# Patient Record
Sex: FEMALE | HISPANIC OR LATINO | Employment: UNEMPLOYED | URBAN - METROPOLITAN AREA
[De-identification: names, ages, dates, MRNs, and addresses within clinical notes are randomized per-mention and may not be internally consistent; named-entity substitution may affect disease eponyms.]

---

## 2020-02-12 ENCOUNTER — OFFICE VISIT (OUTPATIENT)
Dept: FAMILY MEDICINE CLINIC | Facility: CLINIC | Age: 60
End: 2020-02-12

## 2020-02-12 ENCOUNTER — LAB (OUTPATIENT)
Dept: FAMILY MEDICINE CLINIC | Facility: CLINIC | Age: 60
End: 2020-02-12

## 2020-02-12 ENCOUNTER — RESULTS ENCOUNTER (OUTPATIENT)
Dept: FAMILY MEDICINE CLINIC | Facility: CLINIC | Age: 60
End: 2020-02-12

## 2020-02-12 VITALS
HEIGHT: 65 IN | SYSTOLIC BLOOD PRESSURE: 148 MMHG | WEIGHT: 200.6 LBS | OXYGEN SATURATION: 98 % | HEART RATE: 94 BPM | DIASTOLIC BLOOD PRESSURE: 83 MMHG | BODY MASS INDEX: 33.42 KG/M2 | RESPIRATION RATE: 16 BRPM | TEMPERATURE: 97.4 F

## 2020-02-12 DIAGNOSIS — I10 HYPERTENSION, UNSPECIFIED TYPE: Primary | ICD-10-CM

## 2020-02-12 DIAGNOSIS — Z23 NEED FOR SHINGLES VACCINE: ICD-10-CM

## 2020-02-12 DIAGNOSIS — Z12.39 BREAST CANCER SCREENING: ICD-10-CM

## 2020-02-12 DIAGNOSIS — M25.50 ARTHRALGIA, UNSPECIFIED JOINT: ICD-10-CM

## 2020-02-12 DIAGNOSIS — G47.00 INSOMNIA, UNSPECIFIED TYPE: ICD-10-CM

## 2020-02-12 DIAGNOSIS — F39 MOOD DISORDER (HCC): ICD-10-CM

## 2020-02-12 DIAGNOSIS — E11.9 TYPE 2 DIABETES MELLITUS WITHOUT COMPLICATION, WITHOUT LONG-TERM CURRENT USE OF INSULIN (HCC): ICD-10-CM

## 2020-02-12 DIAGNOSIS — Z12.11 COLON CANCER SCREENING: ICD-10-CM

## 2020-02-12 DIAGNOSIS — I10 HYPERTENSION, UNSPECIFIED TYPE: ICD-10-CM

## 2020-02-12 LAB
ALBUMIN SERPL-MCNC: 4.5 G/DL (ref 3.5–5.2)
ALBUMIN/GLOB SERPL: 1.4 G/DL
ALP SERPL-CCNC: 114 U/L (ref 39–117)
ALT SERPL W P-5'-P-CCNC: 14 U/L (ref 1–33)
ANION GAP SERPL CALCULATED.3IONS-SCNC: 12.8 MMOL/L (ref 5–15)
AST SERPL-CCNC: 14 U/L (ref 1–32)
BASOPHILS # BLD AUTO: 0.02 10*3/MM3 (ref 0–0.2)
BASOPHILS NFR BLD AUTO: 0.2 % (ref 0–1.5)
BILIRUB SERPL-MCNC: 0.2 MG/DL (ref 0.2–1.2)
BUN BLD-MCNC: 13 MG/DL (ref 6–20)
BUN/CREAT SERPL: 18.6 (ref 7–25)
CALCIUM SPEC-SCNC: 9.5 MG/DL (ref 8.6–10.5)
CHLORIDE SERPL-SCNC: 97 MMOL/L (ref 98–107)
CHOLEST SERPL-MCNC: 236 MG/DL (ref 0–200)
CHROMATIN AB SERPL-ACNC: <10 IU/ML (ref 0–14)
CO2 SERPL-SCNC: 26.2 MMOL/L (ref 22–29)
CREAT BLD-MCNC: 0.7 MG/DL (ref 0.57–1)
CRP SERPL-MCNC: 1.89 MG/DL (ref 0–0.5)
DEPRECATED RDW RBC AUTO: 40.8 FL (ref 37–54)
EOSINOPHIL # BLD AUTO: 0.15 10*3/MM3 (ref 0–0.4)
EOSINOPHIL NFR BLD AUTO: 1.6 % (ref 0.3–6.2)
ERYTHROCYTE [DISTWIDTH] IN BLOOD BY AUTOMATED COUNT: 12.5 % (ref 12.3–15.4)
GFR SERPL CREATININE-BSD FRML MDRD: 105 ML/MIN/1.73
GFR SERPL CREATININE-BSD FRML MDRD: 86 ML/MIN/1.73
GLOBULIN UR ELPH-MCNC: 3.3 GM/DL
GLUCOSE BLD-MCNC: 232 MG/DL (ref 65–99)
HBA1C MFR BLD: 10.7 % (ref 3.5–5.6)
HCT VFR BLD AUTO: 42.9 % (ref 34–46.6)
HDLC SERPL-MCNC: 38 MG/DL (ref 40–60)
HGB BLD-MCNC: 14.6 G/DL (ref 12–15.9)
IMM GRANULOCYTES # BLD AUTO: 0.04 10*3/MM3 (ref 0–0.05)
IMM GRANULOCYTES NFR BLD AUTO: 0.4 % (ref 0–0.5)
LDLC SERPL CALC-MCNC: 158 MG/DL (ref 0–100)
LDLC/HDLC SERPL: 4.16 {RATIO}
LYMPHOCYTES # BLD AUTO: 4.06 10*3/MM3 (ref 0.7–3.1)
LYMPHOCYTES NFR BLD AUTO: 43 % (ref 19.6–45.3)
MCH RBC QN AUTO: 31.2 PG (ref 26.6–33)
MCHC RBC AUTO-ENTMCNC: 34 G/DL (ref 31.5–35.7)
MCV RBC AUTO: 91.7 FL (ref 79–97)
MONOCYTES # BLD AUTO: 0.46 10*3/MM3 (ref 0.1–0.9)
MONOCYTES NFR BLD AUTO: 4.9 % (ref 5–12)
NEUTROPHILS # BLD AUTO: 4.72 10*3/MM3 (ref 1.7–7)
NEUTROPHILS NFR BLD AUTO: 49.9 % (ref 42.7–76)
NRBC BLD AUTO-RTO: 0 /100 WBC (ref 0–0.2)
PLATELET # BLD AUTO: 294 10*3/MM3 (ref 140–450)
PMV BLD AUTO: 10.9 FL (ref 6–12)
POTASSIUM BLD-SCNC: 4.4 MMOL/L (ref 3.5–5.2)
PROT SERPL-MCNC: 7.8 G/DL (ref 6–8.5)
RBC # BLD AUTO: 4.68 10*6/MM3 (ref 3.77–5.28)
SODIUM BLD-SCNC: 136 MMOL/L (ref 136–145)
TRIGL SERPL-MCNC: 199 MG/DL (ref 0–150)
TSH SERPL DL<=0.05 MIU/L-ACNC: 2.35 UIU/ML (ref 0.27–4.2)
VLDLC SERPL-MCNC: 39.8 MG/DL (ref 5–40)
WBC NRBC COR # BLD: 9.45 10*3/MM3 (ref 3.4–10.8)

## 2020-02-12 PROCEDURE — 86431 RHEUMATOID FACTOR QUANT: CPT | Performed by: NURSE PRACTITIONER

## 2020-02-12 PROCEDURE — 86140 C-REACTIVE PROTEIN: CPT | Performed by: NURSE PRACTITIONER

## 2020-02-12 PROCEDURE — 86038 ANTINUCLEAR ANTIBODIES: CPT | Performed by: NURSE PRACTITIONER

## 2020-02-12 PROCEDURE — 80061 LIPID PANEL: CPT | Performed by: NURSE PRACTITIONER

## 2020-02-12 PROCEDURE — 84443 ASSAY THYROID STIM HORMONE: CPT | Performed by: NURSE PRACTITIONER

## 2020-02-12 PROCEDURE — 36415 COLL VENOUS BLD VENIPUNCTURE: CPT

## 2020-02-12 PROCEDURE — 80053 COMPREHEN METABOLIC PANEL: CPT | Performed by: NURSE PRACTITIONER

## 2020-02-12 PROCEDURE — 83036 HEMOGLOBIN GLYCOSYLATED A1C: CPT | Performed by: NURSE PRACTITIONER

## 2020-02-12 PROCEDURE — 99204 OFFICE O/P NEW MOD 45 MIN: CPT | Performed by: NURSE PRACTITIONER

## 2020-02-12 PROCEDURE — 85025 COMPLETE CBC W/AUTO DIFF WBC: CPT | Performed by: NURSE PRACTITIONER

## 2020-02-12 RX ORDER — GLIPIZIDE 10 MG/1
10 TABLET ORAL
Qty: 180 TABLET | Refills: 1 | Status: SHIPPED | OUTPATIENT
Start: 2020-02-12 | End: 2020-05-11 | Stop reason: SDUPTHER

## 2020-02-12 RX ORDER — LISINOPRIL 10 MG/1
10 TABLET ORAL DAILY
COMMUNITY
End: 2020-02-12

## 2020-02-12 RX ORDER — AMITRIPTYLINE HYDROCHLORIDE 75 MG/1
75 TABLET, FILM COATED ORAL NIGHTLY
COMMUNITY
End: 2020-02-12 | Stop reason: SDUPTHER

## 2020-02-12 RX ORDER — GLIPIZIDE 10 MG/1
10 TABLET ORAL
COMMUNITY
End: 2020-02-12 | Stop reason: SDUPTHER

## 2020-02-12 RX ORDER — MELOXICAM 15 MG/1
15 TABLET ORAL DAILY
Qty: 30 TABLET | Refills: 0 | Status: SHIPPED | OUTPATIENT
Start: 2020-02-12 | End: 2020-05-11 | Stop reason: SDUPTHER

## 2020-02-12 RX ORDER — ZOLPIDEM TARTRATE 10 MG/1
10 TABLET ORAL NIGHTLY PRN
COMMUNITY
End: 2020-02-12 | Stop reason: SDUPTHER

## 2020-02-12 RX ORDER — LISINOPRIL 20 MG/1
20 TABLET ORAL DAILY
Qty: 30 TABLET | Refills: 2 | Status: SHIPPED | OUTPATIENT
Start: 2020-02-12 | End: 2020-05-11 | Stop reason: SDUPTHER

## 2020-02-12 RX ORDER — ZOLPIDEM TARTRATE 10 MG/1
10 TABLET ORAL NIGHTLY PRN
Qty: 28 TABLET | Refills: 2 | Status: SHIPPED | OUTPATIENT
Start: 2020-02-12 | End: 2020-05-11 | Stop reason: SDUPTHER

## 2020-02-12 RX ORDER — AMITRIPTYLINE HYDROCHLORIDE 75 MG/1
75 TABLET, FILM COATED ORAL NIGHTLY
Qty: 90 TABLET | Refills: 1 | Status: SHIPPED | OUTPATIENT
Start: 2020-02-12 | End: 2020-05-11 | Stop reason: SDUPTHER

## 2020-02-12 NOTE — PROGRESS NOTES
Subjective   Carli Dubon is a 59 y.o. female.     Pt is here today to establish care and with c/o HTN, insomnia, DM.  Pt is from [place] but moved here in Oct.  She is single with 2 children.  She lives with her daughter and 6 grandchildren.  Saw her previous PCP in August.    HTN- currently takes lisinopril 10mg daily.  At home it runs 130/90.    Insomnia- currently takes elavil 75mg nightly and Ambien 10mg nightly.    DM- currently takes metformin 1000mg BID and glipizide 10mg BID.  Does not check BS at home, but does have a meter. Denies any hypoglycemic episodes. Eats a well balanced diet.  She walks frequently.    Bipolar- was previously on meds but lost medicaid and couldn't go to the psychiatrist.  Has mood swings frequently.  Has occasional anxiety and depression.    Pt reports that she has difficulty walking long distances.  She states that she has trouble gripping items because her hand bother her so much.  Pt reports that she has low back pain and bilateral hip pain.  On average pain is a 8/10.      Labs- due  Pap smear- 08/2019- normal  Mammogram- due  DEXA- post menopausal for 4 years.  Colonoscopy- never had- no family history- wants cologuard    Vaccines:  Flu- refused  PNA- believes she had one 15-20 years ago.  Shingles-due   Tdap- due- had 10 years ago    Dental exam- UTD  Eye exam- due         The following portions of the patient's history were reviewed and updated as appropriate: allergies, current medications, past family history, past medical history, past social history, past surgical history and problem list.    Review of Systems   Constitutional: Negative for appetite change, chills, fatigue and fever.   HENT: Negative for congestion, ear pain, hearing loss, postnasal drip, rhinorrhea, sinus pressure, sore throat, swollen glands and trouble swallowing.    Eyes: Negative for blurred vision, double vision, pain, discharge, itching and visual disturbance.   Respiratory: Negative for cough, chest  "tightness, shortness of breath and wheezing.    Cardiovascular: Negative for chest pain and palpitations.   Gastrointestinal: Negative for abdominal pain, blood in stool, constipation, diarrhea, nausea and vomiting.   Endocrine: Negative for polydipsia, polyphagia and polyuria.   Genitourinary: Negative for dysuria, flank pain, frequency and urgency.   Musculoskeletal: Positive for arthralgias, back pain and myalgias. Negative for joint swelling.   Skin: Negative for rash and skin lesions.   Neurological: Negative for dizziness, weakness, numbness and headache.   Psychiatric/Behavioral: Positive for sleep disturbance and stress. Negative for depressed mood. The patient is not nervous/anxious.        Objective   /83 (BP Location: Left arm, Patient Position: Sitting, Cuff Size: Large Adult)   Pulse 94   Temp 97.4 °F (36.3 °C) (Oral)   Resp 16   Ht 165.1 cm (65\")   Wt 91 kg (200 lb 9.6 oz)   SpO2 98%   BMI 33.38 kg/m²   Physical Exam   Constitutional: She is oriented to person, place, and time. She appears well-developed and well-nourished. No distress.   HENT:   Head: Normocephalic and atraumatic.   Eyes: Pupils are equal, round, and reactive to light. Conjunctivae and EOM are normal. Right eye exhibits no discharge. Left eye exhibits no discharge.   Neck: Normal range of motion. Neck supple.   Cardiovascular: Normal rate and regular rhythm.   Pulmonary/Chest: Effort normal and breath sounds normal. No respiratory distress. She has no wheezes. She has no rales.   Abdominal: Soft. Normal appearance and bowel sounds are normal. She exhibits no distension. There is no tenderness.   Musculoskeletal: Normal range of motion.   Neurological: She is alert and oriented to person, place, and time.   Skin: Skin is warm and dry. She is not diaphoretic.   Psychiatric: She has a normal mood and affect. Her behavior is normal. Thought content normal.   Vitals reviewed.        Assessment/Plan     Diagnoses and all orders " for this visit:    1. Hypertension, unspecified type (Primary)  Comments:  Increase lisinopril to 20 mg daily  Check blood pressure  Follow-up 1 month  Orders:  -     CBC & Differential  -     Lipid Panel; Future  -     TSH; Future  -     lisinopril (PRINIVIL,ZESTRIL) 20 MG tablet; Take 1 tablet by mouth Daily.  Dispense: 30 tablet; Refill: 2  -     CBC Auto Differential    2. Colon cancer screening  -     Cologuard - Stool, Per Rectum; Future    3. Breast cancer screening  -     Mammo Screening Digital Tomosynthesis Bilateral With CAD; Future    4. Arthralgia, unspecified joint  Comments:  Start meloxicam  Check rheumatic panel  Will fill out handicap placard according to results  Orders:  -     Rheumatoid factor; Future  -     C-reactive protein; Future  -     CARLITO; Future  -     meloxicam (MOBIC) 15 MG tablet; Take 1 tablet by mouth Daily.  Dispense: 30 tablet; Refill: 0    5. Need for shingles vaccine  -     Zoster Vac Recomb Adjuvanted (SHINGRIX) 50 MCG/0.5ML reconstituted suspension; Inject 0.5 mL into the appropriate muscle as directed by prescriber 1 (One) Time for 1 dose. Repeat dose in 6 mo  Dispense: 1 each; Refill: 1    6. Type 2 diabetes mellitus without complication, without long-term current use of insulin (CMS/[facility])  Comments:  Continue current meds  Check A1c  Advised to check blood sugars occasionally  Orders:  -     Comprehensive Metabolic Panel; Future  -     Hemoglobin A1c; Future  -     glipizide (GLUCOTROL) 10 MG tablet; Take 1 tablet by mouth 2 (Two) Times a Day Before Meals.  Dispense: 180 tablet; Refill: 1  -     metFORMIN (GLUCOPHAGE) 1000 MG tablet; Take 1 tablet by mouth 2 (Two) Times a Day With Meals.  Dispense: 180 tablet; Refill: 1    7. Mood disorder (CMS/HCC)  Comments:  Referral to psychiatry  Stable at this time    Orders:  -     Ambulatory Referral to Psychiatry    8. Insomnia, unspecified type  Comments:  Continue current medications  Patient was educated and is aware of risks of  Ambien  Orders:  -     zolpidem (AMBIEN) 10 MG tablet; Take 1 tablet by mouth At Night As Needed for Sleep.  Dispense: 28 tablet; Refill: 2  -     amitriptyline (ELAVIL) 75 MG tablet; Take 1 tablet by mouth Every Night.  Dispense: 90 tablet; Refill: 1

## 2020-02-12 NOTE — PATIENT INSTRUCTIONS
Tdap vaccine given today  Increase lisinopril to 20mg daily  Start taking meloxicam daily- no other NSAIDS (ibuprofen, aleve, motrin)  Get labs  Get cologuard  Get mammogram

## 2020-02-14 ENCOUNTER — TELEPHONE (OUTPATIENT)
Dept: FAMILY MEDICINE CLINIC | Facility: CLINIC | Age: 60
End: 2020-02-14

## 2020-02-14 DIAGNOSIS — E11.9 TYPE 2 DIABETES MELLITUS WITHOUT COMPLICATION, WITHOUT LONG-TERM CURRENT USE OF INSULIN (HCC): Primary | ICD-10-CM

## 2020-02-14 DIAGNOSIS — E78.5 HYPERLIPIDEMIA, UNSPECIFIED HYPERLIPIDEMIA TYPE: ICD-10-CM

## 2020-02-14 LAB — ANA SER QL: NEGATIVE

## 2020-02-14 RX ORDER — ATORVASTATIN CALCIUM 10 MG/1
10 TABLET, FILM COATED ORAL DAILY
Qty: 30 TABLET | Refills: 1 | Status: SHIPPED | OUTPATIENT
Start: 2020-02-14 | End: 2020-05-11 | Stop reason: SDUPTHER

## 2020-02-14 NOTE — TELEPHONE ENCOUNTER
Called and discussed patient's lab results with her.  I informed her that her A1c is quite elevated.  Patient reports that she does not want insulin at this time.  I told her we would start Januvia and Jardiance.  I also informed her that her cholesterol was elevated.  I am going to start a statin.  Patient was agreeable.  I have also filled out her temporary handicap placard due to her chronic joint pain.

## 2020-02-18 ENCOUNTER — HOSPITAL ENCOUNTER (OUTPATIENT)
Dept: MAMMOGRAPHY | Facility: HOSPITAL | Age: 60
Discharge: HOME | End: 2020-02-18
Admitting: NURSE PRACTITIONER

## 2020-02-18 DIAGNOSIS — Z12.39 BREAST CANCER SCREENING: ICD-10-CM

## 2020-02-18 PROCEDURE — 77067 SCR MAMMO BI INCL CAD: CPT

## 2020-02-18 PROCEDURE — 77063 BREAST TOMOSYNTHESIS BI: CPT

## 2020-02-24 ENCOUNTER — TELEPHONE (OUTPATIENT)
Dept: FAMILY MEDICINE CLINIC | Facility: CLINIC | Age: 60
End: 2020-02-24

## 2020-02-24 PROBLEM — E11.9 DIABETES (HCC): Status: ACTIVE | Noted: 2020-02-24

## 2020-02-25 NOTE — TELEPHONE ENCOUNTER
I submitted both the jardiance and januvia to [facility] on [address].  See if she has tried to pick them up please.

## 2020-03-03 PROBLEM — E78.2 MIXED HYPERLIPIDEMIA: Status: ACTIVE | Noted: 2020-03-03

## 2020-03-04 ENCOUNTER — HOSPITAL ENCOUNTER (EMERGENCY)
Facility: HOSPITAL | Age: 60
Discharge: HOME | End: 2020-03-04
Admitting: EMERGENCY MEDICINE

## 2020-03-04 VITALS
HEIGHT: 65 IN | OXYGEN SATURATION: 97 % | DIASTOLIC BLOOD PRESSURE: 91 MMHG | TEMPERATURE: 98.8 F | HEART RATE: 120 BPM | SYSTOLIC BLOOD PRESSURE: 155 MMHG | BODY MASS INDEX: 33.54 KG/M2 | RESPIRATION RATE: 17 BRPM | WEIGHT: 201.28 LBS

## 2020-03-04 DIAGNOSIS — J02.0 STREP PHARYNGITIS: Primary | ICD-10-CM

## 2020-03-04 LAB
FLUAV SUBTYP SPEC NAA+PROBE: NOT DETECTED
FLUBV RNA ISLT QL NAA+PROBE: NOT DETECTED
S PYO AG THROAT QL: POSITIVE

## 2020-03-04 PROCEDURE — 87502 INFLUENZA DNA AMP PROBE: CPT

## 2020-03-04 PROCEDURE — 25010000002 PENICILLIN G BENZATHINE PER 1200000 UNITS: Performed by: NURSE PRACTITIONER

## 2020-03-04 PROCEDURE — 87651 STREP A DNA AMP PROBE: CPT | Performed by: NURSE PRACTITIONER

## 2020-03-04 PROCEDURE — 96372 THER/PROPH/DIAG INJ SC/IM: CPT

## 2020-03-04 PROCEDURE — 99283 EMERGENCY DEPT VISIT LOW MDM: CPT

## 2020-03-04 RX ORDER — ACETAMINOPHEN 500 MG
1000 TABLET ORAL ONCE
Status: COMPLETED | OUTPATIENT
Start: 2020-03-04 | End: 2020-03-04

## 2020-03-04 RX ADMIN — ACETAMINOPHEN 1000 MG: 500 TABLET, FILM COATED ORAL at 16:53

## 2020-03-04 RX ADMIN — PENICILLIN G BENZATHINE 1.2 MILLION UNITS: 1200000 INJECTION, SUSPENSION INTRAMUSCULAR at 18:13

## 2020-03-04 NOTE — ED PROVIDER NOTES
Subjective   Patient is a 59-year-old  female with history of hypertension and diabetes who presents today with complaints of sore throat fever chills and body aches.  She states her symptoms started yesterday.  She denies any cough or nasal congestion.  She denies any nausea vomiting or diarrhea.  She denies any ill contacts or recent travel.  She reports pain with swallowing but denies any difficulty breathing or swallowing.          Review of Systems   Constitutional: Positive for chills and fever.   HENT: Positive for sore throat. Negative for congestion, drooling, rhinorrhea, trouble swallowing and voice change.    Respiratory: Negative for cough.    Gastrointestinal: Negative for diarrhea, nausea and vomiting.   Skin: Negative for rash.       Past Medical History:   Diagnosis Date   • Bipolar 1 disorder (CMS/[facility])    • Diabetes mellitus (CMS/[facility])    • Hypertension    • PTSD (post-traumatic stress disorder)        Allergies   Allergen Reactions   • Iodine Swelling   • Adhesive Tape Rash       No past surgical history on file.    Family History   Problem Relation Age of Onset   • Hypertension Mother    • Diabetes Mother        Social History     Socioeconomic History   • Marital status: Unknown     Spouse name: Not on file   • Number of children: Not on file   • Years of education: Not on file   • Highest education level: Not on file   Tobacco Use   • Smoking status: Current Every Day Smoker     Packs/day: 1.00     Types: Cigarettes   • Smokeless tobacco: Never Used   Substance and Sexual Activity   • Alcohol use: Not Currently   • Drug use: Not Currently   • Sexual activity: Not Currently           Objective   Physical Exam   Constitutional: She appears well-developed.   Vital signs and triage nurse note reviewed.  Constitutional: Awake, alert; well-developed and well-nourished. No acute distress is noted.  HEENT: Normocephalic, atraumatic; pupils are PERRL with intact EOM; oropharynx is erythematous and  moist with exudate.  Uvula is midline.  No drooling or pooling of oral secretions.  Neck: Supple, full range of motion without pain; no cervical lymphadenopathy. Normal phonation.  Cardiovascular: Regular rate and rhythm, normal S1-S2.  No murmur noted.  Pulmonary: Respiratory effort regular nonlabored, breath sounds clear to auscultation all fields.  Abdomen: Soft, nontender, nondistended with normoactive bowel sounds; no rebound or guarding.  Musculoskeletal: Independent range of motion of all extremities with no palpable tenderness or edema.  Neuro: Alert oriented x3, speech is clear and appropriate, GCS 15.    Skin: Flesh tone, warm, dry, intact; no erythematous or petechial rash or lesion.        Procedures           ED Course      Labs Reviewed   RAPID STREP A SCREEN - Abnormal; Notable for the following components:       Result Value    Strep A Ag Positive (*)     All other components within normal limits   INFLUENZA ANTIGEN, RAPID - Normal     No radiology results for the last day  Medications   penicillin G benzathine (BICILLIN-LA) injection 1.2 Million Units (has no administration in time range)   acetaminophen (TYLENOL) tablet 1,000 mg (1,000 mg Oral Given 3/4/20 1653)                                          MDM  Number of Diagnoses or Management Options  Strep pharyngitis:   Diagnosis management comments: Patient had strep and flu swab obtained.  She was given Tylenol for fever.  She remained well-appearing throughout her ED stay and in no acute distress with stable vital signs.    Patient was given Bicillin 1,200,000 units IM.    Diagnosis and treatment plan discussed with patient.  Patient agreeable to plan.   I discussed findings with patient who voices understanding of discharge instructions, signs and symptoms requiring return to ED; discharged improved and in stable condition with follow up for re-evaluation.         Amount and/or Complexity of Data Reviewed  Clinical lab tests: reviewed and  ordered    Patient Progress  Patient progress: stable      Final diagnoses:   Strep pharyngitis            Penelope Odell, DILLON  03/04/20 3941

## 2020-03-04 NOTE — DISCHARGE INSTRUCTIONS
Continue Tylenol ibuprofen as needed for pain and fever.  Drink plenty of fluids.  Warm salt water gargles.  Chloraseptic throat spray or Cepacol lozenges.  Change your toothbrush in 2 days.  Follow-up with your primary care physician as needed.  Return for new or worsening symptoms.

## 2020-03-12 ENCOUNTER — OFFICE VISIT (OUTPATIENT)
Dept: FAMILY MEDICINE CLINIC | Facility: CLINIC | Age: 60
End: 2020-03-12

## 2020-03-12 VITALS
BODY MASS INDEX: 33.59 KG/M2 | TEMPERATURE: 97.4 F | RESPIRATION RATE: 16 BRPM | SYSTOLIC BLOOD PRESSURE: 144 MMHG | WEIGHT: 201.6 LBS | HEIGHT: 65 IN | OXYGEN SATURATION: 99 % | DIASTOLIC BLOOD PRESSURE: 86 MMHG | HEART RATE: 99 BPM

## 2020-03-12 DIAGNOSIS — M25.50 ARTHRALGIA, UNSPECIFIED JOINT: ICD-10-CM

## 2020-03-12 DIAGNOSIS — E11.9 TYPE 2 DIABETES MELLITUS WITHOUT COMPLICATION, WITHOUT LONG-TERM CURRENT USE OF INSULIN (HCC): ICD-10-CM

## 2020-03-12 DIAGNOSIS — I10 HYPERTENSION, UNSPECIFIED TYPE: Primary | ICD-10-CM

## 2020-03-12 PROCEDURE — 99213 OFFICE O/P EST LOW 20 MIN: CPT | Performed by: NURSE PRACTITIONER

## 2020-03-12 NOTE — PROGRESS NOTES
"Subjective   Carli Dubon is a 59 y.o. female.     Pt is here today for a 1 mo follow up on HTN, arthralgias, DM.  Overall, she is doing well at this time.    HTN- lisinopril was previously increased to 20mg daily.  She reports that she is feeling good with the medication change.  Has not checked it at home at all.  Denies CP, SOA, dizziness, or HA.    Arthralgias- pt was started on meloxicam daily. She states that she has not seen any difference in her joint pain since starting the medication. The pain is daily, but it is manageable.      DM- currently on glipizide 10mg BID, metformin 1000mg BID, Jardiance 25mg daily, and Januvia 100mg.  She states that she is doing really well on her new medications.  She does not check her blood sugars at home.  She can tell that they are doing better though.  Denies any hypoglycemia.           The following portions of the patient's history were reviewed and updated as appropriate: allergies, current medications, past family history, past medical history, past social history, past surgical history and problem list.    Review of Systems   Constitutional: Negative for chills, fatigue and fever.   HENT: Negative for congestion, sinus pressure and sore throat.    Eyes: Negative for blurred vision and double vision.   Respiratory: Negative for cough, chest tightness and shortness of breath.    Cardiovascular: Negative for chest pain, palpitations and leg swelling.   Gastrointestinal: Negative for abdominal pain, diarrhea, nausea and vomiting.   Musculoskeletal: Positive for arthralgias.   Neurological: Positive for headache (occasional). Negative for dizziness.       Objective   /86 (BP Location: Left arm, Patient Position: Sitting, Cuff Size: Adult) Comment (BP Location): ELEVATED HEART LEVEL  Pulse 99   Temp 97.4 °F (36.3 °C) (Oral)   Resp 16   Ht 165.1 cm (65\")   Wt 91.4 kg (201 lb 9.6 oz)   SpO2 99%   Breastfeeding No   BMI 33.55 kg/m²   Physical Exam   "   Constitutional: She is oriented to person, place, and time. She appears well-developed and well-nourished. No distress.   HENT:   Head: Normocephalic and atraumatic.   Cardiovascular: Normal rate, regular rhythm and normal heart sounds.   Pulmonary/Chest: Effort normal and breath sounds normal. No respiratory distress.   Abdominal: Soft.   Musculoskeletal: She exhibits no edema.   Neurological: She is alert and oriented to person, place, and time.   Psychiatric: She has a normal mood and affect. Her behavior is normal. Judgment and thought content normal.         Assessment/Plan     Diagnoses and all orders for this visit:    1. Hypertension, unspecified type (Primary)  Comments:  stable  cont lisinopril  follow up 6 mo    2. Type 2 diabetes mellitus without complication, without long-term current use of insulin (CMS/[facility])  Comments:  reports she is doing well  cont current meds  recheck A1C in 3 mo    3. Arthralgia, unspecified joint  Comments:  no change  stop meloxicam  tylenol arthritis prn  pain is manageable.

## 2020-03-12 NOTE — PATIENT INSTRUCTIONS
Continue current meds  Stop meloxicam  Take tylenol arthritis as needed  Call for any issues or concerns

## 2020-05-11 DIAGNOSIS — G47.00 INSOMNIA, UNSPECIFIED TYPE: ICD-10-CM

## 2020-05-11 DIAGNOSIS — E11.9 TYPE 2 DIABETES MELLITUS WITHOUT COMPLICATION, WITHOUT LONG-TERM CURRENT USE OF INSULIN (HCC): ICD-10-CM

## 2020-05-11 DIAGNOSIS — I10 HYPERTENSION, UNSPECIFIED TYPE: ICD-10-CM

## 2020-05-11 DIAGNOSIS — E78.5 HYPERLIPIDEMIA, UNSPECIFIED HYPERLIPIDEMIA TYPE: ICD-10-CM

## 2020-05-11 DIAGNOSIS — M25.50 ARTHRALGIA, UNSPECIFIED JOINT: ICD-10-CM

## 2020-05-11 RX ORDER — AMITRIPTYLINE HYDROCHLORIDE 75 MG/1
75 TABLET, FILM COATED ORAL NIGHTLY
Qty: 90 TABLET | Refills: 1 | Status: SHIPPED | OUTPATIENT
Start: 2020-05-11 | End: 2021-03-08 | Stop reason: SDUPTHER

## 2020-05-11 RX ORDER — ATORVASTATIN CALCIUM 10 MG/1
10 TABLET, FILM COATED ORAL DAILY
Qty: 30 TABLET | Refills: 1 | Status: SHIPPED | OUTPATIENT
Start: 2020-05-11 | End: 2020-08-10 | Stop reason: SDUPTHER

## 2020-05-11 RX ORDER — GLIPIZIDE 10 MG/1
10 TABLET ORAL
Qty: 180 TABLET | Refills: 1 | Status: SHIPPED | OUTPATIENT
Start: 2020-05-11 | End: 2021-06-25 | Stop reason: SDUPTHER

## 2020-05-11 RX ORDER — LISINOPRIL 20 MG/1
20 TABLET ORAL DAILY
Qty: 30 TABLET | Refills: 2 | Status: SHIPPED | OUTPATIENT
Start: 2020-05-11 | End: 2020-10-05 | Stop reason: SDUPTHER

## 2020-05-11 RX ORDER — ZOLPIDEM TARTRATE 10 MG/1
10 TABLET ORAL NIGHTLY PRN
Qty: 28 TABLET | Refills: 2 | Status: SHIPPED | OUTPATIENT
Start: 2020-05-11 | End: 2020-08-10 | Stop reason: SDUPTHER

## 2020-05-11 RX ORDER — MELOXICAM 15 MG/1
15 TABLET ORAL DAILY
Qty: 30 TABLET | Refills: 0 | Status: SHIPPED | OUTPATIENT
Start: 2020-05-11 | End: 2020-10-05

## 2020-05-12 ENCOUNTER — OFFICE VISIT (OUTPATIENT)
Dept: FAMILY MEDICINE CLINIC | Facility: CLINIC | Age: 60
End: 2020-05-12

## 2020-05-12 VITALS
OXYGEN SATURATION: 98 % | TEMPERATURE: 97.7 F | DIASTOLIC BLOOD PRESSURE: 83 MMHG | HEIGHT: 65 IN | HEART RATE: 95 BPM | BODY MASS INDEX: 32.32 KG/M2 | WEIGHT: 194 LBS | SYSTOLIC BLOOD PRESSURE: 123 MMHG

## 2020-05-12 DIAGNOSIS — J44.9 CHRONIC OBSTRUCTIVE PULMONARY DISEASE, UNSPECIFIED COPD TYPE (HCC): ICD-10-CM

## 2020-05-12 DIAGNOSIS — E11.9 TYPE 2 DIABETES MELLITUS WITHOUT COMPLICATION, WITHOUT LONG-TERM CURRENT USE OF INSULIN (HCC): Primary | ICD-10-CM

## 2020-05-12 DIAGNOSIS — E78.5 HYPERLIPIDEMIA, UNSPECIFIED HYPERLIPIDEMIA TYPE: ICD-10-CM

## 2020-05-12 DIAGNOSIS — B37.9 YEAST INFECTION: ICD-10-CM

## 2020-05-12 PROCEDURE — 99214 OFFICE O/P EST MOD 30 MIN: CPT | Performed by: NURSE PRACTITIONER

## 2020-05-12 RX ORDER — FLUCONAZOLE 150 MG/1
150 TABLET ORAL ONCE
Qty: 1 TABLET | Refills: 0 | Status: SHIPPED | OUTPATIENT
Start: 2020-05-12 | End: 2020-05-12

## 2020-05-12 NOTE — PROGRESS NOTES
Subjective   Carli Dubon is a 59 y.o. female.     Pt is here today to follow up on DM and hyperlipidemia.    DM- currently on glipizide 10mg BID, metformin 1000mg BID, Jardiance 25mg daily, and Januvia 100mg.  She states that she is well on her medications.  She does not check her blood sugars at home.  She can tell that they are doing better though.  Denies any hypoglycemia. She has not been watching her diet as well since the pandemic started.  She does report that she currently has a yeast infection, which we believe is due to the jardiance. She is having vaginal itching. Denies dysuria    Hyperlipidemia- currently taking lipitor 10mg daily. She is down 7lbs since her last visit 2 months ago.    Asthma/copd- believes that her asthma is acting up.  She reports that she gets short of air some, and she is coughing frequently when she is lying down.  She used to be on spiriva when she was in [place] but has not been taking that since moving.  She is smoking 1ppd.       The following portions of the patient's history were reviewed and updated as appropriate: allergies, current medications, past family history, past medical history, past social history, past surgical history and problem list.    Review of Systems   Constitutional: Negative for chills, fatigue and fever.   HENT: Negative for congestion, sinus pressure and sore throat.    Respiratory: Positive for cough and shortness of breath (occasional). Negative for chest tightness.    Cardiovascular: Negative for chest pain and palpitations.   Gastrointestinal: Negative for abdominal pain, constipation, diarrhea, nausea and vomiting.   Genitourinary: Positive for vaginal pain (itching). Negative for dysuria, frequency and urgency.   Musculoskeletal: Positive for arthralgias.   Neurological: Negative for dizziness and headache.       Objective   /83 (BP Location: Left arm, Patient Position: Sitting, Cuff Size: Large Adult)   Pulse 95   Temp 97.7 °F (36.5 °C)  "(Oral)   Ht 165.1 cm (65\")   Wt 88 kg (194 lb)   SpO2 98%   Breastfeeding No   BMI 32.28 kg/m²   Physical Exam   Constitutional: She is oriented to person, place, and time. She appears well-developed and well-nourished. No distress.   HENT:   Head: Normocephalic and atraumatic.   Eyes: EOM are normal.   Cardiovascular: Normal rate, regular rhythm and normal heart sounds.   No murmur heard.  Pulmonary/Chest: Effort normal and breath sounds normal. No respiratory distress. She has no wheezes.   Abdominal: Soft.   Neurological: She is alert and oriented to person, place, and time.   Psychiatric: She has a normal mood and affect. Her behavior is normal. Judgment and thought content normal.         Assessment/Plan     Diagnoses and all orders for this visit:    1. Type 2 diabetes mellitus without complication, without long-term current use of insulin (CMS/[facility]) (Primary)  Comments:  tolerating meds well  has yeast inf- likely from jardiance- may need to switch if reoccurence  check a1c  Orders:  -     Hemoglobin A1c; Future    2. Hyperlipidemia, unspecified hyperlipidemia type  Comments:  stable  check lipid panel  work on diet and exercise  Orders:  -     Lipid Panel; Future    3. Yeast infection  Comments:  diflucan  if continues will need to stop jardiance  Orders:  -     fluconazole (Diflucan) 150 MG tablet; Take 1 tablet by mouth 1 (One) Time for 1 dose.  Dispense: 1 tablet; Refill: 0    4. Chronic obstructive pulmonary disease, unspecified COPD type (CMS/[facility])  Comments:  restart spiriva  may need PFTS  smoking cessation discussed  Orders:  -     Tiotropium Bromide Monohydrate (Spiriva Respimat) 1.25 MCG/ACT aerosol solution inhaler; Inhale 2 puffs Daily.  Dispense: 1 inhaler; Refill: 11              "

## 2020-05-12 NOTE — PATIENT INSTRUCTIONS
Restart spiriva inhaler- 2 puffs daily  Work on smoking cessation  Continue current meds  Get labs drawn  Work on diet and exercise  Take diflucan pill for yeast infection, call if it continues

## 2020-06-15 ENCOUNTER — HOSPITAL ENCOUNTER (INPATIENT)
Facility: HOSPITAL | Age: 60
LOS: 1 days | Discharge: HOME | End: 2020-06-17
Attending: EMERGENCY MEDICINE | Admitting: INTERNAL MEDICINE

## 2020-06-15 ENCOUNTER — APPOINTMENT (OUTPATIENT)
Dept: CT IMAGING | Facility: HOSPITAL | Age: 60
End: 2020-06-15

## 2020-06-15 DIAGNOSIS — K85.90 ACUTE PANCREATITIS WITHOUT INFECTION OR NECROSIS, UNSPECIFIED PANCREATITIS TYPE: Primary | ICD-10-CM

## 2020-06-15 DIAGNOSIS — R10.9 ABDOMINAL PAIN, UNSPECIFIED ABDOMINAL LOCATION: ICD-10-CM

## 2020-06-15 DIAGNOSIS — K85.30 DRUG-INDUCED ACUTE PANCREATITIS WITHOUT INFECTION OR NECROSIS: ICD-10-CM

## 2020-06-15 PROBLEM — E66.9 OBESITY (BMI 30-39.9): Chronic | Status: ACTIVE | Noted: 2020-06-15

## 2020-06-15 PROBLEM — I10 ESSENTIAL HYPERTENSION: Status: ACTIVE | Noted: 2020-06-15

## 2020-06-15 PROBLEM — F43.10 PTSD (POST-TRAUMATIC STRESS DISORDER): Chronic | Status: ACTIVE | Noted: 2020-06-15

## 2020-06-15 PROBLEM — Z72.0 TOBACCO ABUSE: Chronic | Status: ACTIVE | Noted: 2020-06-15

## 2020-06-15 LAB
ALBUMIN SERPL-MCNC: 4.2 G/DL (ref 3.5–5.2)
ALBUMIN/GLOB SERPL: 1.1 G/DL
ALP SERPL-CCNC: 116 U/L (ref 39–117)
ALT SERPL W P-5'-P-CCNC: 8 U/L (ref 1–33)
ANION GAP SERPL CALCULATED.3IONS-SCNC: 11 MMOL/L (ref 5–15)
AST SERPL-CCNC: 10 U/L (ref 1–32)
BASOPHILS # BLD AUTO: 0.1 10*3/MM3 (ref 0–0.2)
BASOPHILS NFR BLD AUTO: 0.8 % (ref 0–1.5)
BILIRUB SERPL-MCNC: 0.2 MG/DL (ref 0.2–1.2)
BILIRUB UR QL STRIP: NEGATIVE
BUN BLD-MCNC: 11 MG/DL (ref 6–20)
BUN BLD-MCNC: ABNORMAL MG/DL
BUN/CREAT SERPL: ABNORMAL
CALCIUM SPEC-SCNC: 8.8 MG/DL (ref 8.6–10.5)
CHLORIDE SERPL-SCNC: 101 MMOL/L (ref 98–107)
CLARITY UR: CLEAR
CO2 SERPL-SCNC: 25 MMOL/L (ref 22–29)
COLOR UR: YELLOW
CREAT BLD-MCNC: 0.77 MG/DL (ref 0.57–1)
DEPRECATED RDW RBC AUTO: 47.3 FL (ref 37–54)
EOSINOPHIL # BLD AUTO: 0.1 10*3/MM3 (ref 0–0.4)
EOSINOPHIL NFR BLD AUTO: 1.1 % (ref 0.3–6.2)
ERYTHROCYTE [DISTWIDTH] IN BLOOD BY AUTOMATED COUNT: 14.4 % (ref 12.3–15.4)
GFR SERPL CREATININE-BSD FRML MDRD: 77 ML/MIN/1.73
GFR SERPL CREATININE-BSD FRML MDRD: 94 ML/MIN/1.73
GLOBULIN UR ELPH-MCNC: 3.9 GM/DL
GLUCOSE BLD-MCNC: 188 MG/DL (ref 65–99)
GLUCOSE UR STRIP-MCNC: ABNORMAL MG/DL
HCT VFR BLD AUTO: 44.4 % (ref 34–46.6)
HGB BLD-MCNC: 15.4 G/DL (ref 12–15.9)
HGB UR QL STRIP.AUTO: NEGATIVE
HOLD SPECIMEN: NORMAL
KETONES UR QL STRIP: NEGATIVE
LEUKOCYTE ESTERASE UR QL STRIP.AUTO: NEGATIVE
LIPASE SERPL-CCNC: 296 U/L (ref 13–60)
LYMPHOCYTES # BLD AUTO: 3.2 10*3/MM3 (ref 0.7–3.1)
LYMPHOCYTES NFR BLD AUTO: 26.6 % (ref 19.6–45.3)
MAGNESIUM SERPL-MCNC: 2.3 MG/DL (ref 1.6–2.6)
MCH RBC QN AUTO: 32.7 PG (ref 26.6–33)
MCHC RBC AUTO-ENTMCNC: 34.8 G/DL (ref 31.5–35.7)
MCV RBC AUTO: 94 FL (ref 79–97)
MONOCYTES # BLD AUTO: 0.6 10*3/MM3 (ref 0.1–0.9)
MONOCYTES NFR BLD AUTO: 5.2 % (ref 5–12)
NEUTROPHILS # BLD AUTO: 7.9 10*3/MM3 (ref 1.7–7)
NEUTROPHILS NFR BLD AUTO: 66.3 % (ref 42.7–76)
NITRITE UR QL STRIP: NEGATIVE
NRBC BLD AUTO-RTO: 0.1 /100 WBC (ref 0–0.2)
PH UR STRIP.AUTO: <=5 [PH] (ref 5–8)
PHOSPHATE SERPL-MCNC: 3.1 MG/DL (ref 2.5–4.5)
PLATELET # BLD AUTO: 290 10*3/MM3 (ref 140–450)
PMV BLD AUTO: 8.7 FL (ref 6–12)
POTASSIUM BLD-SCNC: 3.9 MMOL/L (ref 3.5–5.2)
PROT SERPL-MCNC: 8.1 G/DL (ref 6–8.5)
PROT UR QL STRIP: NEGATIVE
RBC # BLD AUTO: 4.72 10*6/MM3 (ref 3.77–5.28)
SODIUM BLD-SCNC: 137 MMOL/L (ref 136–145)
SP GR UR STRIP: 1.05 (ref 1–1.03)
UROBILINOGEN UR QL STRIP: ABNORMAL
WBC NRBC COR # BLD: 11.9 10*3/MM3 (ref 3.4–10.8)
WHOLE BLOOD HOLD SPECIMEN: NORMAL

## 2020-06-15 PROCEDURE — 84100 ASSAY OF PHOSPHORUS: CPT | Performed by: PHYSICIAN ASSISTANT

## 2020-06-15 PROCEDURE — G0378 HOSPITAL OBSERVATION PER HR: HCPCS

## 2020-06-15 PROCEDURE — 80053 COMPREHEN METABOLIC PANEL: CPT | Performed by: EMERGENCY MEDICINE

## 2020-06-15 PROCEDURE — 25010000002 ONDANSETRON PER 1 MG: Performed by: EMERGENCY MEDICINE

## 2020-06-15 PROCEDURE — 83690 ASSAY OF LIPASE: CPT | Performed by: EMERGENCY MEDICINE

## 2020-06-15 PROCEDURE — 99219 PR INITIAL OBSERVATION CARE/DAY 50 MINUTES: CPT | Performed by: PHYSICIAN ASSISTANT

## 2020-06-15 PROCEDURE — 25010000002 MORPHINE PER 10 MG: Performed by: EMERGENCY MEDICINE

## 2020-06-15 PROCEDURE — 81003 URINALYSIS AUTO W/O SCOPE: CPT | Performed by: EMERGENCY MEDICINE

## 2020-06-15 PROCEDURE — 94799 UNLISTED PULMONARY SVC/PX: CPT

## 2020-06-15 PROCEDURE — 83735 ASSAY OF MAGNESIUM: CPT | Performed by: PHYSICIAN ASSISTANT

## 2020-06-15 PROCEDURE — 94640 AIRWAY INHALATION TREATMENT: CPT

## 2020-06-15 PROCEDURE — 99284 EMERGENCY DEPT VISIT MOD MDM: CPT

## 2020-06-15 PROCEDURE — 85025 COMPLETE CBC W/AUTO DIFF WBC: CPT | Performed by: EMERGENCY MEDICINE

## 2020-06-15 PROCEDURE — 74176 CT ABD & PELVIS W/O CONTRAST: CPT

## 2020-06-15 RX ORDER — DOCUSATE SODIUM 100 MG/1
100 CAPSULE, LIQUID FILLED ORAL 2 TIMES DAILY PRN
Status: DISCONTINUED | OUTPATIENT
Start: 2020-06-15 | End: 2020-06-17 | Stop reason: HOSPADM

## 2020-06-15 RX ORDER — ACETAMINOPHEN 325 MG/1
650 TABLET ORAL EVERY 4 HOURS PRN
Status: DISCONTINUED | OUTPATIENT
Start: 2020-06-15 | End: 2020-06-17 | Stop reason: HOSPADM

## 2020-06-15 RX ORDER — HYDRALAZINE HYDROCHLORIDE 20 MG/ML
10 INJECTION INTRAMUSCULAR; INTRAVENOUS EVERY 6 HOURS PRN
Status: DISCONTINUED | OUTPATIENT
Start: 2020-06-15 | End: 2020-06-17 | Stop reason: HOSPADM

## 2020-06-15 RX ORDER — ONDANSETRON 2 MG/ML
4 INJECTION INTRAMUSCULAR; INTRAVENOUS EVERY 6 HOURS PRN
Status: DISCONTINUED | OUTPATIENT
Start: 2020-06-15 | End: 2020-06-17 | Stop reason: HOSPADM

## 2020-06-15 RX ORDER — SODIUM CHLORIDE 0.9 % (FLUSH) 0.9 %
10 SYRINGE (ML) INJECTION AS NEEDED
Status: DISCONTINUED | OUTPATIENT
Start: 2020-06-15 | End: 2020-06-17 | Stop reason: HOSPADM

## 2020-06-15 RX ORDER — ACETAMINOPHEN 650 MG/1
650 SUPPOSITORY RECTAL EVERY 4 HOURS PRN
Status: DISCONTINUED | OUTPATIENT
Start: 2020-06-15 | End: 2020-06-17 | Stop reason: HOSPADM

## 2020-06-15 RX ORDER — ALUMINA, MAGNESIA, AND SIMETHICONE 2400; 2400; 240 MG/30ML; MG/30ML; MG/30ML
15 SUSPENSION ORAL EVERY 6 HOURS PRN
Status: DISCONTINUED | OUTPATIENT
Start: 2020-06-15 | End: 2020-06-17 | Stop reason: HOSPADM

## 2020-06-15 RX ORDER — MORPHINE SULFATE 4 MG/ML
4 INJECTION, SOLUTION INTRAMUSCULAR; INTRAVENOUS ONCE
Status: COMPLETED | OUTPATIENT
Start: 2020-06-15 | End: 2020-06-15

## 2020-06-15 RX ORDER — NITROGLYCERIN 0.4 MG/1
0.4 TABLET SUBLINGUAL
Status: DISCONTINUED | OUTPATIENT
Start: 2020-06-15 | End: 2020-06-17 | Stop reason: HOSPADM

## 2020-06-15 RX ORDER — ZOLPIDEM TARTRATE 5 MG/1
10 TABLET ORAL NIGHTLY PRN
Status: DISCONTINUED | OUTPATIENT
Start: 2020-06-15 | End: 2020-06-17 | Stop reason: HOSPADM

## 2020-06-15 RX ORDER — MAGNESIUM SULFATE HEPTAHYDRATE 40 MG/ML
2 INJECTION, SOLUTION INTRAVENOUS AS NEEDED
Status: DISCONTINUED | OUTPATIENT
Start: 2020-06-15 | End: 2020-06-17 | Stop reason: HOSPADM

## 2020-06-15 RX ORDER — DEXTROSE MONOHYDRATE 25 G/50ML
25 INJECTION, SOLUTION INTRAVENOUS
Status: DISCONTINUED | OUTPATIENT
Start: 2020-06-15 | End: 2020-06-17 | Stop reason: HOSPADM

## 2020-06-15 RX ORDER — CHOLECALCIFEROL (VITAMIN D3) 125 MCG
5 CAPSULE ORAL NIGHTLY PRN
Status: DISCONTINUED | OUTPATIENT
Start: 2020-06-15 | End: 2020-06-17 | Stop reason: HOSPADM

## 2020-06-15 RX ORDER — CALCIUM GLUCONATE 20 MG/ML
2 INJECTION, SOLUTION INTRAVENOUS AS NEEDED
Status: DISCONTINUED | OUTPATIENT
Start: 2020-06-15 | End: 2020-06-17 | Stop reason: HOSPADM

## 2020-06-15 RX ORDER — ONDANSETRON 2 MG/ML
4 INJECTION INTRAMUSCULAR; INTRAVENOUS ONCE
Status: COMPLETED | OUTPATIENT
Start: 2020-06-15 | End: 2020-06-15

## 2020-06-15 RX ORDER — ACETAMINOPHEN 160 MG/5ML
650 SOLUTION ORAL EVERY 4 HOURS PRN
Status: DISCONTINUED | OUTPATIENT
Start: 2020-06-15 | End: 2020-06-17 | Stop reason: HOSPADM

## 2020-06-15 RX ORDER — SODIUM CHLORIDE, SODIUM LACTATE, POTASSIUM CHLORIDE, CALCIUM CHLORIDE 600; 310; 30; 20 MG/100ML; MG/100ML; MG/100ML; MG/100ML
125 INJECTION, SOLUTION INTRAVENOUS CONTINUOUS
Status: DISCONTINUED | OUTPATIENT
Start: 2020-06-16 | End: 2020-06-17 | Stop reason: HOSPADM

## 2020-06-15 RX ORDER — POTASSIUM CHLORIDE 20 MEQ/1
40 TABLET, EXTENDED RELEASE ORAL AS NEEDED
Status: DISCONTINUED | OUTPATIENT
Start: 2020-06-15 | End: 2020-06-17 | Stop reason: HOSPADM

## 2020-06-15 RX ORDER — ONDANSETRON 4 MG/1
4 TABLET, FILM COATED ORAL EVERY 6 HOURS PRN
Status: DISCONTINUED | OUTPATIENT
Start: 2020-06-15 | End: 2020-06-17 | Stop reason: HOSPADM

## 2020-06-15 RX ORDER — POTASSIUM CHLORIDE 1.5 G/1.77G
40 POWDER, FOR SOLUTION ORAL AS NEEDED
Status: DISCONTINUED | OUTPATIENT
Start: 2020-06-15 | End: 2020-06-17 | Stop reason: HOSPADM

## 2020-06-15 RX ORDER — NICOTINE POLACRILEX 4 MG
15 LOZENGE BUCCAL
Status: DISCONTINUED | OUTPATIENT
Start: 2020-06-15 | End: 2020-06-17 | Stop reason: HOSPADM

## 2020-06-15 RX ORDER — CALCIUM GLUCONATE 20 MG/ML
1 INJECTION, SOLUTION INTRAVENOUS AS NEEDED
Status: DISCONTINUED | OUTPATIENT
Start: 2020-06-15 | End: 2020-06-17 | Stop reason: HOSPADM

## 2020-06-15 RX ORDER — SODIUM CHLORIDE 0.9 % (FLUSH) 0.9 %
10 SYRINGE (ML) INJECTION EVERY 12 HOURS SCHEDULED
Status: DISCONTINUED | OUTPATIENT
Start: 2020-06-16 | End: 2020-06-17 | Stop reason: HOSPADM

## 2020-06-15 RX ORDER — FENTANYL 12 UG/H
1 PATCH TRANSDERMAL
Status: DISCONTINUED | OUTPATIENT
Start: 2020-06-16 | End: 2020-06-17 | Stop reason: HOSPADM

## 2020-06-15 RX ORDER — MAGNESIUM SULFATE HEPTAHYDRATE 40 MG/ML
4 INJECTION, SOLUTION INTRAVENOUS AS NEEDED
Status: DISCONTINUED | OUTPATIENT
Start: 2020-06-15 | End: 2020-06-17 | Stop reason: HOSPADM

## 2020-06-15 RX ADMIN — IPRATROPIUM BROMIDE 0.5 MG: 0.5 SOLUTION RESPIRATORY (INHALATION) at 23:57

## 2020-06-15 RX ADMIN — ONDANSETRON 4 MG: 2 INJECTION INTRAMUSCULAR; INTRAVENOUS at 20:59

## 2020-06-15 RX ADMIN — MORPHINE SULFATE 4 MG: 4 INJECTION INTRAVENOUS at 20:59

## 2020-06-15 NOTE — ED NOTES
Pt c/o abd pain worse with palpation in RUQ x a week and a half. Nausea no vomiting. Pt states the pain is worse when she eats.     Johnna Armstrong, RN  06/15/20 1931       Johnna Armstrong RN  06/15/20 1932

## 2020-06-15 NOTE — ED PROVIDER NOTES
Subjective   Chief complaint: Abdominal pain    59-year-old female presents with abdominal pain.  Patient states pain has been present for little over a week.  Pain has been intermittent.  Pain is in the epigastric area and goes into the right side of the abdomen.  She has had nausea but no vomiting.  She denies any diarrhea.  She has had no fever.  She does report that the pain is worse with eating.  Pain is described as moderate in intensity.      History provided by:  Patient      Review of Systems   Constitutional: Negative for fatigue and fever.   HENT: Negative for congestion and sore throat.    Eyes: Negative for pain and redness.   Respiratory: Negative for cough and shortness of breath.    Cardiovascular: Negative for chest pain and palpitations.   Gastrointestinal: Positive for abdominal pain and nausea. Negative for diarrhea and vomiting.   Genitourinary: Negative for dysuria and frequency.   Musculoskeletal: Negative for back pain.   Skin: Negative for rash.   Neurological: Negative for dizziness and headaches.   Psychiatric/Behavioral: Negative for behavioral problems and confusion.       Past Medical History:   Diagnosis Date   • Bipolar 1 disorder (CMS/[facility])    • Diabetes mellitus (CMS/[facility])    • Hypertension    • PTSD (post-traumatic stress disorder)        Allergies   Allergen Reactions   • Iodine Swelling   • Adhesive Tape Rash       History reviewed. No pertinent surgical history.    Family History   Problem Relation Age of Onset   • Hypertension Mother    • Diabetes Mother        Social History     Socioeconomic History   • Marital status: Single     Spouse name: Not on file   • Number of children: Not on file   • Years of education: Not on file   • Highest education level: Not on file   Tobacco Use   • Smoking status: Current Every Day Smoker     Packs/day: 1.00     Types: Cigarettes   • Smokeless tobacco: Never Used   Substance and Sexual Activity   • Alcohol use: Not Currently   • Drug use: Not  "Currently   • Sexual activity: Not Currently       /68   Pulse 87   Temp 97.7 °F (36.5 °C) (Oral)   Resp 16   Ht 165.1 cm (65\")   Wt 87.4 kg (192 lb 10.9 oz)   SpO2 100%   BMI 32.06 kg/m²       Objective   Physical Exam   Constitutional: She is oriented to person, place, and time. She appears well-developed and well-nourished.   HENT:   Head: Normocephalic and atraumatic.   Eyes: Pupils are equal, round, and reactive to light. EOM are normal.   Neck: Normal range of motion. Neck supple.   Cardiovascular: Normal rate, regular rhythm and normal heart sounds.   Pulmonary/Chest: Effort normal and breath sounds normal. No respiratory distress.   Abdominal: Soft. Bowel sounds are normal. There is tenderness in the right lower quadrant and epigastric area. There is no rebound, no guarding and negative Correia's sign.   Musculoskeletal: Normal range of motion.   Neurological: She is alert and oriented to person, place, and time.   Skin: Skin is warm and dry.   Nursing note and vitals reviewed.      Procedures           ED Course      Results for orders placed or performed during the hospital encounter of 06/15/20   Comprehensive Metabolic Panel   Result Value Ref Range    Glucose 188 (H) 65 - 99 mg/dL    BUN      Creatinine 0.77 0.57 - 1.00 mg/dL    Sodium 137 136 - 145 mmol/L    Potassium 3.9 3.5 - 5.2 mmol/L    Chloride 101 98 - 107 mmol/L    CO2 25.0 22.0 - 29.0 mmol/L    Calcium 8.8 8.6 - 10.5 mg/dL    Total Protein 8.1 6.0 - 8.5 g/dL    Albumin 4.20 3.50 - 5.20 g/dL    ALT (SGPT) 8 1 - 33 U/L    AST (SGOT) 10 1 - 32 U/L    Alkaline Phosphatase 116 39 - 117 U/L    Total Bilirubin 0.2 0.2 - 1.2 mg/dL    eGFR Non African Amer 77 >60 mL/min/1.73    eGFR  African Amer 94 >60 mL/min/1.73    Globulin 3.9 gm/dL    A/G Ratio 1.1 g/dL    BUN/Creatinine Ratio      Anion Gap 11.0 5.0 - 15.0 mmol/L   Lipase   Result Value Ref Range    Lipase 296 (H) 13 - 60 U/L   Urinalysis With Culture If Indicated - Urine, Clean Catch "   Result Value Ref Range    Color, UA Yellow Yellow, Straw    Appearance, UA Clear Clear    pH, UA <=5.0 5.0 - 8.0    Specific Gravity, UA 1.049 (H) 1.005 - 1.030    Glucose, UA >=1000 mg/dL (3+) (A) Negative    Ketones, UA Negative Negative    Bilirubin, UA Negative Negative    Blood, UA Negative Negative    Protein, UA Negative Negative    Leuk Esterase, UA Negative Negative    Nitrite, UA Negative Negative    Urobilinogen, UA 0.2 E.U./dL 0.2 - 1.0 E.U./dL   CBC Auto Differential   Result Value Ref Range    WBC 11.90 (H) 3.40 - 10.80 10*3/mm3    RBC 4.72 3.77 - 5.28 10*6/mm3    Hemoglobin 15.4 12.0 - 15.9 g/dL    Hematocrit 44.4 34.0 - 46.6 %    MCV 94.0 79.0 - 97.0 fL    MCH 32.7 26.6 - 33.0 pg    MCHC 34.8 31.5 - 35.7 g/dL    RDW 14.4 12.3 - 15.4 %    RDW-SD 47.3 37.0 - 54.0 fl    MPV 8.7 6.0 - 12.0 fL    Platelets 290 140 - 450 10*3/mm3    Neutrophil % 66.3 42.7 - 76.0 %    Lymphocyte % 26.6 19.6 - 45.3 %    Monocyte % 5.2 5.0 - 12.0 %    Eosinophil % 1.1 0.3 - 6.2 %    Basophil % 0.8 0.0 - 1.5 %    Neutrophils, Absolute 7.90 (H) 1.70 - 7.00 10*3/mm3    Lymphocytes, Absolute 3.20 (H) 0.70 - 3.10 10*3/mm3    Monocytes, Absolute 0.60 0.10 - 0.90 10*3/mm3    Eosinophils, Absolute 0.10 0.00 - 0.40 10*3/mm3    Basophils, Absolute 0.10 0.00 - 0.20 10*3/mm3    nRBC 0.1 0.0 - 0.2 /100 WBC   BUN   Result Value Ref Range    BUN 11 6 - 20 mg/dL   Light Blue Top   Result Value Ref Range    Extra Tube hold for add-on    Gold Top - SST   Result Value Ref Range    Extra Tube Hold for add-ons.      Ct Abdomen Pelvis Without Contrast    Result Date: 6/15/2020  1. There is mild fat stranding around the pancreatic head. This is most likely due to pancreatitis. Primary duodenal inflammation is considered less likely. 2. Fatty liver. 3. Uterine fibroids.  Electronically Signed By-Ayiln Looney On:6/15/2020 8:15 PM This report was finalized on [id number] by  Aylin Looney, .                                         [facility]   Patient  had the above evaluation.  Results were discussed with the patient.  IV access was established and the patient was given pain and nausea medicine.  She is feeling somewhat better.  CT scan is showing findings consistent with pancreatitis with fat stranding around the head of the pancreas.  Lipase is elevated at 296.  White blood cell count is 11.9.  I discussed with the nurse practitioner on-call for the hospitalist and the patient will be admitted for further evaluation and management.      Final diagnoses:   Acute pancreatitis without infection or necrosis, unspecified pancreatitis type   Abdominal pain, unspecified abdominal location            Nicolas Santos MD  06/15/20 [id number]

## 2020-06-16 ENCOUNTER — APPOINTMENT (OUTPATIENT)
Dept: ULTRASOUND IMAGING | Facility: HOSPITAL | Age: 60
End: 2020-06-16

## 2020-06-16 PROBLEM — G47.00 INSOMNIA: Chronic | Status: ACTIVE | Noted: 2020-06-16

## 2020-06-16 LAB
ANION GAP SERPL CALCULATED.3IONS-SCNC: 10 MMOL/L (ref 5–15)
BASOPHILS # BLD AUTO: 0.2 10*3/MM3 (ref 0–0.2)
BASOPHILS NFR BLD AUTO: 1.3 % (ref 0–1.5)
BUN BLD-MCNC: 13 MG/DL (ref 6–20)
BUN BLD-MCNC: ABNORMAL MG/DL
BUN/CREAT SERPL: ABNORMAL
CALCIUM SPEC-SCNC: 8.1 MG/DL (ref 8.6–10.5)
CHLORIDE SERPL-SCNC: 102 MMOL/L (ref 98–107)
CHOLEST SERPL-MCNC: 141 MG/DL (ref 0–200)
CO2 SERPL-SCNC: 21 MMOL/L (ref 22–29)
CREAT BLD-MCNC: 0.59 MG/DL (ref 0.57–1)
DEPRECATED RDW RBC AUTO: 46.4 FL (ref 37–54)
EOSINOPHIL # BLD AUTO: 0.2 10*3/MM3 (ref 0–0.4)
EOSINOPHIL NFR BLD AUTO: 1.4 % (ref 0.3–6.2)
ERYTHROCYTE [DISTWIDTH] IN BLOOD BY AUTOMATED COUNT: 14.1 % (ref 12.3–15.4)
GFR SERPL CREATININE-BSD FRML MDRD: 105 ML/MIN/1.73
GFR SERPL CREATININE-BSD FRML MDRD: 127 ML/MIN/1.73
GLUCOSE BLD-MCNC: 135 MG/DL (ref 65–99)
GLUCOSE BLDC GLUCOMTR-MCNC: 128 MG/DL (ref 70–105)
GLUCOSE BLDC GLUCOMTR-MCNC: 140 MG/DL (ref 70–105)
GLUCOSE BLDC GLUCOMTR-MCNC: 144 MG/DL (ref 70–105)
GLUCOSE BLDC GLUCOMTR-MCNC: 159 MG/DL (ref 70–105)
GLUCOSE BLDC GLUCOMTR-MCNC: 164 MG/DL (ref 70–105)
HBA1C MFR BLD: 7.5 % (ref 3.5–5.6)
HCT VFR BLD AUTO: 40.9 % (ref 34–46.6)
HDLC SERPL-MCNC: 32 MG/DL (ref 40–60)
HGB BLD-MCNC: 13.8 G/DL (ref 12–15.9)
LDH SERPL-CCNC: 168 U/L (ref 135–214)
LDLC SERPL CALC-MCNC: 90 MG/DL (ref 0–100)
LDLC/HDLC SERPL: 2.82 {RATIO}
LIPASE SERPL-CCNC: 155 U/L (ref 13–60)
LYMPHOCYTES # BLD AUTO: 3.4 10*3/MM3 (ref 0.7–3.1)
LYMPHOCYTES NFR BLD AUTO: 29 % (ref 19.6–45.3)
MAGNESIUM SERPL-MCNC: 2.2 MG/DL (ref 1.6–2.6)
MCH RBC QN AUTO: 31.3 PG (ref 26.6–33)
MCHC RBC AUTO-ENTMCNC: 33.8 G/DL (ref 31.5–35.7)
MCV RBC AUTO: 92.8 FL (ref 79–97)
MONOCYTES # BLD AUTO: 0.7 10*3/MM3 (ref 0.1–0.9)
MONOCYTES NFR BLD AUTO: 5.6 % (ref 5–12)
NEUTROPHILS # BLD AUTO: 7.4 10*3/MM3 (ref 1.7–7)
NEUTROPHILS NFR BLD AUTO: 62.7 % (ref 42.7–76)
NRBC BLD AUTO-RTO: 0.1 /100 WBC (ref 0–0.2)
NT-PROBNP SERPL-MCNC: 46.8 PG/ML (ref 5–900)
PHOSPHATE SERPL-MCNC: 3 MG/DL (ref 2.5–4.5)
PLATELET # BLD AUTO: 231 10*3/MM3 (ref 140–450)
PMV BLD AUTO: 8.4 FL (ref 6–12)
POTASSIUM BLD-SCNC: 4 MMOL/L (ref 3.5–5.2)
RBC # BLD AUTO: 4.41 10*6/MM3 (ref 3.77–5.28)
SODIUM BLD-SCNC: 133 MMOL/L (ref 136–145)
TRIGL SERPL-MCNC: 94 MG/DL (ref 0–150)
VLDLC SERPL-MCNC: 18.8 MG/DL
WBC NRBC COR # BLD: 11.8 10*3/MM3 (ref 3.4–10.8)

## 2020-06-16 PROCEDURE — 80061 LIPID PANEL: CPT | Performed by: PHYSICIAN ASSISTANT

## 2020-06-16 PROCEDURE — 76705 ECHO EXAM OF ABDOMEN: CPT

## 2020-06-16 PROCEDURE — 83615 LACTATE (LD) (LDH) ENZYME: CPT | Performed by: PHYSICIAN ASSISTANT

## 2020-06-16 PROCEDURE — 63710000001 INSULIN LISPRO (HUMAN) PER 5 UNITS: Performed by: INTERNAL MEDICINE

## 2020-06-16 PROCEDURE — 85025 COMPLETE CBC W/AUTO DIFF WBC: CPT | Performed by: PHYSICIAN ASSISTANT

## 2020-06-16 PROCEDURE — 99232 SBSQ HOSP IP/OBS MODERATE 35: CPT | Performed by: INTERNAL MEDICINE

## 2020-06-16 PROCEDURE — 83036 HEMOGLOBIN GLYCOSYLATED A1C: CPT | Performed by: PHYSICIAN ASSISTANT

## 2020-06-16 PROCEDURE — 83880 ASSAY OF NATRIURETIC PEPTIDE: CPT | Performed by: PHYSICIAN ASSISTANT

## 2020-06-16 PROCEDURE — 80048 BASIC METABOLIC PNL TOTAL CA: CPT | Performed by: PHYSICIAN ASSISTANT

## 2020-06-16 PROCEDURE — 84100 ASSAY OF PHOSPHORUS: CPT | Performed by: PHYSICIAN ASSISTANT

## 2020-06-16 PROCEDURE — 82962 GLUCOSE BLOOD TEST: CPT

## 2020-06-16 PROCEDURE — 83735 ASSAY OF MAGNESIUM: CPT | Performed by: PHYSICIAN ASSISTANT

## 2020-06-16 PROCEDURE — 25010000002 HYDROMORPHONE PER 4 MG: Performed by: INTERNAL MEDICINE

## 2020-06-16 PROCEDURE — 83690 ASSAY OF LIPASE: CPT | Performed by: INTERNAL MEDICINE

## 2020-06-16 RX ORDER — HYDROMORPHONE HCL 110MG/55ML
0.5 PATIENT CONTROLLED ANALGESIA SYRINGE INTRAVENOUS ONCE AS NEEDED
Status: COMPLETED | OUTPATIENT
Start: 2020-06-16 | End: 2020-06-16

## 2020-06-16 RX ORDER — NICOTINE 21 MG/24HR
1 PATCH, TRANSDERMAL 24 HOURS TRANSDERMAL DAILY
Status: DISCONTINUED | OUTPATIENT
Start: 2020-06-16 | End: 2020-06-17 | Stop reason: HOSPADM

## 2020-06-16 RX ADMIN — AMITRIPTYLINE HYDROCHLORIDE 75 MG: 50 TABLET, FILM COATED ORAL at 22:34

## 2020-06-16 RX ADMIN — Medication 10 ML: at 08:30

## 2020-06-16 RX ADMIN — FENTANYL 1 PATCH: 12 PATCH, EXTENDED RELEASE TRANSDERMAL at 00:18

## 2020-06-16 RX ADMIN — AMITRIPTYLINE HYDROCHLORIDE 75 MG: 50 TABLET, FILM COATED ORAL at 00:20

## 2020-06-16 RX ADMIN — HYDROMORPHONE HYDROCHLORIDE 0.5 MG: 2 INJECTION, SOLUTION INTRAMUSCULAR; INTRAVENOUS; SUBCUTANEOUS at 22:35

## 2020-06-16 RX ADMIN — SODIUM CHLORIDE, SODIUM LACTATE, POTASSIUM CHLORIDE, AND CALCIUM CHLORIDE 125 ML/HR: 600; 310; 30; 20 INJECTION, SOLUTION INTRAVENOUS at 08:17

## 2020-06-16 RX ADMIN — SODIUM CHLORIDE, SODIUM LACTATE, POTASSIUM CHLORIDE, AND CALCIUM CHLORIDE 125 ML/HR: 600; 310; 30; 20 INJECTION, SOLUTION INTRAVENOUS at 00:20

## 2020-06-16 RX ADMIN — NICOTINE 1 PATCH: 21 PATCH TRANSDERMAL at 05:05

## 2020-06-16 RX ADMIN — Medication 10 ML: at 22:34

## 2020-06-16 RX ADMIN — INSULIN LISPRO 2 UNITS: 100 INJECTION, SOLUTION INTRAVENOUS; SUBCUTANEOUS at 22:34

## 2020-06-16 RX ADMIN — Medication 10 ML: at 00:20

## 2020-06-16 NOTE — H&P
"      [facility] Medicine Services      Patient Name: Carli Dubon  : 1960  MRN: [id number]  Primary Care Physician: Cindy Huddleston APRN  Date of admission: 6/15/2020    Patient Care Team:  Cindy Huddleston APRN as PCP - General (Nurse Practitioner)          Subjective   History Present Illness     Chief Complaint:   Chief Complaint   Patient presents with   • Abdominal Pain     right upper abd pain for 1 week       Ms. Dubon is a 59 y.o. female with past medical history of diabetes, hypertension, hyperlipidemia, obesity, PTSD and tobacco abuse who presents to [facility] complaining of epigastric pain and nausea.  Patient states that for approximately the past week and a half she is experienced a constant and worsening epigastric pain described as a \"knot\" in the epigastric region and occasionally stabbing rated at 10/10 at its worst and 8/5 at the time of my exam.  Patient reports pain is made worse with eating and has some associated nausea without vomiting.  She denies any chest pain, shortness of air, cough, diarrhea, constipation or fever.  Patient denies alcohol use, is uncertain when she last had her cholesterol checked and has not started any new medications.    In the ED patient's CMP found to be generally unremarkable.  Lipase: 296, WBCs: 11.90 with increased absolute neutrophil and lymphocyte count noted on exam.  UA shows 3+ glucose otherwise unremarkable.  CT of the abdomen shows mild fat stranding around the pancreatic head most likely due to pancreatitis.  Primary duodenal inflammation is considered less likely.  A fatty liver as well as uterine fibroids are also noted.    History of Present Illness    Review of Systems   Constitution: Negative for chills, decreased appetite and fever.   HENT: Negative.    Eyes: Negative.    Cardiovascular: Negative.    Respiratory: Negative.    Endocrine: Negative.    Skin: Negative.    Musculoskeletal: Negative.    "   Gastrointestinal: Positive for abdominal pain and nausea. Negative for constipation, diarrhea, hematemesis, hematochezia and vomiting.   Genitourinary: Negative.    Neurological: Negative.    Psychiatric/Behavioral: Negative.            Personal History     Past Medical History:   Past Medical History:   Diagnosis Date   • Bipolar 1 disorder (CMS/HCC)    • Diabetes mellitus (CMS/HCC)    • Hypertension    • PTSD (post-traumatic stress disorder)        Surgical History:    History reviewed. No pertinent surgical history.        Family History: family history includes Diabetes in her mother; Hypertension in her mother. Otherwise pertinent FHx was reviewed and unremarkable.     Social History:  reports that she has been smoking cigarettes. She has been smoking about 1.00 pack per day. She has never used smokeless tobacco. She reports that she drank alcohol. She reports that she has current or past drug history.      Medications:  Prior to Admission medications    Medication Sig Start Date End Date Taking? Authorizing Provider   amitriptyline (ELAVIL) 75 MG tablet Take 1 tablet by mouth Every Night. 5/11/20   Cindy Huddleston APRN   atorvastatin (Lipitor) 10 MG tablet Take 1 tablet by mouth Daily. 5/11/20   Cindy Huddleston APRN   Empagliflozin (Jardiance) 25 MG tablet Take 25 mg by mouth Daily. 5/11/20   Cindy Huddleston APRN   glipizide (GLUCOTROL) 10 MG tablet Take 1 tablet by mouth 2 (Two) Times a Day Before Meals. 5/11/20   Cindy Huddleston APRN   lisinopril (PRINIVIL,ZESTRIL) 20 MG tablet Take 1 tablet by mouth Daily. 5/11/20   Cindy Huddleston APRN   meloxicam (Mobic) 15 MG tablet Take 1 tablet by mouth Daily. 5/11/20   Cindy Huddleston APRN   metFORMIN (GLUCOPHAGE) 1000 MG tablet Take 1 tablet by mouth 2 (Two) Times a Day With Meals. 5/11/20   Cindy Huddleston APRN   SITagliptin (Januvia) 100 MG tablet Take 1 tablet by mouth Daily. 5/11/20   Cindy Huddleston APRN   Tiotropium Bromide Monohydrate  (Spiriva Respimat) 1.25 MCG/ACT aerosol solution inhaler Inhale 2 puffs Daily. 5/12/20   Cindy Huddleston APRN   zolpidem (Ambien) 10 MG tablet Take 1 tablet by mouth At Night As Needed for Sleep. 5/11/20   Cindy Huddleston APRN       Allergies:    Allergies   Allergen Reactions   • Iodine Swelling   • Adhesive Tape Rash       Objective   Objective     Vital Signs  Temp:  [97.7 °F (36.5 °C)] 97.7 °F (36.5 °C)  Heart Rate:  [87-99] 87  Resp:  [16] 16  BP: (113-145)/(63-89) 145/68  SpO2:  [98 %-100 %] 100 %  on   ;      Body mass index is 32.06 kg/m².    Physical Exam   Constitutional: She is oriented to person, place, and time. She appears well-developed and well-nourished. No distress.   HENT:   Head: Normocephalic and atraumatic.   Right Ear: External ear normal.   Left Ear: External ear normal.   Nose: Nose normal.   Eyes: Conjunctivae and EOM are normal.   Neck: Normal range of motion. No JVD present.   Cardiovascular: Normal rate, regular rhythm and intact distal pulses.   Murmur heard.  Pulmonary/Chest: Effort normal and breath sounds normal.   Abdominal: Soft. Bowel sounds are normal. There is tenderness.   No Lyman Swartz sign present   Musculoskeletal: Normal range of motion.   Neurological: She is alert and oriented to person, place, and time.   Skin: Skin is warm and dry.   Psychiatric: She has a normal mood and affect. Her behavior is normal. Judgment and thought content normal.       Results Review:  I have personally reviewed most recent lab results and radiology images and interpretations and agree with findings, most notably: CMP, CBC, lipase, CT of abdomen.    Results from last 7 days   Lab Units 06/15/20  1939   WBC 10*3/mm3 11.90*   HEMOGLOBIN g/dL 15.4   HEMATOCRIT % 44.4   PLATELETS 10*3/mm3 290     Results from last 7 days   Lab Units 06/15/20  1939   SODIUM mmol/L 137   POTASSIUM mmol/L 3.9   CHLORIDE mmol/L 101   CO2 mmol/L 25.0   BUN  11   CREATININE mg/dL 0.77   GLUCOSE mg/dL 188*      CALCIUM mg/dL 8.8   ALT (SGPT) U/L 8   AST (SGOT) U/L 10     Estimated Creatinine Clearance: 88.1 mL/min (by C-G formula based on SCr of 0.77 mg/dL).  Brief Urine Lab Results  (Last result in the past 365 days)      Color   Clarity   Blood   Leuk Est   Nitrite   Protein   CREAT   Urine HCG        06/15/20 1954 Yellow Clear Negative Negative Negative Negative               Microbiology Results (last 10 days)     ** No results found for the last 240 hours. **          ECG/EMG Results (most recent)     None                    Ct Abdomen Pelvis Without Contrast    Result Date: 6/15/2020  1. There is mild fat stranding around the pancreatic head. This is most likely due to pancreatitis. Primary duodenal inflammation is considered less likely. 2. Fatty liver. 3. Uterine fibroids.  Electronically Signed By-Aylin Looney On:6/15/2020 8:15 PM This report was finalized on [id number] by  Aylin Looney, .        Estimated Creatinine Clearance: 88.1 mL/min (by C-G formula based on SCr of 0.77 mg/dL).    Assessment/Plan   Assessment/Plan       Active Hospital Problems    Diagnosis  POA   • Acute pancreatitis without infection or necrosis [K85.90]  Yes     Priority: High   • Essential hypertension [I10]  Yes     Priority: Medium   • Diabetes (CMS/HCC) [E11.9]  Yes     Priority: Medium   • PTSD (post-traumatic stress disorder) [F43.10]  Yes     Priority: Low   • Tobacco abuse [Z72.0]  Yes     Priority: Low   • Obesity (BMI 30-39.9) [E66.9]  Yes     Priority: Low   • Mixed hyperlipidemia [E78.2]  Yes     Priority: Low      Resolved Hospital Problems   No resolved problems to display.     Acute pancreatitis  -Pt presents with 1/2-week history of epigastric pain with nausea  CT shows mild fat stranding around the pancreatic head most likely due to pancreatitis.  Primary duodenal inflammation is considered less likely.  A fatty liver as well as uterine fibroids are also noted.  Lipase: 296  Ca+: 8.8  Check US RUQ, lipid panel and  LDH  Brinson criteria score: 1 on admission (pending LDH)  Fentanyl patch for pain  LR at 125 ml/hr  NPO      Hypertension  -Well controlled with a blood pressure on admission of 148/89  - Continue lisinopril  - Monitor while admitted    Diabetes  -Moderately controlled with glucose of 188 on admission  - Check A1c  - Hold metformin, Januvia, glipizide and Jardiance  -SSI  -N.p.o. as above  -monitor AC and HS    PTSD  -Continue Elavil    Tobacco abuse  -Patient reports currently smoking 1 pack/day  -Encourage cessation especially in light of patient's comorbid conditions  -Nicotine patch ordered    Obesity (BMI: 31.73)  -Encouraged on lifestyle modifications once acute symptoms resolve    Hyperlipidemia  -Statin held while n.p.o.    Insomnia  -Continue Ambien            VTE Prophylaxis -SCDs  Mechanical Order History:     None      Pharmalogical Order History:     None          CODE STATUS: Full  There are no questions and answers to display.           I discussed the patient's findings and my recommendations with patient.        Electronically signed by Norbert Rose PA-C, 06/15/20, 9:02 PM.  [facility], [place], operated by [facility] Hospitalist Team

## 2020-06-16 NOTE — PROGRESS NOTES
"      [facility] Medicine Services Daily Progress Note          Hospitalist Team  LOS 0 days      Patient Care Team:  Cindy Huddleston APRN as PCP - General (Nurse Practitioner)    Patient Location: 4109/1      Subjective   Subjective     Chief Complaint / Subjective  Chief Complaint   Patient presents with   • Abdominal Pain     right upper abd pain for 1 week         Brief Synopsis of Hospital Course/HPI  with past medical history of diabetes, hypertension, hyperlipidemia, obesity, PTSD and tobacco abuse who presents to [facility] complaining of epigastric pain and nausea.  Patient states that for approximately the past week and a half she is experienced a constant and worsening epigastric pain described as a \"knot\" in the epigastric region and occasionally stabbing rated at 10/10 at its worst and 8/5 at the time of my exam.  Patient reports pain is made worse with eating and has some associated nausea without vomiting.  She denies any chest pain, shortness of air, cough, diarrhea, constipation or fever.  Patient denies alcohol use, is uncertain when she last had her cholesterol checked and has not started any new medications.     In the ED patient's CMP found to be generally unremarkable.  Lipase: 296, WBCs: 11.90 with increased absolute neutrophil and lymphocyte count noted on exam.  UA shows 3+ glucose otherwise unremarkable.  CT of the abdomen shows mild fat stranding around the pancreatic head most likely due to pancreatitis.  Primary duodenal inflammation is considered less likely.  A fatty liver as well as uterine fibroids are also noted.    Date::    6/16/2020:  Patient feeling better since admission.  May attempt to advance diet if tolerated.      Review of Systems   Constitution: Negative.   HENT: Negative.    Cardiovascular: Negative.    Respiratory: Negative.    Skin: Negative.    Musculoskeletal: Negative.    Gastrointestinal: Positive for abdominal pain. Negative for " "constipation, diarrhea, jaundice, nausea and vomiting.   Genitourinary: Negative.    Neurological: Negative.    Psychiatric/Behavioral: Negative.    All other systems reviewed and are negative.        Objective   Objective      Vital Signs  Temp:  [97.7 °F (36.5 °C)-98.2 °F (36.8 °C)] 98 °F (36.7 °C)  Heart Rate:  [66-99] 91  Resp:  [16-17] 16  BP: (113-157)/(63-91) 144/80  Oxygen Therapy  SpO2: 94 %  Pulse Oximetry Type: Intermittent  Device (Oxygen Therapy): room air  Device (Oxygen Therapy): room air  Flowsheet Rows      First Filed Value   Admission Height  165.1 cm (65\") Documented at 06/15/2020 1857   Admission Weight  87.4 kg (192 lb 10.9 oz) Documented at 06/15/2020 1857        Intake & Output (last 3 days)       06/13 0701 - 06/14 0700 06/14 0701 - 06/15 0700 06/15 0701 - 06/16 0700 06/16 0701 - 06/17 0700    P.O.   120     I.V. (mL/kg)   590 (6.7)     Total Intake(mL/kg)   710 (8)     Net   +710             Urine Unmeasured Occurrence    1 x        Lines, Drains & Airways    Active LDAs     Name:   Placement date:   Placement time:   Site:   Days:    Peripheral IV 06/15/20 1939 Right Antecubital   06/15/20    1939    Antecubital   less than 1                  Physical Exam:  General: well-developed and well-nourished, NAD  HEENT: NC/AT, EOMI, PERRLA  Heart: RRR. + murmur   Chest: CTAB, no w/r/r, normal respiratory effort  Abdominal: Soft. Epigastric tenderness. ND. Bowel sounds present  Musculoskeletal: Normal ROM.  No edema. No calf tenderness.  Neurological: AAOx3, no focal deficits  Skin: Skin is warm and dry. No rash  Psychiatric: Normal mood and affect.        Procedures:           Results Review:     I reviewed the patient's new clinical results.    Results from last 7 days   Lab Units 06/16/20  0331 06/15/20  1939   WBC 10*3/mm3 11.80* 11.90*   HEMOGLOBIN g/dL 13.8 15.4   HEMATOCRIT % 40.9 44.4   PLATELETS 10*3/mm3 231 290     Results from last 7 days   Lab Units 06/16/20  1504 06/16/20  0331 " 06/15/20  1939   SODIUM mmol/L  --  133* 137   POTASSIUM mmol/L  --  4.0 3.9   CHLORIDE mmol/L  --  102 101   CO2 mmol/L  --  21.0* 25.0   BUN   --  13 11   CREATININE mg/dL  --  0.59 0.77   GLUCOSE mg/dL  --  135* 188*   ALBUMIN g/dL  --   --  4.20   BILIRUBIN mg/dL  --   --  0.2   ALK PHOS U/L  --   --  116   AST (SGOT) U/L  --   --  10   ALT (SGPT) U/L  --   --  8   LIPASE U/L 155*  --  296*   CALCIUM mg/dL  --  8.1* 8.8     Cr Clearance Estimated Creatinine Clearance: 115.4 mL/min (by C-G formula based on SCr of 0.59 mg/dL).    Coag       HbA1C   Lab Results   Component Value Date    HGBA1C 7.5 (H) 06/16/2020    HGBA1C 10.7 (H) 02/12/2020     Blood Glucose   Results from last 7 days   Lab Units 06/16/20  1048 06/16/20  0640 06/16/20  0007   GLUCOSE mg/dL 128* 140* 144*       Troponin Results from last 7 days   Lab Units 06/16/20  0331   PROBNP pg/mL 46.8     Lipids  Lab Results   Component Value Date    CHOL 141 06/16/2020    TRIG 94 06/16/2020    HDL 32 (L) 06/16/2020    LDL 90 06/16/2020       UA    Results from last 7 days   Lab Units 06/15/20  1954   NITRITE UA  Negative       Microbiology       ABG        EKG  No orders to display       Imaging:  Ct Abdomen Pelvis Without Contrast    Result Date: 6/15/2020  1. There is mild fat stranding around the pancreatic head. This is most likely due to pancreatitis. Primary duodenal inflammation is considered less likely. 2. Fatty liver. 3. Uterine fibroids.  Electronically Signed By-Aylin Looney On:6/15/2020 8:15 PM This report was finalized on [id number] by  Aylin Looney, .    Us Gallbladder    Result Date: 6/16/2020  1.The pancreas is mostly obscured by overlying bowel gas. 2.Diffuse hepatic steatosis. 3.Unremarkable sonographic appearance of the gallbladder. No extrahepatic biliary ductal dilatation.  Electronically Signed By-Swathi Griffith On:6/16/2020 9:43 AM This report was finalized on [id number] by  Swathi Griffith, .            Xrays, labs reviewed  personally by physician.    Medication Review:   I have reviewed the patient's current medication list      Scheduled Meds    amitriptyline 75 mg Oral Nightly   fentaNYL 1 patch Transdermal Q72H   And      Check Fentanyl Patch Placement 1 each Does not apply Q12H   insulin lispro 0-9 Units Subcutaneous Q6H   ipratropium 0.5 mg Nebulization 4x Daily - RT   nicotine 1 patch Transdermal Daily   sodium chloride 10 mL Intravenous Q12H       Meds Infusions    lactated ringers 125 mL/hr Last Rate: 125 mL/hr (06/16/20 0817)       Meds PRN  •  acetaminophen **OR** acetaminophen **OR** acetaminophen  •  aluminum-magnesium hydroxide-simethicone  •  Calcium Gluconate-NaCl **AND** calcium gluconate **AND** Calcium, Ionized  •  dextrose  •  dextrose  •  docusate sodium  •  glucagon (human recombinant)  •  hydrALAZINE  •  HYDROmorphone  •  insulin lispro **AND** insulin lispro  •  ketamine (KETALAR) IVPB **AND** Ketamine Vital Signs & Assessment  •  magnesium sulfate **OR** magnesium sulfate **OR** magnesium sulfate  •  melatonin  •  nitroglycerin  •  ondansetron **OR** ondansetron  •  potassium & sodium phosphates **OR** potassium & sodium phosphates  •  potassium chloride  •  potassium chloride  •  [COMPLETED] Insert peripheral IV **AND** sodium chloride  •  sodium chloride  •  zolpidem      Assessment/Plan   Assessment/Plan     Active Hospital Problems    Diagnosis  POA   • Insomnia [G47.00]  Yes   • Acute pancreatitis without infection or necrosis [K85.90]  Yes   • Essential hypertension [I10]  Yes   • PTSD (post-traumatic stress disorder) [F43.10]  Yes   • Tobacco abuse [Z72.0]  Yes   • Obesity (BMI 30-39.9) [E66.9]  Yes   • Mixed hyperlipidemia [E78.2]  Yes   • Diabetes (CMS/HCC) [E11.9]  Yes      Resolved Hospital Problems   No resolved problems to display.       MEDICAL DECISION MAKING COMPLEXITY BY PROBLEM:     Acute pancreatitis  -- pain well managed on fentanyl patch at this time  -- Lipase improved  -- RUQ US  unremakrable  -- fairly mild, etiology not quite clear, suspect Januvia as it is newly started per patient    Diabetes mellitus - type 2  -- oral medications on hold: metformin, Januvia, glipizide, and Jardiance  -- HgbA1c 7.5  -- AccuChecks and SSI coverage    Essential hypertension  -- chronic; stable  -- continue lisinopril    Hyperlipidemia  -- statin on hold while NPO  -- restart at discharge    Insomnia  -- continue Ambien    PTSD  -- continue Elavil    Tobacco abuse  -- encouraged cessation  -- nicotine patch ordered    Obesity  -- encouraged lifestyle modifications after acute illness    VTE Prophylaxis  Mechanical Order History:      Ordered        06/15/20 2300  Place Sequential Compression Device  Once         06/15/20 2300  Maintain Sequential Compression Device  Continuous                 Pharmalogical Order History:     None          Code Status  Code Status and Medical Interventions:   Ordered at: 06/15/20 8474     Code Status:    CPR     Medical Interventions (Level of Support Prior to Arrest):    Full       This patient has been examined wearing appropriate Personal Protective Equipment 06/16/20      Discharge Planning    To be determined pending clinical course.    Destination      Coordination has not been started for this encounter.      Durable Medical Equipment      Coordination has not been started for this encounter.      Dialysis/Infusion      Coordination has not been started for this encounter.      Home Medical Care      Coordination has not been started for this encounter.      Therapy      Coordination has not been started for this encounter.      Community Resources      Coordination has not been started for this encounter.            Electronically signed by Katie Moore MD, 06/16/20, 16:13.    [facility] ([facility] Hospitalist Team

## 2020-06-16 NOTE — NURSING NOTE
S/W Willian Rose in regards to no available heart monitors at this time. Monitor vital signs every 4 hrs and apply when available. Notified charge nurse, Chiqui of situation.

## 2020-06-16 NOTE — PROGRESS NOTES
Discharge Planning Assessment   [facility]     Patient Name: Carli Dubon  MRN: [id number]  Today's Date: 6/16/2020    Admit Date: 6/15/2020    Discharge Needs Assessment     Row Name 06/16/20 1249       Living Environment    Lives With  child(jadon), adult;grandchild(jadon)    Current Living Arrangements  home/apartment/condo    Primary Care Provided by  self    Provides Primary Care For  no one    Family Caregiver if Needed  child(jadon), adult    Able to Return to Prior Arrangements  yes       Resource/Environmental Concerns    Transportation Concerns  car, none       Transition Planning    Patient/Family Anticipates Transition to  home with family    Patient/Family Anticipated Services at Transition  none    Transportation Anticipated  car, drives self       Discharge Needs Assessment    Readmission Within the Last 30 Days  no previous admission in last 30 days    Concerns to be Addressed  no discharge needs identified    Equipment Currently Used at Home  none    Anticipated Changes Related to Illness  none    Equipment Needed After Discharge  none    Discharge Coordination/Progress  Anticipate routine discharge home with family. Patient denies any needs at this time.         Discharge Plan     Row Name 06/16/20 2820       Plan    Plan  Anticipate routine discharge home with family. Patient denies any needs at this time.     Provided Post Acute Provider List?  N/A    Provided Post Acute Provider Quality & Resource List?  N/A    Patient/Family in Agreement with Plan  yes    Plan Comments  Due to Covid 19 pandemic,  working off site. Verified PCP and pharmacy. Patient denies any trouble obtaining food or medication. Denied any DME usage. Barriers to discharge: patient to have a HIDA scan today.  Possible surgery .          Expected Discharge Date and Time     Expected Discharge Date Expected Discharge Time    Jun 18, 2020         Demographic Summary     Row Name 06/16/20 6614       General Information     Admission Type  inpatient    Arrived From  emergency department    Referral Source  other (see comments)    Reason for Consult  discharge planning    Preferred Language  English     Used During This Interaction  no       Contact Information    Permission Granted to Share Info With              Anna Naegele RN Case Manager  [address]  [address] [phone]  office  [phone]  fax  [email or web address]

## 2020-06-17 ENCOUNTER — READMISSION MANAGEMENT (OUTPATIENT)
Dept: CALL CENTER | Facility: HOSPITAL | Age: 60
End: 2020-06-17

## 2020-06-17 VITALS
HEIGHT: 65 IN | WEIGHT: 197.75 LBS | SYSTOLIC BLOOD PRESSURE: 131 MMHG | OXYGEN SATURATION: 98 % | RESPIRATION RATE: 15 BRPM | HEART RATE: 92 BPM | TEMPERATURE: 97.4 F | DIASTOLIC BLOOD PRESSURE: 78 MMHG | BODY MASS INDEX: 32.95 KG/M2

## 2020-06-17 PROBLEM — K85.90 ACUTE PANCREATITIS WITHOUT INFECTION OR NECROSIS: Status: RESOLVED | Noted: 2020-06-15 | Resolved: 2020-06-17

## 2020-06-17 LAB
ANION GAP SERPL CALCULATED.3IONS-SCNC: 9 MMOL/L (ref 5–15)
BASOPHILS # BLD AUTO: 0 10*3/MM3 (ref 0–0.2)
BASOPHILS NFR BLD AUTO: 0.2 % (ref 0–1.5)
BUN BLD-MCNC: 8 MG/DL (ref 6–20)
BUN BLD-MCNC: ABNORMAL MG/DL
BUN/CREAT SERPL: ABNORMAL
CALCIUM SPEC-SCNC: 8.1 MG/DL (ref 8.6–10.5)
CHLORIDE SERPL-SCNC: 101 MMOL/L (ref 98–107)
CO2 SERPL-SCNC: 25 MMOL/L (ref 22–29)
CREAT BLD-MCNC: 0.48 MG/DL (ref 0.57–1)
DEPRECATED RDW RBC AUTO: 45.1 FL (ref 37–54)
EOSINOPHIL # BLD AUTO: 0.2 10*3/MM3 (ref 0–0.4)
EOSINOPHIL NFR BLD AUTO: 1.7 % (ref 0.3–6.2)
ERYTHROCYTE [DISTWIDTH] IN BLOOD BY AUTOMATED COUNT: 13.8 % (ref 12.3–15.4)
GFR SERPL CREATININE-BSD FRML MDRD: 133 ML/MIN/1.73
GFR SERPL CREATININE-BSD FRML MDRD: >150 ML/MIN/1.73
GLUCOSE BLD-MCNC: 115 MG/DL (ref 65–99)
GLUCOSE BLDC GLUCOMTR-MCNC: 131 MG/DL (ref 70–105)
HCT VFR BLD AUTO: 39 % (ref 34–46.6)
HGB BLD-MCNC: 13.3 G/DL (ref 12–15.9)
LIPASE SERPL-CCNC: 144 U/L (ref 13–60)
LYMPHOCYTES # BLD AUTO: 3.1 10*3/MM3 (ref 0.7–3.1)
LYMPHOCYTES NFR BLD AUTO: 34.2 % (ref 19.6–45.3)
MAGNESIUM SERPL-MCNC: 2.1 MG/DL (ref 1.6–2.6)
MCH RBC QN AUTO: 31.3 PG (ref 26.6–33)
MCHC RBC AUTO-ENTMCNC: 34 G/DL (ref 31.5–35.7)
MCV RBC AUTO: 92.1 FL (ref 79–97)
MONOCYTES # BLD AUTO: 0.5 10*3/MM3 (ref 0.1–0.9)
MONOCYTES NFR BLD AUTO: 5.6 % (ref 5–12)
NEUTROPHILS # BLD AUTO: 5.2 10*3/MM3 (ref 1.7–7)
NEUTROPHILS NFR BLD AUTO: 58.3 % (ref 42.7–76)
NRBC BLD AUTO-RTO: 0.1 /100 WBC (ref 0–0.2)
PHOSPHATE SERPL-MCNC: 3.6 MG/DL (ref 2.5–4.5)
PLATELET # BLD AUTO: 245 10*3/MM3 (ref 140–450)
PMV BLD AUTO: 8.1 FL (ref 6–12)
POTASSIUM BLD-SCNC: 3.7 MMOL/L (ref 3.5–5.2)
RBC # BLD AUTO: 4.23 10*6/MM3 (ref 3.77–5.28)
SODIUM BLD-SCNC: 135 MMOL/L (ref 136–145)
WBC NRBC COR # BLD: 9 10*3/MM3 (ref 3.4–10.8)

## 2020-06-17 PROCEDURE — 83735 ASSAY OF MAGNESIUM: CPT | Performed by: PHYSICIAN ASSISTANT

## 2020-06-17 PROCEDURE — 85025 COMPLETE CBC W/AUTO DIFF WBC: CPT | Performed by: PHYSICIAN ASSISTANT

## 2020-06-17 PROCEDURE — 84100 ASSAY OF PHOSPHORUS: CPT | Performed by: PHYSICIAN ASSISTANT

## 2020-06-17 PROCEDURE — 83690 ASSAY OF LIPASE: CPT | Performed by: INTERNAL MEDICINE

## 2020-06-17 PROCEDURE — 94799 UNLISTED PULMONARY SVC/PX: CPT

## 2020-06-17 PROCEDURE — 80048 BASIC METABOLIC PNL TOTAL CA: CPT | Performed by: PHYSICIAN ASSISTANT

## 2020-06-17 PROCEDURE — 82962 GLUCOSE BLOOD TEST: CPT

## 2020-06-17 PROCEDURE — 99239 HOSP IP/OBS DSCHRG MGMT >30: CPT | Performed by: INTERNAL MEDICINE

## 2020-06-17 RX ORDER — FENTANYL 12 UG/H
1 PATCH TRANSDERMAL
Qty: 1 PATCH | Refills: 0 | Status: SHIPPED | OUTPATIENT
Start: 2020-06-19 | End: 2020-06-20

## 2020-06-17 RX ORDER — FENTANYL 12 UG/H
1 PATCH TRANSDERMAL
Qty: 1 PATCH | Refills: 0 | Status: SHIPPED | OUTPATIENT
Start: 2020-06-17 | End: 2020-06-25 | Stop reason: SDUPTHER

## 2020-06-17 RX ADMIN — IPRATROPIUM BROMIDE 0.5 MG: 0.5 SOLUTION RESPIRATORY (INHALATION) at 14:36

## 2020-06-17 RX ADMIN — Medication 10 ML: at 08:46

## 2020-06-17 RX ADMIN — NICOTINE 1 PATCH: 21 PATCH TRANSDERMAL at 08:46

## 2020-06-17 RX ADMIN — IPRATROPIUM BROMIDE 0.5 MG: 0.5 SOLUTION RESPIRATORY (INHALATION) at 07:07

## 2020-06-17 RX ADMIN — SODIUM CHLORIDE, SODIUM LACTATE, POTASSIUM CHLORIDE, AND CALCIUM CHLORIDE 125 ML/HR: 600; 310; 30; 20 INJECTION, SOLUTION INTRAVENOUS at 05:31

## 2020-06-17 NOTE — DISCHARGE SUMMARY
"      [facility] Medicine Services  DISCHARGE SUMMARY        Prepared For PCP:  Cindy Huddleston APRN    Patient Name: Carli Dubon  : 1960  MRN: [id number]      Date of Admission:   6/15/2020    Date of Discharge:  2020    Length of stay:  LOS: 1 day     Hospital Course     Presenting Problem:   Abdominal pain, unspecified abdominal location [R10.9]  Acute pancreatitis without infection or necrosis, unspecified pancreatitis type [K85.90]  Acute pancreatitis without infection or necrosis, unspecified pancreatitis type [K85.90]      Active Hospital Problems    Diagnosis  POA   • Insomnia [G47.00]  Yes   • Essential hypertension [I10]  Yes   • PTSD (post-traumatic stress disorder) [F43.10]  Yes   • Tobacco abuse [Z72.0]  Yes   • Obesity (BMI 30-39.9) [E66.9]  Yes   • Mixed hyperlipidemia [E78.2]  Yes   • Diabetes (CMS/HCC) [E11.9]  Yes      Resolved Hospital Problems    Diagnosis Date Resolved POA   • Acute pancreatitis without infection or necrosis [K85.90] 2020 Yes           Hospital Course:  Carli Dubon is a 59 y.o. female with past medical history of diabetes, hypertension, hyperlipidemia, obesity, PTSD and tobacco abuse who presents to [facility] complaining of epigastric pain and nausea.  Patient states that for approximately the past week and a half she is experienced a constant and worsening epigastric pain described as a \"knot\" in the epigastric region and occasionally stabbing rated at 10/10 at its worst and 8/5 at the time of my exam.  Patient reports pain is made worse with eating and has some associated nausea without vomiting.  She denies any chest pain, shortness of air, cough, diarrhea, constipation or fever.  Patient denies alcohol use, is uncertain when she last had her cholesterol checked and has not started any new medications.     In the ED patient's CMP found to be generally unremarkable.  Lipase: 296, WBCs: 11.90 with increased absolute " neutrophil and lymphocyte count noted on exam.  UA shows 3+ glucose otherwise unremarkable.  CT of the abdomen shows mild fat stranding around the pancreatic head most likely due to pancreatitis.  Primary duodenal inflammation is considered less likely.  A fatty liver as well as uterine fibroids are also noted.    Patient was admitted and given IV fluids and bowel rest along with analgesia.  Her lipase trended down and she was gradually able to advance her diet prior to discharge.  Given the mild presentation, it was felt that this was a side effect of her recently started Januvia. The medication was discontinued as recommended by the drug manufacturers.    She was discharged home in good condition with instructions to follow-up with her PCP.     Recommendation for Outpatient Providers:     Patient will need alternative regimen of diabetic medications and glucose management due to the removal of Januvia from her medical regimen.      Reasons For Change In Medications and Indications for New Medications:    Januvia discontinued due to pancreatitis.    Day of Discharge     HPI: Patient feels well, no increased pain with advancing diet.  No nausea or vomiting.      Vital Signs:   Temp:  [97.4 °F (36.3 °C)-98.4 °F (36.9 °C)] 97.4 °F (36.3 °C)  Heart Rate:  [82-97] 92  Resp:  [14-18] 15  BP: (107-150)/(73-85) 131/78     Physical Exam:  General: well-developed and well-nourished, NAD  HEENT: NC/AT, EOMI, PERRLA  Heart: RRR. No murmur   Chest: CTAB, no w/r/r, normal respiratory effort  Abdominal: Soft. NT/ND. Bowel sounds present  Musculoskeletal: Normal ROM.  No edema. No calf tenderness.  Neurological: AAOx3, no focal deficits  Skin: Skin is warm and dry. No rash  Psychiatric: Normal mood and affect.     Pertinent  and/or Most Recent Results     Results from last 7 days   Lab Units 06/17/20  0355 06/16/20  0331 06/15/20  1939   WBC 10*3/mm3 9.00 11.80* 11.90*   HEMOGLOBIN g/dL 13.3 13.8 15.4   HEMATOCRIT % 39.0 40.9 44.4    PLATELETS 10*3/mm3 245 231 290   SODIUM mmol/L 135* 133* 137   POTASSIUM mmol/L 3.7 4.0 3.9   CHLORIDE mmol/L 101 102 101   CO2 mmol/L 25.0 21.0* 25.0   BUN  8 13 11   CREATININE mg/dL 0.48* 0.59 0.77   GLUCOSE mg/dL 115* 135* 188*   CALCIUM mg/dL 8.1* 8.1* 8.8     Results from last 7 days   Lab Units 06/15/20  1939   BILIRUBIN mg/dL 0.2   ALK PHOS U/L 116   ALT (SGPT) U/L 8   AST (SGOT) U/L 10     Results from last 7 days   Lab Units 06/16/20  0331   CHOLESTEROL mg/dL 141   TRIGLYCERIDES mg/dL 94   HDL CHOL mg/dL 32*     Results from last 7 days   Lab Units 06/16/20  0331   HEMOGLOBIN A1C % 7.5*   PROBNP pg/mL 46.8       Brief Urine Lab Results  (Last result in the past 365 days)      Color   Clarity   Blood   Leuk Est   Nitrite   Protein   CREAT   Urine HCG        06/15/20 1954 Yellow Clear Negative Negative Negative Negative               Microbiology Results Abnormal     None          Ct Abdomen Pelvis Without Contrast    Result Date: 6/15/2020  Impression: 1. There is mild fat stranding around the pancreatic head. This is most likely due to pancreatitis. Primary duodenal inflammation is considered less likely. 2. Fatty liver. 3. Uterine fibroids.  Electronically Signed By-Aylin Looney On:6/15/2020 8:15 PM This report was finalized on [id number] by  Aylin Looney .    Us Gallbladder    Result Date: 6/16/2020  Impression: 1.The pancreas is mostly obscured by overlying bowel gas. 2.Diffuse hepatic steatosis. 3.Unremarkable sonographic appearance of the gallbladder. No extrahepatic biliary ductal dilatation.  Electronically Signed By-Swathi Griffith On:6/16/2020 9:43 AM This report was finalized on [id number] by  Swathi Griffith, .                             Test Results Pending at Discharge        Procedures Performed           Consults:   Consults     Date and Time Order Name Status Description    6/15/2020 2046 Hospitalist (on-call MD unless specified) Completed             Discharge Details           Discharge Medications      New Medications      Instructions Start Date   fentaNYL 12 MCG/HR  Commonly known as:  DURAGESIC   1 patch, Transdermal, Every 72 Hours   Start Date:  June 19, 2020        Continue These Medications      Instructions Start Date   amitriptyline 75 MG tablet  Commonly known as:  ELAVIL   75 mg, Oral, Nightly      atorvastatin 10 MG tablet  Commonly known as:  Lipitor   10 mg, Oral, Daily      Empagliflozin 25 MG tablet  Commonly known as:  Jardiance   25 mg, Oral, Daily      glipizide 10 MG tablet  Commonly known as:  GLUCOTROL   10 mg, Oral, 2 Times Daily Before Meals      lisinopril 20 MG tablet  Commonly known as:  PRINIVIL,ZESTRIL   20 mg, Oral, Daily      meloxicam 15 MG tablet  Commonly known as:  Mobic   15 mg, Oral, Daily      metFORMIN 1000 MG tablet  Commonly known as:  GLUCOPHAGE   1,000 mg, Oral, 2 Times Daily With Meals      Tiotropium Bromide Monohydrate 1.25 MCG/ACT aerosol solution inhaler  Commonly known as:  Spiriva Respimat   2 puffs, Inhalation, Daily - RT      zolpidem 10 MG tablet  Commonly known as:  Ambien   10 mg, Oral, Nightly PRN         Stop These Medications    SITagliptin 100 MG tablet  Commonly known as:  Januvia            Allergies   Allergen Reactions   • Iodine Swelling   • Adhesive Tape Rash         Discharge Disposition:  Home or Self Care    Diet:  Hospital:  Diet Order   Procedures   • Diet Gastrointestinal; Low Residue         Discharge Activity:   Activity Instructions     Activity as Tolerated              CODE STATUS:    Code Status and Medical Interventions:   Ordered at: 06/15/20 2218     Code Status:    CPR     Medical Interventions (Level of Support Prior to Arrest):    Full         Follow-up Appointments  Future Appointments   Date Time Provider Department Center   7/30/2020 11:00 AM Kitty Carlos PA-C [facility] None       Additional Instructions for the Follow-ups that You Need to Schedule     Call MD With Problems / Concerns   As  directed      Instructions: Call [phone] or email [email or web address] for problems or concerns.    Order Comments:  Instructions: Call [phone] or email [email or web address] for problems or concerns.          Discharge Follow-up with PCP   As directed       Currently Documented PCP:    Cindy Huddleston APRN    PCP Phone Number:    [phone]     Follow Up Details:  1 week                 Condition on Discharge:      Stable      This patient has been examined wearing appropriate Personal Protective Equipment 06/17/20      Electronically signed by Katie Moore MD, 06/17/20, 5:15 PM.      Time: I spent  32  minutes on this discharge activity which included face-to-face encounter with the patient/reviewing the data in the system/coordination of the care with the nursing staff as well as consultants/documentation/entering orders.

## 2020-06-17 NOTE — OUTREACH NOTE
Prep Survey      Responses   [facility] patient discharged from?  [facility]   Is LACE score < 7 ?  Yes   Eligibility  [facility]   Date of Admission  06/15/20   Date of Discharge  06/17/20   Discharge Disposition  Home or Self Care   Discharge diagnosis  Acute pancreatitis    COVID-19 Test Status  Not tested   Does the patient have one of the following disease processes/diagnoses(primary or secondary)?  Other   Does the patient have Home health ordered?  No   Is there a DME ordered?  No   Prep survey completed?  Yes          Andreia Campo RN

## 2020-06-18 ENCOUNTER — TRANSITIONAL CARE MANAGEMENT TELEPHONE ENCOUNTER (OUTPATIENT)
Dept: CALL CENTER | Facility: HOSPITAL | Age: 60
End: 2020-06-18

## 2020-06-18 NOTE — OUTREACH NOTE
Call Center TCM Note      Responses   [facility] patient discharged from?  [facility]   COVID-19 Test Status  Not tested   Does the patient have one of the following disease processes/diagnoses(primary or secondary)?  Other   TCM attempt successful?  Yes   Call start time  1638   Call end time  1641   Discharge diagnosis  Acute pancreatitis    Meds reviewed with patient/caregiver?  Yes   Is the patient having any side effects they believe may be caused by any medication additions or changes?  No   Does the patient have all medications ordered at discharge?  No   What is keeping the patient from filling the prescriptions?  -- [Was not available at pharmacy, but is ordered. ]   Nursing Interventions  Nurse provided patient education, No intervention needed   Is the patient taking all medications as directed (includes completed medication regime)?  No   What is preventing the patient from taking all medications as directed?  Other   Nursing Interventions  Nurse provided patient education, Advised patient to call provider   Medication comments  Patient will discuss with PCP tomorrow at [facility]   Does the patient have a primary care provider?   Yes   Does the patient have an appointment with their PCP within 7 days of discharge?  Yes   Comments regarding PCP  PCP:  Samantha Hatch MD Patient has a followup [facility] 6/19/2020   Has the patient kept scheduled appointments due by today?  N/A   Has home health visited the patient within 72 hours of discharge?  N/A   Psychosocial issues?  No   Did the patient receive a copy of their discharge instructions?  Yes   Nursing interventions  Reviewed instructions with patient   What is the patient's perception of their health status since discharge?  Improving   Is the patient/caregiver able to teach back signs and symptoms related to disease process for when to call PCP?  Yes   Is the patient/caregiver able to teach back signs and symptoms related to disease process for when  to call 911?  Yes   Is the patient/caregiver able to teach back the hierarchy of who to call/visit for symptoms/problems? PCP, Specialist, Home health nurse, Urgent Care, ED, 911  Yes   TCM call completed?  Yes          Paresh Peacock RN    6/18/2020, 16:41

## 2020-06-18 NOTE — PROGRESS NOTES
Case Management Discharge Note      Final Note: Routine discharge home     Provided Post Acute Provider List?: N/A  Provided Post Acute Provider Quality & Resource List?: N/A              Final Discharge Disposition Code: 01 - home or self-care

## 2020-06-18 NOTE — PAYOR COMM NOTE
"DC Notification for Cliff Dubon  1960  Ref #[id number]  DC 2020 routine to home    GABRIEL DAMON LPN [facility]    [phone]    Cliff Dubon (59 y.o. Female)     Date of Birth Social Security Number Address Home Phone MRN    1960  [id number] [place] [address] [phone] [id number]    Sabianist Marital Status          None Single       Admission Date Admission Type Admitting Provider Attending Provider Department, Room/Bed    6/15/20 Emergency Katie Moore MD  [facility], 4109/1    Discharge Date Discharge Disposition Discharge Destination        2020 Home or Self Care Home             Attending Provider:  (none)   Allergies:  Iodine, Adhesive Tape    Isolation:  None   Infection:  None   Code Status:  Prior    Ht:  165.1 cm (65\")   Wt:  89.7 kg (197 lb 12 oz)    Admission Cmt:  None   Principal Problem:  None                Active Insurance as of 6/15/2020     Primary Coverage     Payor Plan Insurance Group Employer/Plan Group    [facility] [id number]      Payor Plan Address Payor Plan Phone Number Payor Plan Fax Number Effective Dates    [address]   2020 - None Entered    [facility] [id number]       Subscriber Name Subscriber Birth Date Member ID       CLIFF DUBON 1960 [id number]                 Emergency Contacts      (Rel.) Home Phone Work Phone Mobile Phone    GRULLON,LISSY (Daughter) -- -- [phone]    ANTONIO CEJA (Grandchild) -- -- --            Discharge Summary    No notes of this type exist for this encounter.         "

## 2020-06-19 ENCOUNTER — LAB (OUTPATIENT)
Dept: FAMILY MEDICINE CLINIC | Facility: CLINIC | Age: 60
End: 2020-06-19

## 2020-06-19 ENCOUNTER — OFFICE VISIT (OUTPATIENT)
Dept: FAMILY MEDICINE CLINIC | Facility: CLINIC | Age: 60
End: 2020-06-19

## 2020-06-19 VITALS
TEMPERATURE: 98.4 F | SYSTOLIC BLOOD PRESSURE: 118 MMHG | WEIGHT: 191 LBS | HEIGHT: 65 IN | OXYGEN SATURATION: 95 % | DIASTOLIC BLOOD PRESSURE: 78 MMHG | BODY MASS INDEX: 31.82 KG/M2 | HEART RATE: 118 BPM

## 2020-06-19 DIAGNOSIS — E78.5 HYPERLIPIDEMIA, UNSPECIFIED HYPERLIPIDEMIA TYPE: ICD-10-CM

## 2020-06-19 DIAGNOSIS — K85.30 DRUG-INDUCED ACUTE PANCREATITIS, UNSPECIFIED COMPLICATION STATUS: Primary | ICD-10-CM

## 2020-06-19 DIAGNOSIS — K85.30 DRUG-INDUCED ACUTE PANCREATITIS, UNSPECIFIED COMPLICATION STATUS: ICD-10-CM

## 2020-06-19 DIAGNOSIS — E11.9 TYPE 2 DIABETES MELLITUS WITHOUT COMPLICATION, WITHOUT LONG-TERM CURRENT USE OF INSULIN (HCC): ICD-10-CM

## 2020-06-19 LAB
AMYLASE SERPL-CCNC: 49 U/L (ref 28–100)
CHOLEST SERPL-MCNC: 152 MG/DL (ref 0–200)
HDLC SERPL-MCNC: 35 MG/DL (ref 40–60)
LDLC SERPL CALC-MCNC: 91 MG/DL (ref 0–100)
LDLC/HDLC SERPL: 2.6 {RATIO}
LIPASE SERPL-CCNC: 107 U/L (ref 13–60)
TRIGL SERPL-MCNC: 130 MG/DL (ref 0–150)
VLDLC SERPL-MCNC: 26 MG/DL (ref 5–40)

## 2020-06-19 PROCEDURE — 80061 LIPID PANEL: CPT | Performed by: NURSE PRACTITIONER

## 2020-06-19 PROCEDURE — 83690 ASSAY OF LIPASE: CPT | Performed by: NURSE PRACTITIONER

## 2020-06-19 PROCEDURE — 82150 ASSAY OF AMYLASE: CPT | Performed by: NURSE PRACTITIONER

## 2020-06-19 PROCEDURE — 99214 OFFICE O/P EST MOD 30 MIN: CPT | Performed by: NURSE PRACTITIONER

## 2020-06-19 PROCEDURE — 36415 COLL VENOUS BLD VENIPUNCTURE: CPT

## 2020-06-19 RX ORDER — TRAMADOL HYDROCHLORIDE 50 MG/1
50 TABLET ORAL EVERY 6 HOURS PRN
Qty: 10 TABLET | Refills: 0 | Status: SHIPPED | OUTPATIENT
Start: 2020-06-19 | End: 2020-06-25

## 2020-06-19 NOTE — PROGRESS NOTES
Transitional Care Follow Up Visit  Subjective     Crali Dubon is a 59 y.o. female who presents for a transitional care management visit.    Within 48 business hours after discharge our office contacted her via telephone to coordinate her care and needs.      I reviewed and discussed the details of that call along with the discharge summary, hospital problems, inpatient lab results, inpatient diagnostic studies, and consultation reports with Carli.     Current outpatient and discharge medications have been reconciled for the patient.  Reviewed by: DILLON Landis      Date of TCM Phone Call 6/17/2020   [facility]   Date of Admission 6/15/2020   Date of Discharge 6/17/2020   Discharge Disposition Home or Self Care     Risk for Readmission (LACE) Score: 7 (6/17/2020  6:00 AM)      Pt is here today to follow up on a [facility] admission from 6/15-6/17.  She presented to the hospital with severe epigastric pain.  Pt was found to have pancreatitis with labs and abdominal scan.  She was NPO and slowly progressed diet.  Pt states that her pain is improving, but she is still experiencing it daily.  She is now eating regular foods, but pain is worse with eating.  She has a fentanyl patch on now, but it was due to be changed yesterday.  She took her prescription to [facility] and they had to order patches for her and they wont be available until Monday.  Pt rates her pain a 5/10.  Pt's Januvia was stopped, as we believe this was the cause.  She does not drink alcohol.  Denies CP, SOA, dizziness, HA.       Course During Hospital Stay:  See H and P- will repeat amylase and lipase today to ensure trending down.  Pt advised to go to ER if symptoms persist or worsen     The following portions of the patient's history were reviewed and updated as appropriate: allergies, current medications, past family history, past medical history, past social history, past surgical history and problem list.    Review of Systems    Constitutional: Negative for chills, fatigue and fever.   Respiratory: Negative for cough and chest tightness.    Cardiovascular: Negative for chest pain and palpitations.   Gastrointestinal: Positive for abdominal pain. Negative for constipation, diarrhea, nausea and vomiting.   Genitourinary: Negative for dysuria, frequency and hematuria.   Neurological: Negative for dizziness and headaches.   Psychiatric/Behavioral: The patient is not nervous/anxious.        Objective   Physical Exam   Constitutional: She is oriented to person, place, and time. She appears well-developed and well-nourished. No distress.   HENT:   Head: Normocephalic and atraumatic.   Right Ear: External ear normal.   Left Ear: External ear normal.   Cardiovascular: Normal rate, regular rhythm and normal heart sounds.   No murmur heard.  Pulmonary/Chest: Effort normal and breath sounds normal. No respiratory distress.   Abdominal: Soft. Bowel sounds are normal. There is tenderness.   Neurological: She is alert and oriented to person, place, and time.   Skin: Skin is warm and dry.   Psychiatric: She has a normal mood and affect. Her behavior is normal. Judgment and thought content normal.       Assessment/Plan   Problems Addressed this Visit        Endocrine    Diabetes (CMS/[facility])    Relevant Orders    Ambulatory Referral to Endocrinology      Other Visit Diagnoses     Drug-induced acute pancreatitis, unspecified complication status    -  Primary    slowly improving  recheck amylase and lipase today  strict er prec  script for Ultram for weekend until fentanyl available- INSPECT reviewed  stopped januvia      Relevant Medications    traMADol (Ultram) 50 MG tablet    Other Relevant Orders    Lipase    Amylase

## 2020-06-19 NOTE — PATIENT INSTRUCTIONS
If pain worsen back diet down to liquid or nothing by mouth. Go to ER if no improvement.  Take ultram as needed for pain.  Monday may start fentanyl patch  Watch diet closely  Referral to endocrinology for diabetes management  Call for any issues or concerns

## 2020-06-25 ENCOUNTER — OFFICE VISIT (OUTPATIENT)
Dept: FAMILY MEDICINE CLINIC | Facility: CLINIC | Age: 60
End: 2020-06-25

## 2020-06-25 ENCOUNTER — LAB (OUTPATIENT)
Dept: FAMILY MEDICINE CLINIC | Facility: CLINIC | Age: 60
End: 2020-06-25

## 2020-06-25 VITALS
WEIGHT: 189 LBS | HEIGHT: 65 IN | SYSTOLIC BLOOD PRESSURE: 115 MMHG | HEART RATE: 93 BPM | DIASTOLIC BLOOD PRESSURE: 76 MMHG | BODY MASS INDEX: 31.49 KG/M2 | TEMPERATURE: 97.3 F | OXYGEN SATURATION: 97 %

## 2020-06-25 DIAGNOSIS — K85.30 DRUG-INDUCED ACUTE PANCREATITIS, UNSPECIFIED COMPLICATION STATUS: Primary | ICD-10-CM

## 2020-06-25 DIAGNOSIS — K85.30 DRUG-INDUCED ACUTE PANCREATITIS, UNSPECIFIED COMPLICATION STATUS: ICD-10-CM

## 2020-06-25 LAB
ALBUMIN SERPL-MCNC: 4.4 G/DL (ref 3.5–5.2)
ALBUMIN/GLOB SERPL: 1.2 G/DL
ALP SERPL-CCNC: 92 U/L (ref 39–117)
ALT SERPL W P-5'-P-CCNC: 13 U/L (ref 1–33)
ANION GAP SERPL CALCULATED.3IONS-SCNC: 12.3 MMOL/L (ref 5–15)
AST SERPL-CCNC: 11 U/L (ref 1–32)
BASOPHILS # BLD AUTO: 0.03 10*3/MM3 (ref 0–0.2)
BASOPHILS NFR BLD AUTO: 0.3 % (ref 0–1.5)
BILIRUB SERPL-MCNC: 0.2 MG/DL (ref 0.2–1.2)
BUN BLD-MCNC: 10 MG/DL (ref 6–20)
BUN/CREAT SERPL: 13 (ref 7–25)
CALCIUM SPEC-SCNC: 9.7 MG/DL (ref 8.6–10.5)
CHLORIDE SERPL-SCNC: 102 MMOL/L (ref 98–107)
CO2 SERPL-SCNC: 24.7 MMOL/L (ref 22–29)
CREAT BLD-MCNC: 0.77 MG/DL (ref 0.57–1)
DEPRECATED RDW RBC AUTO: 42.1 FL (ref 37–54)
EOSINOPHIL # BLD AUTO: 0.13 10*3/MM3 (ref 0–0.4)
EOSINOPHIL NFR BLD AUTO: 1.3 % (ref 0.3–6.2)
ERYTHROCYTE [DISTWIDTH] IN BLOOD BY AUTOMATED COUNT: 12.5 % (ref 12.3–15.4)
GFR SERPL CREATININE-BSD FRML MDRD: 77 ML/MIN/1.73
GFR SERPL CREATININE-BSD FRML MDRD: 94 ML/MIN/1.73
GLOBULIN UR ELPH-MCNC: 3.7 GM/DL
GLUCOSE BLD-MCNC: 201 MG/DL (ref 65–99)
HCT VFR BLD AUTO: 44.7 % (ref 34–46.6)
HGB BLD-MCNC: 15 G/DL (ref 12–15.9)
IMM GRANULOCYTES # BLD AUTO: 0.02 10*3/MM3 (ref 0–0.05)
IMM GRANULOCYTES NFR BLD AUTO: 0.2 % (ref 0–0.5)
LIPASE SERPL-CCNC: 105 U/L (ref 13–60)
LYMPHOCYTES # BLD AUTO: 2.89 10*3/MM3 (ref 0.7–3.1)
LYMPHOCYTES NFR BLD AUTO: 27.9 % (ref 19.6–45.3)
MCH RBC QN AUTO: 30.8 PG (ref 26.6–33)
MCHC RBC AUTO-ENTMCNC: 33.6 G/DL (ref 31.5–35.7)
MCV RBC AUTO: 91.8 FL (ref 79–97)
MONOCYTES # BLD AUTO: 0.66 10*3/MM3 (ref 0.1–0.9)
MONOCYTES NFR BLD AUTO: 6.4 % (ref 5–12)
NEUTROPHILS # BLD AUTO: 6.61 10*3/MM3 (ref 1.7–7)
NEUTROPHILS NFR BLD AUTO: 63.9 % (ref 42.7–76)
NRBC BLD AUTO-RTO: 0 /100 WBC (ref 0–0.2)
PLATELET # BLD AUTO: 330 10*3/MM3 (ref 140–450)
PMV BLD AUTO: 11 FL (ref 6–12)
POTASSIUM BLD-SCNC: 4.2 MMOL/L (ref 3.5–5.2)
PROT SERPL-MCNC: 8.1 G/DL (ref 6–8.5)
RBC # BLD AUTO: 4.87 10*6/MM3 (ref 3.77–5.28)
SODIUM BLD-SCNC: 139 MMOL/L (ref 136–145)
WBC NRBC COR # BLD: 10.34 10*3/MM3 (ref 3.4–10.8)

## 2020-06-25 PROCEDURE — 85025 COMPLETE CBC W/AUTO DIFF WBC: CPT | Performed by: NURSE PRACTITIONER

## 2020-06-25 PROCEDURE — 83690 ASSAY OF LIPASE: CPT | Performed by: NURSE PRACTITIONER

## 2020-06-25 PROCEDURE — 36415 COLL VENOUS BLD VENIPUNCTURE: CPT

## 2020-06-25 PROCEDURE — 80053 COMPREHEN METABOLIC PANEL: CPT | Performed by: NURSE PRACTITIONER

## 2020-06-25 PROCEDURE — 99214 OFFICE O/P EST MOD 30 MIN: CPT | Performed by: NURSE PRACTITIONER

## 2020-06-25 RX ORDER — FENTANYL 12 UG/H
1 PATCH TRANSDERMAL
Qty: 1 PATCH | Refills: 0 | Status: SHIPPED | OUTPATIENT
Start: 2020-06-25 | End: 2020-10-05

## 2020-06-25 NOTE — PROGRESS NOTES
"Subjective   Carli Dubon is a 59 y.o. female.     Pt is here today with c/o pancreatitis.  She was hospitalized about 10 days ago due to elevated pacreatic enzymes.  It was believed that her pancreatitis was due to her januvia, which was stopped.  Pt states that her pain started getting worse on Saturday.  She was taking tramadol over the weekend, which only helped minimally.  She now has a fentanyl patch, which helps more with the pain.  She applied a new patch yesterday.  Pain is the worse at night time.  She is consuming a normal diet- but a small amount at a time.  She reports worsening pain after eating.  Pain is in the epigastric area.  Rates it a 5/10 during the day and 9/10 at night.  She does not drink alcohol.  Reports that the pain was so bad yesterday that she almost went to the ER.       The following portions of the patient's history were reviewed and updated as appropriate: allergies, current medications, past family history, past medical history, past social history, past surgical history and problem list.    Review of Systems   Constitutional: Positive for appetite change and chills. Negative for fatigue and fever.   Respiratory: Negative for chest tightness and shortness of breath.    Cardiovascular: Negative for chest pain and palpitations.   Gastrointestinal: Positive for abdominal pain. Negative for constipation, diarrhea, nausea and vomiting.   Neurological: Negative for dizziness and headache.   Psychiatric/Behavioral: Negative for depressed mood and stress. The patient is not nervous/anxious.        Objective   /76 (BP Location: Left arm, Patient Position: Sitting, Cuff Size: Adult)   Pulse 93   Temp 97.3 °F (36.3 °C) (Temporal)   Ht 165.1 cm (65\")   Wt 85.7 kg (189 lb)   SpO2 97%   BMI 31.45 kg/m²   Physical Exam   Constitutional: She is oriented to person, place, and time. She appears well-developed and well-nourished. No distress.   HENT:   Head: Normocephalic and atraumatic. "   Eyes: EOM are normal.   Cardiovascular: Normal rate, regular rhythm and normal heart sounds.   No murmur heard.  Pulmonary/Chest: Effort normal and breath sounds normal. No respiratory distress. She has no wheezes.   Abdominal: Soft. There is tenderness (epigastric region).   Neurological: She is alert and oriented to person, place, and time.   Skin: Skin is warm and dry.   Psychiatric: She has a normal mood and affect. Her behavior is normal. Judgment and thought content normal.         Assessment/Plan     Diagnoses and all orders for this visit:    1. Drug-induced acute pancreatitis, unspecified complication status (Primary)  Comments:  check labs- CBC, CMP , and lipase  cont fentanyl patch for pain management  urgent referral to GI  strict ER precautions- go if pain worsens  consume clear liquid diet for the next few days- advance to low fat as tolerated  Avoid alcohol use  Check blood sugars at home  Last lipid panel was stable  Orders:  -     Ambulatory Referral to Gastroenterology  -     Lipase; Future  -     CBC & Differential  -     Comprehensive Metabolic Panel; Future  -     fentaNYL (DURAGESIC) 12 MCG/HR; Place 1 patch on the skin as directed by provider Every 72 (Seventy-Two) Hours.  Dispense: 1 patch; Refill: 0

## 2020-06-25 NOTE — PATIENT INSTRUCTIONS
Call and set up appt with endocrinology- [phone]  Referral to [facility] ASAP  Consume a clear liquid diet for 1-2 days and then increase to low fat if pain has improved  Go to ER for worsening pain  Cont fentanyl patch  Obtain labs

## 2020-06-26 ENCOUNTER — TELEPHONE (OUTPATIENT)
Dept: FAMILY MEDICINE CLINIC | Facility: CLINIC | Age: 60
End: 2020-06-26

## 2020-06-26 NOTE — TELEPHONE ENCOUNTER
Let her know that I personally called GI and that is the earliest in office visit they have.  They can get her in sooner for a virtual visit and will be calling to discuss that with her.  If her symptoms worsen she will unfortunately need to go to the ER.  Tell her to make sure she sets up an appointment with endocrinology

## 2020-06-26 NOTE — TELEPHONE ENCOUNTER
Patient called and said ZARA richmond get her in until 7/27. She does not think she can wait that long and wants to know if there is something else you can do

## 2020-06-29 ENCOUNTER — OFFICE (AMBULATORY)
Dept: URBAN - METROPOLITAN AREA CLINIC 64 | Facility: CLINIC | Age: 60
End: 2020-06-29
Payer: COMMERCIAL

## 2020-06-29 VITALS
HEART RATE: 101 BPM | DIASTOLIC BLOOD PRESSURE: 84 MMHG | SYSTOLIC BLOOD PRESSURE: 122 MMHG | HEIGHT: 66 IN | WEIGHT: 189 LBS

## 2020-06-29 DIAGNOSIS — Z12.11 ENCOUNTER FOR SCREENING FOR MALIGNANT NEOPLASM OF COLON: ICD-10-CM

## 2020-06-29 DIAGNOSIS — J44.9 CHRONIC OBSTRUCTIVE PULMONARY DISEASE, UNSPECIFIED: ICD-10-CM

## 2020-06-29 DIAGNOSIS — R10.13 EPIGASTRIC PAIN: ICD-10-CM

## 2020-06-29 DIAGNOSIS — K85.90 ACUTE PANCREATITIS WITHOUT NECROSIS OR INFECTION, UNSPECIFIE: ICD-10-CM

## 2020-06-29 DIAGNOSIS — R63.4 ABNORMAL WEIGHT LOSS: ICD-10-CM

## 2020-06-29 PROCEDURE — 99204 OFFICE O/P NEW MOD 45 MIN: CPT | Performed by: INTERNAL MEDICINE

## 2020-07-02 ENCOUNTER — ON CAMPUS - OUTPATIENT (AMBULATORY)
Dept: URBAN - METROPOLITAN AREA HOSPITAL 2 | Facility: HOSPITAL | Age: 60
End: 2020-07-02
Payer: COMMERCIAL

## 2020-07-02 ENCOUNTER — OFFICE (AMBULATORY)
Dept: URBAN - METROPOLITAN AREA PATHOLOGY 4 | Facility: PATHOLOGY | Age: 60
End: 2020-07-02
Payer: COMMERCIAL

## 2020-07-02 VITALS
SYSTOLIC BLOOD PRESSURE: 119 MMHG | SYSTOLIC BLOOD PRESSURE: 123 MMHG | DIASTOLIC BLOOD PRESSURE: 47 MMHG | DIASTOLIC BLOOD PRESSURE: 70 MMHG | OXYGEN SATURATION: 95 % | TEMPERATURE: 97.3 F | OXYGEN SATURATION: 94 % | OXYGEN SATURATION: 100 % | RESPIRATION RATE: 18 BRPM | DIASTOLIC BLOOD PRESSURE: 80 MMHG | RESPIRATION RATE: 16 BRPM | OXYGEN SATURATION: 96 % | OXYGEN SATURATION: 98 % | SYSTOLIC BLOOD PRESSURE: 113 MMHG | HEART RATE: 94 BPM | HEART RATE: 101 BPM | DIASTOLIC BLOOD PRESSURE: 76 MMHG | SYSTOLIC BLOOD PRESSURE: 111 MMHG | OXYGEN SATURATION: 99 % | SYSTOLIC BLOOD PRESSURE: 94 MMHG | HEART RATE: 92 BPM | HEIGHT: 66 IN | DIASTOLIC BLOOD PRESSURE: 57 MMHG | RESPIRATION RATE: 14 BRPM | WEIGHT: 188.2 LBS | HEART RATE: 97 BPM | RESPIRATION RATE: 10 BRPM | DIASTOLIC BLOOD PRESSURE: 81 MMHG | RESPIRATION RATE: 15 BRPM | SYSTOLIC BLOOD PRESSURE: 105 MMHG | RESPIRATION RATE: 23 BRPM | SYSTOLIC BLOOD PRESSURE: 131 MMHG | HEART RATE: 105 BPM | HEART RATE: 99 BPM | SYSTOLIC BLOOD PRESSURE: 112 MMHG

## 2020-07-02 DIAGNOSIS — K29.50 UNSPECIFIED CHRONIC GASTRITIS WITHOUT BLEEDING: ICD-10-CM

## 2020-07-02 DIAGNOSIS — R63.4 ABNORMAL WEIGHT LOSS: ICD-10-CM

## 2020-07-02 DIAGNOSIS — B96.81 HELICOBACTER PYLORI [H. PYLORI] AS THE CAUSE OF DISEASES CLA: ICD-10-CM

## 2020-07-02 DIAGNOSIS — K25.9 GASTRIC ULCER, UNSPECIFIED AS ACUTE OR CHRONIC, WITHOUT HEMO: ICD-10-CM

## 2020-07-02 DIAGNOSIS — R10.13 EPIGASTRIC PAIN: ICD-10-CM

## 2020-07-02 DIAGNOSIS — Z12.11 ENCOUNTER FOR SCREENING FOR MALIGNANT NEOPLASM OF COLON: ICD-10-CM

## 2020-07-02 DIAGNOSIS — R93.3 ABNORMAL FINDINGS ON DIAGNOSTIC IMAGING OF OTHER PARTS OF DI: ICD-10-CM

## 2020-07-02 PROBLEM — K31.89 OTHER DISEASES OF STOMACH AND DUODENUM: Status: ACTIVE | Noted: 2020-07-02

## 2020-07-02 LAB
GI HISTOLOGY: A. UNSPECIFIED: (no result)
GI HISTOLOGY: B. SELECT: (no result)
GI HISTOLOGY: PDF REPORT: (no result)

## 2020-07-02 PROCEDURE — 43239 EGD BIOPSY SINGLE/MULTIPLE: CPT | Performed by: INTERNAL MEDICINE

## 2020-07-02 PROCEDURE — 88305 TISSUE EXAM BY PATHOLOGIST: CPT | Mod: 26 | Performed by: INTERNAL MEDICINE

## 2020-07-02 PROCEDURE — 45330 DIAGNOSTIC SIGMOIDOSCOPY: CPT | Performed by: INTERNAL MEDICINE

## 2020-07-02 RX ORDER — PANTOPRAZOLE SODIUM 20 MG/1
20 TABLET, DELAYED RELEASE ORAL
Qty: 90 | Refills: 3 | Status: COMPLETED
Start: 2020-07-02 | End: 2021-08-10

## 2020-07-10 ENCOUNTER — TELEPHONE (OUTPATIENT)
Dept: ENDOCRINOLOGY | Facility: CLINIC | Age: 60
End: 2020-07-10

## 2020-07-14 PROBLEM — K29.70 HELICOBACTER PYLORI GASTRITIS: Status: ACTIVE | Noted: 2020-07-14

## 2020-07-14 PROBLEM — B96.81 HELICOBACTER PYLORI GASTRITIS: Status: ACTIVE | Noted: 2020-07-14

## 2020-07-30 ENCOUNTER — OFFICE VISIT (OUTPATIENT)
Dept: PSYCHIATRY | Facility: CLINIC | Age: 60
End: 2020-07-30

## 2020-07-30 DIAGNOSIS — F43.10 PTSD (POST-TRAUMATIC STRESS DISORDER): Primary | Chronic | ICD-10-CM

## 2020-07-30 DIAGNOSIS — F31.32 BIPOLAR AFFECTIVE DISORDER, CURRENTLY DEPRESSED, MODERATE (HCC): ICD-10-CM

## 2020-07-30 DIAGNOSIS — F51.05 INSOMNIA DUE TO MENTAL CONDITION: ICD-10-CM

## 2020-07-30 PROCEDURE — 90792 PSYCH DIAG EVAL W/MED SRVCS: CPT | Performed by: PHYSICIAN ASSISTANT

## 2020-07-30 RX ORDER — ARIPIPRAZOLE 2 MG/1
2 TABLET ORAL NIGHTLY
Qty: 30 TABLET | Refills: 3 | Status: SHIPPED | OUTPATIENT
Start: 2020-07-30 | End: 2020-09-15 | Stop reason: ALTCHOICE

## 2020-07-30 RX ORDER — LAMOTRIGINE 25 MG/1
25 TABLET ORAL 2 TIMES DAILY
Qty: 60 TABLET | Refills: 3 | Status: SHIPPED | OUTPATIENT
Start: 2020-07-30 | End: 2020-08-17 | Stop reason: DRUGHIGH
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
## 2020-07-30 NOTE — PROGRESS NOTES
I have reviewed the notes, assessments, and/or procedures performed byKitty Carlos, I concur with her/his documentation of Carli Dubon.

## 2020-08-10 DIAGNOSIS — E78.5 HYPERLIPIDEMIA, UNSPECIFIED HYPERLIPIDEMIA TYPE: ICD-10-CM

## 2020-08-10 DIAGNOSIS — G47.00 INSOMNIA, UNSPECIFIED TYPE: ICD-10-CM

## 2020-08-10 DIAGNOSIS — E11.9 TYPE 2 DIABETES MELLITUS WITHOUT COMPLICATION, WITHOUT LONG-TERM CURRENT USE OF INSULIN (HCC): ICD-10-CM

## 2020-08-10 RX ORDER — ZOLPIDEM TARTRATE 10 MG/1
10 TABLET ORAL NIGHTLY PRN
Qty: 28 TABLET | Refills: 2 | Status: SHIPPED | OUTPATIENT
Start: 2020-08-10 | End: 2020-10-27 | Stop reason: SDUPTHER

## 2020-08-10 RX ORDER — ATORVASTATIN CALCIUM 10 MG/1
10 TABLET, FILM COATED ORAL DAILY
Qty: 30 TABLET | Refills: 1 | Status: SHIPPED | OUTPATIENT
Start: 2020-08-10 | End: 2020-10-05

## 2020-08-14 ENCOUNTER — TELEPHONE (OUTPATIENT)
Dept: PSYCHIATRY | Facility: CLINIC | Age: 60
End: 2020-08-14

## 2020-08-14 DIAGNOSIS — F31.32 BIPOLAR AFFECTIVE DISORDER, CURRENTLY DEPRESSED, MODERATE (HCC): Primary | ICD-10-CM

## 2020-08-14 NOTE — TELEPHONE ENCOUNTER
Pt called and stated the Lamotrigine 25 mg is not helping with the mood swings. Thinks it need to be increased.

## 2020-08-17 RX ORDER — LAMOTRIGINE 100 MG/1
50 TABLET ORAL 2 TIMES DAILY
Qty: 30 TABLET | Refills: 2 | Status: SHIPPED | OUTPATIENT
Start: 2020-08-17 | End: 2020-10-27 | Stop reason: ALTCHOICE

## 2020-08-17 NOTE — TELEPHONE ENCOUNTER
New prescription sent in for the 100 mg tablets.  Patient is to take half a tablet twice daily for mood stabilizer.  We can increase it to a full tablet if needed in a few weeks

## 2020-08-19 ENCOUNTER — ON CAMPUS - OUTPATIENT (AMBULATORY)
Dept: URBAN - METROPOLITAN AREA HOSPITAL 2 | Facility: HOSPITAL | Age: 60
End: 2020-08-19
Payer: COMMERCIAL

## 2020-08-19 VITALS
WEIGHT: 188 LBS | OXYGEN SATURATION: 98 % | HEIGHT: 66 IN | DIASTOLIC BLOOD PRESSURE: 88 MMHG | HEART RATE: 101 BPM | DIASTOLIC BLOOD PRESSURE: 75 MMHG | TEMPERATURE: 98 F | SYSTOLIC BLOOD PRESSURE: 109 MMHG | SYSTOLIC BLOOD PRESSURE: 163 MMHG | HEART RATE: 80 BPM | RESPIRATION RATE: 18 BRPM | HEART RATE: 103 BPM | OXYGEN SATURATION: 99 % | OXYGEN SATURATION: 100 % | SYSTOLIC BLOOD PRESSURE: 145 MMHG | RESPIRATION RATE: 100 BRPM | DIASTOLIC BLOOD PRESSURE: 90 MMHG | SYSTOLIC BLOOD PRESSURE: 136 MMHG | DIASTOLIC BLOOD PRESSURE: 50 MMHG | HEART RATE: 79 BPM | HEART RATE: 100 BPM | DIASTOLIC BLOOD PRESSURE: 52 MMHG | SYSTOLIC BLOOD PRESSURE: 126 MMHG | SYSTOLIC BLOOD PRESSURE: 146 MMHG | DIASTOLIC BLOOD PRESSURE: 79 MMHG

## 2020-08-19 DIAGNOSIS — K29.70 GASTRITIS, UNSPECIFIED, WITHOUT BLEEDING: ICD-10-CM

## 2020-08-19 DIAGNOSIS — B96.81 HELICOBACTER PYLORI [H. PYLORI] AS THE CAUSE OF DISEASES CLA: ICD-10-CM

## 2020-08-19 DIAGNOSIS — K21.0 GASTRO-ESOPHAGEAL REFLUX DISEASE WITH ESOPHAGITIS: ICD-10-CM

## 2020-08-19 DIAGNOSIS — R10.13 EPIGASTRIC PAIN: ICD-10-CM

## 2020-08-19 PROBLEM — K20.8 OTHER ESOPHAGITIS: Status: ACTIVE | Noted: 2020-08-19

## 2020-08-19 PROCEDURE — 43239 EGD BIOPSY SINGLE/MULTIPLE: CPT | Performed by: INTERNAL MEDICINE

## 2020-08-19 NOTE — SERVICEHPINOTES
FRANNY TREADWELL  is a  59  female   who presents today for a  EGD   for   the indications listed below. The updated Patient Profile was reviewed prior to the procedure, in conjunction with the Physical Exam, including medical conditions, surgical procedures, medications, allergies, family history and social history. See Physical Exam time stamp below for date and time of HPI completion.Pre-operatively, I reviewed the indication(s) for the procedure, the risks of the procedure [including but not limited to: unexpected bleeding possibly requiring hospitalization and/or unplanned repeat procedures, perforation possibly requiring surgical treatment, missed lesions and complications of sedation/MAC (also explained by anesthesia staff)]. I have evaluated the patient for risks associated with the planned anesthesia and the procedure to be performed and find the patient an acceptable candidate for IV sedation.Multiple opportunities were provided for any questions or concerns, and all questions were answered satisfactorily before any anesthesia was administered. We will proceed with the planned procedure.BR

## 2020-09-14 ENCOUNTER — TELEPHONE (OUTPATIENT)
Dept: PSYCHIATRY | Facility: CLINIC | Age: 60
End: 2020-09-14

## 2020-09-14 DIAGNOSIS — F31.32 BIPOLAR AFFECTIVE DISORDER, CURRENTLY DEPRESSED, MODERATE (HCC): Primary | ICD-10-CM

## 2020-09-14 NOTE — TELEPHONE ENCOUNTER
Lamictal DOSE WAS INCREASED TO 50 MG BID- PT REPORTS IT IS NOT HELPING- SHE LEFT A VOICE MESSAGE- I TRIED TO CALL HER BACK TO FIND OUT MORE INFORMATION- I HAD TO LEAVE A MESSAGE.
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
## 2020-09-15 RX ORDER — ZIPRASIDONE HYDROCHLORIDE 20 MG/1
20 CAPSULE ORAL 2 TIMES DAILY WITH MEALS
Qty: 60 CAPSULE | Refills: 2 | Status: SHIPPED | OUTPATIENT
Start: 2020-09-15 | End: 2020-10-27 | Stop reason: DRUGHIGH

## 2020-10-02 NOTE — PROGRESS NOTES
"Estelle Dubon is a 59 y.o. female.     Patient is here for three-month follow-up on hypertension, diabetes, hyperlipidemia  Patient is being seen by psychiatry for PTSD and bipolar disorder.  She reports that her stomach has improved after having pancreatitis.  Pt is asking to be referred to a neurologist because she states her hands started \"twitching\" about a month ago.  She is concerned about parkinsons.  She has been tried on numerous new psych medications. She states she is wanting to hold off on the referral until after she speaks with psych to see if it could be due to the medication.    Pt is also complaining of bilateral hip pain. She is wanting to see an orthopedic specialist.  She has had pain for years.  Feels like her balance is off.  She knows she has arthritis in the hips.     Hypertension- patient is currently on lisinopril 20 mg daily. She is currently out of her medicine, but has not had it yet today, which is why her BP is elevated.  She does not monitor her BP at home.  Denies CP, SOA.  Feels like her balance is off some, but she is having hip pain.  Occasional HA.    Diabetes-patient is currently on metformin 1000 mg twice daily, glipizide 10 mg twice daily, and Jardiance 25 mg daily.  Patient was previously on Januvia but that was stopped after she developed pancreatitis.  She is doing well on her meds.  She does not monitor her BS.  She states that she can tell when her sugar is dropping.      Hyperlipidemia-patient is currently on Crestor. She was previously on lipitor but GI switched her to crestor d/t pancreatitis and liver.     COPD- pt currently smoke about 1 ppd.  States she has been using the Spiriva QID instead of BID.  She does not have a rescue inhaler.       The following portions of the patient's history were reviewed and updated as appropriate: allergies, current medications, past family history, past medical history, past social history, past surgical history and " "problem list.    Review of Systems   Constitutional: Negative for appetite change, chills, fatigue and fever.   HENT: Negative for congestion, ear pain, hearing loss, postnasal drip, rhinorrhea, sinus pressure, sore throat, swollen glands and trouble swallowing.    Eyes: Negative for blurred vision, double vision, pain, discharge, itching and visual disturbance.   Respiratory: Positive for cough (clear phlegm), chest tightness and shortness of breath. Negative for wheezing.    Cardiovascular: Negative for chest pain and palpitations.   Gastrointestinal: Negative for abdominal pain, blood in stool, constipation, diarrhea, nausea and vomiting.   Endocrine: Positive for polyphagia and polyuria. Negative for polydipsia.   Genitourinary: Positive for frequency. Negative for dysuria, flank pain and urgency.   Musculoskeletal: Positive for arthralgias.   Skin: Negative for rash and skin lesions.   Neurological: Positive for headache. Negative for dizziness, weakness and numbness.   Psychiatric/Behavioral: Negative for self-injury, suicidal ideas, depressed mood and stress. The patient is not nervous/anxious.        Objective   /92 (BP Location: Left arm, Patient Position: Sitting, Cuff Size: Large Adult)   Pulse 92   Temp 97.5 °F (36.4 °C) (Temporal)   Ht 165.1 cm (65\")   Wt 86.8 kg (191 lb 6.4 oz)   SpO2 99%   Breastfeeding No   BMI 31.85 kg/m²   Physical Exam  Vitals signs reviewed.   Constitutional:       General: She is not in acute distress.     Appearance: Normal appearance. She is well-developed. She is not diaphoretic.   HENT:      Head: Normocephalic and atraumatic.   Eyes:      General:         Right eye: No discharge.         Left eye: No discharge.      Conjunctiva/sclera: Conjunctivae normal.      Pupils: Pupils are equal, round, and reactive to light.   Neck:      Musculoskeletal: Normal range of motion and neck supple.   Cardiovascular:      Rate and Rhythm: Normal rate and regular rhythm. "   Pulmonary:      Effort: Pulmonary effort is normal. No respiratory distress.      Breath sounds: Normal breath sounds. No wheezing or rales.   Abdominal:      General: Bowel sounds are normal. There is no distension.      Palpations: Abdomen is soft.      Tenderness: There is no abdominal tenderness.   Musculoskeletal: Normal range of motion.   Skin:     General: Skin is warm and dry.   Neurological:      General: No focal deficit present.      Mental Status: She is alert and oriented to person, place, and time.   Psychiatric:         Mood and Affect: Mood normal.         Behavior: Behavior normal.         Thought Content: Thought content normal.         Judgment: Judgment normal.           Assessment/Plan     Diagnoses and all orders for this visit:    1. Type 2 diabetes mellitus without complication, without long-term current use of insulin (CMS/[facility]) (Primary)  Comments:  obtain labs  cont current meds  Orders:  -     Hemoglobin A1c; Future  -     Microalbumin / Creatinine Urine Ratio - Urine, Clean Catch; Future    2. Hypertension, unspecified type  Comments:  elevated today but hasnt had meds  denies side effects  Orders:  -     Lipid Panel; Future  -     Comprehensive Metabolic Panel; Future  -     lisinopril (PRINIVIL,ZESTRIL) 20 MG tablet; Take 1 tablet by mouth Daily.  Dispense: 90 tablet; Refill: 1    3. Hyperlipidemia, unspecified hyperlipidemia type  Comments:  on crestor  will check lipid panel  work on diet and exercise    4. Chronic obstructive pulmonary disease, unspecified COPD type (CMS/[facility])  Comments:  cont spiriva BID  add in albuterol as needed  Orders:  -     albuterol sulfate  (90 Base) MCG/ACT inhaler; Inhale 2 puffs Every 4 (Four) Hours As Needed for Wheezing.  Dispense: 1 g; Refill: 1    5. Need for vaccination  -     FluLaval Quad >6 Months ([id number])    6. Bilateral hip pain  Comments:  requesting referral to ortho surgeon  Orders:  -     Ambulatory Referral to Orthopedic  Surgery

## 2020-10-05 ENCOUNTER — LAB (OUTPATIENT)
Dept: LAB | Facility: HOSPITAL | Age: 60
End: 2020-10-05

## 2020-10-05 ENCOUNTER — OFFICE VISIT (OUTPATIENT)
Dept: FAMILY MEDICINE CLINIC | Facility: CLINIC | Age: 60
End: 2020-10-05

## 2020-10-05 VITALS
HEART RATE: 92 BPM | TEMPERATURE: 97.5 F | DIASTOLIC BLOOD PRESSURE: 92 MMHG | WEIGHT: 191.4 LBS | SYSTOLIC BLOOD PRESSURE: 151 MMHG | OXYGEN SATURATION: 99 % | BODY MASS INDEX: 31.89 KG/M2 | HEIGHT: 65 IN

## 2020-10-05 DIAGNOSIS — Z23 NEED FOR VACCINATION: ICD-10-CM

## 2020-10-05 DIAGNOSIS — E78.5 HYPERLIPIDEMIA, UNSPECIFIED HYPERLIPIDEMIA TYPE: ICD-10-CM

## 2020-10-05 DIAGNOSIS — E11.9 TYPE 2 DIABETES MELLITUS WITHOUT COMPLICATION, WITHOUT LONG-TERM CURRENT USE OF INSULIN (HCC): Primary | ICD-10-CM

## 2020-10-05 DIAGNOSIS — M25.551 BILATERAL HIP PAIN: ICD-10-CM

## 2020-10-05 DIAGNOSIS — J44.9 CHRONIC OBSTRUCTIVE PULMONARY DISEASE, UNSPECIFIED COPD TYPE (HCC): ICD-10-CM

## 2020-10-05 DIAGNOSIS — E11.9 TYPE 2 DIABETES MELLITUS WITHOUT COMPLICATION, WITHOUT LONG-TERM CURRENT USE OF INSULIN (HCC): ICD-10-CM

## 2020-10-05 DIAGNOSIS — I10 HYPERTENSION, UNSPECIFIED TYPE: ICD-10-CM

## 2020-10-05 DIAGNOSIS — M25.552 BILATERAL HIP PAIN: ICD-10-CM

## 2020-10-05 LAB
ALBUMIN SERPL-MCNC: 4.3 G/DL (ref 3.5–5.2)
ALBUMIN UR-MCNC: <1.2 MG/DL
ALBUMIN/GLOB SERPL: 1.3 G/DL
ALP SERPL-CCNC: 120 U/L (ref 39–117)
ALT SERPL W P-5'-P-CCNC: 14 U/L (ref 1–33)
ANION GAP SERPL CALCULATED.3IONS-SCNC: 10.2 MMOL/L (ref 5–15)
AST SERPL-CCNC: 13 U/L (ref 1–32)
BILIRUB SERPL-MCNC: 0.2 MG/DL (ref 0–1.2)
BUN SERPL-MCNC: 20 MG/DL (ref 6–20)
BUN/CREAT SERPL: 24.7 (ref 7–25)
CALCIUM SPEC-SCNC: 9.5 MG/DL (ref 8.6–10.5)
CHLORIDE SERPL-SCNC: 105 MMOL/L (ref 98–107)
CHOLEST SERPL-MCNC: 150 MG/DL (ref 0–200)
CO2 SERPL-SCNC: 25.8 MMOL/L (ref 22–29)
CREAT SERPL-MCNC: 0.81 MG/DL (ref 0.57–1)
CREAT UR-MCNC: 66.1 MG/DL
GFR SERPL CREATININE-BSD FRML MDRD: 73 ML/MIN/1.73
GFR SERPL CREATININE-BSD FRML MDRD: 88 ML/MIN/1.73
GLOBULIN UR ELPH-MCNC: 3.4 GM/DL
GLUCOSE SERPL-MCNC: 122 MG/DL (ref 65–99)
HBA1C MFR BLD: 6.7 % (ref 3.5–5.6)
HDLC SERPL-MCNC: 40 MG/DL (ref 40–60)
LDLC SERPL CALC-MCNC: 86 MG/DL (ref 0–100)
LDLC/HDLC SERPL: 2.14 {RATIO}
MICROALBUMIN/CREAT UR: NORMAL MG/G{CREAT}
POTASSIUM SERPL-SCNC: 4.7 MMOL/L (ref 3.5–5.2)
PROT SERPL-MCNC: 7.7 G/DL (ref 6–8.5)
SODIUM SERPL-SCNC: 141 MMOL/L (ref 136–145)
TRIGL SERPL-MCNC: 122 MG/DL (ref 0–150)
VLDLC SERPL-MCNC: 24.4 MG/DL (ref 5–40)

## 2020-10-05 PROCEDURE — 82570 ASSAY OF URINE CREATININE: CPT

## 2020-10-05 PROCEDURE — 83036 HEMOGLOBIN GLYCOSYLATED A1C: CPT

## 2020-10-05 PROCEDURE — 99214 OFFICE O/P EST MOD 30 MIN: CPT | Performed by: NURSE PRACTITIONER

## 2020-10-05 PROCEDURE — 36415 COLL VENOUS BLD VENIPUNCTURE: CPT

## 2020-10-05 PROCEDURE — 82043 UR ALBUMIN QUANTITATIVE: CPT

## 2020-10-05 PROCEDURE — 90471 IMMUNIZATION ADMIN: CPT | Performed by: NURSE PRACTITIONER

## 2020-10-05 PROCEDURE — 90686 IIV4 VACC NO PRSV 0.5 ML IM: CPT | Performed by: NURSE PRACTITIONER

## 2020-10-05 PROCEDURE — 80053 COMPREHEN METABOLIC PANEL: CPT

## 2020-10-05 PROCEDURE — 80061 LIPID PANEL: CPT

## 2020-10-05 RX ORDER — ROSUVASTATIN CALCIUM 10 MG/1
10 TABLET, COATED ORAL DAILY
COMMUNITY
End: 2020-12-10 | Stop reason: SDUPTHER

## 2020-10-05 RX ORDER — EMPAGLIFLOZIN 25 MG/1
25 TABLET, FILM COATED ORAL DAILY
Qty: 30 TABLET | Refills: 1 | Status: SHIPPED | OUTPATIENT
Start: 2020-10-05 | End: 2020-12-10 | Stop reason: SDUPTHER

## 2020-10-05 RX ORDER — ALBUTEROL SULFATE 90 UG/1
2 AEROSOL, METERED RESPIRATORY (INHALATION) EVERY 4 HOURS PRN
Qty: 1 G | Refills: 1 | Status: SHIPPED | OUTPATIENT
Start: 2020-10-05 | End: 2021-07-06

## 2020-10-05 RX ORDER — LISINOPRIL 20 MG/1
20 TABLET ORAL DAILY
Qty: 90 TABLET | Refills: 1 | Status: SHIPPED | OUTPATIENT
Start: 2020-10-05 | End: 2021-04-16 | Stop reason: SDUPTHER

## 2020-10-05 NOTE — PATIENT INSTRUCTIONS
Continue current meds  Cont to work on diet and exercise  Flu vaccine today  Follow up with psych in regards to hands shaking  Referral to Dr. Hale with ortho  Use albuterol inhaler as needed  Work on smoking cessation

## 2020-10-06 ENCOUNTER — APPOINTMENT (OUTPATIENT)
Dept: CT IMAGING | Facility: HOSPITAL | Age: 60
End: 2020-10-06

## 2020-10-06 ENCOUNTER — HOSPITAL ENCOUNTER (EMERGENCY)
Facility: HOSPITAL | Age: 60
Discharge: HOME | End: 2020-10-07
Attending: EMERGENCY MEDICINE | Admitting: EMERGENCY MEDICINE

## 2020-10-06 DIAGNOSIS — R10.13 EPIGASTRIC PAIN: Primary | ICD-10-CM

## 2020-10-06 LAB
BASOPHILS # BLD AUTO: 0 10*3/MM3 (ref 0–0.2)
BASOPHILS NFR BLD AUTO: 0.4 % (ref 0–1.5)
BILIRUB UR QL STRIP: NEGATIVE
CLARITY UR: CLEAR
COLOR UR: YELLOW
DEPRECATED RDW RBC AUTO: 47.3 FL (ref 37–54)
EOSINOPHIL # BLD AUTO: 0.3 10*3/MM3 (ref 0–0.4)
EOSINOPHIL NFR BLD AUTO: 2.7 % (ref 0.3–6.2)
ERYTHROCYTE [DISTWIDTH] IN BLOOD BY AUTOMATED COUNT: 14.5 % (ref 12.3–15.4)
GLUCOSE UR STRIP-MCNC: ABNORMAL MG/DL
HCT VFR BLD AUTO: 42.6 % (ref 34–46.6)
HGB BLD-MCNC: 14.3 G/DL (ref 12–15.9)
HGB UR QL STRIP.AUTO: NEGATIVE
KETONES UR QL STRIP: NEGATIVE
LEUKOCYTE ESTERASE UR QL STRIP.AUTO: NEGATIVE
LYMPHOCYTES # BLD AUTO: 3.1 10*3/MM3 (ref 0.7–3.1)
LYMPHOCYTES NFR BLD AUTO: 33.3 % (ref 19.6–45.3)
MCH RBC QN AUTO: 31.3 PG (ref 26.6–33)
MCHC RBC AUTO-ENTMCNC: 33.7 G/DL (ref 31.5–35.7)
MCV RBC AUTO: 92.8 FL (ref 79–97)
MONOCYTES # BLD AUTO: 0.7 10*3/MM3 (ref 0.1–0.9)
MONOCYTES NFR BLD AUTO: 7.1 % (ref 5–12)
NEUTROPHILS NFR BLD AUTO: 5.2 10*3/MM3 (ref 1.7–7)
NEUTROPHILS NFR BLD AUTO: 56.5 % (ref 42.7–76)
NITRITE UR QL STRIP: NEGATIVE
NRBC BLD AUTO-RTO: 0.1 /100 WBC (ref 0–0.2)
PH UR STRIP.AUTO: 5.5 [PH] (ref 5–8)
PLATELET # BLD AUTO: 301 10*3/MM3 (ref 140–450)
PMV BLD AUTO: 8.2 FL (ref 6–12)
PROT UR QL STRIP: NEGATIVE
RBC # BLD AUTO: 4.59 10*6/MM3 (ref 3.77–5.28)
SP GR UR STRIP: 1.04 (ref 1–1.03)
UROBILINOGEN UR QL STRIP: ABNORMAL
WBC # BLD AUTO: 9.3 10*3/MM3 (ref 3.4–10.8)

## 2020-10-06 PROCEDURE — 74176 CT ABD & PELVIS W/O CONTRAST: CPT

## 2020-10-06 PROCEDURE — 81003 URINALYSIS AUTO W/O SCOPE: CPT | Performed by: EMERGENCY MEDICINE

## 2020-10-06 PROCEDURE — 80053 COMPREHEN METABOLIC PANEL: CPT | Performed by: EMERGENCY MEDICINE

## 2020-10-06 PROCEDURE — 82150 ASSAY OF AMYLASE: CPT | Performed by: EMERGENCY MEDICINE

## 2020-10-06 PROCEDURE — 99283 EMERGENCY DEPT VISIT LOW MDM: CPT

## 2020-10-06 PROCEDURE — 83690 ASSAY OF LIPASE: CPT | Performed by: EMERGENCY MEDICINE

## 2020-10-06 PROCEDURE — 85025 COMPLETE CBC W/AUTO DIFF WBC: CPT | Performed by: EMERGENCY MEDICINE

## 2020-10-07 VITALS
BODY MASS INDEX: 32.47 KG/M2 | SYSTOLIC BLOOD PRESSURE: 139 MMHG | WEIGHT: 194.89 LBS | HEART RATE: 94 BPM | HEIGHT: 65 IN | TEMPERATURE: 98.4 F | OXYGEN SATURATION: 98 % | RESPIRATION RATE: 19 BRPM | DIASTOLIC BLOOD PRESSURE: 78 MMHG

## 2020-10-07 LAB
ALBUMIN SERPL-MCNC: 4.1 G/DL (ref 3.5–5.2)
ALBUMIN/GLOB SERPL: 1.3 G/DL
ALP SERPL-CCNC: 124 U/L (ref 39–117)
ALT SERPL W P-5'-P-CCNC: 12 U/L (ref 1–33)
AMYLASE SERPL-CCNC: 56 U/L (ref 28–100)
ANION GAP SERPL CALCULATED.3IONS-SCNC: 10 MMOL/L (ref 5–15)
AST SERPL-CCNC: 14 U/L (ref 1–32)
BILIRUB SERPL-MCNC: <0.2 MG/DL (ref 0–1.2)
BUN SERPL-MCNC: 13 MG/DL (ref 6–20)
BUN SERPL-MCNC: ABNORMAL MG/DL
BUN/CREAT SERPL: ABNORMAL
CALCIUM SPEC-SCNC: 8.8 MG/DL (ref 8.6–10.5)
CHLORIDE SERPL-SCNC: 104 MMOL/L (ref 98–107)
CO2 SERPL-SCNC: 24 MMOL/L (ref 22–29)
CREAT SERPL-MCNC: 0.84 MG/DL (ref 0.57–1)
GFR SERPL CREATININE-BSD FRML MDRD: 70 ML/MIN/1.73
GFR SERPL CREATININE-BSD FRML MDRD: 85 ML/MIN/1.73
GLOBULIN UR ELPH-MCNC: 3.2 GM/DL
GLUCOSE SERPL-MCNC: 147 MG/DL (ref 65–99)
LIPASE SERPL-CCNC: 41 U/L (ref 13–60)
POTASSIUM SERPL-SCNC: 3.8 MMOL/L (ref 3.5–5.2)
PROT SERPL-MCNC: 7.3 G/DL (ref 6–8.5)
SODIUM SERPL-SCNC: 138 MMOL/L (ref 136–145)

## 2020-10-07 RX ORDER — OMEPRAZOLE 20 MG/1
20 CAPSULE, DELAYED RELEASE ORAL DAILY
Qty: 30 CAPSULE | Refills: 0 | Status: SHIPPED | OUTPATIENT
Start: 2020-10-07

## 2020-10-07 NOTE — ED PROVIDER NOTES
Subjective   59-year-old female with moderate epigastric pain, worse with food, nonradiating, nonexertional since 3 PM this afternoon.  Pain feels similar to pancreatitis she had in June. Patient denies any ETOH use or hx of GB disease.          Review of Systems   Respiratory: Positive for cough.    Gastrointestinal: Positive for abdominal pain.   All other systems reviewed and are negative.      Past Medical History:   Diagnosis Date   • Bipolar 1 disorder (CMS/HCC)    • Diabetes mellitus (CMS/HCC)    • Hypertension    • PTSD (post-traumatic stress disorder)        Allergies   Allergen Reactions   • Iodine Swelling   • Adhesive Tape Rash       No past surgical history on file.    Family History   Problem Relation Age of Onset   • Hypertension Mother    • Diabetes Mother        Social History     Socioeconomic History   • Marital status: Single     Spouse name: Not on file   • Number of children: Not on file   • Years of education: Not on file   • Highest education level: Not on file   Tobacco Use   • Smoking status: Current Every Day Smoker     Packs/day: 1.00     Types: Cigarettes   • Smokeless tobacco: Never Used   Substance and Sexual Activity   • Alcohol use: Not Currently   • Drug use: Never   • Sexual activity: Not Currently           Objective   Physical Exam  Constitutional:       Appearance: She is well-developed.   HENT:      Head: Normocephalic and atraumatic.      Mouth/Throat:      Mouth: Mucous membranes are moist.      Pharynx: Oropharynx is clear.   Eyes:      Conjunctiva/sclera: Conjunctivae normal.      Pupils: Pupils are equal, round, and reactive to light.   Cardiovascular:      Rate and Rhythm: Normal rate and regular rhythm.   Pulmonary:      Effort: Pulmonary effort is normal.      Breath sounds: Normal breath sounds.   Abdominal:      General: Bowel sounds are normal. There is no distension.      Palpations: Abdomen is soft.      Comments: Epigastric ttp, no rebound or guarding.    Musculoskeletal: Normal range of motion.         General: No swelling or tenderness.   Skin:     General: Skin is warm and dry.      Capillary Refill: Capillary refill takes less than 2 seconds.   Neurological:      General: No focal deficit present.      Mental Status: She is alert and oriented to person, place, and time.   Psychiatric:         Mood and Affect: Mood normal.         Behavior: Behavior normal.         Procedures           ED Course                                           MDM  Number of Diagnoses or Management Options  Epigastric pain:   Diagnosis management comments: Results for orders placed or performed during the hospital encounter of 10/06/20  -Comprehensive Metabolic Panel  Specimen: Blood       Result                      Value             Ref Range           Glucose                     147 (H)           65 - 99 mg/dL       BUN                                                               Creatinine                  0.84              0.57 - 1.00 *       Sodium                      138               136 - 145 mm*       Potassium                   3.8               3.5 - 5.2 mm*       Chloride                    104               98 - 107 mmo*       CO2                         24.0              22.0 - 29.0 *       Calcium                     8.8               8.6 - 10.5 m*       Total Protein               7.3               6.0 - 8.5 g/*       Albumin                     4.10              3.50 - 5.20 *       ALT (SGPT)                  12                1 - 33 U/L          AST (SGOT)                  14                1 - 32 U/L          Alkaline Phosphatase        124 (H)           39 - 117 U/L        Total Bilirubin             <0.2              0.0 - 1.2 mg*       eGFR Non African Amer       70                >60 mL/min/1*       eGFR  African Amer          85                >60 mL/min/1*       Globulin                    3.2               gm/dL               A/G Ratio                   1.3                g/dL                BUN/Creatinine Ratio                                              Anion Gap                   10.0              5.0 - 15.0 m*  -Lipase  Specimen: Blood       Result                      Value             Ref Range           Lipase                      41                13 - 60 U/L    -Amylase  Specimen: Blood       Result                      Value             Ref Range           Amylase                     56                28 - 100 U/L   -Urinalysis With Culture If Indicated - Urine, Clean Catch  Specimen: Urine, Clean Catch       Result                      Value             Ref Range           Color, UA                   Yellow            Yellow, Straw       Appearance, UA              Clear             Clear               pH, UA                      5.5               5.0 - 8.0           Specific [place], UA        1.039 (H)         1.005 - 1.030       Glucose, UA                                   Negative        >=1000 mg/dL (3+) (A)       Ketones, UA                 Negative          Negative            Bilirubin, UA               Negative          Negative            Blood, UA                   Negative          Negative            Protein, UA                 Negative          Negative            Leuk Esterase, UA           Negative          Negative            Nitrite, UA                 Negative          Negative            Urobilinogen, UA            1.0 E.U./dL       0.2 - 1.0 E.*  -CBC Auto Differential  Specimen: Blood       Result                      Value             Ref Range           WBC                         9.30              3.40 - 10.80*       RBC                         4.59              3.77 - 5.28 *       Hemoglobin                  14.3              12.0 - 15.9 *       Hematocrit                  42.6              34.0 - 46.6 %       MCV                         92.8              79.0 - 97.0 *       MCH                         31.3              26.6 - 33.0  *       MCHC                        33.7              31.5 - 35.7 *       RDW                         14.5              12.3 - 15.4 %       RDW-SD                      47.3              37.0 - 54.0 *       MPV                         8.2               6.0 - 12.0 fL       Platelets                   301               140 - 450 10*       Neutrophil %                56.5              42.7 - 76.0 %       Lymphocyte %                33.3              19.6 - 45.3 %       Monocyte %                  7.1               5.0 - 12.0 %        Eosinophil %                2.7               0.3 - 6.2 %         Basophil %                  0.4               0.0 - 1.5 %         Neutrophils, Absolute       5.20              1.70 - 7.00 *       Lymphocytes, Absolute       3.10              0.70 - 3.10 *       Monocytes, Absolute         0.70              0.10 - 0.90 *       Eosinophils, Absolute       0.30              0.00 - 0.40 *       Basophils, Absolute         0.00              0.00 - 0.20 *       nRBC                        0.1               0.0 - 0.2 /1*  -BUN  Specimen: Blood       Result                      Value             Ref Range           BUN                         13                6 - 20 mg/dL   Ct Abdomen Pelvis Without Contrast    Result Date: 10/6/2020  1.  Moderate constipation. 2.  Moderate gastric distention with transition in the region of the duodenal bulb.  This could merely reflect slow gastric emptying, but could also be related to inflammatory changes in the proximal duodenum. 3.  Uterine fibroids. Electronically signed by:  David Almendarez M.D.  10/6/2020 10:15 PM      Patient well, feels better pain almost resolved.  Regarding gastric distention seen on CT, patient admits to eating macaroni and cheese right before she came in.  I did discuss with the patient the potential of gastroparesis versus occult peptic ulcer disease and the importance to follow-up with her family physician.  If symptoms persist a  EGD may be necessary.  Patient will return if she has any significant recurrence of pain.       Amount and/or Complexity of Data Reviewed  Clinical lab tests: reviewed  Tests in the radiology section of CPT®: reviewed  Decide to obtain previous medical records or to obtain history from someone other than the patient: yes        Final diagnoses:   Epigastric pain            Zackery Toro MD  10/07/20 0038

## 2020-10-27 ENCOUNTER — OFFICE VISIT (OUTPATIENT)
Dept: ORTHOPEDIC SURGERY | Facility: CLINIC | Age: 60
End: 2020-10-27

## 2020-10-27 ENCOUNTER — TELEPHONE (OUTPATIENT)
Dept: PSYCHIATRY | Facility: CLINIC | Age: 60
End: 2020-10-27

## 2020-10-27 ENCOUNTER — OFFICE VISIT (OUTPATIENT)
Dept: PSYCHIATRY | Facility: CLINIC | Age: 60
End: 2020-10-27

## 2020-10-27 VITALS — WEIGHT: 200 LBS | HEIGHT: 65 IN | BODY MASS INDEX: 33.32 KG/M2

## 2020-10-27 DIAGNOSIS — M25.551 BILATERAL HIP PAIN: Primary | ICD-10-CM

## 2020-10-27 DIAGNOSIS — F43.10 PTSD (POST-TRAUMATIC STRESS DISORDER): ICD-10-CM

## 2020-10-27 DIAGNOSIS — F31.32 BIPOLAR AFFECTIVE DISORDER, CURRENTLY DEPRESSED, MODERATE (HCC): Primary | ICD-10-CM

## 2020-10-27 DIAGNOSIS — F51.05 INSOMNIA DUE TO MENTAL CONDITION: ICD-10-CM

## 2020-10-27 DIAGNOSIS — G47.00 INSOMNIA, UNSPECIFIED TYPE: ICD-10-CM

## 2020-10-27 DIAGNOSIS — M25.552 BILATERAL HIP PAIN: Primary | ICD-10-CM

## 2020-10-27 PROCEDURE — 99406 BEHAV CHNG SMOKING 3-10 MIN: CPT | Performed by: ORTHOPAEDIC SURGERY

## 2020-10-27 PROCEDURE — 99213 OFFICE O/P EST LOW 20 MIN: CPT | Performed by: PHYSICIAN ASSISTANT

## 2020-10-27 PROCEDURE — 99203 OFFICE O/P NEW LOW 30 MIN: CPT | Performed by: ORTHOPAEDIC SURGERY

## 2020-10-27 RX ORDER — ZIPRASIDONE HYDROCHLORIDE 40 MG/1
40 CAPSULE ORAL 2 TIMES DAILY WITH MEALS
Qty: 60 CAPSULE | Refills: 2 | Status: SHIPPED | OUTPATIENT
Start: 2020-10-27 | End: 2021-01-21 | Stop reason: ALTCHOICE

## 2020-10-27 RX ORDER — ZOLPIDEM TARTRATE 10 MG/1
10 TABLET ORAL NIGHTLY PRN
Qty: 30 TABLET | Refills: 2 | Status: SHIPPED | OUTPATIENT
Start: 2020-10-27 | End: 2021-01-21 | Stop reason: ALTCHOICE

## 2020-10-27 RX ORDER — DIVALPROEX SODIUM 250 MG/1
250 TABLET, EXTENDED RELEASE ORAL NIGHTLY
Qty: 30 TABLET | Refills: 2 | Status: SHIPPED | OUTPATIENT
Start: 2020-10-27 | End: 2021-02-04 | Stop reason: DRUGHIGH

## 2020-10-27 NOTE — PATIENT INSTRUCTIONS
Insomnia  Insomnia is a sleep disorder that makes it difficult to fall asleep or stay asleep. Insomnia can cause fatigue, low energy, difficulty concentrating, mood swings, and poor performance at work or school.  There are three different ways to classify insomnia:  · Difficulty falling asleep.  · Difficulty staying asleep.  · Waking up too early in the morning.  Any type of insomnia can be long-term (chronic) or short-term (acute). Both are common. Short-term insomnia usually lasts for three months or less. Chronic insomnia occurs at least three times a week for longer than three months.  What are the causes?  Insomnia may be caused by another condition, situation, or substance, such as:  · Anxiety.  · Certain medicines.  · Gastroesophageal reflux disease (GERD) or other gastrointestinal conditions.  · Asthma or other breathing conditions.  · Restless legs syndrome, sleep apnea, or other sleep disorders.  · Chronic pain.  · Menopause.  · Stroke.  · Abuse of alcohol, tobacco, or illegal drugs.  · Mental health conditions, such as depression.  · Caffeine.  · Neurological disorders, such as Alzheimer's disease.  · An overactive thyroid (hyperthyroidism).  Sometimes, the cause of insomnia may not be known.  What increases the risk?  Risk factors for insomnia include:  · Gender. Women are affected more often than men.  · Age. Insomnia is more common as you get older.  · Stress.  · Lack of exercise.  · Irregular work schedule or working night shifts.  · Traveling between different time zones.  · Certain medical and mental health conditions.  What are the signs or symptoms?  If you have insomnia, the main symptom is having trouble falling asleep or having trouble staying asleep. This may lead to other symptoms, such as:  · Feeling fatigued or having low energy.  · Feeling nervous about going to sleep.  · Not feeling rested in the morning.  · Having trouble concentrating.  · Feeling irritable, anxious, or depressed.  How  is this diagnosed?  This condition may be diagnosed based on:  · Your symptoms and medical history. Your health care provider may ask about:  ? Your sleep habits.  ? Any medical conditions you have.  ? Your mental health.  · A physical exam.  How is this treated?  Treatment for insomnia depends on the cause. Treatment may focus on treating an underlying condition that is causing insomnia. Treatment may also include:  · Medicines to help you sleep.  · Counseling or therapy.  · Lifestyle adjustments to help you sleep better.  Follow these instructions at home:  Eating and drinking    · Limit or avoid alcohol, caffeinated beverages, and cigarettes, especially close to bedtime. These can disrupt your sleep.  · Do not eat a large meal or eat spicy foods right before bedtime. This can lead to digestive discomfort that can make it hard for you to sleep.  Sleep habits    · Keep a sleep diary to help you and your health care provider figure out what could be causing your insomnia. Write down:  ? When you sleep.  ? When you wake up during the night.  ? How well you sleep.  ? How rested you feel the next day.  ? Any side effects of medicines you are taking.  ? What you eat and drink.  · Make your bedroom a dark, comfortable place where it is easy to fall asleep.  ? Put up shades or blackout curtains to block light from outside.  ? Use a white noise machine to block noise.  ? Keep the temperature cool.  · Limit screen use before bedtime. This includes:  ? Watching TV.  ? Using your smartphone, tablet, or computer.  · Stick to a routine that includes going to bed and waking up at the same times every day and night. This can help you fall asleep faster. Consider making a quiet activity, such as reading, part of your nighttime routine.  · Try to avoid taking naps during the day so that you sleep better at night.  · Get out of bed if you are still awake after 15 minutes of trying to sleep. Keep the lights down, but try reading or  doing a quiet activity. When you feel sleepy, go back to bed.  General instructions  · Take over-the-counter and prescription medicines only as told by your health care provider.  · Exercise regularly, as told by your health care provider. Avoid exercise starting several hours before bedtime.  · Use relaxation techniques to manage stress. Ask your health care provider to suggest some techniques that may work well for you. These may include:  ? Breathing exercises.  ? Routines to release muscle tension.  ? Visualizing peaceful scenes.  · Make sure that you drive carefully. Avoid driving if you feel very sleepy.  · Keep all follow-up visits as told by your health care provider. This is important.  Contact a health care provider if:  · You are tired throughout the day.  · You have trouble in your daily routine due to sleepiness.  · You continue to have sleep problems, or your sleep problems get worse.  Get help right away if:  · You have serious thoughts about hurting yourself or someone else.  If you ever feel like you may hurt yourself or others, or have thoughts about taking your own life, get help right away. You can go to your nearest emergency department or call:  · Your local emergency services (911 in the [place]  · A suicide crisis helpline, such as the National Suicide Prevention Lifeline at 1-210.241.5092. This is open 24 hours a day.  Summary  · Insomnia is a sleep disorder that makes it difficult to fall asleep or stay asleep.  · Insomnia can be long-term (chronic) or short-term (acute).  · Treatment for insomnia depends on the cause. Treatment may focus on treating an underlying condition that is causing insomnia.  · Keep a sleep diary to help you and your health care provider figure out what could be causing your insomnia.  This information is not intended to replace advice given to you by your health care provider. Make sure you discuss any questions you have with your health care provider.  Document  Released: 12/15/2001 Document Revised: 11/30/2018 Document Reviewed: 09/27/2018  [employer] Patient Education © 2020 [employer]

## 2020-10-27 NOTE — PROGRESS NOTES
NEW VISIT    Patient: Carli Dubon  ?  YOB: 1960    MRN: [id number]  ?  Chief Complaint   Patient presents with   • Right Hip - Pain, Establish Care   • Left Hip - Pain, Establish Care      ?  HPI:   Carli Wright presents to the office with low back pain with radiation into both the hips.  The symptoms appear to be related to neurogenic claudication.  She states that she can walk about half a city block and then she starts to have a lot of pain and discomfort.  There is also some numbness and tingling of the lower extremities.  She has difficulty in going up and down the steps.  When she leans forward she has little relief in her symptoms.  She states that there are no risk factors for avascular necrosis.  She is a smoker and I am concerned that there may be some overlap with vascular claudication caused by lower extremity vascular insufficiency as well.      Pain Location: BILATERAL hip  Radiation: radiates up back and down legs  Quality: aching, stabbing  Intensity/Severity: mild-moderate  Duration: 1+ years  Onset quality: gradual   Timing: intermittent  Aggravating Factors: any weight bearing  Alleviating Factors: rest  Previous Episodes: no  Associated Symptoms: pain, numbness/tingling  ADLs Affected: ambulating  Previous Treatment: nsaids    This patient is a new patient.  This problem is new to this examiner.      Allergies:   Allergies   Allergen Reactions   • Iodine Swelling   • Adhesive Tape Rash       Medications:   Home Medications:  Current Outpatient Medications on File Prior to Visit   Medication Sig   • albuterol sulfate  (90 Base) MCG/ACT inhaler Inhale 2 puffs Every 4 (Four) Hours As Needed for Wheezing.   • amitriptyline (ELAVIL) 75 MG tablet Take 1 tablet by mouth Every Night.   • divalproex (Depakote ER) 250 MG 24 hr tablet Take 1 tablet by mouth Every Night.   • Empagliflozin (Jardiance) 25 MG tablet Take 25 mg by mouth Daily.   • glipizide (GLUCOTROL) 10 MG tablet  Take 1 tablet by mouth 2 (Two) Times a Day Before Meals.   • lisinopril (PRINIVIL,ZESTRIL) 20 MG tablet Take 1 tablet by mouth Daily.   • metFORMIN (GLUCOPHAGE) 1000 MG tablet Take 1 tablet by mouth 2 (Two) Times a Day With Meals.   • omeprazole (priLOSEC) 20 MG capsule Take 1 capsule by mouth Daily.   • rosuvastatin (CRESTOR) 10 MG tablet Take 10 mg by mouth Daily.   • Tiotropium Bromide Monohydrate (Spiriva Respimat) 1.25 MCG/ACT aerosol solution inhaler Inhale 2 puffs Daily.   • ziprasidone (Geodon) 40 MG capsule Take 1 capsule by mouth 2 (Two) Times a Day With Meals.   • zolpidem (Ambien) 10 MG tablet Take 1 tablet by mouth At Night As Needed for Sleep.   • [DISCONTINUED] lamoTRIgine (LaMICtal) 100 MG tablet Take 0.5 tablets by mouth 2 (Two) Times a Day.   • [DISCONTINUED] ziprasidone (Geodon) 20 MG capsule Take 1 capsule by mouth 2 (Two) Times a Day With Meals.   • [DISCONTINUED] zolpidem (Ambien) 10 MG tablet Take 1 tablet by mouth At Night As Needed for Sleep.     No current facility-administered medications on file prior to visit.      Current Medications:  Scheduled Meds:  PRN Meds:.    I have reviewed the patient's medical history in detail and updated the computerized patient record.  Review and summarization of old records include:    Past Medical History:   Diagnosis Date   • Bipolar 1 disorder (CMS/HCC)    • Diabetes mellitus (CMS/HCC)    • Hypertension    • PTSD (post-traumatic stress disorder)      No past surgical history on file.  Social History     Occupational History   • Not on file   Tobacco Use   • Smoking status: Current Every Day Smoker     Packs/day: 1.00     Types: Cigarettes   • Smokeless tobacco: Never Used   Substance and Sexual Activity   • Alcohol use: Not Currently   • Drug use: Never   • Sexual activity: Not Currently      Family History   Problem Relation Age of Onset   • Hypertension Mother    • Diabetes Mother          Review of Systems   Constitutional: Negative.    HENT:  "Negative.    Eyes: Negative.    Respiratory: Negative.    Cardiovascular: Negative.    Gastrointestinal: Negative.    Endocrine: Negative.    Genitourinary: Negative.    Musculoskeletal: Positive for arthralgias and gait problem.   Skin: Negative.    Allergic/Immunologic: Negative.    Hematological: Negative.    Psychiatric/Behavioral: Negative.           Wt Readings from Last 3 Encounters:   10/27/20 90.7 kg (200 lb)   10/06/20 88.4 kg (194 lb 14.2 oz)   10/05/20 86.8 kg (191 lb 6.4 oz)     Ht Readings from Last 3 Encounters:   10/27/20 165.1 cm (65\")   10/06/20 165.1 cm (65\")   10/05/20 165.1 cm (65\")     Body mass index is 33.28 kg/m².  Facility age limit for growth percentiles is 20 years.  There were no vitals filed for this visit.      Physical Exam  Constitutional: Patient is oriented to person, place, and time. Appears well-developed and well-nourished.   HENT:   Head: Normocephalic and atraumatic.   Eyes: Conjunctivae and EOM are normal. Pupils are equal, round, and reactive to light.   Cardiovascular: Normal rate, regular rhythm, normal heart sounds and intact distal pulses.   Pulmonary/Chest: Effort normal and breath sounds normal.   Musculoskeletal:   See detailed exam below   Neurological: Alert and oriented to person, place, and time. No sensory deficit. Coordination normal.   Skin: Skin is warm and dry. Capillary refill takes less than 2 seconds. No rash noted. No erythema.   Psychiatric: Patient has a normal mood and affect. Her behavior is normal. Judgment and thought content normal.   Nursing note and vitals reviewed.      Ortho Exam:   Bilateral SI JOINT: Mild tenderness is present dorsally over the SI joint. Figure of 4 sign is positive. There is mild spasm present in the erector spinae muscle. Flexion and extension are associated with discomfort. Deep tendon reflexes are intact and symmetrical on both lower extremities. Pain radiates into the buttock on extension of the hip. No evidence of cauda " equina syndrome. No motor or sensory deficit is noted. There is no bowel or bladder involvement.  Bilateral hip (djd): Neurovascular status is intact. Patient does not have a limp on the affected side. Internal and external rotations are not associated with pain and discomfort. Anterior joint line pain and tenderness is significant. Stinchfield sign is positive. Figure of 4 sign is positive. Patient is unable to perform an active straight leg raise exam. Greater trochanter is tender. Crossover adduction test is positive. Cross body adduction is limited and painful for the patient. Patient has very significant limp and joint line tenderness anteriorly, posteriorly and medially. Dorsalis pedis and posterior tibial artery pulses are palpable. Common peroneal nerve function is well preserved.             Diagnostics:  Bilateral hip xrays ap/lateral views were ordered by Ace Hale MD and performed at [facility] on 10/27/20. These images were independently viewed and interpreted by myself, my impression as follows:    bilateral Hip X-Ray  Indication: Pain and discomfort in both hips.  AP and lateral  Findings: Early degenerative change over the inferior aspect of the hip joint.  Otherwise the femoral head is fairly well-preserved and there are no signs of avascular necrosis or subchondral fracture.  no bony lesion  Soft tissues within normal limits  within normal limits joint spaces  Hardware appropriately positioned not applicable      no prior studies available for comparison.    X-RAY was ordered and reviewed by Ace Hale MD        Assessment:  Diagnoses and all orders for this visit:    1. Bilateral hip pain (Primary)  -     XR Hips Bilateral With or Without Pelvis 2 View          Procedures  ?    Plan    · Issues with regards to neurogenic claudication and low back pain discussed with the patient.  · Discussed with the patient that she should talk to her PCP about possible  KIRIT at the pain clinic that would help manage her symptoms of low back pain and discomfort.  I advised the patient of the risks in continuing to use tobacco, and I provided this patient with smoking cessation educational materials.  We discussed using Nicotine gum and/or patches, Hypnotherapy, and Psychological Counseling.   I also discussed how to quit smoking and the patient has expressed the willingness to quit.      During this visit, I spent > 3-10 minutes counseling the patient regarding smoking cessation.   ·   · Rest, ice, compression, and elevation (RICE) therapy  · Stretching and strengthening exercises  · Tylenol 500-1000mg by mouth every 6 hours as needed for pain   · Follow up in 6 month(s).  · There is no indication for surgical replacement of the hip at this point.  We will follow this patient for her symptoms and radiographic progression before we would make a surgical recommendation for her.  · Calcium and vitamin D for bone health.    Date of encounter: 10/27/2020   Ace Hale MD
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
## 2020-10-27 NOTE — TELEPHONE ENCOUNTER
[facility] called with a drug interaction with Depakote and Lamictal- wants to know if it is ok to fill or does it need to be changed?   [facility] phone number: [phone]

## 2020-10-28 ENCOUNTER — OFFICE VISIT (OUTPATIENT)
Dept: PODIATRY | Facility: CLINIC | Age: 60
End: 2020-10-28

## 2020-10-28 VITALS
SYSTOLIC BLOOD PRESSURE: 147 MMHG | HEART RATE: 94 BPM | HEIGHT: 65 IN | WEIGHT: 200 LBS | BODY MASS INDEX: 33.32 KG/M2 | DIASTOLIC BLOOD PRESSURE: 87 MMHG

## 2020-10-28 DIAGNOSIS — M79.671 BILATERAL FOOT PAIN: Primary | ICD-10-CM

## 2020-10-28 DIAGNOSIS — M79.672 BILATERAL FOOT PAIN: Primary | ICD-10-CM

## 2020-10-28 DIAGNOSIS — M19.071 ARTHRITIS OF BOTH FEET: ICD-10-CM

## 2020-10-28 DIAGNOSIS — M19.072 ARTHRITIS OF BOTH FEET: ICD-10-CM

## 2020-10-28 PROCEDURE — 99203 OFFICE O/P NEW LOW 30 MIN: CPT | Performed by: PODIATRIST

## 2020-10-29 NOTE — PROGRESS NOTES
10/28/2020  Foot and Ankle Surgery - New Patient   Provider: Dr. Florentin Syed DPM  Location: [facility]    Subjective:  Carli Dubon is a 59 y.o. female.     Chief Complaint   Patient presents with   • Left Foot - Pain   • Right Foot - Pain       HPI: Patient is a 59-year-old diabetic female that presents with pain involving both feet.  She states that these issues have been ongoing for several months.  Symptoms are worse with increased activity and typically improved with rest.  She rates her pain a 7 out of 10 most days.  She is unaware of any previous injury.  She isolates the majority of the discomfort to the midfoot region and arch.  She states that she is fairly active and would like to remain this way.  She is concerned that the pain will progress and cause further limitation.  From a diabetic perspective, she denies any previous open wounds or infections.  Her most recent A1c was 6.7%.      Allergies   Allergen Reactions   • Iodine Swelling   • Adhesive Tape Rash       Past Medical History:   Diagnosis Date   • Bipolar 1 disorder (CMS/[facility])    • Diabetes mellitus (CMS/[facility])    • Hypertension    • PTSD (post-traumatic stress disorder)        History reviewed. No pertinent surgical history.    Family History   Problem Relation Age of Onset   • Hypertension Mother    • Diabetes Mother        Social History     Socioeconomic History   • Marital status: Single     Spouse name: Not on file   • Number of children: Not on file   • Years of education: Not on file   • Highest education level: Not on file   Tobacco Use   • Smoking status: Current Every Day Smoker     Packs/day: 1.00     Types: Cigarettes   • Smokeless tobacco: Never Used   Substance and Sexual Activity   • Alcohol use: Not Currently   • Drug use: Never   • Sexual activity: Not Currently        Current Outpatient Medications on File Prior to Visit   Medication Sig Dispense Refill   • albuterol sulfate  (90 Base) MCG/ACT inhaler  Inhale 2 puffs Every 4 (Four) Hours As Needed for Wheezing. 1 g 1   • amitriptyline (ELAVIL) 75 MG tablet Take 1 tablet by mouth Every Night. 90 tablet 1   • divalproex (Depakote ER) 250 MG 24 hr tablet Take 1 tablet by mouth Every Night. 30 tablet 2   • Empagliflozin (Jardiance) 25 MG tablet Take 25 mg by mouth Daily. 30 tablet 1   • glipizide (GLUCOTROL) 10 MG tablet Take 1 tablet by mouth 2 (Two) Times a Day Before Meals. 180 tablet 1   • lisinopril (PRINIVIL,ZESTRIL) 20 MG tablet Take 1 tablet by mouth Daily. 90 tablet 1   • metFORMIN (GLUCOPHAGE) 1000 MG tablet Take 1 tablet by mouth 2 (Two) Times a Day With Meals. 180 tablet 1   • omeprazole (priLOSEC) 20 MG capsule Take 1 capsule by mouth Daily. 30 capsule 0   • rosuvastatin (CRESTOR) 10 MG tablet Take 10 mg by mouth Daily.     • Tiotropium Bromide Monohydrate (Spiriva Respimat) 1.25 MCG/ACT aerosol solution inhaler Inhale 2 puffs Daily. 1 inhaler 11   • ziprasidone (Geodon) 40 MG capsule Take 1 capsule by mouth 2 (Two) Times a Day With Meals. 60 capsule 2   • zolpidem (Ambien) 10 MG tablet Take 1 tablet by mouth At Night As Needed for Sleep. 30 tablet 2     No current facility-administered medications on file prior to visit.        Review of Systems:  General: Denies fever, chills, fatigue, and weakness.  Eyes: Denies vision loss, blurry vision, and excessive redness.  ENT: Denies hearing issues and difficulty swallowing.  Cardiovascular: Denies palpitations, chest pain, or syncopal episodes.  Respiratory: Denies shortness of breath, wheezing, and coughing.  GI: Denies abdominal pain, nausea, and vomiting.   : Denies frequency, hematuria, and urgency.  Musculoskeletal: + Bilateral foot pain  Derm: Denies rash, open wounds, or suspicious lesions.  Neuro: Denies headaches, numbness, loss of coordination, and tremors.  Psych: Denies anxiety and depression.  Endocrine: Denies temperature intolerance and changes in appetite.  Heme: Denies bleeding disorders or  "abnormal bruising.     Objective   /87   Pulse 94   Ht 165.1 cm (65\")   Wt 90.7 kg (200 lb)   BMI 33.28 kg/m²     Foot/Ankle Exam:       General:   Appearance: appears stated age and healthy    Orientation: AAOx3    Affect: appropriate    Gait: unimpaired      VASCULAR      Right Foot Vascularity   Normal vascular exam    Dorsalis pedis:  2+  Posterior tibial:  2+  Skin Temperature: warm    Edema Grading:  None  CFT:  < 3 seconds  Pedal Hair Growth:  Present  Varicosities: none       Left Foot Vascularity   Normal vascular exam    Dorsalis pedis:  2+  Posterior tibial:  2+  Skin Temperature: warm    Edema Grading:  None  CFT:  < 3 seconds  Pedal Hair Growth:  Present  Varicosities: none        NEUROLOGIC     Right Foot Neurologic   Light touch sensation:  Normal  Hot/Cold sensation: normal    Protective Sensation using [place] Monofilament:  10  Achilles reflex:  2+     Left Foot Neurologic   Light touch sensation:  Normal  Hot/cold sensation: normal    Protective Sensation using [place] Monofilament:  10  Achilles reflex:  2+     MUSCULOSKELETAL      Right Foot Musculoskeletal   Ecchymosis:  None  Tenderness: arch and dorsal foot    Arch:  Normal     Left Foot Musculoskeletal   Ecchymosis:  None  Tenderness: arch and dorsal foot    Arch:  Normal     MUSCLE STRENGTH     Right Foot Muscle Strength   Normal strength    Foot dorsiflexion:  5  Foot plantar flexion:  5  Foot inversion:  5  Foot eversion:  5     Left Foot Muscle Strength   Normal strength    Foot dorsiflexion:  5  Foot plantar flexion:  5  Foot inversion:  5  Foot eversion:  5     DERMATOLOGIC     Right Foot Dermatologic   Skin: skin intact       Left Foot Dermatologic   Skin: skin intact       TESTS     Right Foot Tests   Anterior drawer: negative    Varus tilt: negative    Heel raise: pain       Left Foot Tests   Anterior drawer: negative    Varus tilt: negative    Heel raise: pain        Right Foot Additional Comments Mild " rigidity with midfoot range of motion.  Muscle strength is grossly intact and appropriate.  Discomfort with palpation to the midfoot and arch, bilateral.  Moderate equinus contracture with soft tissue rigidity, bilateral.  No open wounds or signs of infection      Assessment/Plan   Diagnoses and all orders for this visit:    1. Bilateral foot pain (Primary)  -     XR Foot 3+ View Bilateral    2. Arthritis of both feet      Patient presents with longstanding issues involving both feet.  Imaging was performed today showing degenerative changes involving the midfoot joints, bilateral.  I did review the imaging, diagnosis, and treatment options at length.  Her symptoms to appear to be secondary to primary arthritis of the midfoot.  We did discuss conservative treatment options and importance of support.  I would like her to obtain a pair of over-the-counter arch supports and wear on a daily basis.  She is to avoid barefoot and unsupported weightbearing.  We also reviewed rice therapy and use of over-the-counter topical analgesic.  I would like her to perform range of motion, stretching, and manual therapy exercises daily.  I would like to see her in 6 weeks for reevaluation.  We did discuss the importance of diabetic foot care and glycemic control.    Orders Placed This Encounter   Procedures   • XR Foot 3+ View Bilateral     Weight Bearing, RM 13     Order Specific Question:   Reason for Exam:     Answer:   bilateral foot pain        Note is dictated utilizing voice recognition software. Unfortunately this leads to occasional typographical errors. I apologize in advance if the situation occurs. If questions occur please do not hesitate to call our office.

## 2020-12-10 ENCOUNTER — OFFICE VISIT (OUTPATIENT)
Dept: PODIATRY | Facility: CLINIC | Age: 60
End: 2020-12-10

## 2020-12-10 VITALS
WEIGHT: 201 LBS | HEIGHT: 65 IN | HEART RATE: 111 BPM | DIASTOLIC BLOOD PRESSURE: 89 MMHG | SYSTOLIC BLOOD PRESSURE: 149 MMHG | BODY MASS INDEX: 33.49 KG/M2

## 2020-12-10 DIAGNOSIS — M19.072 ARTHRITIS OF BOTH FEET: ICD-10-CM

## 2020-12-10 DIAGNOSIS — M79.671 BILATERAL FOOT PAIN: Primary | ICD-10-CM

## 2020-12-10 DIAGNOSIS — E11.9 TYPE 2 DIABETES MELLITUS WITHOUT COMPLICATION, WITHOUT LONG-TERM CURRENT USE OF INSULIN (HCC): ICD-10-CM

## 2020-12-10 DIAGNOSIS — M19.071 ARTHRITIS OF BOTH FEET: ICD-10-CM

## 2020-12-10 DIAGNOSIS — M79.672 BILATERAL FOOT PAIN: Primary | ICD-10-CM

## 2020-12-10 PROCEDURE — 99213 OFFICE O/P EST LOW 20 MIN: CPT | Performed by: PODIATRIST

## 2020-12-10 RX ORDER — ROSUVASTATIN CALCIUM 10 MG/1
10 TABLET, COATED ORAL DAILY
Qty: 30 TABLET | Refills: 1 | Status: SHIPPED | OUTPATIENT
Start: 2020-12-10 | End: 2021-02-09 | Stop reason: SINTOL

## 2020-12-10 RX ORDER — EMPAGLIFLOZIN 25 MG/1
25 TABLET, FILM COATED ORAL DAILY
Qty: 30 TABLET | Refills: 1 | Status: SHIPPED | OUTPATIENT
Start: 2020-12-10 | End: 2021-02-10 | Stop reason: SDUPTHER

## 2020-12-10 NOTE — PROGRESS NOTES
12/10/2020  Foot and Ankle Surgery - Established Patient/Follow-up  Provider: Dr. Florentin Syed, CHERELLE  Location: [facility]    Subjective:  Carli Dubon is a 60 y.o. female.     Chief Complaint   Patient presents with   • Left Foot - Follow-up   • Right Foot - Follow-up   • Annual Exam     DM check       HPI: Patient returns for follow-up on bilateral foot pain.  She has noticed improvement with the arch supports but continues to have discomfort at the end of the day.  Overall, she feels that she is doing relatively well.  She denies any new issues.    Allergies   Allergen Reactions   • Iodine Swelling   • Adhesive Tape Rash       Current Outpatient Medications on File Prior to Visit   Medication Sig Dispense Refill   • albuterol sulfate  (90 Base) MCG/ACT inhaler Inhale 2 puffs Every 4 (Four) Hours As Needed for Wheezing. 1 g 1   • amitriptyline (ELAVIL) 75 MG tablet Take 1 tablet by mouth Every Night. 90 tablet 1   • divalproex (Depakote ER) 250 MG 24 hr tablet Take 1 tablet by mouth Every Night. 30 tablet 2   • glipizide (GLUCOTROL) 10 MG tablet Take 1 tablet by mouth 2 (Two) Times a Day Before Meals. 180 tablet 1   • lisinopril (PRINIVIL,ZESTRIL) 20 MG tablet Take 1 tablet by mouth Daily. 90 tablet 1   • metFORMIN (GLUCOPHAGE) 1000 MG tablet Take 1 tablet by mouth 2 (Two) Times a Day With Meals. 180 tablet 1   • omeprazole (priLOSEC) 20 MG capsule Take 1 capsule by mouth Daily. 30 capsule 0   • Tiotropium Bromide Monohydrate (Spiriva Respimat) 1.25 MCG/ACT aerosol solution inhaler Inhale 2 puffs Daily. 1 inhaler 11   • ziprasidone (Geodon) 40 MG capsule Take 1 capsule by mouth 2 (Two) Times a Day With Meals. 60 capsule 2   • zolpidem (Ambien) 10 MG tablet Take 1 tablet by mouth At Night As Needed for Sleep. 30 tablet 2   • [DISCONTINUED] Empagliflozin (Jardiance) 25 MG tablet Take 25 mg by mouth Daily. 30 tablet 1   • [DISCONTINUED] rosuvastatin (CRESTOR) 10 MG tablet Take 10 mg by mouth  "Daily.       No current facility-administered medications on file prior to visit.        Objective   /89   Pulse 111   Ht 165.1 cm (65\")   Wt 91.2 kg (201 lb)   BMI 33.45 kg/m²     General:   Appearance: appears stated age and healthy    Orientation: AAOx3    Affect: appropriate    Gait: unimpaired       VASCULAR       Right Foot Vascularity   Normal vascular exam    Dorsalis pedis:  2+  Posterior tibial:  2+  Skin Temperature: warm    Edema Grading:  None  CFT:  < 3 seconds  Pedal Hair Growth:  Present  Varicosities: none        Left Foot Vascularity   Normal vascular exam    Dorsalis pedis:  2+  Posterior tibial:  2+  Skin Temperature: warm    Edema Grading:  None  CFT:  < 3 seconds  Pedal Hair Growth:  Present  Varicosities: none        NEUROLOGIC      Right Foot Neurologic   Light touch sensation:  Normal  Hot/Cold sensation: normal    Protective Sensation using [place]-Amina Monofilament:  10  Achilles reflex:  2+      Left Foot Neurologic   Light touch sensation:  Normal  Hot/cold sensation: normal    Protective Sensation using [place]-Amina Monofilament:  10  Achilles reflex:  2+      MUSCULOSKELETAL       Right Foot Musculoskeletal   Ecchymosis:  None  Tenderness: arch and dorsal foot    Arch:  Normal      Left Foot Musculoskeletal   Ecchymosis:  None  Tenderness: arch and dorsal foot    Arch:  Normal      MUSCLE STRENGTH      Right Foot Muscle Strength   Normal strength    Foot dorsiflexion:  5  Foot plantar flexion:  5  Foot inversion:  5  Foot eversion:  5      Left Foot Muscle Strength   Normal strength    Foot dorsiflexion:  5  Foot plantar flexion:  5  Foot inversion:  5  Foot eversion:  5      DERMATOLOGIC      Right Foot Dermatologic   Skin: skin intact        Left Foot Dermatologic   Skin: skin intact        TESTS      Right Foot Tests   Anterior drawer: negative    Varus tilt: negative    Heel raise: pain        Left Foot Tests   Anterior drawer: negative    Varus tilt: negative  "   Heel raise: pain      Assessment/Plan   Diagnoses and all orders for this visit:    1. Bilateral foot pain (Primary)    2. Arthritis of both feet      Patient returns with physical exam relatively unchanged.  She has noticed improvement with the inserts but continues to have discomfort at the end of the day.  We did discuss additional conservative treatments including steroid therapy.  She states that she does not want to consider at this time.  She would like to continue at home exercises and wearing the inserts.  I have recommended that she call with any additional issues or concerns.  She has opted to see me on an as-needed basis at this time.    No orders of the defined types were placed in this encounter.         Note is dictated utilizing voice recognition software. Unfortunately this leads to occasional typographical errors. I apologize in advance if the situation occurs. If questions occur please do not hesitate to call our office.

## 2021-01-19 ENCOUNTER — TELEPHONE (OUTPATIENT)
Dept: PSYCHIATRY | Facility: CLINIC | Age: 61
End: 2021-01-19

## 2021-01-19 DIAGNOSIS — F31.32 BIPOLAR AFFECTIVE DISORDER, CURRENTLY DEPRESSED, MODERATE (HCC): Primary | ICD-10-CM

## 2021-01-19 DIAGNOSIS — F51.05 INSOMNIA DUE TO MENTAL CONDITION: ICD-10-CM

## 2021-01-19 NOTE — TELEPHONE ENCOUNTER
Ambien is not working  for sleep. Can take it  At bedtime and still be awake at 4 am. Please advise  Geodon states  That after she takes it she has problems swallowing . This started 2 weeks ago. Please advsie

## 2021-01-21 RX ORDER — OLANZAPINE 5 MG/1
5 TABLET ORAL NIGHTLY
Qty: 30 TABLET | Refills: 2 | Status: SHIPPED | OUTPATIENT
Start: 2021-01-21 | End: 2021-02-04 | Stop reason: ALTCHOICE

## 2021-01-21 RX ORDER — TEMAZEPAM 15 MG/1
CAPSULE ORAL
Qty: 60 CAPSULE | Refills: 0 | Status: SHIPPED | OUTPATIENT
Start: 2021-01-21 | End: 2021-01-26 | Stop reason: SDUPTHER

## 2021-01-21 NOTE — TELEPHONE ENCOUNTER
Tell her to stop taking the Geodon and I have sent an Rx for Zyprexa (Olanzapine instead) to take AT BEDTIME, plus change the Ambien to Restoril 15mg capsules, take one or two caps at bedtime for sleep

## 2021-01-26 ENCOUNTER — TELEPHONE (OUTPATIENT)
Dept: PSYCHIATRY | Facility: CLINIC | Age: 61
End: 2021-01-26

## 2021-01-26 DIAGNOSIS — F51.05 INSOMNIA DUE TO MENTAL CONDITION: ICD-10-CM

## 2021-01-26 RX ORDER — TEMAZEPAM 15 MG/1
CAPSULE ORAL
Qty: 30 CAPSULE | Refills: 0 | Status: SHIPPED | OUTPATIENT
Start: 2021-01-26 | End: 2021-03-04 | Stop reason: ALTCHOICE

## 2021-01-26 NOTE — TELEPHONE ENCOUNTER
Then we will start with 1 capsule at bedtime #30 of the 15 mg Restoril.  Tell the patient if it does not seem to help, we can increase it to the 30 mg

## 2021-01-26 NOTE — TELEPHONE ENCOUNTER
TEMAZEPAM IS NOT COVERED IN THE QTY WRITTEN FOR. CAN WE CHANGE IT TO A QTY OF  30? INSURANCE WILL ONLY PAY FOR 30 AT A TIME. PRIOR AUTHORIZATION WAS DENIED.

## 2021-01-26 NOTE — TELEPHONE ENCOUNTER
Does the patient take one or two to go to sleep?  If she takes two, then I will change it to the 30mg capsule #30

## 2021-01-28 ENCOUNTER — OFFICE VISIT (OUTPATIENT)
Dept: FAMILY MEDICINE CLINIC | Facility: CLINIC | Age: 61
End: 2021-01-28

## 2021-01-28 ENCOUNTER — LAB (OUTPATIENT)
Dept: FAMILY MEDICINE CLINIC | Facility: CLINIC | Age: 61
End: 2021-01-28

## 2021-01-28 VITALS
BODY MASS INDEX: 34.45 KG/M2 | SYSTOLIC BLOOD PRESSURE: 133 MMHG | OXYGEN SATURATION: 99 % | DIASTOLIC BLOOD PRESSURE: 86 MMHG | WEIGHT: 207 LBS | TEMPERATURE: 96.5 F | HEART RATE: 103 BPM

## 2021-01-28 DIAGNOSIS — Z23 NEED FOR VACCINATION: ICD-10-CM

## 2021-01-28 DIAGNOSIS — Z00.00 PREVENTATIVE HEALTH CARE: ICD-10-CM

## 2021-01-28 DIAGNOSIS — Z78.0 POSTMENOPAUSAL STATE: ICD-10-CM

## 2021-01-28 DIAGNOSIS — E11.9 TYPE 2 DIABETES MELLITUS WITHOUT COMPLICATION, WITHOUT LONG-TERM CURRENT USE OF INSULIN (HCC): ICD-10-CM

## 2021-01-28 DIAGNOSIS — E78.5 HYPERLIPIDEMIA, UNSPECIFIED HYPERLIPIDEMIA TYPE: ICD-10-CM

## 2021-01-28 DIAGNOSIS — I10 HYPERTENSION, UNSPECIFIED TYPE: ICD-10-CM

## 2021-01-28 DIAGNOSIS — J44.9 CHRONIC OBSTRUCTIVE PULMONARY DISEASE, UNSPECIFIED COPD TYPE (HCC): ICD-10-CM

## 2021-01-28 DIAGNOSIS — Z86.018 HISTORY OF UTERINE FIBROID: ICD-10-CM

## 2021-01-28 DIAGNOSIS — Z12.31 BREAST CANCER SCREENING BY MAMMOGRAM: ICD-10-CM

## 2021-01-28 DIAGNOSIS — Z00.00 PREVENTATIVE HEALTH CARE: Primary | ICD-10-CM

## 2021-01-28 LAB
ALBUMIN SERPL-MCNC: 4.3 G/DL (ref 3.5–5.2)
ALBUMIN/GLOB SERPL: 1.2 G/DL
ALP SERPL-CCNC: 101 U/L (ref 39–117)
ALT SERPL W P-5'-P-CCNC: 19 U/L (ref 1–33)
ANION GAP SERPL CALCULATED.3IONS-SCNC: 9.7 MMOL/L (ref 5–15)
AST SERPL-CCNC: 15 U/L (ref 1–32)
BASOPHILS # BLD AUTO: 0.05 10*3/MM3 (ref 0–0.2)
BASOPHILS NFR BLD AUTO: 0.5 % (ref 0–1.5)
BILIRUB SERPL-MCNC: 0.2 MG/DL (ref 0–1.2)
BUN SERPL-MCNC: 22 MG/DL (ref 6–20)
BUN/CREAT SERPL: 26.2 (ref 7–25)
CALCIUM SPEC-SCNC: 9.3 MG/DL (ref 8.6–10.5)
CHLORIDE SERPL-SCNC: 104 MMOL/L (ref 98–107)
CHOLEST SERPL-MCNC: 139 MG/DL (ref 0–200)
CO2 SERPL-SCNC: 27.3 MMOL/L (ref 22–29)
CREAT SERPL-MCNC: 0.84 MG/DL (ref 0.57–1)
DEPRECATED RDW RBC AUTO: 43.9 FL (ref 37–54)
EOSINOPHIL # BLD AUTO: 0.29 10*3/MM3 (ref 0–0.4)
EOSINOPHIL NFR BLD AUTO: 2.7 % (ref 0.3–6.2)
ERYTHROCYTE [DISTWIDTH] IN BLOOD BY AUTOMATED COUNT: 13.1 % (ref 12.3–15.4)
GFR SERPL CREATININE-BSD FRML MDRD: 70 ML/MIN/1.73
GFR SERPL CREATININE-BSD FRML MDRD: 84 ML/MIN/1.73
GLOBULIN UR ELPH-MCNC: 3.6 GM/DL
GLUCOSE SERPL-MCNC: 144 MG/DL (ref 65–99)
HBA1C MFR BLD: 7.3 % (ref 3.5–5.6)
HCT VFR BLD AUTO: 46.6 % (ref 34–46.6)
HDLC SERPL-MCNC: 39 MG/DL (ref 40–60)
HGB BLD-MCNC: 15.8 G/DL (ref 12–15.9)
IMM GRANULOCYTES # BLD AUTO: 0.05 10*3/MM3 (ref 0–0.05)
IMM GRANULOCYTES NFR BLD AUTO: 0.5 % (ref 0–0.5)
LDLC SERPL CALC-MCNC: 69 MG/DL (ref 0–100)
LDLC/HDLC SERPL: 1.63 {RATIO}
LYMPHOCYTES # BLD AUTO: 3.95 10*3/MM3 (ref 0.7–3.1)
LYMPHOCYTES NFR BLD AUTO: 36.9 % (ref 19.6–45.3)
MCH RBC QN AUTO: 31.7 PG (ref 26.6–33)
MCHC RBC AUTO-ENTMCNC: 33.9 G/DL (ref 31.5–35.7)
MCV RBC AUTO: 93.4 FL (ref 79–97)
MONOCYTES # BLD AUTO: 0.67 10*3/MM3 (ref 0.1–0.9)
MONOCYTES NFR BLD AUTO: 6.3 % (ref 5–12)
NEUTROPHILS NFR BLD AUTO: 5.7 10*3/MM3 (ref 1.7–7)
NEUTROPHILS NFR BLD AUTO: 53.1 % (ref 42.7–76)
NRBC BLD AUTO-RTO: 0 /100 WBC (ref 0–0.2)
PLATELET # BLD AUTO: 297 10*3/MM3 (ref 140–450)
PMV BLD AUTO: 10.9 FL (ref 6–12)
POTASSIUM SERPL-SCNC: 4.2 MMOL/L (ref 3.5–5.2)
PROT SERPL-MCNC: 7.9 G/DL (ref 6–8.5)
RBC # BLD AUTO: 4.99 10*6/MM3 (ref 3.77–5.28)
SODIUM SERPL-SCNC: 141 MMOL/L (ref 136–145)
TRIGL SERPL-MCNC: 183 MG/DL (ref 0–150)
VLDLC SERPL-MCNC: 31 MG/DL (ref 5–40)
WBC # BLD AUTO: 10.71 10*3/MM3 (ref 3.4–10.8)

## 2021-01-28 PROCEDURE — 99396 PREV VISIT EST AGE 40-64: CPT | Performed by: NURSE PRACTITIONER

## 2021-01-28 PROCEDURE — 80061 LIPID PANEL: CPT | Performed by: NURSE PRACTITIONER

## 2021-01-28 PROCEDURE — 85025 COMPLETE CBC W/AUTO DIFF WBC: CPT | Performed by: NURSE PRACTITIONER

## 2021-01-28 PROCEDURE — 90715 TDAP VACCINE 7 YRS/> IM: CPT | Performed by: NURSE PRACTITIONER

## 2021-01-28 PROCEDURE — 83036 HEMOGLOBIN GLYCOSYLATED A1C: CPT | Performed by: NURSE PRACTITIONER

## 2021-01-28 PROCEDURE — 36415 COLL VENOUS BLD VENIPUNCTURE: CPT

## 2021-01-28 PROCEDURE — 90471 IMMUNIZATION ADMIN: CPT | Performed by: NURSE PRACTITIONER

## 2021-01-28 PROCEDURE — 80053 COMPREHEN METABOLIC PANEL: CPT | Performed by: NURSE PRACTITIONER

## 2021-01-28 RX ORDER — ZOSTER VACCINE RECOMBINANT, ADJUVANTED 50 MCG/0.5
0.5 KIT INTRAMUSCULAR ONCE
Qty: 1 EACH | Refills: 1 | Status: SHIPPED | OUTPATIENT
Start: 2021-01-28 | End: 2021-01-28

## 2021-01-28 RX ORDER — FLUTICASONE PROPIONATE 50 MCG
2 SPRAY, SUSPENSION (ML) NASAL DAILY
Qty: 9.9 ML | Refills: 2 | Status: SHIPPED | OUTPATIENT
Start: 2021-01-28

## 2021-01-28 NOTE — PROGRESS NOTES
Subjective   Carli Dubon is a 60 y.o. female.     Patient is here today for her annual CPE.  She tries to stay active 3 days a week.   Diet as been ok.     Hypertension-patient is currently on lisinopril 20 mg daily. Doing well on medication.  Denies CP, SOA, dizziness, HA.     Diabetes-patient is currently on Jardiance 25 mg daily, glipizide 10 mg twice daily, Metformin 1000 mg twice daily. She does not monitor her blood sugars regularly.  Tries to stay active and eat a well balanced diet.  Denies any hypoglycemic episodes.     Hyperlipidemia-patient is currently on Crestor 10 mg daily. Doing well with the medication/     COPD-patient is currently on Spiriva and albuterol as needed. She states that at night she coughs frequently.  She still smokes 1 ppd. She uses the albuterol nightly.      Bipolar disorder-patient is seeing psychiatry for this.  She is currently on Zyprexa, Depakote. Mood is stable with the medication.     Insomnia-patient is seeing psychiatry for this.  She was recently switched to Restoril 15 mg at night.  She is also on Elavil 75 mg nightly.    Labs- due  Pap smear- 08/2019- normal  Mammogram- due end of feb  DEXA- post menopausal for 4 years.  Colonoscopy- 7/2/20     Vaccines:  Flu- UTD  PNA- believes she had one 15-20 years ago.  Shingles-due- needs to get at the pharmacy  Tdap- due     Dental exam- due  Eye exam- due         The following portions of the patient's history were reviewed and updated as appropriate: allergies, current medications, past family history, past medical history, past social history, past surgical history and problem list.    Review of Systems   Constitutional: Negative for chills, fatigue and fever.   HENT: Negative for congestion, ear pain and sore throat.    Eyes: Negative for blurred vision and double vision.   Respiratory: Positive for cough (at night). Negative for chest tightness and shortness of breath.    Cardiovascular: Negative for chest pain and  palpitations.   Gastrointestinal: Negative for abdominal pain, constipation, diarrhea, nausea and vomiting.   Genitourinary: Negative for dysuria and frequency.   Musculoskeletal: Negative for arthralgias.   Neurological: Positive for headache. Negative for dizziness.   Psychiatric/Behavioral: Positive for sleep disturbance. Negative for self-injury, suicidal ideas, depressed mood and stress. The patient is not nervous/anxious.        Objective   /86   Pulse 103   Temp 96.5 °F (35.8 °C) (Tympanic)   Wt 93.9 kg (207 lb)   SpO2 99%   BMI 34.45 kg/m²   Physical Exam  Vitals signs reviewed.   Constitutional:       General: She is not in acute distress.     Appearance: Normal appearance. She is well-developed. She is not diaphoretic.   HENT:      Head: Normocephalic and atraumatic.      Right Ear: Tympanic membrane and ear canal normal.      Left Ear: Tympanic membrane and ear canal normal.   Eyes:      General:         Right eye: No discharge.         Left eye: No discharge.      Conjunctiva/sclera: Conjunctivae normal.      Pupils: Pupils are equal, round, and reactive to light.   Neck:      Musculoskeletal: Normal range of motion and neck supple.   Cardiovascular:      Rate and Rhythm: Normal rate and regular rhythm.   Pulmonary:      Effort: Pulmonary effort is normal. No respiratory distress.      Breath sounds: Normal breath sounds. No wheezing or rales.   Abdominal:      General: Bowel sounds are normal. There is no distension.      Palpations: Abdomen is soft.      Tenderness: There is no abdominal tenderness.   Musculoskeletal: Normal range of motion.   Skin:     General: Skin is warm and dry.   Neurological:      General: No focal deficit present.      Mental Status: She is alert and oriented to person, place, and time.   Psychiatric:         Mood and Affect: Mood normal.         Behavior: Behavior normal.         Thought Content: Thought content normal.         Judgment: Judgment normal.        Diabetic foot exam:   Left: Filament test present   Pulses Dorsalis Pedis:  present          Right: Filament test some altered sensation with filament test   Pulses Dorsalis Pedis:  present         Assessment/Plan     Diagnoses and all orders for this visit:    1. Preventative health care (Primary)  Comments:  work on diet and exercise  check labs  mammo 2/18  DEXA  Tdap today  shingrix ordered  Orders:  -     CBC & Differential  -     Comprehensive Metabolic Panel; Future  -     Lipid Panel; Future    2. Type 2 diabetes mellitus without complication, without long-term current use of insulin (CMS/[facility])  Comments:  cont meds  check A1C  Orders:  -     Hemoglobin A1c; Future  -     Ambulatory Referral to Ophthalmology    3. Breast cancer screening by mammogram  -     Mammo Screening Digital Tomosynthesis Bilateral With CAD; Future    4. Hypertension, unspecified type  Comments:  stable  cont meds  work on diet an exercise    5. Hyperlipidemia, unspecified hyperlipidemia type  Comments:  stable  check labs  cont crestor    6. Chronic obstructive pulmonary disease, unspecified COPD type (CMS/[facility])  Comments:  stable  cont spiriva  smoking cessation discussed    7. Postmenopausal state  -     DEXA Bone Density Axial; Future    8. Need for vaccination  -     Zoster Vac Recomb Adjuvanted (Shingrix) 50 MCG/0.5ML reconstituted suspension; Inject 0.5 mL into the appropriate muscle as directed by prescriber 1 (One) Time for 1 dose. Repeat in 2-6 months  Dispense: 1 each; Refill: 1  -     Tdap Vaccine Greater Than or Equal To 6yo IM    9. History of uterine fibroid  Comments:  states she has had Gyn surgery in past  wants to see Gyn  Orders:  -     Ambulatory Referral to Obstetrics / Gynecology    Other orders  -     fluticasone (Flonase) 50 MCG/ACT nasal spray; 2 sprays into the nostril(s) as directed by provider Daily.  Dispense: 9.9 mL; Refill: 2

## 2021-01-28 NOTE — PATIENT INSTRUCTIONS
Continue current meds  Work on diet and exercise  Stop smoking  Start using flonase nasal spray daily to see if it helps with drainage  Get mammo after 2/18  Get DEXA scan  Complete blood work  Get shingles vaccine at pharmacy  Get diabetic eye exam  Referral to OB/Gyn of [facility]

## 2021-02-02 ENCOUNTER — TELEPHONE (OUTPATIENT)
Dept: PSYCHIATRY | Facility: CLINIC | Age: 61
End: 2021-02-02

## 2021-02-03 NOTE — TELEPHONE ENCOUNTER
Seroquel is one of the bipolar medications, in the same family as Zyprexa,  which she is taking now for bipolar.  She cannot take both.  If she wants to d/c the Geodon, we can switch her to 400mg of Seroquel.  Also, please ask her if the Depakote is helping her mood swings and irritability yet or does she think it needs to be increased to 500mg

## 2021-02-04 RX ORDER — DIVALPROEX SODIUM 500 MG/1
500 TABLET, EXTENDED RELEASE ORAL NIGHTLY
Qty: 30 TABLET | Refills: 2 | Status: SHIPPED | OUTPATIENT
Start: 2021-02-04 | End: 2021-02-09

## 2021-02-04 RX ORDER — QUETIAPINE FUMARATE 400 MG/1
400 TABLET, FILM COATED ORAL NIGHTLY
Qty: 30 TABLET | Refills: 2 | Status: SHIPPED | OUTPATIENT
Start: 2021-02-04 | End: 2021-02-09

## 2021-02-04 NOTE — TELEPHONE ENCOUNTER
It was supposed to say d/c the zyprexa.  I had d/c the Geodon and switched her to Zyprexa (Olanzapine).  I will send the Seroquel but she needs to stop the Olanzapine.  I am sending the 400mg tabs of seroquel, so she should only take one tablet, not two since her daughter's pills were 200mg.  I am increasing the Depakote to 500mg nightly

## 2021-02-04 NOTE — TELEPHONE ENCOUNTER
She is not on Geodon any more it was stopped. She thinks the Depakote is helping some but can be increased and she is very  Interested in taking Seroquel. Please advise

## 2021-02-09 ENCOUNTER — OFFICE VISIT (OUTPATIENT)
Dept: PSYCHIATRY | Facility: CLINIC | Age: 61
End: 2021-02-09

## 2021-02-09 DIAGNOSIS — E78.5 HYPERLIPIDEMIA, UNSPECIFIED HYPERLIPIDEMIA TYPE: ICD-10-CM

## 2021-02-09 DIAGNOSIS — G47.00 INSOMNIA, UNSPECIFIED TYPE: ICD-10-CM

## 2021-02-09 DIAGNOSIS — F31.32 BIPOLAR AFFECTIVE DISORDER, CURRENTLY DEPRESSED, MODERATE (HCC): Chronic | ICD-10-CM

## 2021-02-09 DIAGNOSIS — F43.10 PTSD (POST-TRAUMATIC STRESS DISORDER): Primary | Chronic | ICD-10-CM

## 2021-02-09 PROCEDURE — 99214 OFFICE O/P EST MOD 30 MIN: CPT | Performed by: PHYSICIAN ASSISTANT

## 2021-02-09 RX ORDER — DIVALPROEX SODIUM 500 MG/1
1000 TABLET, EXTENDED RELEASE ORAL NIGHTLY
Qty: 60 TABLET | Refills: 3 | Status: SHIPPED | OUTPATIENT
Start: 2021-02-09 | End: 2021-06-14 | Stop reason: SDUPTHER

## 2021-02-09 NOTE — PATIENT INSTRUCTIONS
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
## 2021-02-10 ENCOUNTER — TELEPHONE (OUTPATIENT)
Dept: FAMILY MEDICINE CLINIC | Facility: CLINIC | Age: 61
End: 2021-02-10

## 2021-02-10 DIAGNOSIS — E11.9 TYPE 2 DIABETES MELLITUS WITHOUT COMPLICATION, WITHOUT LONG-TERM CURRENT USE OF INSULIN (HCC): ICD-10-CM

## 2021-02-10 RX ORDER — EMPAGLIFLOZIN 25 MG/1
25 TABLET, FILM COATED ORAL DAILY
Qty: 30 TABLET | Refills: 1 | Status: SHIPPED | OUTPATIENT
Start: 2021-02-10 | End: 2021-04-26 | Stop reason: SDUPTHER

## 2021-02-10 RX ORDER — ATORVASTATIN CALCIUM 10 MG/1
TABLET, FILM COATED ORAL
Qty: 30 TABLET | Refills: 0 | OUTPATIENT
Start: 2021-02-10

## 2021-02-10 NOTE — TELEPHONE ENCOUNTER
Caller: Sean Dubona    Relationship: Self    Best call back number: [phone]    Medication needed:   Requested Prescriptions     Pending Prescriptions Disp Refills   • Empagliflozin (Jardiance) 25 MG tablet 30 tablet 1     Sig: Take 25 mg by mouth Daily.       When do you need the refill by: asap    What details did the patient provide when requesting the medication: patient has 3 pills left     Does the patient have less than a 3 day supply:  [x] Yes  [] No    What is the patient's preferred pharmacy: [place] [facility] [phone] [place], [place] [phone]

## 2021-02-19 ENCOUNTER — HOSPITAL ENCOUNTER (OUTPATIENT)
Dept: MAMMOGRAPHY | Facility: HOSPITAL | Age: 61
Discharge: HOME | End: 2021-02-19

## 2021-02-19 ENCOUNTER — HOSPITAL ENCOUNTER (OUTPATIENT)
Dept: BONE DENSITY | Facility: HOSPITAL | Age: 61
Discharge: HOME | End: 2021-02-19

## 2021-02-19 DIAGNOSIS — Z78.0 POSTMENOPAUSAL STATE: ICD-10-CM

## 2021-02-19 DIAGNOSIS — Z12.31 BREAST CANCER SCREENING BY MAMMOGRAM: ICD-10-CM

## 2021-02-19 PROCEDURE — 77080 DXA BONE DENSITY AXIAL: CPT

## 2021-02-19 PROCEDURE — 77063 BREAST TOMOSYNTHESIS BI: CPT

## 2021-02-19 PROCEDURE — 77067 SCR MAMMO BI INCL CAD: CPT

## 2021-02-24 ENCOUNTER — TELEPHONE (OUTPATIENT)
Dept: PSYCHIATRY | Facility: CLINIC | Age: 61
End: 2021-02-24

## 2021-03-01 NOTE — TELEPHONE ENCOUNTER
She is already on 75mg of Elavil at night for sleep, tell her to take two at bedtime (150mg nightly) at 9 or 10pm.  She can also use the Restoril with it if needed.

## 2021-03-02 ENCOUNTER — TELEPHONE (OUTPATIENT)
Dept: PSYCHIATRY | Facility: CLINIC | Age: 61
End: 2021-03-02

## 2021-03-02 DIAGNOSIS — F51.05 INSOMNIA DUE TO MENTAL CONDITION: Primary | ICD-10-CM

## 2021-03-04 RX ORDER — ZOLPIDEM TARTRATE 10 MG/1
10 TABLET ORAL NIGHTLY PRN
Qty: 30 TABLET | Refills: 2 | Status: SHIPPED | OUTPATIENT
Start: 2021-03-04 | End: 2021-05-11 | Stop reason: SDUPTHER

## 2021-03-04 NOTE — TELEPHONE ENCOUNTER
New rx sent for Ambien 10mg nightly prn.  Make sure she knows that she is to d/c the Restoril now

## 2021-03-05 ENCOUNTER — TELEPHONE (OUTPATIENT)
Dept: PSYCHIATRY | Facility: CLINIC | Age: 61
End: 2021-03-05

## 2021-03-05 DIAGNOSIS — G47.00 INSOMNIA, UNSPECIFIED TYPE: ICD-10-CM

## 2021-03-08 RX ORDER — AMITRIPTYLINE HYDROCHLORIDE 150 MG/1
150 TABLET, FILM COATED ORAL NIGHTLY
Qty: 30 TABLET | Refills: 2 | Status: SHIPPED | OUTPATIENT
Start: 2021-03-08 | End: 2021-05-11 | Stop reason: SDUPTHER

## 2021-03-08 NOTE — TELEPHONE ENCOUNTER
NO.  She has been taking two of the 75mg tabs, which is 150mg, so I have changed the dosage to the 150mg tabs and sent a new Rx so make sure she knows to ONLY take ONE of these tabs at bedtime, NOT TWO,  since it is the higher dosage.

## 2021-03-12 ENCOUNTER — TELEPHONE (OUTPATIENT)
Dept: FAMILY MEDICINE CLINIC | Facility: CLINIC | Age: 61
End: 2021-03-12

## 2021-03-12 NOTE — TELEPHONE ENCOUNTER
THIS MESSAGE IS FOR:   [facility]      PATIENT CALLED STATING: THE JARDIAN MEDICATION WAS NOT FILLED AT THE PHARMACY AND PATIENT WAS TOLD BY THE PHARMACY THAT INFORMATION WAS SENT TO OFFICE FOR APPROVAL.     POSSIBLE PA NEEDED FOR INS.     PATIENT IS REQUESTING: MORE INFORMATION ON WHY MEDICATION WAS NOT FILLED.    PATIENT NEEDS THIS BY: ASAP    PHARMACY TO USE: [facility] [address] [place] [address] - [phone] [place] [phone] FX  [phone]    PLEASE ADVISE/CALL PATIENT IF YOU HAVE ANY QUESTIONS- PHONE NUMBER:     Carli Dubon (Self) [phone] (H)     Empagliflozin (Jardiance) 25 MG tablet

## 2021-03-16 ENCOUNTER — TELEPHONE (OUTPATIENT)
Dept: FAMILY MEDICINE CLINIC | Facility: CLINIC | Age: 61
End: 2021-03-16

## 2021-03-16 NOTE — TELEPHONE ENCOUNTER
Caller: Carli Dubon    Relationship: Self    Best call back number: [phone]    What medication are you requesting: AMOXICILLIN     What are your current symptoms: COUGH, CHEST COLD       If a prescription is needed, what is your preferred pharmacy and phone number: [facility] [address] [place] [address] [address] [facility] [phone] [place] [phone]

## 2021-03-17 ENCOUNTER — OFFICE VISIT (OUTPATIENT)
Dept: FAMILY MEDICINE CLINIC | Facility: CLINIC | Age: 61
End: 2021-03-17

## 2021-03-17 VITALS
TEMPERATURE: 96.9 F | WEIGHT: 211 LBS | BODY MASS INDEX: 35.11 KG/M2 | SYSTOLIC BLOOD PRESSURE: 153 MMHG | DIASTOLIC BLOOD PRESSURE: 93 MMHG | HEART RATE: 94 BPM | OXYGEN SATURATION: 99 %

## 2021-03-17 DIAGNOSIS — J06.9 UPPER RESPIRATORY TRACT INFECTION, UNSPECIFIED TYPE: Primary | ICD-10-CM

## 2021-03-17 PROCEDURE — 99213 OFFICE O/P EST LOW 20 MIN: CPT | Performed by: NURSE PRACTITIONER

## 2021-03-17 RX ORDER — NYSTATIN AND TRIAMCINOLONE ACETONIDE 100000; 1 [USP'U]/G; MG/G
OINTMENT TOPICAL
COMMUNITY
Start: 2021-02-01

## 2021-03-17 RX ORDER — ZIPRASIDONE HYDROCHLORIDE 40 MG/1
CAPSULE ORAL
COMMUNITY
Start: 2021-01-27 | End: 2021-05-11

## 2021-03-17 RX ORDER — AMOXICILLIN AND CLAVULANATE POTASSIUM 875; 125 MG/1; MG/1
1 TABLET, FILM COATED ORAL 2 TIMES DAILY
Qty: 20 TABLET | Refills: 0 | Status: SHIPPED | OUTPATIENT
Start: 2021-03-17 | End: 2021-03-27

## 2021-03-17 RX ORDER — DIVALPROEX SODIUM 500 MG/1
TABLET, DELAYED RELEASE ORAL
COMMUNITY
Start: 2021-02-09 | End: 2021-05-11

## 2021-03-17 NOTE — PATIENT INSTRUCTIONS
Complete antibiotic  Continue mucinex  Push water intake  Take tylenol or ibuprofen for fever or discomfort  Get plenty of rest  Call if no improvement or worsening symptoms

## 2021-03-17 NOTE — PROGRESS NOTES
Subjective   Carli Dubon is a 60 y.o. female.     Pt is here today with c/o chest congestion and a cough.  She states that her symptoms started over 10 days ago.  Cough and congestion started at the same time.  Cough worsens at night and in the morning  It is productive  Sputum is clear to yellow.  Denies fever, chills, sore throat.  Has some nasal congestion.  Has some wheezing.  She has tried mucinex and aedla selzer         The following portions of the patient's history were reviewed and updated as appropriate: allergies, current medications, past family history, past medical history, past social history, past surgical history and problem list.    Review of Systems   Constitutional: Negative for chills, fatigue and fever.   HENT: Positive for congestion. Negative for sore throat.         Ear fullness   Respiratory: Positive for cough and wheezing. Negative for chest tightness and shortness of breath.    Cardiovascular: Negative for chest pain and palpitations.   Gastrointestinal: Negative for constipation, diarrhea, nausea and vomiting.   Neurological: Negative for dizziness and headache.       Objective   /93 (BP Location: Right arm, Patient Position: Sitting, Cuff Size: Adult)   Pulse 94   Temp 96.9 °F (36.1 °C) (Tympanic)   Wt 95.7 kg (211 lb)   SpO2 99%   BMI 35.11 kg/m²   Physical Exam  Constitutional:       Appearance: Normal appearance. She is obese.   HENT:      Head: Normocephalic and atraumatic.      Right Ear: Tympanic membrane and ear canal normal.      Left Ear: Tympanic membrane and ear canal normal.   Cardiovascular:      Rate and Rhythm: Normal rate and regular rhythm.      Heart sounds: No murmur heard.     Pulmonary:      Effort: Pulmonary effort is normal. No respiratory distress.      Breath sounds: Normal breath sounds.   Skin:     General: Skin is warm and dry.   Neurological:      General: No focal deficit present.      Mental Status: She is alert and oriented to person, place,  and time.   Psychiatric:         Mood and Affect: Mood normal.         Behavior: Behavior normal.         Thought Content: Thought content normal.         Judgment: Judgment normal.           Assessment/Plan     Diagnoses and all orders for this visit:    1. Upper respiratory tract infection, unspecified type (Primary)  Comments:  possible sinusitis with some chest congestion  augmentin  cont mucinex  push water intake  callif no improvement  Orders:  -     amoxicillin-clavulanate (Augmentin) 875-125 MG per tablet; Take 1 tablet by mouth 2 (Two) Times a Day for 10 days.  Dispense: 20 tablet; Refill: 0

## 2021-04-16 DIAGNOSIS — I10 HYPERTENSION, UNSPECIFIED TYPE: ICD-10-CM

## 2021-04-16 RX ORDER — LISINOPRIL 20 MG/1
20 TABLET ORAL DAILY
Qty: 90 TABLET | Refills: 1 | Status: SHIPPED | OUTPATIENT
Start: 2021-04-16 | End: 2021-09-13

## 2021-04-16 NOTE — TELEPHONE ENCOUNTER
Caller: DubonCarli    Relationship: Self    Best call back number: [phone]    Medication needed:   Requested Prescriptions     Pending Prescriptions Disp Refills   • lisinopril (PRINIVIL,ZESTRIL) 20 MG tablet 90 tablet 1     Sig: Take 1 tablet by mouth Daily.       When do you need the refill by: ASAP    Does the patient have less than a 3 day supply:  [x] Yes  [] No    What is the patient's preferred pharmacy: [facility] [address] [place], [place] [phone] [place] [phone] FX

## 2021-04-26 DIAGNOSIS — E11.9 TYPE 2 DIABETES MELLITUS WITHOUT COMPLICATION, WITHOUT LONG-TERM CURRENT USE OF INSULIN (HCC): ICD-10-CM

## 2021-04-26 NOTE — TELEPHONE ENCOUNTER
Caller: Sean Dubona    Relationship: Self    Best call back number: [phone]    Medication needed:   Requested Prescriptions     Pending Prescriptions Disp Refills   • Empagliflozin (Jardiance) 25 MG tablet 30 tablet 1     Sig: Take 25 mg by mouth Daily.       When do you need the refill by: ASAP    What additional details did the patient provide when requesting the medication: PATIENT ONLY HAS 2 LEFT    Does the patient have less than a 3 day supply:  [x] Yes  [] No    What is the patient's preferred pharmacy: [facility] [phone] [place] [phone]

## 2021-04-27 RX ORDER — EMPAGLIFLOZIN 25 MG/1
25 TABLET, FILM COATED ORAL DAILY
Qty: 30 TABLET | Refills: 1 | Status: SHIPPED | OUTPATIENT
Start: 2021-04-27 | End: 2021-06-25 | Stop reason: SDUPTHER

## 2021-05-11 ENCOUNTER — OFFICE VISIT (OUTPATIENT)
Dept: PSYCHIATRY | Facility: CLINIC | Age: 61
End: 2021-05-11

## 2021-05-11 DIAGNOSIS — G47.00 INSOMNIA, UNSPECIFIED TYPE: ICD-10-CM

## 2021-05-11 DIAGNOSIS — F51.05 INSOMNIA DUE TO MENTAL CONDITION: ICD-10-CM

## 2021-05-11 DIAGNOSIS — F31.32 BIPOLAR AFFECTIVE DISORDER, CURRENTLY DEPRESSED, MODERATE (HCC): Primary | Chronic | ICD-10-CM

## 2021-05-11 DIAGNOSIS — F43.10 PTSD (POST-TRAUMATIC STRESS DISORDER): Chronic | ICD-10-CM

## 2021-05-11 PROCEDURE — 99214 OFFICE O/P EST MOD 30 MIN: CPT | Performed by: PHYSICIAN ASSISTANT

## 2021-05-11 RX ORDER — ZOLPIDEM TARTRATE 10 MG/1
10 TABLET ORAL NIGHTLY PRN
Qty: 30 TABLET | Refills: 2 | Status: SHIPPED | OUTPATIENT
Start: 2021-05-11 | End: 2021-09-14 | Stop reason: SDUPTHER

## 2021-05-11 RX ORDER — AMITRIPTYLINE HYDROCHLORIDE 150 MG/1
150 TABLET, FILM COATED ORAL NIGHTLY
Qty: 30 TABLET | Refills: 5 | Status: SHIPPED | OUTPATIENT
Start: 2021-05-11 | End: 2021-12-18

## 2021-05-25 ENCOUNTER — LAB (OUTPATIENT)
Dept: LAB | Facility: HOSPITAL | Age: 61
End: 2021-05-25

## 2021-05-25 DIAGNOSIS — F31.32 BIPOLAR AFFECTIVE DISORDER, CURRENTLY DEPRESSED, MODERATE (HCC): Chronic | ICD-10-CM

## 2021-05-25 LAB
ALBUMIN SERPL-MCNC: 4.2 G/DL (ref 3.5–5.2)
ALBUMIN/GLOB SERPL: 1.3 G/DL
ALP SERPL-CCNC: 86 U/L (ref 39–117)
ALT SERPL W P-5'-P-CCNC: 18 U/L (ref 1–33)
ANION GAP SERPL CALCULATED.3IONS-SCNC: 9.9 MMOL/L (ref 5–15)
AST SERPL-CCNC: 17 U/L (ref 1–32)
BILIRUB SERPL-MCNC: 0.2 MG/DL (ref 0–1.2)
BUN SERPL-MCNC: 14 MG/DL (ref 6–20)
BUN/CREAT SERPL: 16.5 (ref 7–25)
CALCIUM SPEC-SCNC: 9 MG/DL (ref 8.6–10.5)
CHLORIDE SERPL-SCNC: 101 MMOL/L (ref 98–107)
CO2 SERPL-SCNC: 25.1 MMOL/L (ref 22–29)
CREAT SERPL-MCNC: 0.85 MG/DL (ref 0.57–1)
GFR SERPL CREATININE-BSD FRML MDRD: 69 ML/MIN/1.73
GFR SERPL CREATININE-BSD FRML MDRD: 83 ML/MIN/1.73
GLOBULIN UR ELPH-MCNC: 3.2 GM/DL
GLUCOSE SERPL-MCNC: 211 MG/DL (ref 65–99)
POTASSIUM SERPL-SCNC: 4.5 MMOL/L (ref 3.5–5.2)
PROT SERPL-MCNC: 7.4 G/DL (ref 6–8.5)
SODIUM SERPL-SCNC: 136 MMOL/L (ref 136–145)
VALPROATE SERPL-MCNC: 57 MCG/ML (ref 50–125)

## 2021-05-25 PROCEDURE — 80164 ASSAY DIPROPYLACETIC ACD TOT: CPT

## 2021-05-25 PROCEDURE — 36415 COLL VENOUS BLD VENIPUNCTURE: CPT

## 2021-05-25 PROCEDURE — 80053 COMPREHEN METABOLIC PANEL: CPT

## 2021-06-14 RX ORDER — DIVALPROEX SODIUM 500 MG/1
1000 TABLET, EXTENDED RELEASE ORAL NIGHTLY
Qty: 60 TABLET | Refills: 3 | Status: SHIPPED | OUTPATIENT
Start: 2021-06-14 | End: 2021-11-03

## 2021-06-25 DIAGNOSIS — J44.9 CHRONIC OBSTRUCTIVE PULMONARY DISEASE, UNSPECIFIED COPD TYPE (HCC): ICD-10-CM

## 2021-06-25 DIAGNOSIS — E11.9 TYPE 2 DIABETES MELLITUS WITHOUT COMPLICATION, WITHOUT LONG-TERM CURRENT USE OF INSULIN (HCC): ICD-10-CM

## 2021-06-25 RX ORDER — GLIPIZIDE 10 MG/1
10 TABLET ORAL
Qty: 180 TABLET | Refills: 1 | Status: SHIPPED | OUTPATIENT
Start: 2021-06-25 | End: 2021-08-03 | Stop reason: SDUPTHER

## 2021-06-25 RX ORDER — TIOTROPIUM BROMIDE INHALATION SPRAY 1.56 UG/1
2 SPRAY, METERED RESPIRATORY (INHALATION)
Qty: 4 G | Refills: 0 | Status: SHIPPED | OUTPATIENT
Start: 2021-06-25 | End: 2022-07-18

## 2021-06-25 RX ORDER — BENZONATATE 100 MG/1
100 CAPSULE ORAL 3 TIMES DAILY PRN
Qty: 30 CAPSULE | Refills: 0 | Status: SHIPPED | OUTPATIENT
Start: 2021-06-25 | End: 2021-07-28 | Stop reason: SDUPTHER

## 2021-06-25 NOTE — TELEPHONE ENCOUNTER
Caller: CLIFF DIETRICH    Relationship: SELF    Best call back number: [phone]    Medication needed:   Requested Prescriptions     Pending Prescriptions Disp Refills   • metFORMIN (GLUCOPHAGE) 1000 MG tablet 180 tablet 1     Sig: Take 1 tablet by mouth 2 (Two) Times a Day With Meals.   • glipizide (GLUCOTROL) 10 MG tablet 180 tablet 1     Sig: Take 1 tablet by mouth 2 (Two) Times a Day Before Meals.   • empagliflozin (Jardiance) 25 MG tablet tablet 30 tablet 1     Sig: Take 1 tablet by mouth Daily.   • Tiotropium Bromide Monohydrate (Spiriva Respimat) 1.25 MCG/ACT aerosol solution inhaler       Sig: Inhale 2 puffs Daily.       When do you need the refill by: ASAP    What additional details did the patient provide when requesting the medication: PATIENT REQUESTING REFILL ON RX , AND CRESTOR, AND COUGH MEDICINE FOR ONGOING COUGH PLEASE ADVISE, THANK YOU!    Does the patient have less than a 3 day supply:  [] Yes  [x] No    What is the patient's preferred pharmacy: [address] - [facility] [place] [phone] [place] [phone]

## 2021-07-04 DIAGNOSIS — J44.9 CHRONIC OBSTRUCTIVE PULMONARY DISEASE, UNSPECIFIED COPD TYPE (HCC): ICD-10-CM

## 2021-07-06 RX ORDER — ALBUTEROL SULFATE 90 UG/1
AEROSOL, METERED RESPIRATORY (INHALATION)
Qty: 18 G | Refills: 0 | Status: SHIPPED | OUTPATIENT
Start: 2021-07-06 | End: 2021-07-28 | Stop reason: SDUPTHER

## 2021-07-28 ENCOUNTER — OFFICE VISIT (OUTPATIENT)
Dept: FAMILY MEDICINE CLINIC | Facility: CLINIC | Age: 61
End: 2021-07-28

## 2021-07-28 ENCOUNTER — LAB (OUTPATIENT)
Dept: FAMILY MEDICINE CLINIC | Facility: CLINIC | Age: 61
End: 2021-07-28

## 2021-07-28 VITALS
BODY MASS INDEX: 35.28 KG/M2 | DIASTOLIC BLOOD PRESSURE: 84 MMHG | TEMPERATURE: 97.8 F | SYSTOLIC BLOOD PRESSURE: 134 MMHG | HEART RATE: 108 BPM | OXYGEN SATURATION: 95 % | WEIGHT: 212 LBS

## 2021-07-28 DIAGNOSIS — E11.9 TYPE 2 DIABETES MELLITUS WITHOUT COMPLICATION, WITHOUT LONG-TERM CURRENT USE OF INSULIN (HCC): ICD-10-CM

## 2021-07-28 DIAGNOSIS — J44.9 CHRONIC OBSTRUCTIVE PULMONARY DISEASE, UNSPECIFIED COPD TYPE (HCC): ICD-10-CM

## 2021-07-28 DIAGNOSIS — I10 HYPERTENSION, UNSPECIFIED TYPE: ICD-10-CM

## 2021-07-28 DIAGNOSIS — I10 HYPERTENSION, UNSPECIFIED TYPE: Primary | ICD-10-CM

## 2021-07-28 DIAGNOSIS — M79.642 BILATERAL HAND PAIN: ICD-10-CM

## 2021-07-28 DIAGNOSIS — E78.5 HYPERLIPIDEMIA, UNSPECIFIED HYPERLIPIDEMIA TYPE: ICD-10-CM

## 2021-07-28 DIAGNOSIS — M79.641 BILATERAL HAND PAIN: ICD-10-CM

## 2021-07-28 DIAGNOSIS — G47.00 INSOMNIA, UNSPECIFIED TYPE: ICD-10-CM

## 2021-07-28 LAB
ALBUMIN SERPL-MCNC: 4.5 G/DL (ref 3.5–5.2)
ALBUMIN/GLOB SERPL: 1.2 G/DL
ALP SERPL-CCNC: 93 U/L (ref 39–117)
ALT SERPL W P-5'-P-CCNC: 36 U/L (ref 1–33)
ANION GAP SERPL CALCULATED.3IONS-SCNC: 11.9 MMOL/L (ref 5–15)
AST SERPL-CCNC: 23 U/L (ref 1–32)
BILIRUB SERPL-MCNC: 0.2 MG/DL (ref 0–1.2)
BUN SERPL-MCNC: 13 MG/DL (ref 6–20)
BUN/CREAT SERPL: 15.3 (ref 7–25)
CALCIUM SPEC-SCNC: 9.7 MG/DL (ref 8.6–10.5)
CHLORIDE SERPL-SCNC: 100 MMOL/L (ref 98–107)
CHOLEST SERPL-MCNC: 228 MG/DL (ref 0–200)
CO2 SERPL-SCNC: 27.1 MMOL/L (ref 22–29)
CREAT SERPL-MCNC: 0.85 MG/DL (ref 0.57–1)
GFR SERPL CREATININE-BSD FRML MDRD: 69 ML/MIN/1.73
GFR SERPL CREATININE-BSD FRML MDRD: 83 ML/MIN/1.73
GLOBULIN UR ELPH-MCNC: 3.9 GM/DL
GLUCOSE SERPL-MCNC: 130 MG/DL (ref 65–99)
HBA1C MFR BLD: 7.4 % (ref 3.5–5.6)
HDLC SERPL-MCNC: 43 MG/DL (ref 40–60)
LDLC SERPL CALC-MCNC: 150 MG/DL (ref 0–100)
LDLC/HDLC SERPL: 3.42 {RATIO}
POTASSIUM SERPL-SCNC: 4.8 MMOL/L (ref 3.5–5.2)
PROT SERPL-MCNC: 8.4 G/DL (ref 6–8.5)
SODIUM SERPL-SCNC: 139 MMOL/L (ref 136–145)
TRIGL SERPL-MCNC: 190 MG/DL (ref 0–150)
VLDLC SERPL-MCNC: 35 MG/DL (ref 5–40)

## 2021-07-28 PROCEDURE — 36415 COLL VENOUS BLD VENIPUNCTURE: CPT

## 2021-07-28 PROCEDURE — 80061 LIPID PANEL: CPT | Performed by: NURSE PRACTITIONER

## 2021-07-28 PROCEDURE — 99214 OFFICE O/P EST MOD 30 MIN: CPT | Performed by: NURSE PRACTITIONER

## 2021-07-28 PROCEDURE — 80053 COMPREHEN METABOLIC PANEL: CPT | Performed by: NURSE PRACTITIONER

## 2021-07-28 PROCEDURE — 83036 HEMOGLOBIN GLYCOSYLATED A1C: CPT | Performed by: NURSE PRACTITIONER

## 2021-07-28 RX ORDER — ALBUTEROL SULFATE 90 UG/1
2 AEROSOL, METERED RESPIRATORY (INHALATION) EVERY 4 HOURS PRN
Qty: 18 G | Refills: 2 | Status: SHIPPED | OUTPATIENT
Start: 2021-07-28 | End: 2022-09-08

## 2021-07-28 RX ORDER — ATORVASTATIN CALCIUM 20 MG/1
20 TABLET, FILM COATED ORAL DAILY
Qty: 30 TABLET | Refills: 2 | Status: SHIPPED | OUTPATIENT
Start: 2021-07-28 | End: 2022-01-11 | Stop reason: SDUPTHER

## 2021-07-28 RX ORDER — BENZONATATE 100 MG/1
100 CAPSULE ORAL 3 TIMES DAILY PRN
Qty: 30 CAPSULE | Refills: 0 | Status: SHIPPED | OUTPATIENT
Start: 2021-07-28 | End: 2022-01-11

## 2021-07-28 RX ORDER — BUDESONIDE AND FORMOTEROL FUMARATE DIHYDRATE 160; 4.5 UG/1; UG/1
2 AEROSOL RESPIRATORY (INHALATION)
Qty: 6 G | Refills: 12 | Status: SHIPPED | OUTPATIENT
Start: 2021-07-28 | End: 2022-01-25 | Stop reason: SDUPTHER

## 2021-07-28 NOTE — PATIENT INSTRUCTIONS
Continue current meds  Get labs checked  Work on smoking cessation  Referral to hand surgery and pulmonary  Call for issues or concerns  Start symbicort twice daily- rinse mouth after use

## 2021-07-28 NOTE — PROGRESS NOTES
Subjective   Calri Dubon is a 60 y.o. female.     Pt is here today for a 6 mo follow up on hypertension, diabetes, hyperlipidemia, COPD, bipolar disorder, and insomnia.  She had pancreatitis in the last year and follows with GI.  She states that she continues to cough frequently.  She is wanting to see a pulmonologist  She is also wanting to see a hand specialist for difficulty gripping and swelling of the hands.  She states that they go numb at times.     Hypertension-patient is currently on lisinopril 20 mg daily. Doing well on medication.  Denies CP, SOA, dizziness, HA.      Diabetes-patient is currently on Jardiance 25 mg daily, glipizide 10 mg twice daily, Metformin 1000 mg twice daily. She does not monitor her blood sugars regularly.  Tries to stay active and eat a well balanced diet.       Hyperlipidemia- she was previously prescribed crestor but states it was stopped due to a psych med. She was told to restart atorvastatin. She has been off crestor about 1 mo     COPD-patient is currently on Spiriva and albuterol as needed. She states that at night she coughs frequently.  She still smokes 1 ppd. She uses the albuterol nightly.       Bipolar disorder-patient is seeing psychiatry for this.  She is currently on Depakote. Mood is stable with the medication.      Insomnia-patient is seeing psychiatry for this.  She is on ambien 10mg prn.  She is also on Elavil 75 mg nightly.     Labs- due  Pap smear- 08/2019- normal  Mammogram- 2/2021  DEXA- post menopausal for 4 years.  Colonoscopy- 7/2/20     Vaccines:  Flu- UTD  PNA- believes she had one 15-20 years ago.  Shingles-due- needs to get at the pharmacy  Tdap- UTD     Dental exam- due  Eye exam- due       The following portions of the patient's history were reviewed and updated as appropriate: allergies, current medications, past family history, past medical history, past social history, past surgical history and problem list.    Review of Systems    Constitutional: Negative for chills, fatigue and fever.   HENT: Negative for congestion, ear pain, sinus pressure and sore throat.    Respiratory: Positive for cough and shortness of breath. Negative for chest tightness.    Cardiovascular: Negative for chest pain and palpitations.   Gastrointestinal: Positive for nausea. Negative for abdominal pain and vomiting.   Musculoskeletal: Positive for arthralgias.   Neurological: Negative for dizziness and headache.   Psychiatric/Behavioral: Negative for self-injury, suicidal ideas, depressed mood and stress. The patient is not nervous/anxious.        Objective   /84 (BP Location: Left arm, Patient Position: Sitting, Cuff Size: Large Adult)   Pulse 108   Temp 97.8 °F (36.6 °C) (Tympanic)   Wt 96.2 kg (212 lb)   SpO2 95%   BMI 35.28 kg/m²   Physical Exam  Constitutional:       Appearance: Normal appearance. She is not ill-appearing.   HENT:      Head: Normocephalic and atraumatic.   Cardiovascular:      Rate and Rhythm: Normal rate and regular rhythm.      Heart sounds: No murmur heard.     Pulmonary:      Effort: Pulmonary effort is normal. No respiratory distress.      Breath sounds: Normal breath sounds.   Musculoskeletal:         General: Normal range of motion.   Skin:     General: Skin is warm and dry.   Neurological:      General: No focal deficit present.      Mental Status: She is alert and oriented to person, place, and time.   Psychiatric:         Mood and Affect: Mood normal.         Behavior: Behavior normal.         Thought Content: Thought content normal.         Judgment: Judgment normal.           Assessment/Plan     Diagnoses and all orders for this visit:    1. Hypertension, unspecified type (Primary)  Comments:  stable  cont current med  work on diet and exercise  Orders:  -     Comprehensive Metabolic Panel; Future    2. Type 2 diabetes mellitus without complication, without long-term current use of insulin (CMS/[facility])  Comments:  stable  cont  meds  check A1C  Orders:  -     Hemoglobin A1c; Future    3. Hyperlipidemia, unspecified hyperlipidemia type  Comments:  work on diet and exercise  restart atorvastatin  check lipid panel  Orders:  -     Comprehensive Metabolic Panel; Future  -     Lipid Panel; Future  -     atorvastatin (LIPITOR) 20 MG tablet; Take 1 tablet by mouth Daily.  Dispense: 30 tablet; Refill: 2    4. Insomnia, unspecified type  Comments:  stable  sees psych    5. Chronic obstructive pulmonary disease, unspecified COPD type (CMS/HCC)  Comments:  requesting pulmonary referral  discussed smoking cessation  cont spiriva and albuterol  start symbicort  Orders:  -     Ambulatory Referral to Pulmonology  -     albuterol sulfate  (90 Base) MCG/ACT inhaler; Inhale 2 puffs Every 4 (Four) Hours As Needed for Wheezing.  Dispense: 18 g; Refill: 2  -     budesonide-formoterol (Symbicort) 160-4.5 MCG/ACT inhaler; Inhale 2 puffs 2 (Two) Times a Day.  Dispense: 6 g; Refill: 12    6. Bilateral hand pain  Comments:  unknown etiology  possible carpal tunnel  requesting hand surgeon referral  Orders:  -     Ambulatory Referral to Hand Surgery    7. Chronic obstructive pulmonary disease, unspecified COPD type (CMS/HCC)  Comments:  cont spiriva BID  add in albuterol as needed  Orders:  -     Ambulatory Referral to Pulmonology  -     albuterol sulfate  (90 Base) MCG/ACT inhaler; Inhale 2 puffs Every 4 (Four) Hours As Needed for Wheezing.  Dispense: 18 g; Refill: 2  -     budesonide-formoterol (Symbicort) 160-4.5 MCG/ACT inhaler; Inhale 2 puffs 2 (Two) Times a Day.  Dispense: 6 g; Refill: 12    Other orders  -     benzonatate (Tessalon Perles) 100 MG capsule; Take 1 capsule by mouth 3 (Three) Times a Day As Needed for Cough.  Dispense: 30 capsule; Refill: 0

## 2021-08-03 DIAGNOSIS — E11.9 TYPE 2 DIABETES MELLITUS WITHOUT COMPLICATION, WITHOUT LONG-TERM CURRENT USE OF INSULIN (HCC): ICD-10-CM

## 2021-08-03 RX ORDER — GLIPIZIDE 10 MG/1
10 TABLET ORAL
Qty: 180 TABLET | Refills: 1 | Status: SHIPPED | OUTPATIENT
Start: 2021-08-03 | End: 2022-01-11 | Stop reason: SDUPTHER

## 2021-08-03 NOTE — TELEPHONE ENCOUNTER
Caller: Carli Dubon    Relationship: Self    Best call back number:[phone]    Medication needed:   Requested Prescriptions     Pending Prescriptions Disp Refills   • glipizide (GLUCOTROL) 10 MG tablet 180 tablet 1     Sig: Take 1 tablet by mouth 2 (Two) Times a Day Before Meals.       When do you need the refill by: ASAP      Does the patient have less than a 3 day supply:  [x] Yes  [] No    What is the patient's preferred pharmacy: [facility] [address] [place], [place] [id number] [place] [phone]

## 2021-08-10 ENCOUNTER — OFFICE VISIT (OUTPATIENT)
Dept: PODIATRY | Facility: CLINIC | Age: 61
End: 2021-08-10

## 2021-08-10 ENCOUNTER — OFFICE (AMBULATORY)
Dept: URBAN - METROPOLITAN AREA CLINIC 64 | Facility: CLINIC | Age: 61
End: 2021-08-10
Payer: COMMERCIAL

## 2021-08-10 VITALS
HEIGHT: 63 IN | SYSTOLIC BLOOD PRESSURE: 150 MMHG | HEART RATE: 98 BPM | RESPIRATION RATE: 20 BRPM | WEIGHT: 212.6 LBS | BODY MASS INDEX: 37.67 KG/M2 | DIASTOLIC BLOOD PRESSURE: 88 MMHG

## 2021-08-10 VITALS
HEART RATE: 110 BPM | HEIGHT: 66 IN | DIASTOLIC BLOOD PRESSURE: 87 MMHG | WEIGHT: 213 LBS | SYSTOLIC BLOOD PRESSURE: 131 MMHG

## 2021-08-10 DIAGNOSIS — M19.072 ARTHRITIS OF BOTH FEET: ICD-10-CM

## 2021-08-10 DIAGNOSIS — B96.81 HELICOBACTER PYLORI [H. PYLORI] AS THE CAUSE OF DISEASES CLA: ICD-10-CM

## 2021-08-10 DIAGNOSIS — R10.13 EPIGASTRIC PAIN: ICD-10-CM

## 2021-08-10 DIAGNOSIS — M79.671 BILATERAL FOOT PAIN: Primary | ICD-10-CM

## 2021-08-10 DIAGNOSIS — K21.00 GASTRO-ESOPHAGEAL REFLUX DISEASE WITH ESOPHAGITIS, WITHOUT B: ICD-10-CM

## 2021-08-10 DIAGNOSIS — E11.65 TYPE 2 DIABETES MELLITUS WITH HYPERGLYCEMIA, WITHOUT LONG-TERM CURRENT USE OF INSULIN (HCC): ICD-10-CM

## 2021-08-10 DIAGNOSIS — R14.0 ABDOMINAL DISTENSION (GASEOUS): ICD-10-CM

## 2021-08-10 DIAGNOSIS — A04.8 OTHER SPECIFIED BACTERIAL INTESTINAL INFECTIONS: ICD-10-CM

## 2021-08-10 DIAGNOSIS — K92.1 MELENA: ICD-10-CM

## 2021-08-10 DIAGNOSIS — M19.071 ARTHRITIS OF BOTH FEET: ICD-10-CM

## 2021-08-10 DIAGNOSIS — M79.672 BILATERAL FOOT PAIN: Primary | ICD-10-CM

## 2021-08-10 PROBLEM — K21.0 GASTRO-ESOPHAGEAL REFLUX DISEASE WITH ESOPHAGITIS: Status: ACTIVE | Noted: 2020-08-19

## 2021-08-10 PROCEDURE — 99214 OFFICE O/P EST MOD 30 MIN: CPT | Performed by: INTERNAL MEDICINE

## 2021-08-10 PROCEDURE — 99213 OFFICE O/P EST LOW 20 MIN: CPT | Performed by: PODIATRIST

## 2021-08-10 RX ORDER — PANTOPRAZOLE SODIUM 40 MG/1
40 TABLET, DELAYED RELEASE ORAL
Qty: 30 | Refills: 2 | Status: ACTIVE
Start: 2021-08-10

## 2021-08-11 ENCOUNTER — OFFICE (AMBULATORY)
Dept: URBAN - METROPOLITAN AREA PATHOLOGY 4 | Facility: PATHOLOGY | Age: 61
End: 2021-08-11
Payer: COMMERCIAL

## 2021-08-11 ENCOUNTER — ON CAMPUS - OUTPATIENT (AMBULATORY)
Dept: URBAN - METROPOLITAN AREA HOSPITAL 2 | Facility: HOSPITAL | Age: 61
End: 2021-08-11
Payer: COMMERCIAL

## 2021-08-11 VITALS
OXYGEN SATURATION: 95 % | WEIGHT: 214 LBS | OXYGEN SATURATION: 96 % | SYSTOLIC BLOOD PRESSURE: 167 MMHG | OXYGEN SATURATION: 100 % | TEMPERATURE: 97.1 F | SYSTOLIC BLOOD PRESSURE: 145 MMHG | DIASTOLIC BLOOD PRESSURE: 87 MMHG | HEART RATE: 106 BPM | DIASTOLIC BLOOD PRESSURE: 79 MMHG | DIASTOLIC BLOOD PRESSURE: 78 MMHG | HEART RATE: 109 BPM | RESPIRATION RATE: 16 BRPM | HEART RATE: 100 BPM | SYSTOLIC BLOOD PRESSURE: 131 MMHG | DIASTOLIC BLOOD PRESSURE: 84 MMHG | SYSTOLIC BLOOD PRESSURE: 169 MMHG | HEART RATE: 116 BPM | SYSTOLIC BLOOD PRESSURE: 128 MMHG | HEART RATE: 120 BPM | RESPIRATION RATE: 18 BRPM | HEIGHT: 66 IN | DIASTOLIC BLOOD PRESSURE: 70 MMHG | SYSTOLIC BLOOD PRESSURE: 130 MMHG

## 2021-08-11 DIAGNOSIS — K21.00 GASTRO-ESOPHAGEAL REFLUX DISEASE WITH ESOPHAGITIS, WITHOUT B: ICD-10-CM

## 2021-08-11 DIAGNOSIS — R10.13 EPIGASTRIC PAIN: ICD-10-CM

## 2021-08-11 DIAGNOSIS — K29.50 UNSPECIFIED CHRONIC GASTRITIS WITHOUT BLEEDING: ICD-10-CM

## 2021-08-11 DIAGNOSIS — K44.9 DIAPHRAGMATIC HERNIA WITHOUT OBSTRUCTION OR GANGRENE: ICD-10-CM

## 2021-08-11 DIAGNOSIS — K92.1 MELENA: ICD-10-CM

## 2021-08-11 PROBLEM — K20.90 ESOPHAGITIS, UNSPECIFIED WITHOUT BLEEDING: Status: ACTIVE | Noted: 2021-08-11

## 2021-08-11 PROBLEM — K20.80 OTHER ESOPHAGITIS WITHOUT BLEEDING: Status: ACTIVE | Noted: 2021-08-11

## 2021-08-11 PROBLEM — K31.89 OTHER DISEASES OF STOMACH AND DUODENUM: Status: ACTIVE | Noted: 2021-08-11

## 2021-08-11 LAB
GI HISTOLOGY: A. SELECT: (no result)
GI HISTOLOGY: B. UNSPECIFIED: (no result)
GI HISTOLOGY: PDF REPORT: (no result)

## 2021-08-11 PROCEDURE — 88305 TISSUE EXAM BY PATHOLOGIST: CPT | Performed by: INTERNAL MEDICINE

## 2021-08-11 PROCEDURE — 88342 IMHCHEM/IMCYTCHM 1ST ANTB: CPT | Performed by: INTERNAL MEDICINE

## 2021-08-11 PROCEDURE — 43239 EGD BIOPSY SINGLE/MULTIPLE: CPT | Performed by: INTERNAL MEDICINE

## 2021-08-11 RX ADMIN — IPRATROPIUM BROMIDE: 0.5 SOLUTION RESPIRATORY (INHALATION) at 10:08

## 2021-08-11 NOTE — PROGRESS NOTES
08/10/2021  Foot and Ankle Surgery - Established Patient/Follow-up  Provider: Dr. Florentin Syed DPM  Location: [facility]    Subjective:  Carli Dubon is a 60 y.o. female.     Chief Complaint   Patient presents with   • Left Foot - Pain, Follow-up   • Right Foot - Follow-up, Pain   • Diabetes     diabetic foot check pcp leanne barton last seen 07/2021        HPI: Patient returns for continued pain involving both feet.  Patient states that she did not obtain the inserts which were previously discussed.  She has continued to wear flip-flops and flats which does cause increased discomfort at times.  She continues to have a pain to the midfoot regions.  She denies any other issues.  Her most recent A1c was 7.4%.  She has not had any open wounds or infections.    Allergies   Allergen Reactions   • Iodine Swelling   • Adhesive Tape Rash       Current Outpatient Medications on File Prior to Visit   Medication Sig Dispense Refill   • albuterol sulfate  (90 Base) MCG/ACT inhaler Inhale 2 puffs Every 4 (Four) Hours As Needed for Wheezing. 18 g 2   • amitriptyline (ELAVIL) 150 MG tablet Take 1 tablet by mouth Every Night. 30 tablet 5   • atorvastatin (LIPITOR) 20 MG tablet Take 1 tablet by mouth Daily. 30 tablet 2   • benzonatate (Tessalon Perles) 100 MG capsule Take 1 capsule by mouth 3 (Three) Times a Day As Needed for Cough. 30 capsule 0   • budesonide-formoterol (Symbicort) 160-4.5 MCG/ACT inhaler Inhale 2 puffs 2 (Two) Times a Day. 6 g 12   • divalproex (Depakote ER) 500 MG 24 hr tablet Take 2 tablets by mouth Every Night. 60 tablet 3   • empagliflozin (Jardiance) 25 MG tablet tablet Take 1 tablet by mouth Daily. 30 tablet 1   • fluticasone (Flonase) 50 MCG/ACT nasal spray 2 sprays into the nostril(s) as directed by provider Daily. 9.9 mL 2   • glipizide (GLUCOTROL) 10 MG tablet Take 1 tablet by mouth 2 (Two) Times a Day Before Meals. 180 tablet 1   • lisinopril (PRINIVIL,ZESTRIL) 20 MG tablet Take 1  "tablet by mouth Daily. 90 tablet 1   • metFORMIN (GLUCOPHAGE) 1000 MG tablet Take 1 tablet by mouth 2 (Two) Times a Day With Meals. 180 tablet 1   • nystatin-triamcinolone (MYCOLOG) 726704-9.1 UNIT/GM-% ointment      • omeprazole (priLOSEC) 20 MG capsule Take 1 capsule by mouth Daily. 30 capsule 0   • Tiotropium Bromide Monohydrate (Spiriva Respimat) 1.25 MCG/ACT aerosol solution inhaler Inhale 2 puffs Daily. 4 g 0   • zolpidem (AMBIEN) 10 MG tablet Take 1 tablet by mouth At Night As Needed for Sleep. 30 tablet 2     No current facility-administered medications on file prior to visit.       Objective   /88 (BP Location: Right arm, Patient Position: Sitting, Cuff Size: Adult)   Pulse 98   Resp 20   Ht 160 cm (63\")   Wt 96.4 kg (212 lb 9.6 oz)   BMI 37.66 kg/m²     General:   Appearance: appears stated age and healthy    Orientation: AAOx3    Affect: appropriate    Gait: unimpaired       VASCULAR       Right Foot Vascularity   Normal vascular exam    Dorsalis pedis:  2+  Posterior tibial:  2+  Skin Temperature: warm    Edema Grading:  None  CFT:  < 3 seconds  Pedal Hair Growth:  Present  Varicosities: none        Left Foot Vascularity   Normal vascular exam    Dorsalis pedis:  2+  Posterior tibial:  2+  Skin Temperature: warm    Edema Grading:  None  CFT:  < 3 seconds  Pedal Hair Growth:  Present  Varicosities: none        NEUROLOGIC      Right Foot Neurologic   Light touch sensation:  Normal  Hot/Cold sensation: normal    Protective Sensation using York-Amina Monofilament:  10  Achilles reflex:  2+      Left Foot Neurologic   Light touch sensation:  Normal  Hot/cold sensation: normal    Protective Sensation using York-Amina Monofilament:  10  Achilles reflex:  2+      MUSCULOSKELETAL       Right Foot Musculoskeletal   Ecchymosis:  None  Tenderness: arch and dorsal foot    Arch:  Normal      Left Foot Musculoskeletal   Ecchymosis:  None  Tenderness: arch and dorsal " foot    Arch:  Normal      MUSCLE STRENGTH      Right Foot Muscle Strength   Normal strength    Foot dorsiflexion:  5  Foot plantar flexion:  5  Foot inversion:  5  Foot eversion:  5      Left Foot Muscle Strength   Normal strength    Foot dorsiflexion:  5  Foot plantar flexion:  5  Foot inversion:  5  Foot eversion:  5      DERMATOLOGIC      Right Foot Dermatologic   Skin: skin intact        Left Foot Dermatologic   Skin: skin intact        TESTS      Right Foot Tests   Anterior drawer: negative    Varus tilt: negative    Heel raise: pain        Left Foot Tests   Anterior drawer: negative    Varus tilt: negative    Heel raise: pain      Assessment/Plan   Diagnoses and all orders for this visit:    1. Bilateral foot pain (Primary)    2. Arthritis of both feet    3. Type 2 diabetes mellitus with hyperglycemia, without long-term current use of insulin (CMS/[facility])      Patient's physical exam is unchanged and stable.  She continues to have discomfort to the midfoot regions.  I explained that this is due to soft tissue rigidity and moderate degenerative changes involving the midfoot joints.  I do feel that proper support is very important to prevent further issues with her feet.  We did review appropriate shoes and inserts.  Patient states that she understands and agrees.  I have asked that she proceed with range of motion and manual therapy exercises daily.  We did discuss rice therapy and proper use of OTC anti-inflammatories both oral and topical.  I do feel that if she remains supported that she will notice improvement.  I continue to feel that she is at mild risk of pedal complications.  We did review the importance of daily foot checks and overall glycemic control.  Patient is to return in 6 months for routine foot check.  Greater than 20 minutes was spent before, during, and after evaluation for patient care    No orders of the defined types were placed in this encounter.         Note is dictated utilizing voice  recognition software. Unfortunately this leads to occasional typographical errors. I apologize in advance if the situation occurs. If questions occur please do not hesitate to call our office.

## 2021-08-15 ENCOUNTER — HOSPITAL ENCOUNTER (EMERGENCY)
Facility: HOSPITAL | Age: 61
Discharge: LEFT BEFORE DISCHARGE | End: 2021-08-15
Attending: EMERGENCY MEDICINE | Admitting: INTERNAL MEDICINE

## 2021-08-15 ENCOUNTER — APPOINTMENT (OUTPATIENT)
Dept: GENERAL RADIOLOGY | Facility: HOSPITAL | Age: 61
End: 2021-08-15

## 2021-08-15 ENCOUNTER — APPOINTMENT (OUTPATIENT)
Dept: CT IMAGING | Facility: HOSPITAL | Age: 61
End: 2021-08-15

## 2021-08-15 VITALS
SYSTOLIC BLOOD PRESSURE: 152 MMHG | OXYGEN SATURATION: 98 % | TEMPERATURE: 98.5 F | RESPIRATION RATE: 18 BRPM | WEIGHT: 212.3 LBS | BODY MASS INDEX: 35.37 KG/M2 | HEART RATE: 121 BPM | DIASTOLIC BLOOD PRESSURE: 93 MMHG | HEIGHT: 65 IN

## 2021-08-15 DIAGNOSIS — U07.1 COVID-19: Primary | ICD-10-CM

## 2021-08-15 DIAGNOSIS — N39.0 URINARY TRACT INFECTION WITHOUT HEMATURIA, SITE UNSPECIFIED: ICD-10-CM

## 2021-08-15 LAB
ALBUMIN SERPL-MCNC: 4 G/DL (ref 3.5–5.2)
ALBUMIN/GLOB SERPL: 1.2 G/DL
ALP SERPL-CCNC: 84 U/L (ref 39–117)
ALT SERPL W P-5'-P-CCNC: 25 U/L (ref 1–33)
ANION GAP SERPL CALCULATED.3IONS-SCNC: 15 MMOL/L (ref 5–15)
AST SERPL-CCNC: 22 U/L (ref 1–32)
B PARAPERT DNA SPEC QL NAA+PROBE: NOT DETECTED
B PERT DNA SPEC QL NAA+PROBE: NOT DETECTED
BACTERIA UR QL AUTO: ABNORMAL /HPF
BASOPHILS # BLD AUTO: 0 10*3/MM3 (ref 0–0.2)
BASOPHILS NFR BLD AUTO: 0.4 % (ref 0–1.5)
BILIRUB SERPL-MCNC: 0.2 MG/DL (ref 0–1.2)
BILIRUB UR QL STRIP: NEGATIVE
BUN SERPL-MCNC: 8 MG/DL (ref 6–20)
BUN/CREAT SERPL: 12.9 (ref 7–25)
C PNEUM DNA NPH QL NAA+NON-PROBE: NOT DETECTED
CALCIUM SPEC-SCNC: 8.1 MG/DL (ref 8.6–10.5)
CHLORIDE SERPL-SCNC: 96 MMOL/L (ref 98–107)
CLARITY UR: ABNORMAL
CO2 SERPL-SCNC: 23 MMOL/L (ref 22–29)
COLOR UR: YELLOW
CREAT SERPL-MCNC: 0.62 MG/DL (ref 0.57–1)
D-LACTATE SERPL-SCNC: 0.7 MMOL/L (ref 0.5–2)
DEPRECATED RDW RBC AUTO: 48.1 FL (ref 37–54)
EOSINOPHIL # BLD AUTO: 0 10*3/MM3 (ref 0–0.4)
EOSINOPHIL NFR BLD AUTO: 0.1 % (ref 0.3–6.2)
ERYTHROCYTE [DISTWIDTH] IN BLOOD BY AUTOMATED COUNT: 14.6 % (ref 12.3–15.4)
FLUAV SUBTYP SPEC NAA+PROBE: NOT DETECTED
FLUBV RNA ISLT QL NAA+PROBE: NOT DETECTED
GFR SERPL CREATININE-BSD FRML MDRD: 120 ML/MIN/1.73
GFR SERPL CREATININE-BSD FRML MDRD: 99 ML/MIN/1.73
GLOBULIN UR ELPH-MCNC: 3.4 GM/DL
GLUCOSE SERPL-MCNC: 111 MG/DL (ref 65–99)
GLUCOSE UR STRIP-MCNC: ABNORMAL MG/DL
HADV DNA SPEC NAA+PROBE: NOT DETECTED
HCOV 229E RNA SPEC QL NAA+PROBE: NOT DETECTED
HCOV HKU1 RNA SPEC QL NAA+PROBE: NOT DETECTED
HCOV NL63 RNA SPEC QL NAA+PROBE: NOT DETECTED
HCOV OC43 RNA SPEC QL NAA+PROBE: NOT DETECTED
HCT VFR BLD AUTO: 45.4 % (ref 34–46.6)
HGB BLD-MCNC: 15.2 G/DL (ref 12–15.9)
HGB UR QL STRIP.AUTO: ABNORMAL
HMPV RNA NPH QL NAA+NON-PROBE: NOT DETECTED
HPIV1 RNA SPEC QL NAA+PROBE: NOT DETECTED
HPIV2 RNA SPEC QL NAA+PROBE: NOT DETECTED
HPIV3 RNA NPH QL NAA+PROBE: NOT DETECTED
HPIV4 P GENE NPH QL NAA+PROBE: NOT DETECTED
HYALINE CASTS UR QL AUTO: ABNORMAL /LPF
KETONES UR QL STRIP: ABNORMAL
LEUKOCYTE ESTERASE UR QL STRIP.AUTO: ABNORMAL
LYMPHOCYTES # BLD AUTO: 1.3 10*3/MM3 (ref 0.7–3.1)
LYMPHOCYTES NFR BLD AUTO: 11.8 % (ref 19.6–45.3)
M PNEUMO IGG SER IA-ACNC: NOT DETECTED
MAGNESIUM SERPL-MCNC: 1.7 MG/DL (ref 1.6–2.6)
MCH RBC QN AUTO: 31.3 PG (ref 26.6–33)
MCHC RBC AUTO-ENTMCNC: 33.5 G/DL (ref 31.5–35.7)
MCV RBC AUTO: 93.5 FL (ref 79–97)
MONOCYTES # BLD AUTO: 0.6 10*3/MM3 (ref 0.1–0.9)
MONOCYTES NFR BLD AUTO: 5.4 % (ref 5–12)
NEUTROPHILS NFR BLD AUTO: 82.3 % (ref 42.7–76)
NEUTROPHILS NFR BLD AUTO: 9.3 10*3/MM3 (ref 1.7–7)
NITRITE UR QL STRIP: NEGATIVE
NRBC BLD AUTO-RTO: 0 /100 WBC (ref 0–0.2)
PH UR STRIP.AUTO: 5.5 [PH] (ref 5–8)
PLATELET # BLD AUTO: 213 10*3/MM3 (ref 140–450)
PMV BLD AUTO: 7.9 FL (ref 6–12)
POTASSIUM SERPL-SCNC: 4 MMOL/L (ref 3.5–5.2)
PROT SERPL-MCNC: 7.4 G/DL (ref 6–8.5)
PROT UR QL STRIP: NEGATIVE
RBC # BLD AUTO: 4.85 10*6/MM3 (ref 3.77–5.28)
RBC # UR: ABNORMAL /HPF
REF LAB TEST METHOD: ABNORMAL
RHINOVIRUS RNA SPEC NAA+PROBE: NOT DETECTED
RSV RNA NPH QL NAA+NON-PROBE: NOT DETECTED
SARS-COV-2 RNA NPH QL NAA+NON-PROBE: DETECTED
SODIUM SERPL-SCNC: 134 MMOL/L (ref 136–145)
SP GR UR STRIP: 1.03 (ref 1–1.03)
SQUAMOUS #/AREA URNS HPF: ABNORMAL /HPF
TROPONIN T SERPL-MCNC: <0.01 NG/ML (ref 0–0.03)
UROBILINOGEN UR QL STRIP: ABNORMAL
WBC # BLD AUTO: 11.3 10*3/MM3 (ref 3.4–10.8)
WBC UR QL AUTO: ABNORMAL /HPF

## 2021-08-15 PROCEDURE — 81001 URINALYSIS AUTO W/SCOPE: CPT | Performed by: EMERGENCY MEDICINE

## 2021-08-15 PROCEDURE — 71045 X-RAY EXAM CHEST 1 VIEW: CPT

## 2021-08-15 PROCEDURE — 80053 COMPREHEN METABOLIC PANEL: CPT | Performed by: EMERGENCY MEDICINE

## 2021-08-15 PROCEDURE — 99284 EMERGENCY DEPT VISIT MOD MDM: CPT

## 2021-08-15 PROCEDURE — 93005 ELECTROCARDIOGRAM TRACING: CPT | Performed by: EMERGENCY MEDICINE

## 2021-08-15 PROCEDURE — 70450 CT HEAD/BRAIN W/O DYE: CPT

## 2021-08-15 PROCEDURE — 25010000002 ONDANSETRON PER 1 MG: Performed by: EMERGENCY MEDICINE

## 2021-08-15 PROCEDURE — 84484 ASSAY OF TROPONIN QUANT: CPT | Performed by: EMERGENCY MEDICINE

## 2021-08-15 PROCEDURE — 96374 THER/PROPH/DIAG INJ IV PUSH: CPT

## 2021-08-15 PROCEDURE — 25010000002 CEFTRIAXONE PER 250 MG: Performed by: EMERGENCY MEDICINE

## 2021-08-15 PROCEDURE — G0378 HOSPITAL OBSERVATION PER HR: HCPCS

## 2021-08-15 PROCEDURE — 85025 COMPLETE CBC W/AUTO DIFF WBC: CPT | Performed by: EMERGENCY MEDICINE

## 2021-08-15 PROCEDURE — 96375 TX/PRO/DX INJ NEW DRUG ADDON: CPT

## 2021-08-15 PROCEDURE — 25010000002 KETOROLAC TROMETHAMINE PER 15 MG: Performed by: EMERGENCY MEDICINE

## 2021-08-15 PROCEDURE — 83605 ASSAY OF LACTIC ACID: CPT

## 2021-08-15 PROCEDURE — 83735 ASSAY OF MAGNESIUM: CPT | Performed by: EMERGENCY MEDICINE

## 2021-08-15 PROCEDURE — 0202U NFCT DS 22 TRGT SARS-COV-2: CPT | Performed by: EMERGENCY MEDICINE

## 2021-08-15 RX ORDER — CEFDINIR 300 MG/1
300 CAPSULE ORAL 2 TIMES DAILY
Qty: 14 CAPSULE | Refills: 0 | Status: SHIPPED | OUTPATIENT
Start: 2021-08-15 | End: 2022-01-11

## 2021-08-15 RX ORDER — BENZONATATE 100 MG/1
100 CAPSULE ORAL 3 TIMES DAILY PRN
Status: DISCONTINUED | OUTPATIENT
Start: 2021-08-15 | End: 2021-08-15

## 2021-08-15 RX ORDER — KETOROLAC TROMETHAMINE 15 MG/ML
15 INJECTION, SOLUTION INTRAMUSCULAR; INTRAVENOUS ONCE
Status: COMPLETED | OUTPATIENT
Start: 2021-08-15 | End: 2021-08-15

## 2021-08-15 RX ORDER — ACETAMINOPHEN 500 MG
1000 TABLET ORAL ONCE
Status: COMPLETED | OUTPATIENT
Start: 2021-08-15 | End: 2021-08-15

## 2021-08-15 RX ORDER — SODIUM CHLORIDE 0.9 % (FLUSH) 0.9 %
10 SYRINGE (ML) INJECTION AS NEEDED
Status: DISCONTINUED | OUTPATIENT
Start: 2021-08-15 | End: 2021-08-16 | Stop reason: HOSPADM

## 2021-08-15 RX ORDER — ONDANSETRON 2 MG/ML
4 INJECTION INTRAMUSCULAR; INTRAVENOUS ONCE
Status: COMPLETED | OUTPATIENT
Start: 2021-08-15 | End: 2021-08-15

## 2021-08-15 RX ORDER — ONDANSETRON 4 MG/1
4 TABLET, ORALLY DISINTEGRATING ORAL EVERY 6 HOURS PRN
Qty: 20 TABLET | Refills: 0 | Status: SHIPPED | OUTPATIENT
Start: 2021-08-15

## 2021-08-15 RX ORDER — ALBUTEROL SULFATE 90 UG/1
2 AEROSOL, METERED RESPIRATORY (INHALATION) EVERY 6 HOURS PRN
Status: DISCONTINUED | OUTPATIENT
Start: 2021-08-15 | End: 2021-08-15

## 2021-08-15 RX ORDER — IBUPROFEN 400 MG/1
800 TABLET ORAL ONCE
Status: DISCONTINUED | OUTPATIENT
Start: 2021-08-15 | End: 2021-08-15

## 2021-08-15 RX ORDER — DEXAMETHASONE SODIUM PHOSPHATE 10 MG/ML
6 INJECTION, SOLUTION INTRAMUSCULAR; INTRAVENOUS EVERY 24 HOURS
Status: DISCONTINUED | OUTPATIENT
Start: 2021-08-15 | End: 2021-08-15

## 2021-08-15 RX ORDER — DEXTROMETHORPHAN HYDROBROMIDE AND PROMETHAZINE HYDROCHLORIDE 15; 6.25 MG/5ML; MG/5ML
2.5 SYRUP ORAL 4 TIMES DAILY PRN
Qty: 118 ML | Refills: 0 | Status: SHIPPED | OUTPATIENT
Start: 2021-08-15

## 2021-08-15 RX ORDER — ZINC SULFATE 50(220)MG
220 CAPSULE ORAL DAILY
Status: DISCONTINUED | OUTPATIENT
Start: 2021-08-16 | End: 2021-08-15

## 2021-08-15 RX ADMIN — ONDANSETRON 4 MG: 2 INJECTION INTRAMUSCULAR; INTRAVENOUS at 17:24

## 2021-08-15 RX ADMIN — SODIUM CHLORIDE 1000 ML: 9 INJECTION, SOLUTION INTRAVENOUS at 17:24

## 2021-08-15 RX ADMIN — SODIUM CHLORIDE 1000 ML: 9 INJECTION, SOLUTION INTRAVENOUS at 20:49

## 2021-08-15 RX ADMIN — CEFTRIAXONE SODIUM 1 G: 10 INJECTION, POWDER, FOR SOLUTION INTRAVENOUS at 21:16

## 2021-08-15 RX ADMIN — ACETAMINOPHEN 1000 MG: 500 TABLET, FILM COATED ORAL at 17:12

## 2021-08-15 RX ADMIN — KETOROLAC TROMETHAMINE 15 MG: 15 INJECTION, SOLUTION INTRAMUSCULAR; INTRAVENOUS at 21:16

## 2021-08-15 NOTE — ED PROVIDER NOTES
Subjective   Chief complaint: Patient is a pleasant 60-year-old female.  She states since yesterday she has had frontal headache that radiates diffusely.  She does not get headaches like this often but she has had them before.  She is concerned about infection.  She has had fever.  She has had some nausea.  And she is had dry mouth.  She has diabetes.  She has not been checking her sugar.  She denies chest pain shortness of breath.  She denies cough.  She has not been Covid vaccinated.  She denies abdominal pain vomiting or diarrhea.  She has had some congestion.  With her headache she is has a sensation of ringing in her ears.    Context: As above    Duration: 1 day    Timing: Persistent    Severity: Pain is moderate    Associated Symptoms: Negative except as noted above.  Appropriate PPE was used.        PCP:            Review of Systems   Constitutional: Positive for fever.   HENT: Positive for congestion and tinnitus.         Dry mouth   Respiratory: Negative for cough and shortness of breath.    Gastrointestinal: Positive for nausea.   All other systems reviewed and are negative.      Past Medical History:   Diagnosis Date   • Bipolar 1 disorder (CMS/[facility])    • Diabetes mellitus (CMS/[facility])    • Hypertension    • PTSD (post-traumatic stress disorder)        Allergies   Allergen Reactions   • Iodine Swelling   • Adhesive Tape Rash       History reviewed. No pertinent surgical history.    Family History   Problem Relation Age of Onset   • Hypertension Mother    • Diabetes Mother        Social History     Socioeconomic History   • Marital status: Single     Spouse name: Not on file   • Number of children: Not on file   • Years of education: Not on file   • Highest education level: Not on file   Tobacco Use   • Smoking status: Current Every Day Smoker     Packs/day: 1.00     Years: 30.00     Pack years: 30.00     Types: Cigarettes   • Smokeless tobacco: Never Used   Vaping Use   • Vaping Use: Never used   Substance  and Sexual Activity   • Alcohol use: Not Currently   • Drug use: Never   • Sexual activity: Not Currently           Objective   Physical Exam  Vitals and nursing note reviewed.   Constitutional:       Appearance: Normal appearance.   HENT:      Head: Normocephalic and atraumatic.      Right Ear: Tympanic membrane normal.      Left Ear: Tympanic membrane normal.   Eyes:      Extraocular Movements: Extraocular movements intact.      Pupils: Pupils are equal, round, and reactive to light.   Cardiovascular:      Rate and Rhythm: Normal rate and regular rhythm.      Pulses: Normal pulses.      Heart sounds: Normal heart sounds.   Pulmonary:      Effort: Pulmonary effort is normal.      Breath sounds: Normal breath sounds.   Abdominal:      Tenderness: There is no abdominal tenderness.   Musculoskeletal:         General: Normal range of motion.      Cervical back: Neck supple.   Skin:     General: Skin is warm and dry.      Capillary Refill: Capillary refill takes less than 2 seconds.   Neurological:      General: No focal deficit present.      Mental Status: She is alert and oriented to person, place, and time.      Cranial Nerves: No cranial nerve deficit.      Sensory: No sensory deficit.      Motor: No weakness.      Coordination: Coordination normal.   Psychiatric:         Mood and Affect: Mood normal.         Behavior: Behavior normal.         Thought Content: Thought content normal.         Judgment: Judgment normal.         Procedures           ED Course            Results for orders placed or performed during the hospital encounter of 08/15/21   Respiratory Panel PCR w/COVID-19(SARS-CoV-2) [facility]UY[facility] In-House, NP Swab in UTM/VTM, 3-4 HR TAT - Swab, Nasopharynx    Specimen: Nasopharynx; Swab   Result Value Ref Range    ADENOVIRUS, PCR Not Detected Not Detected    Coronavirus 229E Not Detected Not Detected    Coronavirus HKU1 Not Detected Not Detected    Coronavirus NL63 Not Detected Not Detected     Coronavirus OC43 Not Detected Not Detected    COVID19 Detected (C) Not Detected - Ref. Range    Human Metapneumovirus Not Detected Not Detected    Human Rhinovirus/Enterovirus Not Detected Not Detected    Influenza A PCR Not Detected Not Detected    Influenza B PCR Not Detected Not Detected    Parainfluenza Virus 1 Not Detected Not Detected    Parainfluenza Virus 2 Not Detected Not Detected    Parainfluenza Virus 3 Not Detected Not Detected    Parainfluenza Virus 4 Not Detected Not Detected    RSV, PCR Not Detected Not Detected    Bordetella pertussis pcr Not Detected Not Detected    Bordetella parapertussis PCR Not Detected Not Detected    Chlamydophila pneumoniae PCR Not Detected Not Detected    Mycoplasma pneumo by PCR Not Detected Not Detected   Comprehensive Metabolic Panel    Specimen: Blood   Result Value Ref Range    Glucose 111 (H) 65 - 99 mg/dL    BUN 8 6 - 20 mg/dL    Creatinine 0.62 0.57 - 1.00 mg/dL    Sodium 134 (L) 136 - 145 mmol/L    Potassium 4.0 3.5 - 5.2 mmol/L    Chloride 96 (L) 98 - 107 mmol/L    CO2 23.0 22.0 - 29.0 mmol/L    Calcium 8.1 (L) 8.6 - 10.5 mg/dL    Total Protein 7.4 6.0 - 8.5 g/dL    Albumin 4.00 3.50 - 5.20 g/dL    ALT (SGPT) 25 1 - 33 U/L    AST (SGOT) 22 1 - 32 U/L    Alkaline Phosphatase 84 39 - 117 U/L    Total Bilirubin 0.2 0.0 - 1.2 mg/dL    eGFR Non African Amer 99 >60 mL/min/1.73    eGFR  African Amer 120 >60 mL/min/1.73    Globulin 3.4 gm/dL    A/G Ratio 1.2 g/dL    BUN/Creatinine Ratio 12.9 7.0 - 25.0    Anion Gap 15.0 5.0 - 15.0 mmol/L   Urinalysis With Microscopic If Indicated (No Culture) - Urine, Clean Catch    Specimen: Urine, Clean Catch   Result Value Ref Range    Color, UA Yellow Yellow, Straw    Appearance, UA Cloudy (A) Clear    pH, UA 5.5 5.0 - 8.0    Specific Gravity, UA 1.026 1.005 - 1.030    Glucose, UA >=1000 mg/dL (3+) (A) Negative    Ketones, UA 40 mg/dL (2+) (A) Negative    Bilirubin, UA Negative Negative    Blood, UA Trace (A) Negative    Protein,  UA Negative Negative    Leuk Esterase, UA Small (1+) (A) Negative    Nitrite, UA Negative Negative    Urobilinogen, UA 0.2 E.U./dL 0.2 - 1.0 E.U./dL   CBC Auto Differential    Specimen: Blood   Result Value Ref Range    WBC 11.30 (H) 3.40 - 10.80 10*3/mm3    RBC 4.85 3.77 - 5.28 10*6/mm3    Hemoglobin 15.2 12.0 - 15.9 g/dL    Hematocrit 45.4 34.0 - 46.6 %    MCV 93.5 79.0 - 97.0 fL    MCH 31.3 26.6 - 33.0 pg    MCHC 33.5 31.5 - 35.7 g/dL    RDW 14.6 12.3 - 15.4 %    RDW-SD 48.1 37.0 - 54.0 fl    MPV 7.9 6.0 - 12.0 fL    Platelets 213 140 - 450 10*3/mm3    Neutrophil % 82.3 (H) 42.7 - 76.0 %    Lymphocyte % 11.8 (L) 19.6 - 45.3 %    Monocyte % 5.4 5.0 - 12.0 %    Eosinophil % 0.1 (L) 0.3 - 6.2 %    Basophil % 0.4 0.0 - 1.5 %    Neutrophils, Absolute 9.30 (H) 1.70 - 7.00 10*3/mm3    Lymphocytes, Absolute 1.30 0.70 - 3.10 10*3/mm3    Monocytes, Absolute 0.60 0.10 - 0.90 10*3/mm3    Eosinophils, Absolute 0.00 0.00 - 0.40 10*3/mm3    Basophils, Absolute 0.00 0.00 - 0.20 10*3/mm3    nRBC 0.0 0.0 - 0.2 /100 WBC   Troponin    Specimen: Blood   Result Value Ref Range    Troponin T <0.010 0.000 - 0.030 ng/mL   Magnesium    Specimen: Blood   Result Value Ref Range    Magnesium 1.7 1.6 - 2.6 mg/dL   Urinalysis, Microscopic Only - Urine, Clean Catch    Specimen: Urine, Clean Catch   Result Value Ref Range    RBC, UA 13-20 (A) None Seen /HPF    WBC, UA 21-30 (A) None Seen /HPF    Bacteria, UA Trace (A) None Seen /HPF    Squamous Epithelial Cells, UA 0-2 None Seen, 0-2 /HPF    Hyaline Casts, UA 0-2 None Seen /LPF    Methodology Automated Microscopy    POC Lactate    Specimen: Blood   Result Value Ref Range    Lactate 0.7 0.5 - 2.0 mmol/L   ECG 12 Lead   Result Value Ref Range    QT Interval 345 ms            CT Head Without Contrast    Result Date: 8/15/2021  1. No acute abnormality is identified in the brain. There is mild cerebral atrophy. 2. Chronic ethmoid sinusitis.  Electronically Signed By-Aylin Looney MD On:8/15/2021 6:48  PM This report was finalized on [id number] by  Aylin Looney MD.    XR Chest 1 View    Result Date: 8/15/2021  1.  No evidence of active disease.   Electronically Signed By-Aylin Looney MD On:8/15/2021 5:49 PM This report was finalized on [id number] by  Aylin Looney MD.    EKG shows sinus tach rate of 110 poor wave progression.                           MDM  Number of Diagnoses or Management Options  COVID-19  Urinary tract infection without hematuria, site unspecified  Diagnosis management comments: Patient remains persistently tachycardic.  Even when she was afebrile she was heart rate around 120.  She has UTI.  She is diabetic with COVID-19.  Put her on antibiotics and gave IV fluids.  No improvement in the heart rate.  Will admit for observation overnight.  Patient is okay with this verbalized understanding.  Lactic initial is normal.    Shortly after admitting the patient, she did decline admission to the hospital.  I discussed my reasoning again for admitting her to the hospital.  She verbalizes understanding.  She declines admission at this point in time.  She understands that she could die.  She understands and worried about sepsis and infection overwhelming her body.  However she does declined admission.  She was offered the monoclonal antibody therapy.  The risks and benefits were discussed.  She declines the monoclonal antibody therapy.  It was discussed that she could return under 10 days and still receive it.  She understands she can return at any point to be reevaluated but at this point time she will leave AGAINST MEDICAL ADVICE.  I will still send medications in for her urinary tract infection and symptomatic care for Covid.  Patient is fully oriented.  She can make her decisions.           Amount and/or Complexity of Data Reviewed  Clinical lab tests: reviewed  Tests in the radiology section of CPT®: reviewed  Tests in the medicine section of CPT®: reviewed  Discussion of test results with  the performing providers: yes  Discuss the patient with other providers: yes  Independent visualization of images, tracings, or specimens: yes    Risk of Complications, Morbidity, and/or Mortality  Presenting problems: high    Patient Progress  Patient progress: stable      Final diagnoses:   None     covid 19  uti  Persistent tachycardia  ED Disposition  ED Disposition     None          No follow-up provider specified.       Medication List      No changes were made to your prescriptions during this visit.          Kolby Perez,   08/15/21 2139       Kolby Perez,   08/15/21 2155       Kolby Perez,   08/15/21 2159

## 2021-08-16 NOTE — ED NOTES
Pt states she is scared and does not want to be admitted. Dr. Perez did talk to her and explained why he felt it was best to be admitted. Pt still refused admittance. Pt understands she is going AMA and the risks associated with that.      Denise Richards RN  08/15/21 1070

## 2021-08-16 NOTE — DISCHARGE INSTRUCTIONS
You have declined the monoclonal antibody therapy.  You can return and have this therapy less than 10 days of your symptom onset of COVID-19.  Please return to the emergency department at any point for worsening symptoms signs or concerns.    You have elected to leave AGAINST MEDICAL ADVICE please return to the emergency department immediately for any worsening symptoms signs or concerns.  You are always welcome to come for reevaluation if you change your mind

## 2021-08-17 LAB — QT INTERVAL: 345 MS

## 2021-08-19 ENCOUNTER — TELEPHONE (OUTPATIENT)
Dept: FAMILY MEDICINE CLINIC | Facility: CLINIC | Age: 61
End: 2021-08-19

## 2021-08-19 NOTE — TELEPHONE ENCOUNTER
PATIENT NEEDS THE MEDICATION FOR THE NEBULIZER MACHINE.      CALL BACK #: [phone]     PHARMACY: [place] [address] [address] [facility] [phone] [facility] [phone]

## 2021-08-19 NOTE — TELEPHONE ENCOUNTER
I dont see where she has had nebulizing medication. What medication is she talking about. She is not to be using a nebulizer around other people with her covid. She is ok to do it when alone

## 2021-08-20 ENCOUNTER — TELEPHONE (OUTPATIENT)
Dept: FAMILY MEDICINE CLINIC | Facility: CLINIC | Age: 61
End: 2021-08-20

## 2021-08-20 RX ORDER — ALBUTEROL SULFATE 0.63 MG/3ML
1 SOLUTION RESPIRATORY (INHALATION) EVERY 6 HOURS PRN
Qty: 30 EACH | Refills: 2 | Status: SHIPPED | OUTPATIENT
Start: 2021-08-20

## 2021-08-20 NOTE — TELEPHONE ENCOUNTER
"PATIENT CALLED EARLIER REGARDING NEBULIZER SOLUTION FOR HER NEBULIZER SHE WAS PRESCRIBED BY A PROVIDER 5 YEARS AGO WHEN SHE LIVED IN [place]. PATIENT HASNT NEEDED THE SOLUTION UNTIL RECENTLY AND NOW IS OUT AND REQUESTING A REFILL.     PATIENT WAS TOLD THAT BECAUSE ANA ROSA WAS NOT THE PRESCRIBING PROVIDER, THAT SHE MAY NEED TO MAKE AN APPT TO BE ABLE TO GET THIS \"NEW\" PRESCRIPTION. PATIENT HAS COVID AND UNABLE TO SCHEDULE A IN PERSON VISIT.     PLEASE ADVISE.     Caller: Carli Dubon    Relationship: Self    Best call back number: [phone]       If a prescription is needed, what is your preferred pharmacy and phone number: [facility] [address] [facility] - [phone] [facility] [phone] FX       "

## 2021-08-20 NOTE — TELEPHONE ENCOUNTER
I will send in albuterol nebulizer, but she really should be trying to use the inhaler instead of the nebulizer bc the nebulizer can increase the spread of the virus.

## 2021-09-12 DIAGNOSIS — E11.9 TYPE 2 DIABETES MELLITUS WITHOUT COMPLICATION, WITHOUT LONG-TERM CURRENT USE OF INSULIN (HCC): ICD-10-CM

## 2021-09-12 DIAGNOSIS — I10 HYPERTENSION, UNSPECIFIED TYPE: ICD-10-CM

## 2021-09-13 RX ORDER — EMPAGLIFLOZIN 25 MG/1
TABLET, FILM COATED ORAL
Qty: 30 TABLET | Refills: 0 | Status: SHIPPED | OUTPATIENT
Start: 2021-09-13 | End: 2021-09-17 | Stop reason: SDUPTHER

## 2021-09-13 RX ORDER — LISINOPRIL 20 MG/1
TABLET ORAL
Qty: 90 TABLET | Refills: 0 | Status: SHIPPED | OUTPATIENT
Start: 2021-09-13 | End: 2021-09-17 | Stop reason: SDUPTHER

## 2021-09-14 ENCOUNTER — OFFICE VISIT (OUTPATIENT)
Dept: PSYCHIATRY | Facility: CLINIC | Age: 61
End: 2021-09-14

## 2021-09-14 DIAGNOSIS — F43.10 PTSD (POST-TRAUMATIC STRESS DISORDER): Primary | Chronic | ICD-10-CM

## 2021-09-14 DIAGNOSIS — F51.05 INSOMNIA DUE TO MENTAL CONDITION: ICD-10-CM

## 2021-09-14 DIAGNOSIS — F31.32 BIPOLAR AFFECTIVE DISORDER, CURRENTLY DEPRESSED, MODERATE (HCC): Chronic | ICD-10-CM

## 2021-09-14 PROCEDURE — 99214 OFFICE O/P EST MOD 30 MIN: CPT | Performed by: PSYCHIATRY & NEUROLOGY

## 2021-09-14 RX ORDER — ZOLPIDEM TARTRATE 10 MG/1
10 TABLET ORAL NIGHTLY PRN
Qty: 30 TABLET | Refills: 2 | Status: SHIPPED | OUTPATIENT
Start: 2021-09-14 | End: 2022-01-13 | Stop reason: SDUPTHER
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
## 2021-09-17 DIAGNOSIS — I10 HYPERTENSION, UNSPECIFIED TYPE: ICD-10-CM

## 2021-09-17 DIAGNOSIS — E11.9 TYPE 2 DIABETES MELLITUS WITHOUT COMPLICATION, WITHOUT LONG-TERM CURRENT USE OF INSULIN (HCC): ICD-10-CM

## 2021-09-17 RX ORDER — LISINOPRIL 20 MG/1
20 TABLET ORAL DAILY
Qty: 90 TABLET | Refills: 0 | Status: SHIPPED | OUTPATIENT
Start: 2021-09-17 | End: 2022-01-11

## 2021-09-17 NOTE — TELEPHONE ENCOUNTER
Caller: Carli Dubon    Relationship: Self    Best call back number: [phone]    Medication needed:   Requested Prescriptions     Pending Prescriptions Disp Refills   • empagliflozin (Jardiance) 25 MG tablet tablet 30 tablet 0     Sig: Take 1 tablet by mouth Daily.   • lisinopril (PRINIVIL,ZESTRIL) 20 MG tablet 90 tablet 0     Sig: Take 1 tablet by mouth Daily.       When do you need the refill by: 09/17/2021    What additional details did the patient provide when requesting the medication: THE PATIENT IS CURRENTLY OUT OF THE ABOVE MEDICATIONS.    Does the patient have less than a 3 day supply:  [x] Yes  [] No    What is the patient's preferred pharmacy: [facility] [address] [place] [address] [phone] [facility] [phone]

## 2021-09-29 DIAGNOSIS — F51.05 INSOMNIA DUE TO MENTAL CONDITION: ICD-10-CM

## 2021-09-29 RX ORDER — ZOLPIDEM TARTRATE 10 MG/1
10 TABLET ORAL NIGHTLY PRN
Qty: 30 TABLET | Refills: 0 | OUTPATIENT
Start: 2021-09-29

## 2021-10-04 ENCOUNTER — TELEPHONE (OUTPATIENT)
Dept: FAMILY MEDICINE CLINIC | Facility: CLINIC | Age: 61
End: 2021-10-04

## 2021-10-04 ENCOUNTER — TELEPHONE (OUTPATIENT)
Dept: PSYCHIATRY | Facility: CLINIC | Age: 61
End: 2021-10-04

## 2021-10-04 DIAGNOSIS — F43.10 PTSD (POST-TRAUMATIC STRESS DISORDER): Primary | ICD-10-CM

## 2021-10-04 NOTE — TELEPHONE ENCOUNTER
I recommend she make an appt. I also recommend drinking plenty of water and monitoring BP, HR, and BS.

## 2021-10-04 NOTE — TELEPHONE ENCOUNTER
Caller: Carli Dubon    Relationship: Self    Best call back number: [phone]    What medications are you currently taking:   Current Outpatient Medications on File Prior to Visit   Medication Sig Dispense Refill   • albuterol (ACCUNEB) 0.63 MG/3ML nebulizer solution Take 3 mL by nebulization Every 6 (Six) Hours As Needed for Wheezing. 30 each 2   • albuterol sulfate  (90 Base) MCG/ACT inhaler Inhale 2 puffs Every 4 (Four) Hours As Needed for Wheezing. 18 g 2   • amitriptyline (ELAVIL) 150 MG tablet Take 1 tablet by mouth Every Night. 30 tablet 5   • atorvastatin (LIPITOR) 20 MG tablet Take 1 tablet by mouth Daily. 30 tablet 2   • benzonatate (Tessalon Perles) 100 MG capsule Take 1 capsule by mouth 3 (Three) Times a Day As Needed for Cough. 30 capsule 0   • budesonide-formoterol (Symbicort) 160-4.5 MCG/ACT inhaler Inhale 2 puffs 2 (Two) Times a Day. 6 g 12   • cefdinir (OMNICEF) 300 MG capsule Take 1 capsule by mouth 2 (Two) Times a Day. 14 capsule 0   • divalproex (Depakote ER) 500 MG 24 hr tablet Take 2 tablets by mouth Every Night. 60 tablet 3   • empagliflozin (Jardiance) 25 MG tablet tablet Take 1 tablet by mouth Daily. 90 tablet 0   • fluticasone (Flonase) 50 MCG/ACT nasal spray 2 sprays into the nostril(s) as directed by provider Daily. 9.9 mL 2   • glipizide (GLUCOTROL) 10 MG tablet Take 1 tablet by mouth 2 (Two) Times a Day Before Meals. 180 tablet 1   • lisinopril (PRINIVIL,ZESTRIL) 20 MG tablet Take 1 tablet by mouth Daily. 90 tablet 0   • metFORMIN (GLUCOPHAGE) 1000 MG tablet Take 1 tablet by mouth 2 (Two) Times a Day With Meals. 180 tablet 1   • nystatin-triamcinolone (MYCOLOG) 410892-6.1 UNIT/GM-% ointment      • omeprazole (priLOSEC) 20 MG capsule Take 1 capsule by mouth Daily. 30 capsule 0   • ondansetron ODT (ZOFRAN-ODT) 4 MG disintegrating tablet Place 1 tablet on the tongue Every 6 (Six) Hours As Needed for Nausea. 20 tablet 0   • promethazine-dextromethorphan (PROMETHAZINE-DM)  6.25-15 MG/5ML syrup Take 2.5 mL by mouth 4 (Four) Times a Day As Needed for Cough. 118 mL 0   • Tiotropium Bromide Monohydrate (Spiriva Respimat) 1.25 MCG/ACT aerosol solution inhaler Inhale 2 puffs Daily. 4 g 0   • zolpidem (AMBIEN) 10 MG tablet Take 1 tablet by mouth At Night As Needed for Sleep. 30 tablet 2     No current facility-administered medications on file prior to visit.          When did you start taking these medications: A COUPLE OF YEARS     Which medication are you concerned about: LISINOPRIL     Who prescribed you this medication: DILLON MARTINEZ     What are your concerns: PT HAS BEGUN TO HAVE DIZZY SPELLS, HEADACHES, FATIGUE AND COUGH. SHE STATED THAT SHE THINKS THAT THE MEDICATION LISINOPRIL HAS SOMETHING TO DO WITH THE SYMPTOMS     How long have you had these concerns: 2 MONTHS

## 2021-10-04 NOTE — TELEPHONE ENCOUNTER
Patient's mom and dad passed and she has been having increased anxiety and cannot sleep either.  She wants to know if you can give her something to help with anxiety.  Please advise.

## 2021-10-06 RX ORDER — CLONAZEPAM 0.5 MG/1
0.5 TABLET ORAL 2 TIMES DAILY
Qty: 60 TABLET | Refills: 0 | Status: SHIPPED | OUTPATIENT
Start: 2021-10-06 | End: 2022-01-13 | Stop reason: SDUPTHER

## 2021-10-06 NOTE — TELEPHONE ENCOUNTER
I sent an ONE rx for Klonopin 0.5mg twice daily AS NEEDED for anxiety but instruct her not to take it as the same time as the Ambien in the evening.

## 2021-11-03 RX ORDER — DIVALPROEX SODIUM 500 MG/1
TABLET, EXTENDED RELEASE ORAL
Qty: 180 TABLET | Refills: 1 | Status: SHIPPED | OUTPATIENT
Start: 2021-11-03 | End: 2022-02-20

## 2021-12-02 NOTE — PLAN OF CARE
Admitted to unit acute pancreatitis, ruq pain. Fentanyl patch present on left upper arm with tegaderm covering. Room air. Ambulates ad amanda in room safely. BRP. No skin issues noted. Will continue to monitor.  
Fentanyl patch for pain. Took x1 dose dilaudid for increased pain after eating dinner. Voiced that she had very little pain immediately after eating then as time went on pain became worse. Will continue to monitor.  
Pain persist.Patient states she is feeling better but it still hurts. Will advance to full liquid diet this morning. Patient had a one time dose of dilaudid overnight.   
Ultrasound of gallbladder today.  
independent

## 2021-12-17 DIAGNOSIS — G47.00 INSOMNIA, UNSPECIFIED TYPE: ICD-10-CM

## 2021-12-18 RX ORDER — AMITRIPTYLINE HYDROCHLORIDE 150 MG/1
TABLET, FILM COATED ORAL
Qty: 90 TABLET | Refills: 1 | Status: SHIPPED | OUTPATIENT
Start: 2021-12-18 | End: 2022-06-08

## 2022-01-11 ENCOUNTER — LAB (OUTPATIENT)
Dept: FAMILY MEDICINE CLINIC | Facility: CLINIC | Age: 62
End: 2022-01-11

## 2022-01-11 ENCOUNTER — OFFICE VISIT (OUTPATIENT)
Dept: FAMILY MEDICINE CLINIC | Facility: CLINIC | Age: 62
End: 2022-01-11

## 2022-01-11 VITALS
TEMPERATURE: 97.8 F | OXYGEN SATURATION: 96 % | DIASTOLIC BLOOD PRESSURE: 86 MMHG | BODY MASS INDEX: 35.78 KG/M2 | SYSTOLIC BLOOD PRESSURE: 158 MMHG | HEART RATE: 107 BPM | WEIGHT: 215 LBS

## 2022-01-11 DIAGNOSIS — E11.9 TYPE 2 DIABETES MELLITUS WITHOUT COMPLICATION, WITHOUT LONG-TERM CURRENT USE OF INSULIN: ICD-10-CM

## 2022-01-11 DIAGNOSIS — E78.5 HYPERLIPIDEMIA, UNSPECIFIED HYPERLIPIDEMIA TYPE: ICD-10-CM

## 2022-01-11 DIAGNOSIS — J44.9 CHRONIC OBSTRUCTIVE PULMONARY DISEASE, UNSPECIFIED COPD TYPE: ICD-10-CM

## 2022-01-11 DIAGNOSIS — Z12.2 SCREENING FOR LUNG CANCER: ICD-10-CM

## 2022-01-11 DIAGNOSIS — I10 HYPERTENSION, UNSPECIFIED TYPE: ICD-10-CM

## 2022-01-11 DIAGNOSIS — I10 HYPERTENSION, UNSPECIFIED TYPE: Primary | ICD-10-CM

## 2022-01-11 DIAGNOSIS — G47.00 INSOMNIA, UNSPECIFIED TYPE: ICD-10-CM

## 2022-01-11 DIAGNOSIS — Z12.31 BREAST CANCER SCREENING BY MAMMOGRAM: ICD-10-CM

## 2022-01-11 LAB
ALBUMIN SERPL-MCNC: 4 G/DL (ref 3.5–5.2)
ALBUMIN UR-MCNC: 2.4 MG/DL
ALBUMIN/GLOB SERPL: 1.3 G/DL
ALP SERPL-CCNC: 96 U/L (ref 39–117)
ALT SERPL W P-5'-P-CCNC: 19 U/L (ref 1–33)
ANION GAP SERPL CALCULATED.3IONS-SCNC: 11.1 MMOL/L (ref 5–15)
AST SERPL-CCNC: 16 U/L (ref 1–32)
BILIRUB SERPL-MCNC: <0.2 MG/DL (ref 0–1.2)
BUN SERPL-MCNC: 16 MG/DL (ref 6–20)
BUN/CREAT SERPL: 21.1 (ref 7–25)
CALCIUM SPEC-SCNC: 9 MG/DL (ref 8.6–10.5)
CHLORIDE SERPL-SCNC: 101 MMOL/L (ref 98–107)
CHOLEST SERPL-MCNC: 204 MG/DL (ref 0–200)
CO2 SERPL-SCNC: 25.9 MMOL/L (ref 22–29)
CREAT SERPL-MCNC: 0.76 MG/DL (ref 0.57–1)
CREAT UR-MCNC: 53.5 MG/DL
GFR SERPL CREATININE-BSD FRML MDRD: 78 ML/MIN/1.73
GFR SERPL CREATININE-BSD FRML MDRD: 94 ML/MIN/1.73
GLOBULIN UR ELPH-MCNC: 3 GM/DL
GLUCOSE SERPL-MCNC: 169 MG/DL (ref 65–99)
HBA1C MFR BLD: 7.9 % (ref 3.5–5.6)
HDLC SERPL-MCNC: 40 MG/DL (ref 40–60)
LDLC SERPL CALC-MCNC: 127 MG/DL (ref 0–100)
LDLC/HDLC SERPL: 3.07 {RATIO}
MICROALBUMIN/CREAT UR: 44.9 MG/G
POTASSIUM SERPL-SCNC: 4.8 MMOL/L (ref 3.5–5.2)
PROT SERPL-MCNC: 7 G/DL (ref 6–8.5)
SODIUM SERPL-SCNC: 138 MMOL/L (ref 136–145)
TRIGL SERPL-MCNC: 206 MG/DL (ref 0–150)
VLDLC SERPL-MCNC: 37 MG/DL (ref 5–40)

## 2022-01-11 PROCEDURE — 82043 UR ALBUMIN QUANTITATIVE: CPT | Performed by: NURSE PRACTITIONER

## 2022-01-11 PROCEDURE — 36415 COLL VENOUS BLD VENIPUNCTURE: CPT

## 2022-01-11 PROCEDURE — 83036 HEMOGLOBIN GLYCOSYLATED A1C: CPT | Performed by: NURSE PRACTITIONER

## 2022-01-11 PROCEDURE — 80053 COMPREHEN METABOLIC PANEL: CPT | Performed by: NURSE PRACTITIONER

## 2022-01-11 PROCEDURE — 82570 ASSAY OF URINE CREATININE: CPT | Performed by: NURSE PRACTITIONER

## 2022-01-11 PROCEDURE — 99214 OFFICE O/P EST MOD 30 MIN: CPT | Performed by: NURSE PRACTITIONER

## 2022-01-11 PROCEDURE — 80061 LIPID PANEL: CPT | Performed by: NURSE PRACTITIONER

## 2022-01-11 RX ORDER — LOSARTAN POTASSIUM 50 MG/1
50 TABLET ORAL DAILY
Qty: 30 TABLET | Refills: 0 | Status: SHIPPED | OUTPATIENT
Start: 2022-01-11 | End: 2022-02-14

## 2022-01-11 RX ORDER — GLIPIZIDE 10 MG/1
10 TABLET ORAL
Qty: 180 TABLET | Refills: 1 | Status: SHIPPED | OUTPATIENT
Start: 2022-01-11 | End: 2022-08-24 | Stop reason: SDUPTHER

## 2022-01-11 RX ORDER — ATORVASTATIN CALCIUM 20 MG/1
20 TABLET, FILM COATED ORAL DAILY
Qty: 90 TABLET | Refills: 2 | Status: SHIPPED | OUTPATIENT
Start: 2022-01-11 | End: 2022-01-12

## 2022-01-11 NOTE — PATIENT INSTRUCTIONS
Start flonase nasal spray daily  Stop lisinopril  Start losartan for BP  Check labs  Work on diet and exercise  Follow up with pulmonary  Work on quitting smoking  Get CT scan and mammogram

## 2022-01-11 NOTE — PROGRESS NOTES
Subjective     Carli Dubon is a 61 y.o. female.     Pt is here today for a 6 mo follow up on hypertension, diabetes, hyperlipidemia, COPD, bipolar disorder, and insomnia.  She had pancreatitis in the last year and follows with GI.     Hypertension-patient is currently on lisinopril 20 mg daily. Doing well on medication. She has been out of the lisinopril and stopped it due to coughing.  Denies CP, dizziness, HA. She does have some SOA.      Diabetes-patient is currently on Jardiance 25 mg daily, glipizide 10 mg twice daily, Metformin 1000 mg twice daily. She does not monitor her blood sugars regularly.  Doesn't always stay active or eats a well balanced diet. has been out of the jardiance for 2 weeks.      Hyperlipidemia- she is currently on lipitor 20mg daily. She is out of the medication     COPD- Pt was referred to pulmonary and has appt March 3rd. She has sp,e SOA. patient is currently on Spiriva and albuterol as needed. She states that at night she coughs frequently.  She still smokes 1 ppd. She uses the albuterol nightly.       Bipolar disorder-patient is seeing psychiatry for this.  She is currently on Depakote. Mood is stable with the medication. She is also on klonopin as needed     Insomnia-patient is seeing psychiatry for this.  She is on ambien 10mg prn.  She is also on Elavil 75 mg nightly.     Labs- due  Pap smear- 08/2019- normal  Mammogram- 2/2021  DEXA- post menopausal for 4 years.  Colonoscopy- 7/2/20     Vaccines:  Flu- refused  PNA- believes she had one 15-20 years ago.  Shingles-due- needs to get at the pharmacy  Tdap- UTD  covid- UTD     Dental exam- due  Eye exam- due       The following portions of the patient's history were reviewed and updated as appropriate: allergies, current medications, past family history, past medical history, past social history, past surgical history and problem list.    Review of Systems   Constitutional: Negative for chills, fatigue and fever.   HENT: Negative  for congestion, sinus pressure and sore throat.    Eyes: Negative for blurred vision and double vision.   Respiratory: Positive for cough and shortness of breath. Negative for chest tightness.    Cardiovascular: Negative for chest pain and palpitations.   Gastrointestinal: Negative for abdominal pain, constipation, diarrhea, nausea and vomiting.   Genitourinary: Negative for dysuria, frequency and urgency.   Musculoskeletal: Negative for arthralgias.   Neurological: Negative for dizziness and headache.   Psychiatric/Behavioral: Negative for self-injury, suicidal ideas, depressed mood and stress. The patient is not nervous/anxious.        Objective     /86   Pulse 107   Temp 97.8 °F (36.6 °C) (Tympanic)   Wt 97.5 kg (215 lb)   SpO2 96%   BMI 35.78 kg/m²     Current Outpatient Medications on File Prior to Visit   Medication Sig Dispense Refill   • albuterol (ACCUNEB) 0.63 MG/3ML nebulizer solution Take 3 mL by nebulization Every 6 (Six) Hours As Needed for Wheezing. 30 each 2   • albuterol sulfate  (90 Base) MCG/ACT inhaler Inhale 2 puffs Every 4 (Four) Hours As Needed for Wheezing. 18 g 2   • amitriptyline (ELAVIL) 150 MG tablet TAKE 1 TABLET BY MOUTH ONCE DAILY AT NIGHT 90 tablet 1   • budesonide-formoterol (Symbicort) 160-4.5 MCG/ACT inhaler Inhale 2 puffs 2 (Two) Times a Day. 6 g 12   • clonazePAM (KlonoPIN) 0.5 MG tablet Take 1 tablet by mouth 2 (Two) Times a Day. 60 tablet 0   • divalproex (DEPAKOTE ER) 500 MG 24 hr tablet TAKE 2 TABLETS BY MOUTH ONCE DAILY AT NIGHT 180 tablet 1   • fluticasone (Flonase) 50 MCG/ACT nasal spray 2 sprays into the nostril(s) as directed by provider Daily. 9.9 mL 2   • nystatin-triamcinolone (MYCOLOG) 862112-6.1 UNIT/GM-% ointment      • omeprazole (priLOSEC) 20 MG capsule Take 1 capsule by mouth Daily. 30 capsule 0   • ondansetron ODT (ZOFRAN-ODT) 4 MG disintegrating tablet Place 1 tablet on the tongue Every 6 (Six) Hours As Needed for Nausea. 20 tablet 0   •  promethazine-dextromethorphan (PROMETHAZINE-DM) 6.25-15 MG/5ML syrup Take 2.5 mL by mouth 4 (Four) Times a Day As Needed for Cough. 118 mL 0   • Tiotropium Bromide Monohydrate (Spiriva Respimat) 1.25 MCG/ACT aerosol solution inhaler Inhale 2 puffs Daily. 4 g 0   • zolpidem (AMBIEN) 10 MG tablet Take 1 tablet by mouth At Night As Needed for Sleep. 30 tablet 2   • [DISCONTINUED] atorvastatin (LIPITOR) 20 MG tablet Take 1 tablet by mouth Daily. 30 tablet 2   • [DISCONTINUED] benzonatate (Tessalon Perles) 100 MG capsule Take 1 capsule by mouth 3 (Three) Times a Day As Needed for Cough. 30 capsule 0   • [DISCONTINUED] cefdinir (OMNICEF) 300 MG capsule Take 1 capsule by mouth 2 (Two) Times a Day. 14 capsule 0   • [DISCONTINUED] empagliflozin (Jardiance) 25 MG tablet tablet Take 1 tablet by mouth Daily. 90 tablet 0   • [DISCONTINUED] glipizide (GLUCOTROL) 10 MG tablet Take 1 tablet by mouth 2 (Two) Times a Day Before Meals. 180 tablet 1   • [DISCONTINUED] lisinopril (PRINIVIL,ZESTRIL) 20 MG tablet Take 1 tablet by mouth Daily. 90 tablet 0   • [DISCONTINUED] metFORMIN (GLUCOPHAGE) 1000 MG tablet Take 1 tablet by mouth 2 (Two) Times a Day With Meals. 180 tablet 1     No current facility-administered medications on file prior to visit.        Physical Exam  Vitals reviewed.   Constitutional:       General: She is not in acute distress.     Appearance: Normal appearance. She is well-developed. She is obese. She is not diaphoretic.   HENT:      Head: Normocephalic and atraumatic.   Eyes:      General:         Right eye: No discharge.         Left eye: No discharge.      Extraocular Movements: Extraocular movements intact.      Conjunctiva/sclera: Conjunctivae normal.   Cardiovascular:      Rate and Rhythm: Normal rate and regular rhythm.      Heart sounds: No murmur heard.      Pulmonary:      Effort: Pulmonary effort is normal. No respiratory distress.      Breath sounds: Normal breath sounds. No wheezing or rales.    Abdominal:      General: Bowel sounds are normal.      Palpations: Abdomen is soft.   Musculoskeletal:         General: Normal range of motion.      Cervical back: Normal range of motion.   Skin:     General: Skin is warm and dry.   Neurological:      General: No focal deficit present.      Mental Status: She is alert and oriented to person, place, and time.   Psychiatric:         Mood and Affect: Mood normal.         Behavior: Behavior normal.         Thought Content: Thought content normal.         Judgment: Judgment normal.           Assessment/Plan     Diagnoses and all orders for this visit:    1. Hypertension, unspecified type (Primary)  Comments:  deteriorated  off lisinopril due to coughing  start losartan  f/u1 mo  Orders:  -     Comprehensive metabolic panel; Future  -     losartan (Cozaar) 50 MG tablet; Take 1 tablet by mouth Daily.  Dispense: 30 tablet; Refill: 0    2. Hyperlipidemia, unspecified hyperlipidemia type  Comments:  cont lipitor  check labs  work on diet and exercise  Orders:  -     Lipid panel; Future  -     atorvastatin (LIPITOR) 20 MG tablet; Take 1 tablet by mouth Daily.  Dispense: 90 tablet; Refill: 2    3. Insomnia, unspecified type  Comments:  stable  cont meds  sees psych    4. Chronic obstructive pulmonary disease, unspecified COPD type (HCC)  Comments:  waiting to see pulm  cont meds  smoking cessation  start flonase for cough    5. Type 2 diabetes mellitus without complication, without long-term current use of insulin (HCC)  Comments:  check labs  work on diet and exercise  cont meds    Orders:  -     Hemoglobin A1c; Future  -     Microalbumin / Creatinine Urine Ratio - Urine, Clean Catch; Future  -     empagliflozin (Jardiance) 25 MG tablet tablet; Take 1 tablet by mouth Daily.  Dispense: 90 tablet; Refill: 0  -     glipizide (GLUCOTROL) 10 MG tablet; Take 1 tablet by mouth 2 (Two) Times a Day Before Meals.  Dispense: 180 tablet; Refill: 1  -     metFORMIN (GLUCOPHAGE) 1000 MG  tablet; Take 1 tablet by mouth 2 (Two) Times a Day With Meals.  Dispense: 180 tablet; Refill: 1    6. Breast cancer screening by mammogram  -     Mammo Screening Digital Tomosynthesis Bilateral With CAD; Future    7. Screening for lung cancer  Comments:  get low dose CT scan  Orders:  -     CT Chest Low Dose Wo; Future

## 2022-01-12 RX ORDER — ATORVASTATIN CALCIUM 40 MG/1
40 TABLET, FILM COATED ORAL DAILY
Qty: 90 TABLET | Refills: 1 | Status: SHIPPED | OUTPATIENT
Start: 2022-01-12 | End: 2022-08-24 | Stop reason: SDUPTHER

## 2022-01-13 ENCOUNTER — OFFICE VISIT (OUTPATIENT)
Dept: PSYCHIATRY | Facility: CLINIC | Age: 62
End: 2022-01-13

## 2022-01-13 ENCOUNTER — OFFICE (AMBULATORY)
Dept: URBAN - METROPOLITAN AREA CLINIC 64 | Facility: CLINIC | Age: 62
End: 2022-01-13
Payer: COMMERCIAL

## 2022-01-13 VITALS
HEIGHT: 66 IN | WEIGHT: 215 LBS | DIASTOLIC BLOOD PRESSURE: 106 MMHG | SYSTOLIC BLOOD PRESSURE: 163 MMHG | HEART RATE: 100 BPM

## 2022-01-13 DIAGNOSIS — F51.05 INSOMNIA DUE TO MENTAL CONDITION: ICD-10-CM

## 2022-01-13 DIAGNOSIS — F43.10 PTSD (POST-TRAUMATIC STRESS DISORDER): Chronic | ICD-10-CM

## 2022-01-13 DIAGNOSIS — F51.04 PSYCHOPHYSIOLOGICAL INSOMNIA: Chronic | ICD-10-CM

## 2022-01-13 DIAGNOSIS — F31.32 BIPOLAR AFFECTIVE DISORDER, CURRENTLY DEPRESSED, MODERATE: Primary | Chronic | ICD-10-CM

## 2022-01-13 DIAGNOSIS — K85.90 ACUTE PANCREATITIS WITHOUT NECROSIS OR INFECTION, UNSPECIFIE: ICD-10-CM

## 2022-01-13 PROCEDURE — 99214 OFFICE O/P EST MOD 30 MIN: CPT | Performed by: INTERNAL MEDICINE

## 2022-01-13 PROCEDURE — 99214 OFFICE O/P EST MOD 30 MIN: CPT | Performed by: PSYCHIATRY & NEUROLOGY

## 2022-01-13 RX ORDER — ZOLPIDEM TARTRATE 10 MG/1
10 TABLET ORAL NIGHTLY PRN
Qty: 30 TABLET | Refills: 2 | Status: SHIPPED | OUTPATIENT
Start: 2022-01-13 | End: 2022-01-13 | Stop reason: SDUPTHER

## 2022-01-13 RX ORDER — ZOLPIDEM TARTRATE 10 MG/1
10 TABLET ORAL NIGHTLY PRN
Qty: 30 TABLET | Refills: 2 | Status: SHIPPED | OUTPATIENT
Start: 2022-01-13 | End: 2022-04-29 | Stop reason: SDUPTHER

## 2022-01-13 RX ORDER — CLONAZEPAM 0.5 MG/1
0.5 TABLET ORAL 2 TIMES DAILY
Qty: 60 TABLET | Refills: 2 | Status: SHIPPED | OUTPATIENT
Start: 2022-01-13 | End: 2022-01-13 | Stop reason: SDUPTHER

## 2022-01-13 RX ORDER — FAMOTIDINE 40 MG/1
40 TABLET, FILM COATED ORAL
Qty: 30 | Refills: 3 | Status: ACTIVE
Start: 2022-01-13

## 2022-01-13 RX ORDER — POLYETHYLENE GLYCOL 3350, SODIUM SULFATE, SODIUM CHLORIDE, POTASSIUM CHLORIDE, ASCORBIC ACID, SODIUM ASCORBATE 140-9-5.2G
KIT ORAL
Qty: 1 | Refills: 0 | Status: COMPLETED
Start: 2022-01-13 | End: 2022-02-09

## 2022-01-13 RX ORDER — CLONAZEPAM 0.5 MG/1
0.5 TABLET ORAL 2 TIMES DAILY
Qty: 60 TABLET | Refills: 2 | Status: SHIPPED | OUTPATIENT
Start: 2022-01-13 | End: 2022-07-25 | Stop reason: SDUPTHER
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
## 2022-01-18 ENCOUNTER — HOSPITAL ENCOUNTER (OUTPATIENT)
Dept: CT IMAGING | Facility: HOSPITAL | Age: 62
Discharge: HOME | End: 2022-01-18
Admitting: NURSE PRACTITIONER

## 2022-01-18 DIAGNOSIS — Z12.2 SCREENING FOR LUNG CANCER: ICD-10-CM

## 2022-01-18 PROCEDURE — 71271 CT THORAX LUNG CANCER SCR C-: CPT

## 2022-01-19 ENCOUNTER — OFFICE VISIT (OUTPATIENT)
Dept: ORTHOPEDIC SURGERY | Facility: CLINIC | Age: 62
End: 2022-01-19

## 2022-01-19 VITALS — BODY MASS INDEX: 35.32 KG/M2 | WEIGHT: 212 LBS | HEIGHT: 65 IN

## 2022-01-19 DIAGNOSIS — M25.562 CHRONIC PAIN OF BOTH KNEES: ICD-10-CM

## 2022-01-19 DIAGNOSIS — G89.29 CHRONIC PAIN OF BOTH KNEES: ICD-10-CM

## 2022-01-19 DIAGNOSIS — G89.29 CHRONIC PAIN OF RIGHT KNEE: Primary | ICD-10-CM

## 2022-01-19 DIAGNOSIS — M25.551 BILATERAL HIP PAIN: ICD-10-CM

## 2022-01-19 DIAGNOSIS — M25.561 CHRONIC PAIN OF BOTH KNEES: ICD-10-CM

## 2022-01-19 DIAGNOSIS — M25.552 BILATERAL HIP PAIN: ICD-10-CM

## 2022-01-19 DIAGNOSIS — M25.561 CHRONIC PAIN OF RIGHT KNEE: Primary | ICD-10-CM

## 2022-01-19 PROCEDURE — 99213 OFFICE O/P EST LOW 20 MIN: CPT | Performed by: ORTHOPAEDIC SURGERY

## 2022-01-19 RX ORDER — FAMOTIDINE 40 MG/1
TABLET, FILM COATED ORAL
COMMUNITY
Start: 2022-01-13

## 2022-01-19 NOTE — PROGRESS NOTES
"Chief Complaint  Follow-up of the Left Hip, Follow-up of the Right Hip, and Initial Evaluation of the Right Knee (X several months, no known injury)    Subjective    History of Present Illness      Carli Dubon is a 61 y.o. female who presents to [facility] for right knee pain and bilateral SI joint and low back pain.  History of Present Illness patient has been complaining of pain in her right knee for several months.  She states that she cannot kneel down.  She denies any history of trauma to the knee.  Housework is becoming progressively more difficult for the patient to accomplish because of the knee pain and discomfort.  She also states that there is low back pain that radiates into the hip the buttock and the thigh.  The right is more symptomatic than the left.  She has a distinct sense of radiculopathy with some numbness and tingling as well.  Pain Location:  RIGHT knee and RIGHT hip  Radiation: none  Quality: dull, aching  Intensity/Severity: moderate  Duration: 6 months  Progression of symptoms: yes, progressive worsening  Onset quality: gradual   Timing: intermittent  Aggravating Factors: kneeling, squatting  Alleviating Factors: NSAIDs  Previous Episodes: yes  Associated Symptoms: pain, swelling  ADLs Affected: ambulating, recreational activities/sports  Previous Treatment: NSAIDs       Objective   Vital Signs:   Ht 165.1 cm (65\")   Wt 96.2 kg (212 lb)   BMI 35.28 kg/m²     Physical Exam  Physical Exam  Vitals signs and nursing note reviewed.   Constitutional:       Appearance: Normal appearance.   Pulmonary:      Effort: Pulmonary effort is normal.   Skin:     General: Skin is warm and dry.      Capillary Refill: Capillary refill takes less than 2 seconds.   Neurological:      General: No focal deficit present.      Mental Status: He is alert and oriented to person, place, and time. Mental status is at baseline.   Psychiatric:         Mood and Affect: Mood normal.         " Behavior: Behavior normal.         Thought Content: Thought content normal.         Judgment: Judgment normal.     Ortho Exam   Right knee (varus). Patient has crepitus throughout range of motion. Positive patellar grind test. Mild effusion. Lachman is negative. Pivot shift is negative. Anterior and posterior drawer signs are negative. Significant joint line tenderness is noted on the medial aspect of the knee. Patient has a varus orientation of the knee. There is fullness and tenderness in the Popliteal fossa. Mild distention of a Popliteal cyst is noted in this location. Range of motion in flexion is from 0-100 degrees. Neurovascular status is intact.  Dorsalis pedis and posterior tibial artery pulses are palpable. Common peroneal nerve function is well preserved. Patient's gait is cautious and antalgic. Skin and soft tissues are mildly swollen, consistent with synovitis and effusion. The patient has a significant limp with the first few steps after starting the gait cycle. Getting out of a chair takes a lot of effort due to pain on knee flexion.   Bilateral SI JOINT: Mild tenderness is present dorsally over the SI joint. Figure of 4 sign is positive. There is mild spasm present in the erector spinae muscle. Flexion and extension are associated with discomfort. Deep tendon reflexes are intact and symmetrical on both lower extremities. Pain radiates into the buttock on extension of the hip. No evidence of cauda equina syndrome. No motor or sensory deficit is noted. There is no bowel or bladder involvement.          Result Review :   The following data was reviewed by: Ace Hale MD on 01/19/2022:  Radiologic studies - see below for interpretation  RIGHT knee xrays  weightbearing/standing ap/lateral views were performed at [facility] on 01/19/22. Images were independently viewed and interpreted by myself, my impression as follows:    right Knee X-Ray  Indication: Pain and discomfort on the medial  aspect of the knee.  AP, Lateral views  Findings: Moderately advanced osteoarthritis of the knee with narrowing of the medial joint space.  no bony lesion  Soft tissues within normal limits  decreased joint spaces  Hardware appropriately positioned not applicable      no prior studies available for comparison.    This patient's x-ray report was graded according to the Kellgren and Mario classification.  This took into account the joint space narrowing, osteophyte formation, sclerosis of the distal femur/proximal tibia along with deformity of those bones.  The findings were indicative of K L grade 2.    X-RAY was ordered and reviewed by Ace Hale MD        Procedures           Assessment   Assessment and Plan    Diagnoses and all orders for this visit:    1. Chronic pain of right knee (Primary)  -     XR Knee 3 View Right    2. Bilateral hip pain  -     Ambulatory Referral to Physical Therapy Evaluate and treat (bilat hips and knees)    3. Chronic pain of both knees  -     Ambulatory Referral to Physical Therapy Evaluate and treat (bilat hips and knees)          Follow Up   · Compression/brace to the knee to prevent her from buckling and giving her.  · Intra-articular steroid injection and viscosupplementation injections discussed with patient.  · Calcium and vitamin D for bone health.  · Glucosamine, chondroitin and turmeric for cartilage health.  · Note for physical therapy given to the patient to work on the lumbar spine as well as on the knees.  · Nonoperative care at this point discussed with the patient.  · Tablet meloxicam 7.5 mg tab 1 p.o. nightly for pain swelling and discomfort.  · Rest, ice, compression, and elevation (RICE) therapy  · Stretching and strengthening exercises of the quads and hamstrings.  · OTC Tylenol 500-1000mg by mouth every 6 hours as needed for pain   · Follow up in 6 month(s)  • Patient was given instructions and counseling regarding her condition or for health maintenance  advice. Please see specific information pulled into the AVS if appropriate.     Ace Hale MD   Date of Encounter: 1/19/2022        EMR Dragon/Transcription disclaimer:  Much of this encounter note is an electronic transcription/translation of spoken language to printed text. The electronic translation of spoken language may permit erroneous, or at times, nonsensical words or phrases to be inadvertently transcribed; Although I have reviewed the note for such errors, some may still exist.

## 2022-01-25 DIAGNOSIS — J44.9 CHRONIC OBSTRUCTIVE PULMONARY DISEASE, UNSPECIFIED COPD TYPE: ICD-10-CM

## 2022-01-25 RX ORDER — BUDESONIDE AND FORMOTEROL FUMARATE DIHYDRATE 160; 4.5 UG/1; UG/1
2 AEROSOL RESPIRATORY (INHALATION)
Qty: 6 G | Refills: 12 | Status: SHIPPED | OUTPATIENT
Start: 2022-01-25 | End: 2022-09-27

## 2022-01-25 NOTE — TELEPHONE ENCOUNTER
Caller: Dubon Carli    Relationship: Self    Best call back number:     Requested Prescriptions:   Requested Prescriptions     Pending Prescriptions Disp Refills   • budesonide-formoterol (Symbicort) 160-4.5 MCG/ACT inhaler 6 g 12     Sig: Inhale 2 puffs 2 (Two) Times a Day.        Pharmacy where request should be sent:  [facility]  [address]  [phone]    Additional details provided by patient: PATIENT SAYS THAT THIS NEEDS A PRIOR AUTHORIZATION BEFORE IT IS FILLED.      Does the patient have less than a 3 day supply:  [] Yes  [x] No    Shahnaz Hale Rep   01/25/22 15:13 EST

## 2022-02-08 ENCOUNTER — OFFICE VISIT (OUTPATIENT)
Dept: PODIATRY | Facility: CLINIC | Age: 62
End: 2022-02-08

## 2022-02-08 VITALS
WEIGHT: 212 LBS | HEART RATE: 112 BPM | SYSTOLIC BLOOD PRESSURE: 156 MMHG | BODY MASS INDEX: 35.32 KG/M2 | DIASTOLIC BLOOD PRESSURE: 94 MMHG | HEIGHT: 65 IN

## 2022-02-08 DIAGNOSIS — M79.672 BILATERAL FOOT PAIN: Primary | ICD-10-CM

## 2022-02-08 DIAGNOSIS — M19.071 ARTHRITIS OF BOTH FEET: ICD-10-CM

## 2022-02-08 DIAGNOSIS — M19.072 ARTHRITIS OF BOTH FEET: ICD-10-CM

## 2022-02-08 DIAGNOSIS — M79.671 BILATERAL FOOT PAIN: Primary | ICD-10-CM

## 2022-02-08 DIAGNOSIS — E11.65 TYPE 2 DIABETES MELLITUS WITH HYPERGLYCEMIA, WITHOUT LONG-TERM CURRENT USE OF INSULIN: ICD-10-CM

## 2022-02-08 PROCEDURE — 99213 OFFICE O/P EST LOW 20 MIN: CPT | Performed by: PODIATRIST

## 2022-02-09 ENCOUNTER — OFFICE (AMBULATORY)
Dept: URBAN - METROPOLITAN AREA PATHOLOGY 4 | Facility: PATHOLOGY | Age: 62
End: 2022-02-09
Payer: COMMERCIAL

## 2022-02-09 ENCOUNTER — ON CAMPUS - OUTPATIENT (AMBULATORY)
Dept: URBAN - METROPOLITAN AREA HOSPITAL 2 | Facility: HOSPITAL | Age: 62
End: 2022-02-09
Payer: COMMERCIAL

## 2022-02-09 VITALS
DIASTOLIC BLOOD PRESSURE: 91 MMHG | DIASTOLIC BLOOD PRESSURE: 82 MMHG | HEART RATE: 95 BPM | DIASTOLIC BLOOD PRESSURE: 86 MMHG | TEMPERATURE: 96.4 F | SYSTOLIC BLOOD PRESSURE: 105 MMHG | OXYGEN SATURATION: 99 % | HEART RATE: 103 BPM | RESPIRATION RATE: 18 BRPM | DIASTOLIC BLOOD PRESSURE: 84 MMHG | WEIGHT: 207 LBS | HEART RATE: 111 BPM | OXYGEN SATURATION: 97 % | HEART RATE: 96 BPM | DIASTOLIC BLOOD PRESSURE: 46 MMHG | OXYGEN SATURATION: 94 % | SYSTOLIC BLOOD PRESSURE: 130 MMHG | DIASTOLIC BLOOD PRESSURE: 72 MMHG | SYSTOLIC BLOOD PRESSURE: 121 MMHG | OXYGEN SATURATION: 95 % | DIASTOLIC BLOOD PRESSURE: 70 MMHG | DIASTOLIC BLOOD PRESSURE: 79 MMHG | HEART RATE: 97 BPM | SYSTOLIC BLOOD PRESSURE: 127 MMHG | HEART RATE: 98 BPM | SYSTOLIC BLOOD PRESSURE: 110 MMHG | HEIGHT: 66 IN | RESPIRATION RATE: 16 BRPM | DIASTOLIC BLOOD PRESSURE: 50 MMHG | OXYGEN SATURATION: 100 % | SYSTOLIC BLOOD PRESSURE: 133 MMHG | SYSTOLIC BLOOD PRESSURE: 134 MMHG | SYSTOLIC BLOOD PRESSURE: 141 MMHG | HEART RATE: 99 BPM

## 2022-02-09 DIAGNOSIS — D12.3 BENIGN NEOPLASM OF TRANSVERSE COLON: ICD-10-CM

## 2022-02-09 DIAGNOSIS — K62.1 RECTAL POLYP: ICD-10-CM

## 2022-02-09 DIAGNOSIS — D12.8 BENIGN NEOPLASM OF RECTUM: ICD-10-CM

## 2022-02-09 DIAGNOSIS — Z12.11 ENCOUNTER FOR SCREENING FOR MALIGNANT NEOPLASM OF COLON: ICD-10-CM

## 2022-02-09 PROBLEM — K63.5 POLYP OF COLON: Status: ACTIVE | Noted: 2022-02-09

## 2022-02-09 LAB
GI HISTOLOGY: A. UNSPECIFIED: (no result)
GI HISTOLOGY: B. UNSPECIFIED: (no result)
GI HISTOLOGY: PDF REPORT: (no result)

## 2022-02-09 PROCEDURE — 88305 TISSUE EXAM BY PATHOLOGIST: CPT | Mod: 26 | Performed by: INTERNAL MEDICINE

## 2022-02-09 PROCEDURE — 45385 COLONOSCOPY W/LESION REMOVAL: CPT | Mod: 33 | Performed by: INTERNAL MEDICINE

## 2022-02-09 PROCEDURE — 45390 COLONOSCOPY W/RESECTION: CPT | Mod: 33,59 | Performed by: INTERNAL MEDICINE

## 2022-02-09 NOTE — PROGRESS NOTES
02/08/2022  Foot and Ankle Surgery - Established Patient/Follow-up  Provider: Dr. Florentin Syed DPM  Location: [facility]    Subjective:  Carli Dubon is a 61 y.o. female.     Chief Complaint   Patient presents with   • Left Foot - Pain     Patient noticed August 2021 feet were swelling bad     • Right Foot - Pain   • Follow-up     Last pcp appt with ALEX Huddleston APRN 1/11/2022       HPI: Patient returns for routine foot check.  She states that she has been doing relatively well.  She recently went on vacation noticed increased swelling involving the feet.  She states that the swelling and discomfort lasted for approximately 2 weeks and gradually dissipated.  Today, she has no pain or limitation.  She has not had any new issues involving the feet including no open wounds or signs of infection.  She feels that her overall health and glycemic control is relatively unchanged.    Allergies   Allergen Reactions   • Iodine Swelling   • Adhesive Tape Rash       Current Outpatient Medications on File Prior to Visit   Medication Sig Dispense Refill   • albuterol (ACCUNEB) 0.63 MG/3ML nebulizer solution Take 3 mL by nebulization Every 6 (Six) Hours As Needed for Wheezing. 30 each 2   • albuterol sulfate  (90 Base) MCG/ACT inhaler Inhale 2 puffs Every 4 (Four) Hours As Needed for Wheezing. 18 g 2   • amitriptyline (ELAVIL) 150 MG tablet TAKE 1 TABLET BY MOUTH ONCE DAILY AT NIGHT 90 tablet 1   • atorvastatin (Lipitor) 40 MG tablet Take 1 tablet by mouth Daily. 90 tablet 1   • budesonide-formoterol (Symbicort) 160-4.5 MCG/ACT inhaler Inhale 2 puffs 2 (Two) Times a Day. 6 g 12   • clonazePAM (KlonoPIN) 0.5 MG tablet Take 1 tablet by mouth 2 (Two) Times a Day. 60 tablet 2   • divalproex (DEPAKOTE ER) 500 MG 24 hr tablet TAKE 2 TABLETS BY MOUTH ONCE DAILY AT NIGHT 180 tablet 1   • empagliflozin (Jardiance) 25 MG tablet tablet Take 1 tablet by mouth Daily. 90 tablet 0   • famotidine (PEPCID) 40 MG tablet      •  "fluticasone (Flonase) 50 MCG/ACT nasal spray 2 sprays into the nostril(s) as directed by provider Daily. 9.9 mL 2   • glipizide (GLUCOTROL) 10 MG tablet Take 1 tablet by mouth 2 (Two) Times a Day Before Meals. 180 tablet 1   • losartan (Cozaar) 50 MG tablet Take 1 tablet by mouth Daily. 30 tablet 0   • metFORMIN (GLUCOPHAGE) 1000 MG tablet Take 1 tablet by mouth 2 (Two) Times a Day With Meals. 180 tablet 1   • nystatin-triamcinolone (MYCOLOG) 556415-1.1 UNIT/GM-% ointment      • omeprazole (priLOSEC) 20 MG capsule Take 1 capsule by mouth Daily. 30 capsule 0   • ondansetron ODT (ZOFRAN-ODT) 4 MG disintegrating tablet Place 1 tablet on the tongue Every 6 (Six) Hours As Needed for Nausea. 20 tablet 0   • promethazine-dextromethorphan (PROMETHAZINE-DM) 6.25-15 MG/5ML syrup Take 2.5 mL by mouth 4 (Four) Times a Day As Needed for Cough. 118 mL 0   • Tiotropium Bromide Monohydrate (Spiriva Respimat) 1.25 MCG/ACT aerosol solution inhaler Inhale 2 puffs Daily. 4 g 0   • zolpidem (AMBIEN) 10 MG tablet Take 1 tablet by mouth At Night As Needed for Sleep. 30 tablet 2     No current facility-administered medications on file prior to visit.       Objective   /94   Pulse 112   Ht 165.1 cm (65\")   Wt 96.2 kg (212 lb)   BMI 35.28 kg/m²       General:   Appearance: appears stated age and healthy    Orientation: AAOx3    Affect: appropriate    Gait: unimpaired       VASCULAR       Right Foot Vascularity   Normal vascular exam    Dorsalis pedis:  2+  Posterior tibial:  2+  Skin Temperature: warm    Edema Grading:  None  CFT:  < 3 seconds  Pedal Hair Growth:  Present  Varicosities: none        Left Foot Vascularity   Normal vascular exam    Dorsalis pedis:  2+  Posterior tibial:  2+  Skin Temperature: warm    Edema Grading:  None  CFT:  < 3 seconds  Pedal Hair Growth:  Present  Varicosities: none        NEUROLOGIC      Right Foot Neurologic   Light touch sensation:  Normal  Hot/Cold sensation: normal    Protective Sensation " using Luana-Amina Monofilament:  10  Achilles reflex:  2+      Left Foot Neurologic   Light touch sensation:  Normal  Hot/cold sensation: normal    Protective Sensation using Luana-Amina Monofilament:  10  Achilles reflex:  2+      MUSCULOSKELETAL       Right Foot Musculoskeletal   Ecchymosis:  None  Tenderness: arch and dorsal foot    Arch:  Normal      Left Foot Musculoskeletal   Ecchymosis:  None  Tenderness: arch and dorsal foot    Arch:  Normal      MUSCLE STRENGTH      Right Foot Muscle Strength   Normal strength    Foot dorsiflexion:  5  Foot plantar flexion:  5  Foot inversion:  5  Foot eversion:  5      Left Foot Muscle Strength   Normal strength    Foot dorsiflexion:  5  Foot plantar flexion:  5  Foot inversion:  5  Foot eversion:  5      DERMATOLOGIC      Right Foot Dermatologic   Skin: skin intact        Left Foot Dermatologic   Skin: skin intact        TESTS      Right Foot Tests   Anterior drawer: negative    Varus tilt: negative    Heel raise: pain        Left Foot Tests   Anterior drawer: negative    Varus tilt: negative    Heel raise: pain      Assessment/Plan   Diagnoses and all orders for this visit:    1. Bilateral foot pain (Primary)    2. Arthritis of both feet    3. Type 2 diabetes mellitus with hyperglycemia, without long-term current use of insulin (HCC)      Patient returns for evaluation of her feet.  She complains of intermittent discomfort and swelling involving the midfoot regions which is relatively unchanged from previous exam.  She did have a recent inflammatory flare which has improved.  Clinically, patient's symptoms are relatively unchanged and stable.  She is able to perform normal daily activities without limitation.  I have asked that she continue to use over-the-counter arch supports and supportive shoes to help offset any discomfort or inflammatory flares.  We also reviewed the importance of daily diabetic foot checks and glycemic control.  After chart review, her  most recent A1c was 7.9%.  We did discuss follow-up in 6 months for reevaluation but patient would like to see me on an as-needed basis.  I have asked that she call with any new issues or concerns.  Greater than 20 minutes was spent before, during, and after evaluation for patient care.    No orders of the defined types were placed in this encounter.         Note is dictated utilizing voice recognition software. Unfortunately this leads to occasional typographical errors. I apologize in advance if the situation occurs. If questions occur please do not hesitate to call our office.

## 2022-02-13 NOTE — PROGRESS NOTES
Subjective     Carli Dubon is a 61 y.o. female.     Patient is here today for 1 month follow-up on hypertension.  She had developed a cough on lisinopril so it was stopped and patient was started on losartan.  Patient reports that the cough has resolved since stopping the lisinopril.  She feels great on the medication.  Denies CP, dizziness, HA.  Has occasional SOA but has appt with pulmonary.        The following portions of the patient's history were reviewed and updated as appropriate: allergies, current medications, past family history, past medical history, past social history, past surgical history and problem list.    Review of Systems   Constitutional: Negative for appetite change, chills, fatigue and fever.   HENT: Negative for congestion.    Respiratory: Positive for shortness of breath. Negative for cough, chest tightness and wheezing.    Cardiovascular: Negative for chest pain and palpitations.   Gastrointestinal: Negative for abdominal pain, nausea and vomiting.   Neurological: Negative for dizziness, weakness, numbness and headache.       Objective     /81   Pulse 106   Temp 98 °F (36.7 °C) (Tympanic)   Wt 96.6 kg (213 lb)   SpO2 94%   BMI 35.45 kg/m²     Current Outpatient Medications on File Prior to Visit   Medication Sig Dispense Refill   • albuterol (ACCUNEB) 0.63 MG/3ML nebulizer solution Take 3 mL by nebulization Every 6 (Six) Hours As Needed for Wheezing. 30 each 2   • albuterol sulfate  (90 Base) MCG/ACT inhaler Inhale 2 puffs Every 4 (Four) Hours As Needed for Wheezing. 18 g 2   • amitriptyline (ELAVIL) 150 MG tablet TAKE 1 TABLET BY MOUTH ONCE DAILY AT NIGHT 90 tablet 1   • atorvastatin (Lipitor) 40 MG tablet Take 1 tablet by mouth Daily. 90 tablet 1   • budesonide-formoterol (Symbicort) 160-4.5 MCG/ACT inhaler Inhale 2 puffs 2 (Two) Times a Day. 6 g 12   • clonazePAM (KlonoPIN) 0.5 MG tablet Take 1 tablet by mouth 2 (Two) Times a Day. 60 tablet 2   • divalproex (DEPAKOTE  ER) 500 MG 24 hr tablet TAKE 2 TABLETS BY MOUTH ONCE DAILY AT NIGHT 180 tablet 1   • empagliflozin (Jardiance) 25 MG tablet tablet Take 1 tablet by mouth Daily. 90 tablet 0   • famotidine (PEPCID) 40 MG tablet      • fluticasone (Flonase) 50 MCG/ACT nasal spray 2 sprays into the nostril(s) as directed by provider Daily. 9.9 mL 2   • glipizide (GLUCOTROL) 10 MG tablet Take 1 tablet by mouth 2 (Two) Times a Day Before Meals. 180 tablet 1   • metFORMIN (GLUCOPHAGE) 1000 MG tablet Take 1 tablet by mouth 2 (Two) Times a Day With Meals. 180 tablet 1   • nystatin-triamcinolone (MYCOLOG) 238888-5.1 UNIT/GM-% ointment      • omeprazole (priLOSEC) 20 MG capsule Take 1 capsule by mouth Daily. 30 capsule 0   • ondansetron ODT (ZOFRAN-ODT) 4 MG disintegrating tablet Place 1 tablet on the tongue Every 6 (Six) Hours As Needed for Nausea. 20 tablet 0   • promethazine-dextromethorphan (PROMETHAZINE-DM) 6.25-15 MG/5ML syrup Take 2.5 mL by mouth 4 (Four) Times a Day As Needed for Cough. 118 mL 0   • Tiotropium Bromide Monohydrate (Spiriva Respimat) 1.25 MCG/ACT aerosol solution inhaler Inhale 2 puffs Daily. 4 g 0   • zolpidem (AMBIEN) 10 MG tablet Take 1 tablet by mouth At Night As Needed for Sleep. 30 tablet 2   • [DISCONTINUED] losartan (Cozaar) 50 MG tablet Take 1 tablet by mouth Daily. 30 tablet 0     No current facility-administered medications on file prior to visit.        Physical Exam  Vitals reviewed.   Constitutional:       General: She is not in acute distress.     Appearance: Normal appearance. She is well-developed. She is not diaphoretic.   HENT:      Head: Normocephalic and atraumatic.   Eyes:      General:         Right eye: No discharge.         Left eye: No discharge.      Extraocular Movements: Extraocular movements intact.      Conjunctiva/sclera: Conjunctivae normal.   Cardiovascular:      Rate and Rhythm: Normal rate and regular rhythm.   Pulmonary:      Effort: Pulmonary effort is normal. No respiratory  distress.      Breath sounds: Normal breath sounds. No wheezing or rales.   Abdominal:      General: Bowel sounds are normal.      Palpations: Abdomen is soft.   Musculoskeletal:         General: Normal range of motion.      Cervical back: Normal range of motion.   Skin:     General: Skin is warm and dry.   Neurological:      General: No focal deficit present.      Mental Status: She is alert and oriented to person, place, and time.   Psychiatric:         Mood and Affect: Mood normal.         Behavior: Behavior normal.         Thought Content: Thought content normal.         Judgment: Judgment normal.           Assessment/Plan     Diagnoses and all orders for this visit:    1. Hypertension, unspecified type (Primary)  Comments:  stable  will increase losart to 100mg to get closer to 120/80  pt to send home readings in 1 wk  f/u 3 mo  Orders:  -     losartan (Cozaar) 100 MG tablet; Take 1 tablet by mouth Daily.  Dispense: 90 tablet; Refill: 1

## 2022-02-14 ENCOUNTER — OFFICE VISIT (OUTPATIENT)
Dept: FAMILY MEDICINE CLINIC | Facility: CLINIC | Age: 62
End: 2022-02-14

## 2022-02-14 ENCOUNTER — TELEPHONE (OUTPATIENT)
Dept: FAMILY MEDICINE CLINIC | Facility: CLINIC | Age: 62
End: 2022-02-14

## 2022-02-14 VITALS
SYSTOLIC BLOOD PRESSURE: 140 MMHG | WEIGHT: 213 LBS | HEART RATE: 106 BPM | BODY MASS INDEX: 35.45 KG/M2 | OXYGEN SATURATION: 94 % | DIASTOLIC BLOOD PRESSURE: 81 MMHG | TEMPERATURE: 98 F

## 2022-02-14 DIAGNOSIS — M79.641 PAIN IN BOTH HANDS: Primary | ICD-10-CM

## 2022-02-14 DIAGNOSIS — M79.642 PAIN IN BOTH HANDS: Primary | ICD-10-CM

## 2022-02-14 DIAGNOSIS — I10 HYPERTENSION, UNSPECIFIED TYPE: Primary | ICD-10-CM

## 2022-02-14 PROCEDURE — 99213 OFFICE O/P EST LOW 20 MIN: CPT | Performed by: NURSE PRACTITIONER

## 2022-02-14 RX ORDER — LOSARTAN POTASSIUM 100 MG/1
100 TABLET ORAL DAILY
Qty: 90 TABLET | Refills: 1 | Status: SHIPPED | OUTPATIENT
Start: 2022-02-14 | End: 2022-06-16 | Stop reason: SDUPTHER

## 2022-02-14 NOTE — TELEPHONE ENCOUNTER
I literally just saw patient and she mentioned nothing of this. Can you please verify that it was her? She said she was doing great

## 2022-02-14 NOTE — TELEPHONE ENCOUNTER
Caller: Carli Dubon    Relationship: Self    Best call back number:[phone]    What is the medical concern/diagnosis: PAIN AND SWELLING IN HANDS, SOMETIMES AT NIGHT CANNOT CLOSE THEM, WILL HAVE TROUBLE MAKING FINE MOTOR MOVEMENTS SUCH AS OPENING A BOTTLE OF WATER, ON GOING SEVERAL MONTHS     What specialty or service is being requested: SPECIALIST THAT CAN HELP WITH HER HANDS     What is the provider, practice or medical service name: [facility]ILLON [facility]ARTINE[facility] WOULD RECOMMEND     What is the office location:     What is the office phone number:     Any additional details:

## 2022-02-16 ENCOUNTER — TREATMENT (OUTPATIENT)
Dept: PHYSICAL THERAPY | Facility: CLINIC | Age: 62
End: 2022-02-16

## 2022-02-16 DIAGNOSIS — M25.561 CHRONIC PAIN OF BOTH KNEES: ICD-10-CM

## 2022-02-16 DIAGNOSIS — M25.552 BILATERAL HIP PAIN: Primary | ICD-10-CM

## 2022-02-16 DIAGNOSIS — G89.29 CHRONIC PAIN OF BOTH KNEES: ICD-10-CM

## 2022-02-16 DIAGNOSIS — M25.551 BILATERAL HIP PAIN: Primary | ICD-10-CM

## 2022-02-16 DIAGNOSIS — M25.562 CHRONIC PAIN OF BOTH KNEES: ICD-10-CM

## 2022-02-16 PROCEDURE — 97162 PT EVAL MOD COMPLEX 30 MIN: CPT | Performed by: PHYSICAL THERAPIST

## 2022-02-20 RX ORDER — DIVALPROEX SODIUM 500 MG/1
TABLET, EXTENDED RELEASE ORAL
Qty: 180 TABLET | Refills: 1 | Status: SHIPPED | OUTPATIENT
Start: 2022-02-20 | End: 2022-07-25

## 2022-02-22 ENCOUNTER — HOSPITAL ENCOUNTER (OUTPATIENT)
Dept: MAMMOGRAPHY | Facility: HOSPITAL | Age: 62
Discharge: HOME | End: 2022-02-22
Admitting: NURSE PRACTITIONER

## 2022-02-22 DIAGNOSIS — R92.8 ABNORMAL MAMMOGRAM: Primary | ICD-10-CM

## 2022-02-22 DIAGNOSIS — Z12.31 BREAST CANCER SCREENING BY MAMMOGRAM: ICD-10-CM

## 2022-02-22 PROCEDURE — 77067 SCR MAMMO BI INCL CAD: CPT

## 2022-02-22 PROCEDURE — 77063 BREAST TOMOSYNTHESIS BI: CPT

## 2022-02-25 ENCOUNTER — HOSPITAL ENCOUNTER (OUTPATIENT)
Dept: MAMMOGRAPHY | Facility: HOSPITAL | Age: 62
Discharge: HOME | End: 2022-02-25
Admitting: NURSE PRACTITIONER

## 2022-02-25 DIAGNOSIS — R92.8 ABNORMAL MAMMOGRAM: ICD-10-CM

## 2022-02-25 PROCEDURE — G0279 TOMOSYNTHESIS, MAMMO: HCPCS

## 2022-02-25 PROCEDURE — 77065 DX MAMMO INCL CAD UNI: CPT

## 2022-02-28 ENCOUNTER — TREATMENT (OUTPATIENT)
Dept: PHYSICAL THERAPY | Facility: CLINIC | Age: 62
End: 2022-02-28

## 2022-02-28 DIAGNOSIS — G89.29 CHRONIC PAIN OF BOTH KNEES: ICD-10-CM

## 2022-02-28 DIAGNOSIS — M25.551 BILATERAL HIP PAIN: Primary | ICD-10-CM

## 2022-02-28 DIAGNOSIS — M25.561 CHRONIC PAIN OF BOTH KNEES: ICD-10-CM

## 2022-02-28 DIAGNOSIS — M25.562 CHRONIC PAIN OF BOTH KNEES: ICD-10-CM

## 2022-02-28 DIAGNOSIS — M25.552 BILATERAL HIP PAIN: Primary | ICD-10-CM

## 2022-02-28 PROCEDURE — 97112 NEUROMUSCULAR REEDUCATION: CPT | Performed by: PHYSICAL THERAPIST

## 2022-02-28 PROCEDURE — 97110 THERAPEUTIC EXERCISES: CPT | Performed by: PHYSICAL THERAPIST

## 2022-02-28 NOTE — PROGRESS NOTES
"Physical Therapy Daily Progress Note    Patient:  Carli Dubon  :  1960  Referring practitioner:  Ace Hale MD  Date of Initial Visit:  Type: THERAPY  Noted: 2022  Today's Date:  2022      Visit Diagnoses:    ICD-10-CM ICD-9-CM   1. Bilateral hip pain  M25.551 719.45    M25.552    2. Chronic pain of both knees  M25.561 719.46    M25.562 338.29    G89.29        VISIT#:  2 in POC.    Subjective   Carli Dubon reports she felt better after her initial evaluation last visit.  She did not complete her HEP per busyness with living with her daughter and all of her young grandchildren.  Her R knee is \"hurting a little\" at the start of her session today.      Objective   See Exercise, Manual, and Modality Logs for complete treatment.       ASSESSMENT  Pt tolerated new and progression of exercises / activities without problems.  Cues as noted.  Fatigued with exercises; required rest breaks throughout.  She had a difficult time differentiating between pain and muscle fatigue/burn with exercising the muscle.  Requires frequent re-directing throughout session to remind pt of hold durations and to correct amount of sets/reps.  No complications today.      PLAN  Continue current POC and progress as tolerated per PT evaluation.        Timed:  Therapeutic Exercise:    26     mins  [id number];     Neuromuscular Shelbi:    14    mins  [id number];          Timed Treatment:   40   mins   Total Treatment:     40   mins      Edwin Jefferson, PT  [place] License # [id number]  Physical Therapist  "

## 2022-03-03 ENCOUNTER — OFFICE VISIT (OUTPATIENT)
Dept: PULMONOLOGY | Facility: HOSPITAL | Age: 62
End: 2022-03-03

## 2022-03-03 VITALS
OXYGEN SATURATION: 96 % | RESPIRATION RATE: 12 BRPM | SYSTOLIC BLOOD PRESSURE: 151 MMHG | WEIGHT: 213 LBS | DIASTOLIC BLOOD PRESSURE: 92 MMHG | BODY MASS INDEX: 35.49 KG/M2 | HEIGHT: 65 IN | HEART RATE: 101 BPM

## 2022-03-03 DIAGNOSIS — G47.33 OBSTRUCTIVE SLEEP APNEA: ICD-10-CM

## 2022-03-03 DIAGNOSIS — U07.1 COVID-19: ICD-10-CM

## 2022-03-03 DIAGNOSIS — J43.1 PANLOBULAR EMPHYSEMA: Primary | ICD-10-CM

## 2022-03-03 DIAGNOSIS — I10 ESSENTIAL HYPERTENSION: ICD-10-CM

## 2022-03-03 DIAGNOSIS — Z01.812 PRE-PROCEDURE LAB EXAM: Primary | ICD-10-CM

## 2022-03-03 PROBLEM — F17.200 TOBACCO DEPENDENCE SYNDROME: Status: ACTIVE | Noted: 2018-07-09

## 2022-03-03 PROBLEM — E11.65 HYPERGLYCEMIA DUE TO TYPE 2 DIABETES MELLITUS: Status: ACTIVE | Noted: 2018-07-09

## 2022-03-03 PROCEDURE — G0463 HOSPITAL OUTPT CLINIC VISIT: HCPCS

## 2022-03-03 RX ORDER — NICOTINE 21 MG/24HR
1 PATCH, TRANSDERMAL 24 HOURS TRANSDERMAL EVERY 24 HOURS
Qty: 30 PATCH | Refills: 11 | Status: SHIPPED | OUTPATIENT
Start: 2022-03-03

## 2022-03-03 NOTE — PROGRESS NOTES
SLEEP/PULMONARY  CLINIC NOTE      PATIENT IDENTIFICATION:  Name: Carli Dubon  Age: 61 y.o.  Sex: female  :  1960  MRN: [id number]    DATE OF CONSULTATION:  3/3/2022                     CHIEF COMPLAINT: Shortness of breath    History of Present Illness:   Carli Dubon is a 61 y.o. female lady with history of heavy smoking and developing worsening shortness of breath dyspnea exertion coughing dry no hemoptysis no fever no chills she has a CAT scan was done did not show any lung nodule  Patient is still smoking  Pt with still multiple wakening up at night with sleepiness fatigue and snoring, witnessed apnea, Hard  to get up in the morning. Daytime fatigue sleepiness loss of energy, Hyannis score of (12 )         Review of Systems:   Constitutional:  As above   Eyes: negative   ENT/oropharynx: negative   Cardiovascular: negative   Respiratory:  As above   Gastrointestinal: negative   Genitourinary: negative   Neurological: negative   Musculoskeletal: negative   Integument/breast: negative   Endocrine: negative   Allergic/Immunologic: negative     Past Medical History:  Past Medical History:   Diagnosis Date   • Bipolar 1 disorder (HCC)    • Chronic obstructive lung disease (HCC) 2015   • COVID-19 8/15/2021   • Diabetes mellitus (HCC)    • Hypertension    • Mixed hyperlipidemia 3/3/2020   • PTSD (post-traumatic stress disorder)    • Tobacco abuse 6/15/2020       Past Surgical History:  History reviewed. No pertinent surgical history.     Family History:  Family History   Problem Relation Age of Onset   • Hypertension Mother    • Diabetes Mother         Social History:   Social History     Tobacco Use   • Smoking status: Current Every Day Smoker     Packs/day: 1.00     Years: 30.00     Pack years: 30.00     Types: Cigarettes   • Smokeless tobacco: Never Used   Substance Use Topics   • Alcohol use: Not Currently        Allergies:  Allergies   Allergen Reactions   • Iodine Swelling   • Adhesive Tape Rash        Home Meds:  (Not in a hospital admission)      Objective:    Vitals Ranges:   Heart Rate:  [101] 101  Resp:  [12] 12  BP: (151)/(92) 151/92  Body mass index is 35.45 kg/m².     Exam:  General Appearance:  WDWN    HEENT:   without obvious abnormality,  Conjunctiva/corneas clear,  Normal external ear canals, no drainage    Clear orsalmucosa,  Mallampati score 3    Neck:  Supple, symmetrical, trachea midline. No JVD.  Lungs:   Bilateral basal rhonchi bilaterally, respirations unlabored symmetrical wall movement.    Chest wall:  No tenderness or deformity.    Heart:  Regular rate and rhythm, S1 and S2 normal.  Extremities: Trace edema no clubbing or Cyanosis        Data Review:  All labs (24hrs): No results found for this or any previous visit (from the past 24 hour(s)).     Imaging:  Mammo Diagnostic Digital Tomosynthesis Left With CAD  Narrative: MAMMO DIAGNOSTIC DIGITAL TOMOSYNTHESIS LEFT W CAD-     Date of Exam: 2/25/2022 12:25 PM     Indication: 61-year-old woman called back from screening mammogram for  left breast calcifications.      Comparison: 02/22/2022, 02/19/2021, 02/18/2020, 07/19/2018.     Technique: Left. diagnostic mammogram was performed utilizing  tomosynthesis.       These mammographic images were interpreted with the assistance of a  computer aided detection system.      FINDINGS:  There are scattered areas of fibroglandular density.     Group of punctate and amorphous calcifications in the outer, slightly  upper left breast, posterior depth.     Impression: Recommend stereotactic biopsy of left breast calcifications.     I discussed results with the patient following the exam. She will be  scheduled for biopsy, and our staff will notify the ordering clinician's  office.     BI-RADS ASSESSMENT: BI-RADS 4. Suspicious abnormality.        The patient's information is entered into a computerized reminder system  with a targeted due date for the next mammogram.     Note:  It has been reported that  there is approximately a 15% false  negative in mammography.  Therefore, management of a palpable  abnormality should not be deferred because of a negative mammogram.                             Electronically Signed By-Swathi Griffith MD On:2/25/2022 1:00 PM  This report was finalized on [id number] by  Swathi Griffith MD.       ASSESSMENT:  Diagnoses and all orders for this visit:    Panlobular emphysema (HCC)  -     Pulmonary Function Test; Future    COVID-19  -     Pulmonary Function Test; Future    Essential hypertension    Obstructive sleep apnea  -     Polysomnography 4 or More Parameters; Future    Other orders  -     nicotine (Nicotine Step 1) 21 MG/24HR patch; Place 1 patch on the skin as directed by provider Daily.        PLAN:   This is patient with symptoms of obstructive sleep apnea, NPSG study ASAP / split night study, Avoid supine avoid sedative meds in pm, weight loss, Avoid driving. Long discussion with patient about the physiology of NEVIN, and long term and short term   benefit of treating NEVIN     Education patient  About tips for  smoking cessation and risk factors and benefit, was to a discussion with the patient.     Bronchodilator inhaled corticosteroid    Education how to use inhalers    Encouraged to use incentive spirometer    Continue to exercise slowly as tolerated    Monitor for any change in the color of the sputum    Avoid any exposure to fumes, gas or any irritant        Follow-up 3 months    Ryan Bingham MD. D, ABSM.  3/3/2022  15:08 EST

## 2022-03-04 ENCOUNTER — TREATMENT (OUTPATIENT)
Dept: PHYSICAL THERAPY | Facility: CLINIC | Age: 62
End: 2022-03-04

## 2022-03-04 DIAGNOSIS — M25.551 BILATERAL HIP PAIN: Primary | ICD-10-CM

## 2022-03-04 DIAGNOSIS — M25.552 BILATERAL HIP PAIN: Primary | ICD-10-CM

## 2022-03-04 PROCEDURE — 97110 THERAPEUTIC EXERCISES: CPT | Performed by: PHYSICAL THERAPIST

## 2022-03-04 PROCEDURE — 97112 NEUROMUSCULAR REEDUCATION: CPT | Performed by: PHYSICAL THERAPIST

## 2022-03-04 NOTE — PROGRESS NOTES
Physical Therapy Daily Progress Note      Patient: Carli Dubon   : 1960  Diagnosis/ICD-10 Code:  Bilateral hip pain [M25.551, M25.552]  Referring practitioner: Ace Hale MD  Date of Initial Visit: 3/4/2022  Today's Date: 3/4/2022  Patient seen for 3 sessions.  POC 2x/wk x 12 weeks, exp. 22  Insurance 2/10, exp. 22    PMH: DM, arthritis, asthma    VISIT#: 3      Subjective:  Pain low back/upper buttocks 9/10.   She did fine after previous session. She states she is doing HEP.      Objective     See Exercise, Manual, and Modality Logs for complete treatment.  Added NuStep.       Patient Education:      Assessment/Plan:  Max cueing for performance of there ex correctly, with good pace and to avoid holding breath. Significant weakness B quadriceps/hamstrings.  Patient rating pain extremely high although, able to transition movement without appearance of discomfort.     Progress per Plan of Care:  Monitor response, continue as tolerated.             Timed:         Manual Therapy:         mins  01384;     Therapeutic Exercise:   30      mins  23326;     Neuromuscular Shelbi:  10      mins  21327;    Therapeutic Activity:          mins  63766;     Gait Training:           mins  12738;     Ultrasound:          mins  99345;    Ionto                                   mins   68801  Self Care                            mins   20411  Aquatic                               mins 76338    Un-Timed:  Electrical Stimulation:         mins  16305 ( );  Traction          mins 76769      Timed Treatment:      mins   Total Treatment:        mins    Roxann Tejada PTA  Physical Therapist Assistant   [place] license:  #[id number]

## 2022-03-08 ENCOUNTER — HOSPITAL ENCOUNTER (OUTPATIENT)
Dept: MAMMOGRAPHY | Facility: HOSPITAL | Age: 62
Discharge: HOME | End: 2022-03-08

## 2022-03-08 ENCOUNTER — TREATMENT (OUTPATIENT)
Dept: PHYSICAL THERAPY | Facility: CLINIC | Age: 62
End: 2022-03-08

## 2022-03-08 DIAGNOSIS — R92.1 BREAST CALCIFICATION, LEFT: ICD-10-CM

## 2022-03-08 DIAGNOSIS — R92.8 ABNORMAL MAMMOGRAM OF LEFT BREAST: ICD-10-CM

## 2022-03-08 DIAGNOSIS — M25.561 CHRONIC PAIN OF BOTH KNEES: ICD-10-CM

## 2022-03-08 DIAGNOSIS — G89.29 CHRONIC PAIN OF BOTH KNEES: ICD-10-CM

## 2022-03-08 DIAGNOSIS — M25.551 BILATERAL HIP PAIN: Primary | ICD-10-CM

## 2022-03-08 DIAGNOSIS — M25.552 BILATERAL HIP PAIN: Primary | ICD-10-CM

## 2022-03-08 DIAGNOSIS — M25.562 CHRONIC PAIN OF BOTH KNEES: ICD-10-CM

## 2022-03-08 PROCEDURE — 97112 NEUROMUSCULAR REEDUCATION: CPT | Performed by: PHYSICAL THERAPIST

## 2022-03-08 PROCEDURE — 76098 X-RAY EXAM SURGICAL SPECIMEN: CPT

## 2022-03-08 PROCEDURE — 0 LIDOCAINE 1 % SOLUTION: Performed by: NURSE PRACTITIONER

## 2022-03-08 PROCEDURE — A4648 IMPLANTABLE TISSUE MARKER: HCPCS

## 2022-03-08 PROCEDURE — 88305 TISSUE EXAM BY PATHOLOGIST: CPT | Performed by: NURSE PRACTITIONER

## 2022-03-08 PROCEDURE — 97110 THERAPEUTIC EXERCISES: CPT | Performed by: PHYSICAL THERAPIST

## 2022-03-08 RX ORDER — LIDOCAINE HYDROCHLORIDE 10 MG/ML
3 INJECTION, SOLUTION INFILTRATION; PERINEURAL ONCE
Status: COMPLETED | OUTPATIENT
Start: 2022-03-08 | End: 2022-03-08

## 2022-03-08 RX ORDER — LIDOCAINE HYDROCHLORIDE AND EPINEPHRINE BITARTRATE 20; .01 MG/ML; MG/ML
10 INJECTION, SOLUTION SUBCUTANEOUS ONCE
Status: COMPLETED | OUTPATIENT
Start: 2022-03-08 | End: 2022-03-08

## 2022-03-08 RX ADMIN — Medication 3 ML: at 13:18

## 2022-03-08 RX ADMIN — LIDOCAINE HYDROCHLORIDE AND EPINEPHRINE 10 ML: 20; 10 INJECTION, SOLUTION INFILTRATION; PERINEURAL at 13:18

## 2022-03-08 NOTE — PROGRESS NOTES
"Physical Therapy Daily Progress Note      Patient: Carli Dubon   : 1960  Diagnosis/ICD-10 Code:  Bilateral hip pain [M25.551, M25.552]  Referring practitioner: Ace Hale MD  Date of Initial Visit: 3/8/2022  Today's Date: 3/8/2022  Patient seen for 4 sessions.  POC 2x/wk x 12 weeks, exp. 22  Insurance 3/10, exp. 22    PMH: DM, arthritis, asthma    VISIT#: 4      Subjective:  Pain continues with low back/ buttocks to hips 7/10.  No pain B knees. She did fine after previous session.       Objective     See Exercise, Manual, and Modality Logs for complete treatment.  Progression as noted.      Patient Education:  Hep progressed and issued.  See chart.       Exercises  Supine Posterior Pelvic Tilt - 2 x daily - 7 x weekly - 2 sets - 10 reps - 5 sec. hold  Supine Hip Adduction Isometric with Ball - 2 x daily - 7 x weekly - 2 sets - 10 reps - 5\" hold  Clamshell - 2 x daily - 7 x weekly - 1 sets - 10 reps      Assessment/Plan: Pt. Requires cueing to decrease pace of exercise, avoid valsalva and for proper technique, especially with posterior pelvic tilts.   She appears to be responding well to PT without significant increase in pain.       Progress per Plan of Care:  Monitor response, continue as tolerated.             Timed:         Manual Therapy:         mins  04215;     Therapeutic Exercise:   30      mins  21775;     Neuromuscular Shelbi:  10      mins  69730;    Therapeutic Activity:          mins  55411;     Gait Training:           mins  44282;     Ultrasound:          mins  21215;    Ionto                                   mins   36382  Self Care                            mins   31119  Aquatic                               mins 71636    Un-Timed:  Electrical Stimulation:         mins  32674 ( );  Traction          mins 24046      Timed Treatment:   40   mins   Total Treatment:     40   mins    Roxann Tejada PTA  Physical Therapist Assistant   [place] license:  #[id number]  "

## 2022-03-09 ENCOUNTER — TELEPHONE (OUTPATIENT)
Dept: MAMMOGRAPHY | Facility: HOSPITAL | Age: 62
End: 2022-03-09

## 2022-03-09 LAB
LAB AP CASE REPORT: NORMAL
LAB AP DIAGNOSIS COMMENT: NORMAL
PATH REPORT.FINAL DX SPEC: NORMAL
PATH REPORT.GROSS SPEC: NORMAL

## 2022-03-09 NOTE — TELEPHONE ENCOUNTER
Follow up call after left breast stereo bx on 3/8/22.  Carli was just a little sore but doing well. No issues with the tegaderm .

## 2022-03-11 ENCOUNTER — TREATMENT (OUTPATIENT)
Dept: PHYSICAL THERAPY | Facility: CLINIC | Age: 62
End: 2022-03-11

## 2022-03-11 DIAGNOSIS — M25.552 BILATERAL HIP PAIN: Primary | ICD-10-CM

## 2022-03-11 DIAGNOSIS — G89.29 CHRONIC PAIN OF BOTH KNEES: ICD-10-CM

## 2022-03-11 DIAGNOSIS — M25.561 CHRONIC PAIN OF BOTH KNEES: ICD-10-CM

## 2022-03-11 DIAGNOSIS — M25.551 BILATERAL HIP PAIN: Primary | ICD-10-CM

## 2022-03-11 DIAGNOSIS — M25.562 CHRONIC PAIN OF BOTH KNEES: ICD-10-CM

## 2022-03-11 PROCEDURE — 97110 THERAPEUTIC EXERCISES: CPT | Performed by: PHYSICAL THERAPIST

## 2022-03-11 PROCEDURE — 97112 NEUROMUSCULAR REEDUCATION: CPT | Performed by: PHYSICAL THERAPIST

## 2022-03-11 PROCEDURE — 97530 THERAPEUTIC ACTIVITIES: CPT | Performed by: PHYSICAL THERAPIST

## 2022-03-11 NOTE — PROGRESS NOTES
Physical Therapy Daily Progress Note    Patient: Carli Dubon  : 1960  Referring practitioner: Ace Hale MD  Today's Date: 3/11/2022    VISIT#: 5    Subjective   Pt reports: doing a little better. R knee popped when she was coming out of her house this afternoon, it hurt for a while afterwards. Doing HEP.       Objective     See Exercise, Manual, and Modality Logs for complete treatment.     Patient Education:    Assessment & Plan     Assessment    Assessment details: Pt tolerated today's rx session well. Still requires vcs for technique and hold time. Subjective improvement is reported.           Progress per Plan of Care            Timed:         Manual Therapy:         mins  71916;     Therapeutic Exercise:   20      mins  02920;     Neuromuscular Shelbi:  10      mins  71231;    Therapeutic Activity:    10      mins  07159;     Gait Training:           mins  64837;     Ultrasound:          mins  47335;    Ionto                                   mins   82852  Self Care                            mins   05785  Canal repositioning           mins    50394    Un-Timed:  Electrical Stimulation:         mins  39416 ( );  Traction          mins 64114  Low Eval          Mins  25422  Mod Eval          Mins  62426  High Eval                            Mins  41452  Re-Eval                               mins  15540    Timed Treatment:  40    mins   Total Treatment:    40    mins    Ayo Gilmore, PT, CLT  Physical Therapist  [place] License:  # [id number]

## 2022-03-15 DIAGNOSIS — E11.9 TYPE 2 DIABETES MELLITUS WITHOUT COMPLICATION, WITHOUT LONG-TERM CURRENT USE OF INSULIN: ICD-10-CM

## 2022-03-15 RX ORDER — EMPAGLIFLOZIN 25 MG/1
TABLET, FILM COATED ORAL
Qty: 30 TABLET | Refills: 0 | Status: SHIPPED | OUTPATIENT
Start: 2022-03-15 | End: 2022-04-22 | Stop reason: SDUPTHER

## 2022-03-22 ENCOUNTER — TELEPHONE (OUTPATIENT)
Dept: FAMILY MEDICINE CLINIC | Facility: CLINIC | Age: 62
End: 2022-03-22

## 2022-03-22 NOTE — TELEPHONE ENCOUNTER
I dont believe the patient has ever actually been seen in our office for this. She has just called asking for referral. I dont have documentation I can send them. I am not sure that this is emergent. She can either schedule an appt or call them to see if they get cancellization.

## 2022-03-23 ENCOUNTER — HOSPITAL ENCOUNTER (OUTPATIENT)
Dept: SLEEP MEDICINE | Facility: HOSPITAL | Age: 62
Discharge: HOME | End: 2022-03-23
Admitting: INTERNAL MEDICINE

## 2022-03-23 VITALS — WEIGHT: 213 LBS | HEIGHT: 65 IN | BODY MASS INDEX: 35.49 KG/M2

## 2022-03-23 DIAGNOSIS — G47.33 OBSTRUCTIVE SLEEP APNEA: ICD-10-CM

## 2022-03-23 PROCEDURE — 95810 POLYSOM 6/> YRS 4/> PARAM: CPT

## 2022-03-28 ENCOUNTER — TELEPHONE (OUTPATIENT)
Dept: FAMILY MEDICINE CLINIC | Facility: CLINIC | Age: 62
End: 2022-03-28

## 2022-03-28 NOTE — TELEPHONE ENCOUNTER
Caller: Carli Dubon    Relationship: Self    Best call back number: [phone]      What was the call regarding: BOTH HANDS ARE HURTING- CANNOT BEND HER HANDS.  REQUESTING REFERRAL    Do you require a callback: YES

## 2022-03-29 ENCOUNTER — TELEPHONE (OUTPATIENT)
Dept: FAMILY MEDICINE CLINIC | Facility: CLINIC | Age: 62
End: 2022-03-29

## 2022-04-06 ENCOUNTER — TREATMENT (OUTPATIENT)
Dept: PHYSICAL THERAPY | Facility: CLINIC | Age: 62
End: 2022-04-06

## 2022-04-06 DIAGNOSIS — M25.562 CHRONIC PAIN OF BOTH KNEES: ICD-10-CM

## 2022-04-06 DIAGNOSIS — M25.551 BILATERAL HIP PAIN: Primary | ICD-10-CM

## 2022-04-06 DIAGNOSIS — G89.29 CHRONIC PAIN OF BOTH KNEES: ICD-10-CM

## 2022-04-06 DIAGNOSIS — M25.552 BILATERAL HIP PAIN: Primary | ICD-10-CM

## 2022-04-06 DIAGNOSIS — M25.561 CHRONIC PAIN OF BOTH KNEES: ICD-10-CM

## 2022-04-06 PROCEDURE — 97530 THERAPEUTIC ACTIVITIES: CPT | Performed by: PHYSICAL THERAPIST

## 2022-04-06 PROCEDURE — 97110 THERAPEUTIC EXERCISES: CPT | Performed by: PHYSICAL THERAPIST

## 2022-04-06 PROCEDURE — 97112 NEUROMUSCULAR REEDUCATION: CPT | Performed by: PHYSICAL THERAPIST

## 2022-04-06 NOTE — PROGRESS NOTES
Physical Therapy  Progress Note ( 30 day pn)    Patient: Carli Dubon  : 1960  Referring practitioner: Ace Hale MD  Today's Date: 2022    VISIT#: 6  5/10 visits  Exp: 22    Subjective   Pt reports: she hasn't been to therapy because we didn't have any late appts for several weeks. Reports she is doing just a little better. Has not been able to do her HEP due to taking care of her grand children. Her daughter has been sick. Rates her pain 7/10 presently.       Objective          Active Range of Motion   Left Hip   Flexion: 90 degrees with pain    Right Hip   Flexion: 90 degrees with pain  Left Knee   Flexion: 120 degrees   Extension: 0 degrees     Right Knee   Flexion: 126 degrees   Extension: 0 degrees     Additional Active Range of Motion Details  IR/ ER with mod limitation and painful, R worse than L   Has pain in R and L hip        See Exercise, Manual, and Modality Logs for complete treatment.     Patient Education:    Assessment & Plan     Assessment    Assessment details: Pt is referred to therapy due to pain in both hips and knees.  She has been seen for 6 visits.  Pt presents with impaired ROM/ strength/ dec tolerance to standing/ walking/ negotiating stairs. She has made improvement with inc B knee ROM.    Impairments affect performing normal / daily activities, house work, shopping and taking walks. Pt will benefit from continued skilled physical therapy to address impairments, reduce pain and maximize function.     STGs met so far    Goals  Plan Goals: STGs: in 4 weeks    1- Pt will tolerate  initial HEP and progression of her exercise program ( MET )  2- Pt will be I with initial HEP  3- Pt will report at least 35 % improvement and pain reduction  4-  Pt will have improved ROM B hips/ and knee to be able to get in/ out of the car and bath tub with less pain and difficulty  5- Pt will be able to walk for 15-20 min without inc pain     LTGs: by DC     1- Pt will report at  least 60% improvement and pain reduction  2- Pt will be I with final HEP and self management of her condition  3- LEFS score will be 40 % or less  indicating improved function, pain and symptom reduction  4- Pt will voice readiness for DC with I program   5- Pt will be able to walk through grocery store with min pain   6- Pt will be able to negotiate stairs with min pain      Plan  Therapy options: will be seen for skilled therapy services  Planned modality interventions: high voltage pulsed current (pain management) and ultrasound  Planned therapy interventions: functional ROM exercises, home exercise program, manual therapy, neuromuscular re-education, strengthening, therapeutic activities, postural training and balance/weight-bearing training  Frequency: 2x week  Duration in weeks: 12  Treatment plan discussed with: patient  Plan details: Current POC is still indicated          Progress per Plan of Care            Timed:         Manual Therapy:         mins  16788;     Therapeutic Exercise:  20       mins  44684;     Neuromuscular Shelbi: 10       mins  28886;    Therapeutic Activity:    15      mins  10699;     Gait Training:           mins  94047;     Ultrasound:          mins  09202;    Ionto                                   mins   85506  Self Care                            mins   25639  Canal repositioning           mins    52236    Un-Timed:  Electrical Stimulation:         mins  85750 ( );  Traction          mins 60902  Low Eval          Mins  43700  Mod Eval          Mins  79421  High Eval                            Mins  53817  Re-Eval                               mins  57149    Timed Treatment:   45   mins   Total Treatment:     45   mins    Ayo Gilmore PT, CLT  Physical Therapist  [place] License:  # [id number]

## 2022-04-08 ENCOUNTER — TREATMENT (OUTPATIENT)
Dept: PHYSICAL THERAPY | Facility: CLINIC | Age: 62
End: 2022-04-08

## 2022-04-08 ENCOUNTER — LAB (OUTPATIENT)
Dept: LAB | Facility: HOSPITAL | Age: 62
End: 2022-04-08

## 2022-04-08 DIAGNOSIS — M25.552 BILATERAL HIP PAIN: Primary | ICD-10-CM

## 2022-04-08 DIAGNOSIS — Z01.812 PRE-PROCEDURE LAB EXAM: ICD-10-CM

## 2022-04-08 DIAGNOSIS — M25.562 CHRONIC PAIN OF BOTH KNEES: ICD-10-CM

## 2022-04-08 DIAGNOSIS — M25.561 CHRONIC PAIN OF BOTH KNEES: ICD-10-CM

## 2022-04-08 DIAGNOSIS — M25.551 BILATERAL HIP PAIN: Primary | ICD-10-CM

## 2022-04-08 DIAGNOSIS — G89.29 CHRONIC PAIN OF BOTH KNEES: ICD-10-CM

## 2022-04-08 LAB — SARS-COV-2 ORF1AB RESP QL NAA+PROBE: NOT DETECTED

## 2022-04-08 PROCEDURE — 97112 NEUROMUSCULAR REEDUCATION: CPT | Performed by: PHYSICAL THERAPIST

## 2022-04-08 PROCEDURE — C9803 HOPD COVID-19 SPEC COLLECT: HCPCS

## 2022-04-08 PROCEDURE — 97014 ELECTRIC STIMULATION THERAPY: CPT | Performed by: PHYSICAL THERAPIST

## 2022-04-08 PROCEDURE — 97110 THERAPEUTIC EXERCISES: CPT | Performed by: PHYSICAL THERAPIST

## 2022-04-08 PROCEDURE — 97530 THERAPEUTIC ACTIVITIES: CPT | Performed by: PHYSICAL THERAPIST

## 2022-04-08 PROCEDURE — U0004 COV-19 TEST NON-CDC HGH THRU: HCPCS

## 2022-04-08 NOTE — PROGRESS NOTES
Physical Therapy Daily Progress Note    Patient: Carli Dubon  : 1960  Referring practitioner: Ace Hale MD  Today's Date: 2022    VISIT#: 7  6/10 visits   Exp: 21    Subjective   Pt reports: was really sore after last visit. Doing ok this afternoon, a little sore due to the weather. Rates pain 5/10 presently.       Objective     See Exercise, Manual, and Modality Logs for complete treatment. Trial of ES today for pain management.     Patient Education:    Assessment & Plan     Assessment    Assessment details: Good donita to today's rx session and progression of her ex program. Dec pain reported after ES and at conclusion of today's session.           Progress per Plan of Care            Timed:         Manual Therapy:         mins  98570;     Therapeutic Exercise:   20      mins  24231;     Neuromuscular Shelbi:  10      mins  15521;    Therapeutic Activity:    10      mins  21925;     Gait Training:           mins  48682;     Ultrasound:          mins  14628;    Ionto                                   mins   40632  Self Care                            mins   63784  Canal repositioning           mins    00366    Un-Timed:  Electrical Stimulation:   15      mins  61376 ( );  Traction          mins 83309  Low Eval          Mins  07697  Mod Eval          Mins  17001  High Eval                            Mins  18293  Re-Eval                               mins  61784    Timed Treatment:   40   mins   Total Treatment:    55    mins    Ayo Gilmore PT, CLT  Physical Therapist  [place] License:  # [id number]

## 2022-04-11 ENCOUNTER — HOSPITAL ENCOUNTER (OUTPATIENT)
Dept: RESPIRATORY THERAPY | Facility: HOSPITAL | Age: 62
Discharge: HOME | End: 2022-04-11
Admitting: INTERNAL MEDICINE

## 2022-04-11 DIAGNOSIS — J43.1 PANLOBULAR EMPHYSEMA: ICD-10-CM

## 2022-04-11 DIAGNOSIS — U07.1 COVID-19: ICD-10-CM

## 2022-04-11 PROCEDURE — 94060 EVALUATION OF WHEEZING: CPT

## 2022-04-11 PROCEDURE — 94729 DIFFUSING CAPACITY: CPT

## 2022-04-11 PROCEDURE — 94726 PLETHYSMOGRAPHY LUNG VOLUMES: CPT

## 2022-04-11 RX ORDER — ALBUTEROL SULFATE 90 UG/1
2 AEROSOL, METERED RESPIRATORY (INHALATION) ONCE
Status: COMPLETED | OUTPATIENT
Start: 2022-04-11 | End: 2022-04-11

## 2022-04-11 RX ADMIN — ALBUTEROL SULFATE 2 PUFF: 108 INHALANT RESPIRATORY (INHALATION) at 14:00

## 2022-04-12 ENCOUNTER — TREATMENT (OUTPATIENT)
Dept: PHYSICAL THERAPY | Facility: CLINIC | Age: 62
End: 2022-04-12

## 2022-04-12 DIAGNOSIS — M25.551 BILATERAL HIP PAIN: Primary | ICD-10-CM

## 2022-04-12 DIAGNOSIS — M25.552 BILATERAL HIP PAIN: Primary | ICD-10-CM

## 2022-04-12 DIAGNOSIS — M25.561 CHRONIC PAIN OF BOTH KNEES: ICD-10-CM

## 2022-04-12 DIAGNOSIS — M25.562 CHRONIC PAIN OF BOTH KNEES: ICD-10-CM

## 2022-04-12 DIAGNOSIS — G89.29 CHRONIC PAIN OF BOTH KNEES: ICD-10-CM

## 2022-04-12 PROCEDURE — 97110 THERAPEUTIC EXERCISES: CPT | Performed by: PHYSICAL THERAPIST

## 2022-04-12 PROCEDURE — 97530 THERAPEUTIC ACTIVITIES: CPT | Performed by: PHYSICAL THERAPIST

## 2022-04-12 PROCEDURE — 97112 NEUROMUSCULAR REEDUCATION: CPT | Performed by: PHYSICAL THERAPIST

## 2022-04-12 PROCEDURE — 97014 ELECTRIC STIMULATION THERAPY: CPT | Performed by: PHYSICAL THERAPIST

## 2022-04-12 NOTE — PROGRESS NOTES
Physical Therapy Daily Progress Note    Patient: Carli Dubon  : 1960  Referring practitioner: Ace Hlae MD  Today's Date: 2022    VISIT#: 8  7/10 visits   Exp: 21    Subjective   Pt reports: doing ok today, the ES helped last time it felt better for a while. Rates pain 5/10 presently.      Objective     See Exercise, Manual, and Modality Logs for complete treatment.     Patient Education:    Assessment & Plan     Assessment    Assessment details: Good donita to today's rx session . Good initial respond to ES. Responding well to therapy so far. Subjective improvement is reported.           Progress per Plan of Care            Timed:         Manual Therapy:         mins  86833;     Therapeutic Exercise:    20     mins  53640;     Neuromuscular Shelbi:  10      mins  73978;    Therapeutic Activity:    10      mins  09549;     Gait Training:           mins  22458;     Ultrasound:          mins  00548;    Ionto                                   mins   06253  Self Care                            mins   48521  Canal repositioning           mins    49097    Un-Timed:  Electrical Stimulation:    15     mins  23711 ( );  Traction          mins 50235  Low Eval          Mins  28169  Mod Eval          Mins  72160  High Eval                            Mins  92841  Re-Eval                               mins  78246    Timed Treatment:  40    mins   Total Treatment:    55    mins    Ayo Gilmore PT, CLT  Physical Therapist  [place] License:  # [id number]

## 2022-04-14 ENCOUNTER — TREATMENT (OUTPATIENT)
Dept: PHYSICAL THERAPY | Facility: CLINIC | Age: 62
End: 2022-04-14

## 2022-04-14 DIAGNOSIS — M25.552 BILATERAL HIP PAIN: Primary | ICD-10-CM

## 2022-04-14 DIAGNOSIS — M25.551 BILATERAL HIP PAIN: Primary | ICD-10-CM

## 2022-04-14 PROCEDURE — 97014 ELECTRIC STIMULATION THERAPY: CPT | Performed by: PHYSICAL THERAPIST

## 2022-04-14 PROCEDURE — 97110 THERAPEUTIC EXERCISES: CPT | Performed by: PHYSICAL THERAPIST

## 2022-04-14 PROCEDURE — 97112 NEUROMUSCULAR REEDUCATION: CPT | Performed by: PHYSICAL THERAPIST

## 2022-04-14 PROCEDURE — 97530 THERAPEUTIC ACTIVITIES: CPT | Performed by: PHYSICAL THERAPIST

## 2022-04-14 NOTE — PROGRESS NOTES
Physical Therapy Progress Note/ DC summary    Patient: Carli Dubon  : 1960  Referring practitioner: Ace Hale MD  Today's Date: 2022    VISIT#: 9  8/10 visits   Exp: 21    Subjective   Pt reports: pt is going to [place] for a while and wants to make today her last visit. Therapy has helped some but not much. Maybe about 10% improvement so far.       Objective          Active Range of Motion   Left Hip   Flexion: 90 degrees with pain    Right Hip   Flexion: 90 degrees with pain  Left Knee   Flexion: 120 degrees   Extension: 0 degrees     Right Knee   Flexion: 126 degrees   Extension: 0 degrees     Additional Active Range of Motion Details  IR/ ER with mod limitation and painful, R worse than L   Has pain in R and L hip        See Exercise, Manual, and Modality Logs for complete treatment.     Patient Education: Encouraged to continue with HEP    Assessment & Plan     Assessment    Assessment details: Pt is referred to therapy due to pain in both hips and knees.  She has been seen for 8 visits.  Pt presents with impaired ROM/ strength/ dec tolerance to standing/ walking/ negotiating stairs. She has made improvement with inc B knee ROM.    Impairments affect performing normal / daily activities, house work, shopping and taking walks. She has made some improvement but her hip ROM and pain level and functional limitations have not improved much.  She is going out of town for a couple of months therefore she will be DC for now to continue with her HEP.    2/5 STGs and 1/6 LTGs met  LEFS score: 29/80 ( 64%)  Improved was at 67% at eval    Goals  Plan Goals: Plan Goals: STGs: in 4 weeks    1- Pt will tolerate  initial HEP and progression of her exercise program ( MET )  2- Pt will be I with initial HEP ( MET )  3- Pt will report at least 35 % improvement and pain reduction  4-  Pt will have improved ROM B hips/ and knee to be able to get in/ out of the car and bath tub with less pain and  difficulty  5- Pt will be able to walk for 15-20 min without inc pain     LTGs: by DC     1- Pt will report at least 60% improvement and pain reduction  2- Pt will be I with final HEP and self management of her condition ( MET 4/14)  3- LEFS score will be 40 % or less  indicating improved function, pain and symptom reduction  4- Pt will voice readiness for DC with I program   5- Pt will be able to walk through grocery store with min pain   6- Pt will be able to negotiate stairs with min pain         Plan  Plan details: Will be DC to continue with HEP since she is going out of town and will be gone for a couple of months.                       Timed:         Manual Therapy:         mins  75370;     Therapeutic Exercise:   20      mins  57625;     Neuromuscular Shelbi: 10       mins  95332;    Therapeutic Activity:    10      mins  51677;     Gait Training:           mins  23609;     Ultrasound:          mins  56385;    Ionto                                   mins   61074  Self Care                            mins   92728  Canal repositioning           mins    74831    Un-Timed:  Electrical Stimulation:    15     mins  25618 ( );  Traction          mins 02767  Low Eval          Mins  20618  Mod Eval          Mins  66349  High Eval                            Mins  74531  Re-Eval                               mins  55386    Timed Treatment: 40     mins   Total Treatment:    55    mins    Ayo Gilmore, PT, CLT  Physical Therapist  [place] License:  # [id number]

## 2022-04-22 DIAGNOSIS — E11.9 TYPE 2 DIABETES MELLITUS WITHOUT COMPLICATION, WITHOUT LONG-TERM CURRENT USE OF INSULIN: ICD-10-CM

## 2022-04-22 NOTE — TELEPHONE ENCOUNTER
Caller: Carli Dubon    Relationship: Self    Best call back number: [phone]    Requested Prescriptions:   Requested Prescriptions     Pending Prescriptions Disp Refills   • empagliflozin (Jardiance) 25 MG tablet tablet 30 tablet 0     Sig: Take 1 tablet by mouth Daily.        Pharmacy where request should be sent: [facility] [address] [place] [address] [phone] [facility] [phone] FX     Additional details provided by patient: PATIENT IS OUT OF THIS MEDICATION    Does the patient have less than a 3 day supply:  [x] Yes  [] No    Shahnaz Franco Rep   04/22/22 14:47 EDT

## 2022-04-29 DIAGNOSIS — F51.05 INSOMNIA DUE TO MENTAL CONDITION: ICD-10-CM

## 2022-04-29 NOTE — TELEPHONE ENCOUNTER
Rx Refill Note  Requested Prescriptions     Pending Prescriptions Disp Refills   • zolpidem (AMBIEN) 10 MG tablet 30 tablet 2     Sig: Take 1 tablet by mouth At Night As Needed for Sleep.      Last office visit with prescribing clinician: 1/13/2022      Next office visit with prescribing clinician: 7/19/2022   Office Visit with Kitty Carlos PA-C (01/13/2022)           Aylin Schafer MA  04/29/22, 14:45 EDT     Inspect printed

## 2022-04-30 RX ORDER — ZOLPIDEM TARTRATE 10 MG/1
10 TABLET ORAL NIGHTLY PRN
Qty: 30 TABLET | Refills: 2 | Status: SHIPPED | OUTPATIENT
Start: 2022-04-30 | End: 2022-07-25 | Stop reason: SDUPTHER

## 2022-05-20 ENCOUNTER — TELEPHONE (OUTPATIENT)
Dept: ORTHOPEDIC SURGERY | Facility: CLINIC | Age: 62
End: 2022-05-20

## 2022-05-20 DIAGNOSIS — M25.562 CHRONIC PAIN OF BOTH KNEES: ICD-10-CM

## 2022-05-20 DIAGNOSIS — M25.561 CHRONIC PAIN OF BOTH KNEES: ICD-10-CM

## 2022-05-20 DIAGNOSIS — M25.552 BILATERAL HIP PAIN: ICD-10-CM

## 2022-05-20 DIAGNOSIS — G89.29 CHRONIC PAIN OF RIGHT KNEE: Primary | ICD-10-CM

## 2022-05-20 DIAGNOSIS — G89.29 CHRONIC PAIN OF BOTH KNEES: ICD-10-CM

## 2022-05-20 DIAGNOSIS — M25.561 CHRONIC PAIN OF RIGHT KNEE: Primary | ICD-10-CM

## 2022-05-20 DIAGNOSIS — M25.551 BILATERAL HIP PAIN: ICD-10-CM

## 2022-05-20 NOTE — TELEPHONE ENCOUNTER
----- Message from Carli Dubon sent at 5/20/2022  3:58 PM EDT -----  Regarding: Question regarding XR KNEE 3 VW RIGHT  I need a another referral for pysical therapy cause I had to go [place] for emergency I am back I need to start again thank you so much Carli Dubon

## 2022-06-08 DIAGNOSIS — G47.00 INSOMNIA, UNSPECIFIED TYPE: ICD-10-CM

## 2022-06-08 RX ORDER — AMITRIPTYLINE HYDROCHLORIDE 150 MG/1
TABLET, FILM COATED ORAL
Qty: 90 TABLET | Refills: 0 | Status: SHIPPED | OUTPATIENT
Start: 2022-06-08 | End: 2022-09-28 | Stop reason: SDUPTHER

## 2022-06-16 DIAGNOSIS — I10 HYPERTENSION, UNSPECIFIED TYPE: ICD-10-CM

## 2022-06-16 DIAGNOSIS — E11.9 TYPE 2 DIABETES MELLITUS WITHOUT COMPLICATION, WITHOUT LONG-TERM CURRENT USE OF INSULIN: ICD-10-CM

## 2022-06-16 RX ORDER — LOSARTAN POTASSIUM 50 MG/1
TABLET ORAL
Qty: 30 TABLET | Refills: 0 | OUTPATIENT
Start: 2022-06-16

## 2022-06-16 RX ORDER — LOSARTAN POTASSIUM 100 MG/1
100 TABLET ORAL DAILY
Qty: 90 TABLET | Refills: 1 | Status: SHIPPED | OUTPATIENT
Start: 2022-06-16 | End: 2022-06-17 | Stop reason: SDUPTHER

## 2022-06-16 NOTE — TELEPHONE ENCOUNTER
Caller: Carli Dubon    Relationship: Self    Best call back number: [phone]    Requested Prescriptions:   Requested Prescriptions     Pending Prescriptions Disp Refills   • empagliflozin (Jardiance) 25 MG tablet tablet 30 tablet 0     Sig: Take 1 tablet by mouth Daily.   • losartan (Cozaar) 100 MG tablet 90 tablet 1     Sig: Take 1 tablet by mouth Daily.        Pharmacy where request should be sent: [facility] [address] [place], [place] [address] [place], [place] [id number] [address]     Additional details provided by patient: PATIENT IS OUT OF THESE MEDICATIONS    Does the patient have less than a 3 day supply:  [x] Yes  [] No    Shahnaz Brown Rep   06/16/22 15:28 EDT

## 2022-06-17 DIAGNOSIS — I10 HYPERTENSION, UNSPECIFIED TYPE: ICD-10-CM

## 2022-06-17 RX ORDER — LOSARTAN POTASSIUM 100 MG/1
100 TABLET ORAL DAILY
Qty: 90 TABLET | Refills: 1 | Status: SHIPPED | OUTPATIENT
Start: 2022-06-17 | End: 2022-10-06

## 2022-06-17 NOTE — TELEPHONE ENCOUNTER
.    Caller: Carli Dubon    Relationship: Self    Best call back number: [phone]       Requested Prescriptions:   Requested Prescriptions     Pending Prescriptions Disp Refills   • losartan (Cozaar) 100 MG tablet 90 tablet 1     Sig: Take 1 tablet by mouth Daily.        Pharmacy where request should be sent: [facility] [address] [place] [address] [phone] [place] [phone] FX     Additional details provided by patient: PATIENT IS OUT OF MEDICATION     Does the patient have less than a 3 day supply:  [x] Yes  [] No    Shahnaz Daniel Rep   06/17/22 15:45 EDT

## 2022-06-21 ENCOUNTER — TELEPHONE (OUTPATIENT)
Dept: PODIATRY | Facility: CLINIC | Age: 62
End: 2022-06-21

## 2022-06-21 NOTE — TELEPHONE ENCOUNTER
Caller: Carli Dubon    Relationship to patient: Self    Best call back number:[phone]    Chief complaint: LEFT FOOT- BIT BY 5 RED ANTS WHILE TRAVELING - FOOT SWOLLEN - PATIENT IS DIABETIC    Type of visit: NEW PROBLEM    Requested date: ASAP         Additional notes:PATIENT WANTS TO BE SEEN BY DR BEATRIZ CALDERÓN- SHE IS CONCERNED ABOUT THE SWELLING OF HER FOOT AND BEING DIABETIC WITH THE MULTIPLE RED ANT BITES SHE GOT WHILE IN [place]- PLEASE CALL AND ADVISE PATIENT ON SCHEDULING. TY

## 2022-06-23 ENCOUNTER — OFFICE VISIT (OUTPATIENT)
Dept: PODIATRY | Facility: CLINIC | Age: 62
End: 2022-06-23

## 2022-06-23 ENCOUNTER — TELEPHONE (OUTPATIENT)
Dept: ORTHOPEDIC SURGERY | Facility: CLINIC | Age: 62
End: 2022-06-23

## 2022-06-23 VITALS
BODY MASS INDEX: 35.49 KG/M2 | HEART RATE: 111 BPM | WEIGHT: 213 LBS | DIASTOLIC BLOOD PRESSURE: 104 MMHG | HEIGHT: 65 IN | SYSTOLIC BLOOD PRESSURE: 167 MMHG

## 2022-06-23 DIAGNOSIS — M25.551 BILATERAL HIP PAIN: ICD-10-CM

## 2022-06-23 DIAGNOSIS — G89.29 CHRONIC PAIN OF RIGHT KNEE: Primary | ICD-10-CM

## 2022-06-23 DIAGNOSIS — M79.672 BILATERAL FOOT PAIN: Primary | ICD-10-CM

## 2022-06-23 DIAGNOSIS — M79.671 BILATERAL FOOT PAIN: Primary | ICD-10-CM

## 2022-06-23 DIAGNOSIS — G89.29 CHRONIC PAIN OF BOTH KNEES: ICD-10-CM

## 2022-06-23 DIAGNOSIS — M25.561 CHRONIC PAIN OF RIGHT KNEE: Primary | ICD-10-CM

## 2022-06-23 DIAGNOSIS — M19.071 ARTHRITIS OF BOTH FEET: ICD-10-CM

## 2022-06-23 DIAGNOSIS — I87.302 CHRONIC VENOUS HYPERTENSION WITHOUT COMPLICATIONS, LEFT: ICD-10-CM

## 2022-06-23 DIAGNOSIS — M21.6X2 EQUINUS DEFORMITY OF LEFT FOOT: ICD-10-CM

## 2022-06-23 DIAGNOSIS — M25.561 CHRONIC PAIN OF BOTH KNEES: ICD-10-CM

## 2022-06-23 DIAGNOSIS — M25.552 BILATERAL HIP PAIN: ICD-10-CM

## 2022-06-23 DIAGNOSIS — M19.072 ARTHRITIS OF BOTH FEET: ICD-10-CM

## 2022-06-23 DIAGNOSIS — M25.562 CHRONIC PAIN OF BOTH KNEES: ICD-10-CM

## 2022-06-23 DIAGNOSIS — E11.65 TYPE 2 DIABETES MELLITUS WITH HYPERGLYCEMIA, WITHOUT LONG-TERM CURRENT USE OF INSULIN: ICD-10-CM

## 2022-06-23 PROCEDURE — 99213 OFFICE O/P EST LOW 20 MIN: CPT | Performed by: PODIATRIST

## 2022-06-23 NOTE — PROGRESS NOTES
"06/23/2022  Foot and Ankle Surgery - Established Patient/Follow-up  Provider: Dr. Florentin Syed DPM  Location: [facility]    Subjective:  Carli Dubon is a 61 y.o. female.     Chief Complaint   Patient presents with   • Left Foot - Pain     Bit by fire ants   • Follow-up     Last pcp appt with ALEX Huddleston 3/24/2022       HPI: Carli Dubon is a 61-year-old female who presents to the office today for a new issue involving her left foot.     The patient was last seen in 02/2022. She returns to the office today due to issues involving being bitten by fire ants while in [place] approximately 1 month ago. She reports swelling in her left foot. She reports exacerbated swelling of her left foot in the afternoon. The patient localizes her pain to \"the side here.\"     The patient notes that she was previously attending physical therapy and states that she has to resume this. She is diabetic.     Allergies   Allergen Reactions   • Iodine Swelling   • Adhesive Tape Rash       Current Outpatient Medications on File Prior to Visit   Medication Sig Dispense Refill   • albuterol (ACCUNEB) 0.63 MG/3ML nebulizer solution Take 3 mL by nebulization Every 6 (Six) Hours As Needed for Wheezing. 30 each 2   • albuterol sulfate  (90 Base) MCG/ACT inhaler Inhale 2 puffs Every 4 (Four) Hours As Needed for Wheezing. 18 g 2   • amitriptyline (ELAVIL) 150 MG tablet TAKE 1 TABLET BY MOUTH ONCE DAILY AT NIGHT 90 tablet 0   • atorvastatin (Lipitor) 40 MG tablet Take 1 tablet by mouth Daily. 90 tablet 1   • budesonide-formoterol (Symbicort) 160-4.5 MCG/ACT inhaler Inhale 2 puffs 2 (Two) Times a Day. 6 g 12   • clonazePAM (KlonoPIN) 0.5 MG tablet Take 1 tablet by mouth 2 (Two) Times a Day. 60 tablet 2   • divalproex (DEPAKOTE ER) 500 MG 24 hr tablet TAKE 2 TABLETS BY MOUTH ONCE DAILY AT NIGHT 180 tablet 1   • empagliflozin (Jardiance) 25 MG tablet tablet Take 1 tablet by mouth Daily. 30 tablet 0   • famotidine (PEPCID) 40 MG " "tablet      • fluticasone (Flonase) 50 MCG/ACT nasal spray 2 sprays into the nostril(s) as directed by provider Daily. 9.9 mL 2   • glipizide (GLUCOTROL) 10 MG tablet Take 1 tablet by mouth 2 (Two) Times a Day Before Meals. 180 tablet 1   • losartan (Cozaar) 100 MG tablet Take 1 tablet by mouth Daily. 90 tablet 1   • metFORMIN (GLUCOPHAGE) 1000 MG tablet Take 1 tablet by mouth 2 (Two) Times a Day With Meals. 180 tablet 1   • nicotine (Nicotine Step 1) 21 MG/24HR patch Place 1 patch on the skin as directed by provider Daily. 30 patch 11   • nystatin-triamcinolone (MYCOLOG) 842321-6.1 UNIT/GM-% ointment      • omeprazole (priLOSEC) 20 MG capsule Take 1 capsule by mouth Daily. 30 capsule 0   • ondansetron ODT (ZOFRAN-ODT) 4 MG disintegrating tablet Place 1 tablet on the tongue Every 6 (Six) Hours As Needed for Nausea. 20 tablet 0   • promethazine-dextromethorphan (PROMETHAZINE-DM) 6.25-15 MG/5ML syrup Take 2.5 mL by mouth 4 (Four) Times a Day As Needed for Cough. 118 mL 0   • Tiotropium Bromide Monohydrate (Spiriva Respimat) 1.25 MCG/ACT aerosol solution inhaler Inhale 2 puffs Daily. 4 g 0   • zolpidem (AMBIEN) 10 MG tablet Take 1 tablet by mouth At Night As Needed for Sleep. 30 tablet 2     No current facility-administered medications on file prior to visit.       Objective   BP (!) 167/104   Pulse 111   Ht 165.1 cm (65\")   Wt 96.6 kg (213 lb)   BMI 35.45 kg/m²     Foot/Ankle Exam  General:   Appearance: appears stated age and healthy    Orientation: AAOx3    Affect: appropriate    Gait: unimpaired       VASCULAR       Right Foot Vascularity   Normal vascular exam    Dorsalis pedis:  2+  Posterior tibial:  2+  Skin Temperature: warm    Edema Grading:  None  CFT:  < 3 seconds  Pedal Hair Growth:  Present  Varicosities: none        Left Foot Vascularity   Normal vascular exam    Dorsalis pedis:  2+  Posterior tibial:  2+  Skin Temperature: warm    Edema Grading:  None  CFT:  < 3 seconds  Pedal Hair " Growth:  Present  Varicosities: none        NEUROLOGIC      Right Foot Neurologic   Light touch sensation:  Normal  Hot/Cold sensation: normal    Protective Sensation using [place] Monofilament:  10  Achilles reflex:  2+      Left Foot Neurologic   Light touch sensation:  Normal  Hot/cold sensation: normal    Protective Sensation using [place] Monofilament:  10  Achilles reflex:  2+      MUSCULOSKELETAL       Right Foot Musculoskeletal   Ecchymosis:  None  Tenderness: arch and dorsal foot    Arch:  Normal      Left Foot Musculoskeletal   Ecchymosis:  None  Tenderness: arch and dorsal foot    Arch:  Normal      MUSCLE STRENGTH      Right Foot Muscle Strength   Normal strength    Foot dorsiflexion:  5  Foot plantar flexion:  5  Foot inversion:  5  Foot eversion:  5      Left Foot Muscle Strength   Normal strength    Foot dorsiflexion:  5  Foot plantar flexion:  5  Foot inversion:  5  Foot eversion:  5      DERMATOLOGIC      Right Foot Dermatologic   Skin: skin intact        Left Foot Dermatologic   Skin:  No significant inflammation or signs of infection involving the left foot or ankle. No substantial swelling noted on exam.      TESTS      Right Foot Tests   Anterior drawer: negative    Varus tilt: negative    Heel raise: pain        Left Foot Tests   Anterior drawer: negative    Varus tilt: negative    Heel raise: pain      Assessment & Plan   Diagnoses and all orders for this visit:    1. Bilateral foot pain (Primary)    2. Chronic venous hypertension without complications, left    3. Equinus deformity of left foot  -     Ambulatory Referral to Physical Therapy Evaluate and treat    4. Arthritis of both feet    5. Type 2 diabetes mellitus with hyperglycemia, without long-term current use of insulin (HCC)        Carli Dubon is a 61-year-old female who presents to the office today for a new issue involving her left foot after being bitten by fire ants while in [place].  This issue was  approximately a month ago.  Today, she has no concerning features or latent effects from the bites.  She does complain of swelling that affects her left lower extremity.  I explained that this is likely just gravity and increased activity.  I have recommended that she consider a compression stocking on a daily basis.  Patient states that she will be restarting physical therapy in the near future.  Patient is to return to clinic as scheduled.  She is to call with any new issues.  Greater than 20 minutes was spent before, during, and after evaluation for patient care.    Orders Placed This Encounter   Procedures   • Ambulatory Referral to Physical Therapy Evaluate and treat     Referral Priority:   Routine     Referral Type:   Physical Therapy     Referral Reason:   Specialty Services Required     Requested Specialty:   Physical Therapy     Number of Visits Requested:   1          Note is dictated utilizing voice recognition software. Unfortunately this leads to occasional typographical errors. I apologize in advance if the situation occurs. If questions occur please do not hesitate to call our office.    Transcribed from ambient dictation for WIL Syed DPM by Brenda Jaeger.  06/23/22   14:05 EDT    Patient verbalized consent to the visit recording.

## 2022-06-23 NOTE — TELEPHONE ENCOUNTER
Caller: PATIENT     Relationship to patient: SELF     Best call back number: [phone]    Patient is needing: PATIENT IS REQUESTING A PHYSICAL THERAPY ORDER FOR BOTH HIPS AND BOTH KNEES. PLEASE CALL TO DISCUSS WITH PATIENT ABOUT WHERE TO SEND THIS.

## 2022-06-23 NOTE — TELEPHONE ENCOUNTER
PATIENT IS NEEDING A NEW ORDER TO EXTEND PT. SHE WAS ASKING TO HAVE IT SENT TO THE SAME PLACE SHE HAS IT NOW. PLEASE ADVISE.

## 2022-06-24 NOTE — TELEPHONE ENCOUNTER
PT order has been entered; left a message for the patient to see where she would like it sent to.

## 2022-07-11 ENCOUNTER — TREATMENT (OUTPATIENT)
Dept: PHYSICAL THERAPY | Facility: CLINIC | Age: 62
End: 2022-07-11

## 2022-07-11 DIAGNOSIS — M25.562 CHRONIC PAIN OF BOTH KNEES: ICD-10-CM

## 2022-07-11 DIAGNOSIS — M25.552 BILATERAL HIP PAIN: Primary | ICD-10-CM

## 2022-07-11 DIAGNOSIS — G89.29 CHRONIC PAIN OF BOTH KNEES: ICD-10-CM

## 2022-07-11 DIAGNOSIS — M25.551 BILATERAL HIP PAIN: Primary | ICD-10-CM

## 2022-07-11 DIAGNOSIS — M25.561 CHRONIC PAIN OF BOTH KNEES: ICD-10-CM

## 2022-07-11 PROCEDURE — 97162 PT EVAL MOD COMPLEX 30 MIN: CPT | Performed by: PHYSICAL THERAPIST

## 2022-07-11 NOTE — PROGRESS NOTES
Physical Therapy Initial Evaluation and Plan of Care    Patient: Carli Dubon   : 1960  Diagnosis/ICD-10 Code:  Bilateral hip pain [M25.551, M25.552]; Chronic pain of both knees [M25.561, M25.562, G89.29]    Referring practitioner: Ace Hale MD  Date of Initial Visit: 2022  Today's Date: 2022  Patient seen for 1 sessions           Subjective Questionnaire: LEFS: 15 (19% function);(79% disability); [place] Hip: 63% disability      Subjective Evaluation    History of Present Illness  Mechanism of injury: Reports no injury just wear and tear and arthritis.     Onset of symptoms: at least 2 years    Previous PT x 8 visits at [facility]: had to discontinue PT due to travel: minimal benefit. Returning with new order to continue PT - has appt with orthopedic MD next week.    Aggravating factors: activity, walking, standing, going up/down stairs, getting in/ out of the car and bath tub. Change of weather. Walking through the stores.     Relieving factors: rest, getting off her feet    Functional limitation: going shopping, going for walks, doing her house work, any type of physical activities that requires standing/ walking/ bending.     PMH reviewed in EPIC: DM, arthritis, asthma        Subjective comment: Pt is a 62 y/o female with c/o bilateral knee and hip pain that has been present for the last 2 yearsQuality of life: fair    Pain  Current pain ratin  At best pain ratin  At worst pain ratin  Quality: dull ache    Diagnostic Tests  X-ray: abnormal    Treatments  Previous treatment: physical therapy (land-based.)  Patient Goals  Patient goals for therapy: decreased pain, increased motion and increased strength           Precautions: DM    Objective          Tenderness     Right Knee   Tenderness in the inferior patella, lateral patella, medial patella and superior patella.     Additional Tenderness Details  Lateral aspects of both hips and lumbar paraspinals    Lumbar  Screen  Lumbar range of motion within normal limits with the following exceptions:Lumbar flex and ext with mod limitation and co inc pain in both hips    Neurological Testing     Sensation     Knee   Left Knee   Paresthesia: light touch    Right Knee   Paresthesia: light touch     Active Range of Motion   Left Hip   Flexion: 90 degrees with pain    Right Hip   Flexion: 90 degrees with pain  Left Knee   Flexion: 110 degrees with pain  Extension: 0 degrees     Right Knee   Flexion: 115 degrees with pain  Extension: 0 degrees     Additional Active Range of Motion Details  IR/ ER with mod limitation and painful    Passive Range of Motion     Additional Passive Range of Motion Details  Extremely limited in internal rotation in bilateral hips    Strength/Myotome Testing     Left Hip   Planes of Motion   Flexion: 4  Abduction: 4  External rotation: 4  Internal rotation: 4    Right Hip   Planes of Motion   Flexion: 4  Abduction: 4  External rotation: 4  Internal rotation: 4    Left Knee   Flexion: 4  Extension: 4  Quadriceps contraction: fair    Right Knee   Flexion: 4  Extension: 4  Quadriceps contraction: fair    Additional Strength Details  Pain with functional squat. Unable to perform with patellofemoral pain and pain in hips.    Tests     Left Hip   Positive GARY and scour.     Right Hip   Positive GARY and scour.     Swelling     Left Knee Girth Measurement (cm)   Joint line: 35 cm    Right Knee Girth Measurement (cm)   Joint line: 36 cm          Assessment & Plan     Assessment  Impairments: abnormal or restricted ROM, activity intolerance, lacks appropriate home exercise program, pain with function and weight-bearing intolerance  Functional Limitations: walking, uncomfortable because of pain, standing and stooping  Assessment details: Pt is a 61 y.o. female referred to therapy due to pain in both hips and knees.  Recent x-rays revealed arthritic changes in both hips and knees.  Pt presents with impaired ROM/  strength/ dec tolerance to standing/ walking/ negotiating stairs. Upon initial evaluation pt exhibits the above impairments and functional limitations. Impairments affect performing normal / daily activities, house work, shopping and taking walks. Pt is a good candidate for rehab and will benefit from skilled physical therapy to address impairments, reduce pain and maximize function.      Due to prior land-based therapy, will initiate principles of aquatic therapy to offload spine/joints, thermal effects to reduce pain, and hydrostatic pressure to facilitate exercise with transition to land as tolerated.        Barriers to therapy: B nature of pain and poor tolerance to land   Prognosis: good    Goals  Plan Goals: STG's (2 - 4 weeks)  1. Tolerate 45 minutes aquatic therapy with reduction in B knee and hip pain.   2. Able to ambulate x 10 min on Aquatic TM with less pain.  3. Able to tolerate initial HEP w/ progression.  4. Improved tolerance with reduction of pain with functional activities; walking, shopping; squatting; cooking and related ADL's/IADL's.     LTGs: by DC   1- Pt will report at least 60% improvement and pain reduction  2- Pt will be I with final HEP and self management of her condition  3- LEFS score will be 40% or less, indicating improved function, pain and symptom reduction  4- Pt will voice readiness for DC with I program   5- Pt will be able to walk through grocery store with min pain   6- Pt will be able to negotiate stairs with min pain      Plan  Therapy options: will be seen for skilled therapy services  Planned modality interventions: high voltage pulsed current (pain management) and ultrasound  Other planned modality interventions: Aquatic therapy  Planned therapy interventions: functional ROM exercises, home exercise program, manual therapy, neuromuscular re-education, strengthening, therapeutic activities, postural training, balance/weight-bearing training, soft tissue mobilization and  stretching  Frequency: 2x week  Duration in weeks: 10  Treatment plan discussed with: patient        See flow sheet for treatment detail    History # of Personal Factors and/or Comorbidities: MODERATE (1-2)  Examination of Body System(s): # of elements: MODERATE (3)  Clinical Presentation: EVOLVING  Clinical Decision Making: MODERATE          Timed:         Manual Therapy:         mins  14100;     Therapeutic Exercise:         mins  83791;     Neuromuscular [place]bi:        mins  12173;    Therapeutic Activity:          mins  92566;     Gait Training:           mins  13514;     Ultrasound:          mins  46893;    Ionto                                   mins   04411  Self Care                            mins   66683  Canal repositioning           mins    14795      Un-Timed:  Electrical Stimulation:         mins  04431 ( );  Dry Needling          mins self-pay  Traction          mins 67017  Low Eval          Mins  11752  Mod Eval    45      Mins  90974  High Eval                            Mins  29062  Re-Eval                               mins  05997        Timed Treatment:      mins   Total Treatment:    45    mins    PT SIGNATURE: Kory Forbes PT, CLT  [place] License: # [id number]     DATE TREATMENT INITIATED: 7/11/2022    Initial Certification  Certification Period: 7/11/2022 thru 10/8/2022  I certify that the therapy services are furnished while this patient is under my care.  The services outlined above are required by this patient, and will be reviewed every 90 days.     PHYSICIAN: Ace Hale MD   NPI: [id number]                                      DATE:     Please sign and return via fax to [phone].. Thank you, [facility].

## 2022-07-12 ENCOUNTER — TELEPHONE (OUTPATIENT)
Dept: PHYSICAL THERAPY | Facility: CLINIC | Age: 62
End: 2022-07-12

## 2022-07-17 NOTE — PROGRESS NOTES
Subjective     Carli Dubon is a 61 y.o. female.     Pt is here today for a 6 mo follow up on hypertension, diabetes, hyperlipidemia, COPD, bipolar disorder, and insomnia.  She had pancreatitis last year and follows with GI.  No recent flares  Her GI doesn't take her insurance so she needs a new provider.     Hypertension-patient is currently on losartan 100mg daily. Doing well on medication. Had cough with lisinopril.  Denies CP, dizziness, HA. She does have some SOA.      Diabetes-patient is currently on Jardiance 25 mg daily, glipizide 10 mg twice daily, Metformin 1000 mg twice daily. She does not monitor her blood sugars regularly.  Doesn't always stay active or eats a well balanced diet.      Hyperlipidemia- she is currently on lipitor 40mg daily.      COPD- Pt is seeing pulmonology. She has some SOA. She had a sleep study- has sleep apnea. She is albuterol and Symbicort. Pt reports that she has a cough at night. Sputum is yellow     Bipolar disorder-patient is seeing psychiatry for this.  She is currently on Depakote. Mood is stable with the medication. She is also on klonopin as needed     Insomnia-patient is seeing psychiatry for this.  She is on ambien 10mg prn.  She is also on Elavil 75 mg nightly. She is doing well on it.     Labs- due  Pap smear- 08/2019- normal  Mammogram- 2/2022  DEXA- post menopausal for 4 years.  Colonoscopy- 7/2/20     Vaccines:  Flu- N/A  PNA- due  Shingles-due- needs to get at the pharmacy  Tdap- UTD  covid- UTD     Dental exam- due  Eye exam- due       The following portions of the patient's history were reviewed and updated as appropriate: allergies, current medications, past family history, past medical history, past social history, past surgical history and problem list.    Review of Systems   Constitutional: Positive for fatigue. Negative for chills and fever.   HENT: Negative for congestion, ear pain and sore throat.    Respiratory: Positive for cough, chest tightness and  shortness of breath.    Cardiovascular: Negative for chest pain and palpitations.   Gastrointestinal: Negative for abdominal pain, constipation, diarrhea, nausea and vomiting.   Genitourinary: Negative for dysuria and urgency.   Musculoskeletal: Positive for arthralgias.   Neurological: Negative for dizziness and headache.   Psychiatric/Behavioral: Negative for depressed mood and stress. The patient is not nervous/anxious.        Objective     /97 (BP Location: Left arm, Patient Position: Sitting, Cuff Size: Adult)   Pulse 100   Temp 98.7 °F (37.1 °C) (Tympanic)   Wt 99.3 kg (219 lb)   SpO2 94%   BMI 36.44 kg/m²     Current Outpatient Medications on File Prior to Visit   Medication Sig Dispense Refill   • albuterol (ACCUNEB) 0.63 MG/3ML nebulizer solution Take 3 mL by nebulization Every 6 (Six) Hours As Needed for Wheezing. 30 each 2   • albuterol sulfate  (90 Base) MCG/ACT inhaler Inhale 2 puffs Every 4 (Four) Hours As Needed for Wheezing. 18 g 2   • amitriptyline (ELAVIL) 150 MG tablet TAKE 1 TABLET BY MOUTH ONCE DAILY AT NIGHT 90 tablet 0   • atorvastatin (Lipitor) 40 MG tablet Take 1 tablet by mouth Daily. 90 tablet 1   • budesonide-formoterol (Symbicort) 160-4.5 MCG/ACT inhaler Inhale 2 puffs 2 (Two) Times a Day. 6 g 12   • clonazePAM (KlonoPIN) 0.5 MG tablet Take 1 tablet by mouth 2 (Two) Times a Day. 60 tablet 2   • divalproex (DEPAKOTE ER) 500 MG 24 hr tablet TAKE 2 TABLETS BY MOUTH ONCE DAILY AT NIGHT 180 tablet 1   • famotidine (PEPCID) 40 MG tablet      • fluticasone (Flonase) 50 MCG/ACT nasal spray 2 sprays into the nostril(s) as directed by provider Daily. 9.9 mL 2   • glipizide (GLUCOTROL) 10 MG tablet Take 1 tablet by mouth 2 (Two) Times a Day Before Meals. 180 tablet 1   • losartan (Cozaar) 100 MG tablet Take 1 tablet by mouth Daily. 90 tablet 1   • metFORMIN (GLUCOPHAGE) 1000 MG tablet Take 1 tablet by mouth 2 (Two) Times a Day With Meals. 180 tablet 1   • nicotine (Nicotine Step  1) 21 MG/24HR patch Place 1 patch on the skin as directed by provider Daily. 30 patch 11   • nystatin-triamcinolone (MYCOLOG) 494057-2.1 UNIT/GM-% ointment      • omeprazole (priLOSEC) 20 MG capsule Take 1 capsule by mouth Daily. 30 capsule 0   • ondansetron ODT (ZOFRAN-ODT) 4 MG disintegrating tablet Place 1 tablet on the tongue Every 6 (Six) Hours As Needed for Nausea. 20 tablet 0   • promethazine-dextromethorphan (PROMETHAZINE-DM) 6.25-15 MG/5ML syrup Take 2.5 mL by mouth 4 (Four) Times a Day As Needed for Cough. 118 mL 0   • zolpidem (AMBIEN) 10 MG tablet Take 1 tablet by mouth At Night As Needed for Sleep. 30 tablet 2   • [DISCONTINUED] empagliflozin (Jardiance) 25 MG tablet tablet Take 1 tablet by mouth Daily. 30 tablet 0   • [DISCONTINUED] Tiotropium Bromide Monohydrate (Spiriva Respimat) 1.25 MCG/ACT aerosol solution inhaler Inhale 2 puffs Daily. 4 g 0     No current facility-administered medications on file prior to visit.        Physical Exam  Vitals reviewed.   Constitutional:       General: She is not in acute distress.     Appearance: Normal appearance. She is well-developed. She is obese. She is not diaphoretic.   HENT:      Head: Normocephalic and atraumatic.   Eyes:      General:         Right eye: No discharge.         Left eye: No discharge.      Extraocular Movements: Extraocular movements intact.      Conjunctiva/sclera: Conjunctivae normal.   Cardiovascular:      Rate and Rhythm: Normal rate and regular rhythm.      Heart sounds: No murmur heard.  Pulmonary:      Effort: Pulmonary effort is normal. No respiratory distress.      Breath sounds: Normal breath sounds. No wheezing or rales.   Abdominal:      General: Bowel sounds are normal.      Palpations: Abdomen is soft.   Musculoskeletal:         General: Normal range of motion.      Cervical back: Normal range of motion.   Skin:     General: Skin is warm and dry.   Neurological:      General: No focal deficit present.      Mental Status: She is  alert and oriented to person, place, and time.   Psychiatric:         Mood and Affect: Mood normal.         Behavior: Behavior normal.         Thought Content: Thought content normal.         Judgment: Judgment normal.           Assessment & Plan     Diagnoses and all orders for this visit:    1. Hypertension, unspecified type (Primary)  Comments:  elevated  cont losartan  add in norvasc  f/u 1 mo  Orders:  -     Comprehensive Metabolic Panel; Future  -     amLODIPine (Norvasc) 5 MG tablet; Take 1 tablet by mouth Daily.  Dispense: 30 tablet; Refill: 0    2. Type 2 diabetes mellitus without complication, without long-term current use of insulin ([facility])  Comments:  work on diet and exercise  cont meds  check labs  Orders:  -     Comprehensive Metabolic Panel; Future  -     Hemoglobin A1c; Future  -     Lipid Panel; Future  -     empagliflozin (Jardiance) 25 MG tablet tablet; Take 1 tablet by mouth Daily.  Dispense: 90 tablet; Refill: 1    3. Chronic obstructive pulmonary disease, unspecified COPD type ([facility])  Comments:  stable  sees pulmonary  add mucinex as needed for secretions  Orders:  -     guaiFENesin (Mucinex) 600 MG 12 hr tablet; Take 2 tablets by mouth 2 (Two) Times a Day As Needed for Cough.  Dispense: 60 tablet; Refill: 2    4. Hyperlipidemia, unspecified hyperlipidemia type  Comments:  stable  cont lipitor  check labs  Orders:  -     Comprehensive Metabolic Panel; Future  -     Lipid Panel; Future    5. Insomnia, unspecified type  Comments:  stable  sees psych    6. History of pancreatitis  Comments:  needs new GI referral  no recent flares  Orders:  -     Ambulatory Referral to Gastroenterology

## 2022-07-18 ENCOUNTER — OFFICE VISIT (OUTPATIENT)
Dept: FAMILY MEDICINE CLINIC | Facility: CLINIC | Age: 62
End: 2022-07-18

## 2022-07-18 ENCOUNTER — LAB (OUTPATIENT)
Dept: FAMILY MEDICINE CLINIC | Facility: CLINIC | Age: 62
End: 2022-07-18

## 2022-07-18 ENCOUNTER — TREATMENT (OUTPATIENT)
Dept: PHYSICAL THERAPY | Facility: CLINIC | Age: 62
End: 2022-07-18

## 2022-07-18 VITALS
TEMPERATURE: 98.7 F | OXYGEN SATURATION: 94 % | HEART RATE: 100 BPM | SYSTOLIC BLOOD PRESSURE: 158 MMHG | WEIGHT: 219 LBS | DIASTOLIC BLOOD PRESSURE: 97 MMHG | BODY MASS INDEX: 36.44 KG/M2

## 2022-07-18 DIAGNOSIS — I10 HYPERTENSION, UNSPECIFIED TYPE: ICD-10-CM

## 2022-07-18 DIAGNOSIS — E78.5 HYPERLIPIDEMIA, UNSPECIFIED HYPERLIPIDEMIA TYPE: ICD-10-CM

## 2022-07-18 DIAGNOSIS — J44.9 CHRONIC OBSTRUCTIVE PULMONARY DISEASE, UNSPECIFIED COPD TYPE: ICD-10-CM

## 2022-07-18 DIAGNOSIS — I10 HYPERTENSION, UNSPECIFIED TYPE: Primary | ICD-10-CM

## 2022-07-18 DIAGNOSIS — M25.552 BILATERAL HIP PAIN: Primary | ICD-10-CM

## 2022-07-18 DIAGNOSIS — E11.9 TYPE 2 DIABETES MELLITUS WITHOUT COMPLICATION, WITHOUT LONG-TERM CURRENT USE OF INSULIN: ICD-10-CM

## 2022-07-18 DIAGNOSIS — M25.551 BILATERAL HIP PAIN: Primary | ICD-10-CM

## 2022-07-18 DIAGNOSIS — M25.562 CHRONIC PAIN OF BOTH KNEES: ICD-10-CM

## 2022-07-18 DIAGNOSIS — G89.29 CHRONIC PAIN OF BOTH KNEES: ICD-10-CM

## 2022-07-18 DIAGNOSIS — G47.00 INSOMNIA, UNSPECIFIED TYPE: ICD-10-CM

## 2022-07-18 DIAGNOSIS — Z23 NEED FOR PNEUMOCOCCAL VACCINATION: ICD-10-CM

## 2022-07-18 DIAGNOSIS — Z87.19 HISTORY OF PANCREATITIS: ICD-10-CM

## 2022-07-18 DIAGNOSIS — M25.561 CHRONIC PAIN OF BOTH KNEES: ICD-10-CM

## 2022-07-18 LAB
ALBUMIN SERPL-MCNC: 4.2 G/DL (ref 3.5–5.2)
ALBUMIN/GLOB SERPL: 1.4 G/DL
ALP SERPL-CCNC: 90 U/L (ref 39–117)
ALT SERPL W P-5'-P-CCNC: 14 U/L (ref 1–33)
ANION GAP SERPL CALCULATED.3IONS-SCNC: 11.2 MMOL/L (ref 5–15)
AST SERPL-CCNC: 13 U/L (ref 1–32)
BILIRUB SERPL-MCNC: 0.2 MG/DL (ref 0–1.2)
BUN SERPL-MCNC: 14 MG/DL (ref 6–20)
BUN/CREAT SERPL: 19.7 (ref 7–25)
CALCIUM SPEC-SCNC: 8.9 MG/DL (ref 8.6–10.5)
CHLORIDE SERPL-SCNC: 102 MMOL/L (ref 98–107)
CHOLEST SERPL-MCNC: 138 MG/DL (ref 0–200)
CO2 SERPL-SCNC: 25.8 MMOL/L (ref 22–29)
CREAT SERPL-MCNC: 0.71 MG/DL (ref 0.57–1)
EGFRCR SERPLBLD CKD-EPI 2021: 98.1 ML/MIN/1.73
GLOBULIN UR ELPH-MCNC: 3 GM/DL
GLUCOSE SERPL-MCNC: 104 MG/DL (ref 65–99)
HDLC SERPL-MCNC: 45 MG/DL (ref 40–60)
LDLC SERPL CALC-MCNC: 72 MG/DL (ref 0–100)
LDLC/HDLC SERPL: 1.54 {RATIO}
POTASSIUM SERPL-SCNC: 4.5 MMOL/L (ref 3.5–5.2)
PROT SERPL-MCNC: 7.2 G/DL (ref 6–8.5)
SODIUM SERPL-SCNC: 139 MMOL/L (ref 136–145)
TRIGL SERPL-MCNC: 119 MG/DL (ref 0–150)
VLDLC SERPL-MCNC: 21 MG/DL (ref 5–40)

## 2022-07-18 PROCEDURE — 90677 PCV20 VACCINE IM: CPT | Performed by: NURSE PRACTITIONER

## 2022-07-18 PROCEDURE — 83036 HEMOGLOBIN GLYCOSYLATED A1C: CPT | Performed by: NURSE PRACTITIONER

## 2022-07-18 PROCEDURE — 90471 IMMUNIZATION ADMIN: CPT | Performed by: NURSE PRACTITIONER

## 2022-07-18 PROCEDURE — 97113 AQUATIC THERAPY/EXERCISES: CPT | Performed by: PHYSICAL THERAPIST

## 2022-07-18 PROCEDURE — 99214 OFFICE O/P EST MOD 30 MIN: CPT | Performed by: NURSE PRACTITIONER

## 2022-07-18 PROCEDURE — 80053 COMPREHEN METABOLIC PANEL: CPT | Performed by: NURSE PRACTITIONER

## 2022-07-18 PROCEDURE — 36415 COLL VENOUS BLD VENIPUNCTURE: CPT

## 2022-07-18 PROCEDURE — 80061 LIPID PANEL: CPT | Performed by: NURSE PRACTITIONER

## 2022-07-18 RX ORDER — GUAIFENESIN 600 MG/1
1200 TABLET, EXTENDED RELEASE ORAL 2 TIMES DAILY PRN
Qty: 60 TABLET | Refills: 2 | Status: SHIPPED | OUTPATIENT
Start: 2022-07-18

## 2022-07-18 RX ORDER — AMLODIPINE BESYLATE 5 MG/1
5 TABLET ORAL DAILY
Qty: 30 TABLET | Refills: 0 | Status: SHIPPED | OUTPATIENT
Start: 2022-07-18 | End: 2022-08-24 | Stop reason: SDUPTHER

## 2022-07-18 NOTE — PROGRESS NOTES
EMG and Nerve Conduction Studies    The complete report includes the data sheets.     Date of Study:7/25/22    Referring Provider:DR. Munson,    History:    This patient complains of paresthesias in both hands.      Results:    1.  The right and left median sensory and motor nerve conduction studies are normal.    2. The right ulnar sensory and motor distal latencies were normal.  The compound muscle action potential amplitudes were slightly reduced.  The forearm conduction velocity was normal.  The conduction velocity was below normal at the elbow and significantly reduced in velocity compared to the below elbow velocity which was in the normal range.    3.  The left median sensory distal latency was normal the amplitude of the sensory nerve action potential was reduced.  The left ulnar motor distal latency was normal.  The amplitude of the compound muscle action potential was significantly reduced.  The conduction velocity in the forearm was normal but was below normal across elbow and significantly reduced compared to the below elbow conduction velocity.    4.  The right first dorsal interosseous muscle showed increased insertional activity increased polyphasic motor units and decreased recruitment.  The left first dorsal interosseous muscle showed occasional fibrillation increased insertional activity increased polyphasic motor units and decreased recruitment.    5.  The deltoid, triceps, extensor digitorum, and abductor pollicis brevis muscles were normal bilaterally.    Impression:    This is an abnormal study.  There is evidence of moderate ulnar neuropathy at the elbow bilaterally.  There is no evidence of focal median neuropathy at the wrist.  There were no neurogenic changes in the muscles examined in the C5-T1 myotome except the ulnar innervated first dorsal interosseous muscles.    Electronically signed by :    Joseph Seipel, M.D.  July 27, 2022

## 2022-07-18 NOTE — PATIENT INSTRUCTIONS
Add amlodipine for BP- follow up in 1 mo  Check labs  Work on diet and exercise  Referral to GI  Mucinex as needed

## 2022-07-18 NOTE — PROGRESS NOTES
Physical Therapy Daily Note     Diagnosis Plan   1. Bilateral hip pain     2. Chronic pain of both knees         VISIT#: 2    Subjective   Carli Dubon reports: No change since initial evaluation. Overall feeling well, but both knees are painful and they get worse depending on the weather.      Objective     Pool via steps.    See Exercise, Manual, and Modality Logs for complete treatment.     Patient Education: reviewed TA contraction with exercise to facilitate stabilizing LB     Assessment/Plan - good response to aquatic with decrease in pain in pool. Able to ambulate on TM without increase in pain.       Progress per Plan of Care            Timed:         Manual Therapy:         mins  88485;     Therapeutic Exercise:         mins  55245;     Neuromuscular Shelbi:        mins  51396;    Therapeutic Activity:          mins  67140;     Gait Training:           mins  45997;     Ultrasound:          mins  55878;    Ionto                                   mins   24952  Self Care                            mins   92186  Canalith Repos                   mins  4209  Aquatic                         45      mins 36816    Un-Timed:  Electrical Stimulation:         mins  28129 ( );  Dry Needling          mins self-pay  Traction          mins 22960  Low Eval          Mins  42262  Mod Eval          Mins  21786  High Eval                            Mins  57368  Re-Eval                               mins  53964    Timed Treatment:   45   mins   Total Treatment:     45   mins    Kory Forbes, PT PT, DPT, [phone]     Chaparro Monae, SPT

## 2022-07-19 LAB — HBA1C MFR BLD: 7.8 % (ref 3.5–5.6)

## 2022-07-20 ENCOUNTER — OFFICE VISIT (OUTPATIENT)
Dept: ORTHOPEDIC SURGERY | Facility: CLINIC | Age: 62
End: 2022-07-20

## 2022-07-20 DIAGNOSIS — G89.29 CHRONIC PAIN OF BOTH KNEES: Primary | ICD-10-CM

## 2022-07-20 DIAGNOSIS — M25.562 CHRONIC PAIN OF BOTH KNEES: Primary | ICD-10-CM

## 2022-07-20 DIAGNOSIS — M25.561 CHRONIC PAIN OF BOTH KNEES: Primary | ICD-10-CM

## 2022-07-20 DIAGNOSIS — M25.552 BILATERAL HIP PAIN: ICD-10-CM

## 2022-07-20 DIAGNOSIS — M25.551 BILATERAL HIP PAIN: ICD-10-CM

## 2022-07-20 PROCEDURE — 99213 OFFICE O/P EST LOW 20 MIN: CPT | Performed by: ORTHOPAEDIC SURGERY

## 2022-07-20 NOTE — PROGRESS NOTES
Chief Complaint  Consult of the Left Hip and Consult of the Right Hip    Subjective    History of Present Illness      Carli Dubon is a 61 y.o. female who presents to [facility] for bilateral hip and knee pain.  History of Present Illness the patient states that she has chronic low back pain and this pain radiates to both the hips.  She has difficulty with turning over in bed at night.  Cross body activities tend to bother the patient to some degree.  She states that she does not have any history of trauma.  There is no risk factors for avascular necrosis.  She has bilateral knee pain and discomfort.  The right knee is more symptomatic than the left.  The symptoms appear to be classic for osteoarthritis.  Most of the pain is over the medial aspect of the knee.  Pain Location: BILATERAL hip  Radiation: none  Quality: tightness  Intensity/Severity: varies between mild and severe  Duration: 1-2 months  Progression of symptoms: yes, progressive worsening  Onset quality: gradual   Timing: intermittent  Aggravating Factors: going up and down stairs, rising after sitting, walking  Alleviating Factors: rest  Previous Episodes: none mentioned  Associated Symptoms: pain, decreased strength  ADLs Affected: dressing, ambulating, recreational activities/sports, meal preparation  Previous Treatment: NSAIDs       Objective   Vital Signs:   There were no vitals taken for this visit.    Physical Exam  Physical Exam  Vitals signs and nursing note reviewed.   Constitutional:       Appearance: Normal appearance.   Pulmonary:      Effort: Pulmonary effort is normal.   Skin:     General: Skin is warm and dry.      Capillary Refill: Capillary refill takes less than 2 seconds.   Neurological:      General: No focal deficit present.      Mental Status: He is alert and oriented to person, place, and time. Mental status is at baseline.   Psychiatric:         Mood and Affect: Mood normal.         Behavior:  Behavior normal.         Thought Content: Thought content normal.         Judgment: Judgment normal.     Ortho Exam   Bilateral knee (varus). Patient has crepitus throughout range of motion. Positive patellar grind test. Mild effusion. Lachman is negative. Pivot shift is negative. Anterior and posterior drawer signs are negative. Significant joint line tenderness is noted on the medial aspect of the knee. Patient has a varus orientation of the knee. There is fullness and tenderness in the Popliteal fossa. Mild distention of a Popliteal cyst is noted in this location. Range of motion in flexion is from 0-110 degrees. Neurovascular status is intact.  Dorsalis pedis and posterior tibial artery pulses are palpable. Common peroneal nerve function is well preserved. Patient's gait is cautious and antalgic. Skin and soft tissues are mildly swollen, consistent with synovitis and effusion. The patient has a significant limp with the first few steps after starting the gait cycle. Getting out of a chair takes a lot of effort due to pain on knee flexion.   Bilateral hip (gtb): Skin and soft tissues over the greater trochanteric bursa are tender upon palpation, along with IT band tenderness. Cross body adduction is negative. Internal and external rotations are bothersome and cause tenderness over the greater trochanter. There is no clinical deformity and no shortening. She does not have a limp upon walking. There is not piriformis tightness and there  is not capsular tightness with any rotation. Dorsalis pedis and posterior tibial artery pulses are palpable. Neurovascular status is intact and capillary refill is less than 2 seconds. Common peroneal nerve function is well preserved.            Result Review :   The following data was reviewed by: Ace Hale MD on 07/20/2022:  Radiologic studies - see below for interpretation  xrays obtained today    bilateral Hip X-Ray  Indication: Evaluation of pain and discomfort on the  lateral aspect of both the hips.  AP and lateral  Findings: Early degenerative change with osteophyte formation noted.  no bony lesion  Soft tissues within normal limits  decreased joint spaces  Hardware appropriately positioned not applicable      no prior studies available for comparison.    X-RAY was ordered and reviewed by Ace Hale MD            Procedures           Assessment   Assessment and Plan    Diagnoses and all orders for this visit:    1. Chronic pain of both knees (Primary)    2. Bilateral hip pain  -     XR Hips Bilateral With or Without Pelvis 3-4 View          Follow Up   · Compression/brace to both her knees to prevent them from buckling and giving her pain  · Calcium and vitamin D for bone health.  · Viscosupplementation injections and steroid injection to the knee discussed with the patient.  · At this point she is not a candidate for hip or knee replacement surgery because her symptoms are tolerable and radiographic findings are not as advanced to warrant surgical intervention.  · Rest, ice, compression, and elevation (RICE) therapy  · Stretching and strengthening exercises of the quads and the hamstrings.  · Tablet meloxicam 7.5 mg tab 1 p.o. nightly for pain swelling and discomfort.  · OTC Tylenol 500-1000mg by mouth every 6 hours as needed for pain   · Follow up in 12 month(s)  • Patient was given instructions and counseling regarding her condition or for health maintenance advice. Please see specific information pulled into the AVS if appropriate.     Ace Hale MD   Date of Encounter: 7/20/2022       EMR Dragon/Transcription disclaimer:  Much of this encounter note is an electronic transcription/translation of spoken language to printed text. The electronic translation of spoken language may permit erroneous, or at times, nonsensical words or phrases to be inadvertently transcribed; Although I have reviewed the note for such errors, some may still exist.   Answers for HPI/ROS  submitted by the patient on 7/16/2022  Please describe your symptoms.: Hip pain when i walk a little bit or sitting and trying to get up its such a mission  Have you had these symptoms before?: Yes  How long have you been having these symptoms?: Greater than 2 weeks  What is the primary reason for your visit?: Other

## 2022-07-21 ENCOUNTER — TELEPHONE (OUTPATIENT)
Dept: PHYSICAL THERAPY | Facility: CLINIC | Age: 62
End: 2022-07-21

## 2022-07-25 ENCOUNTER — TELEMEDICINE (OUTPATIENT)
Dept: PSYCHIATRY | Facility: CLINIC | Age: 62
End: 2022-07-25

## 2022-07-25 ENCOUNTER — PROCEDURE VISIT (OUTPATIENT)
Dept: NEUROLOGY | Facility: CLINIC | Age: 62
End: 2022-07-25

## 2022-07-25 VITALS — TEMPERATURE: 97.8 F

## 2022-07-25 DIAGNOSIS — F51.05 INSOMNIA DUE TO MENTAL CONDITION: ICD-10-CM

## 2022-07-25 DIAGNOSIS — F31.32 BIPOLAR AFFECTIVE DISORDER, CURRENTLY DEPRESSED, MODERATE: Primary | Chronic | ICD-10-CM

## 2022-07-25 DIAGNOSIS — F43.10 PTSD (POST-TRAUMATIC STRESS DISORDER): Chronic | ICD-10-CM

## 2022-07-25 DIAGNOSIS — G56.23 CUBITAL TUNNEL SYNDROME, BILATERAL: Primary | ICD-10-CM

## 2022-07-25 PROCEDURE — 95886 MUSC TEST DONE W/N TEST COMP: CPT | Performed by: PSYCHIATRY & NEUROLOGY

## 2022-07-25 PROCEDURE — 99214 OFFICE O/P EST MOD 30 MIN: CPT | Performed by: PHYSICIAN ASSISTANT

## 2022-07-25 PROCEDURE — 95910 NRV CNDJ TEST 7-8 STUDIES: CPT | Performed by: PSYCHIATRY & NEUROLOGY

## 2022-07-25 RX ORDER — CLONAZEPAM 0.5 MG/1
0.5 TABLET ORAL 2 TIMES DAILY
Qty: 60 TABLET | Refills: 2 | Status: SHIPPED | OUTPATIENT
Start: 2022-07-25 | End: 2022-11-07

## 2022-07-25 RX ORDER — DIVALPROEX SODIUM 500 MG/1
1500 TABLET, EXTENDED RELEASE ORAL NIGHTLY
Qty: 270 TABLET | Refills: 1 | Status: SHIPPED | OUTPATIENT
Start: 2022-07-25 | End: 2022-10-11 | Stop reason: SDUPTHER

## 2022-07-25 RX ORDER — ZOLPIDEM TARTRATE 10 MG/1
10 TABLET ORAL NIGHTLY PRN
Qty: 30 TABLET | Refills: 2 | Status: SHIPPED | OUTPATIENT
Start: 2022-07-25 | End: 2022-11-10
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
## 2022-07-27 PROBLEM — M25.562 CHRONIC PAIN OF BOTH KNEES: Status: ACTIVE | Noted: 2022-07-27

## 2022-07-27 PROBLEM — M25.552 BILATERAL HIP PAIN: Status: ACTIVE | Noted: 2022-07-27

## 2022-07-27 PROBLEM — M25.561 CHRONIC PAIN OF BOTH KNEES: Status: ACTIVE | Noted: 2022-07-27

## 2022-07-27 PROBLEM — G89.29 CHRONIC PAIN OF BOTH KNEES: Status: ACTIVE | Noted: 2022-07-27

## 2022-07-27 PROBLEM — M25.551 BILATERAL HIP PAIN: Status: ACTIVE | Noted: 2022-07-27

## 2022-08-03 ENCOUNTER — TREATMENT (OUTPATIENT)
Dept: PHYSICAL THERAPY | Facility: CLINIC | Age: 62
End: 2022-08-03

## 2022-08-03 DIAGNOSIS — M25.561 CHRONIC PAIN OF BOTH KNEES: ICD-10-CM

## 2022-08-03 DIAGNOSIS — M25.551 BILATERAL HIP PAIN: Primary | ICD-10-CM

## 2022-08-03 DIAGNOSIS — M25.562 CHRONIC PAIN OF BOTH KNEES: ICD-10-CM

## 2022-08-03 DIAGNOSIS — M25.552 BILATERAL HIP PAIN: Primary | ICD-10-CM

## 2022-08-03 DIAGNOSIS — G89.29 CHRONIC PAIN OF BOTH KNEES: ICD-10-CM

## 2022-08-03 PROCEDURE — 97113 AQUATIC THERAPY/EXERCISES: CPT | Performed by: PHYSICAL THERAPIST

## 2022-08-03 NOTE — PROGRESS NOTES
Physical Therapy Daily Note     Diagnosis Plan   1. Bilateral hip pain     2. Chronic pain of both knees         VISIT#: 3    Subjective   Carli Dubon reports: Feeling well today. Nothing new since last visit and felt well after last aquatic therapy session.    Objective     Pool via steps.    See Exercise, Manual, and Modality Logs for complete treatment.     Patient Education: reviewed TA contraction with exercise to facilitate stabilizing LB     Assessment/Plan - Progressed pt with strengthening exercises in aquatic setting with no increase in pain or symptoms.     Goals  Plan Goals: STG's (2 - 4 weeks)  1. Tolerate 45 minutes aquatic therapy with reduction in B knee and hip pain.   2. Able to ambulate x 10 min on Aquatic TM with less pain.  3. Able to tolerate initial HEP w/ progression.  4. Improved tolerance with reduction of pain with functional activities; walking, shopping; squatting; cooking and related ADL's/IADL's.     LTGs: by DC   1- Pt will report at least 60% improvement and pain reduction  2- Pt will be I with final HEP and self management of her condition  3- LEFS score will be 40% or less, indicating improved function, pain and symptom reduction  4- Pt will voice readiness for DC with I program   5- Pt will be able to walk through grocery store with min pain   6- Pt will be able to negotiate stairs with min pain      Progress per Plan of Care            Timed:         Manual Therapy:         mins  37683;     Therapeutic Exercise:         mins  79352;     Neuromuscular Shelbi:        mins  39759;    Therapeutic Activity:          mins  85781;     Gait Training:           mins  63119;     Ultrasound:          mins  85021;    Ionto                                   mins   17396  Self Care                            mins   75237  Canalith Repos                   mins  4209  Aquatic                         45      mins 52223    Un-Timed:  Electrical Stimulation:         mins  28128 ( );  Dry  Needling          mins self-pay  Traction          mins 26803  Low Eval          Mins  50586  Mod Eval          Mins  30716  High Eval                            Mins  06075  Re-Eval                               mins  63226    Timed Treatment:   45   mins   Total Treatment:     45   mins    Kory Forbes, PT PT, DPT, [id number]     Chaparro Monae, SPT

## 2022-08-09 ENCOUNTER — TREATMENT (OUTPATIENT)
Dept: PHYSICAL THERAPY | Facility: CLINIC | Age: 62
End: 2022-08-09

## 2022-08-09 DIAGNOSIS — M25.562 CHRONIC PAIN OF BOTH KNEES: ICD-10-CM

## 2022-08-09 DIAGNOSIS — M25.561 CHRONIC PAIN OF BOTH KNEES: ICD-10-CM

## 2022-08-09 DIAGNOSIS — M25.552 BILATERAL HIP PAIN: Primary | ICD-10-CM

## 2022-08-09 DIAGNOSIS — M25.551 BILATERAL HIP PAIN: Primary | ICD-10-CM

## 2022-08-09 DIAGNOSIS — G89.29 CHRONIC PAIN OF BOTH KNEES: ICD-10-CM

## 2022-08-09 PROCEDURE — 97113 AQUATIC THERAPY/EXERCISES: CPT | Performed by: PHYSICAL THERAPIST

## 2022-08-09 NOTE — PROGRESS NOTES
Physical Therapy Daily/Progress Note     Diagnosis Plan   1. Bilateral hip pain     2. Chronic pain of both knees         VISIT#: 4    Subjective   Carli Dubon reports: Nothing new no report. Pt stated she is feeling well today and that aquatic seems to be helping.    Objective     Pool via steps.    See Exercise, Manual, and Modality Logs for complete treatment.     Patient Education:    Assessment/Plan - Pt continues to show good response to therapy in aquatic setting. Pt able to perform core stability and strengthening without an increase in pain or symptoms. Will continue POC in aquatic setting and progress as her pain subsides and function allows.    Goals  Plan Goals: STG's (2 - 4 weeks)  1. Tolerate 45 minutes aquatic therapy with reduction in B knee and hip pain. MET  2. Able to ambulate x 10 min on Aquatic TM with less pain. MET  3. Able to tolerate initial HEP w/ progression.   4. Improved tolerance with reduction of pain with functional activities; walking, shopping; squatting; cooking and related ADL's/IADL's.     LTGs: by DC   1- Pt will report at least 60% improvement and pain reduction  2- Pt will be I with final HEP and self management of her condition  3- LEFS score will be 40% or less, indicating improved function, pain and symptom reduction  4- Pt will voice readiness for DC with I program   5- Pt will be able to walk through grocery store with min pain   6- Pt will be able to negotiate stairs with min pain          Progress per Plan of Care    Medicaid/Medicare requires progress note be sent to the referring MD every 30 days or 10 visits, whichever comes first.            Timed:         Manual Therapy:         mins  80887;     Therapeutic Exercise:         mins  65034;     Neuromuscular Shelbi:        mins  70537;    Therapeutic Activity:          mins  62835;     Gait Training:           mins  23202;     Ultrasound:          mins  00902;    Ionto                                   mins    51776  Self Care                            mins   57157  Canalith Repos                   mins  4209  Aquatic                         45      mins 38964    Un-Timed:  Electrical Stimulation:         mins  85735 ( );  Dry Needling          mins self-pay  Traction          mins 07635  Low Eval          Mins  65859  Mod Eval          Mins  13660  High Eval                            Mins  24261  Re-Eval                               mins  29178    Timed Treatment:   45   mins   Total Treatment:     45   mins    Kory Forbes, PT PT, DPT, [id number]     Chaparro Monae, SPT

## 2022-08-11 ENCOUNTER — TELEPHONE (OUTPATIENT)
Dept: PHYSICAL THERAPY | Facility: OTHER | Age: 62
End: 2022-08-11

## 2022-08-12 DIAGNOSIS — F51.05 INSOMNIA DUE TO MENTAL CONDITION: ICD-10-CM

## 2022-08-12 RX ORDER — ZOLPIDEM TARTRATE 10 MG/1
10 TABLET ORAL NIGHTLY PRN
Qty: 30 TABLET | Refills: 2 | OUTPATIENT
Start: 2022-08-12

## 2022-08-15 ENCOUNTER — TREATMENT (OUTPATIENT)
Dept: PHYSICAL THERAPY | Facility: CLINIC | Age: 62
End: 2022-08-15

## 2022-08-15 DIAGNOSIS — M25.562 CHRONIC PAIN OF BOTH KNEES: ICD-10-CM

## 2022-08-15 DIAGNOSIS — M25.552 BILATERAL HIP PAIN: Primary | ICD-10-CM

## 2022-08-15 DIAGNOSIS — M25.551 BILATERAL HIP PAIN: Primary | ICD-10-CM

## 2022-08-15 DIAGNOSIS — G89.29 CHRONIC PAIN OF BOTH KNEES: ICD-10-CM

## 2022-08-15 DIAGNOSIS — M25.561 CHRONIC PAIN OF BOTH KNEES: ICD-10-CM

## 2022-08-15 PROCEDURE — 97113 AQUATIC THERAPY/EXERCISES: CPT | Performed by: PHYSICAL THERAPIST

## 2022-08-15 NOTE — PROGRESS NOTES
Physical Therapy Daily/Progress Note     Diagnosis Plan   1. Bilateral hip pain     2. Chronic pain of both knees         VISIT#: 5    Subjective   Carli Dubon reports: knees and hip don't hurt in pool. Less pain with some activities.     Objective     Pool via steps.    See Exercise, Manual, and Modality Logs for complete treatment.     Patient Education:    Assessment/Plan - No pain in pool or with exercise; STG's met or progressing. Verbal cues for exercise.    Goals  Plan Goals: STG's (2 - 4 weeks)  1. Tolerate 45 minutes aquatic therapy with reduction in B knee and hip pain. MET  2. Able to ambulate x 10 min on Aquatic TM with less pain. MET  3. Able to tolerate initial HEP w/ progression. MET  4. Improved tolerance with reduction of pain with functional activities; walking, shopping; squatting; cooking and related ADL's/IADL's. MET    LTGs: by DC   1- Pt will report at least 60% improvement and pain reduction  2- Pt will be I with final HEP and self management of her condition  3- LEFS score will be 40% or less, indicating improved function, pain and symptom reduction  4- Pt will voice readiness for DC with I program   5- Pt will be able to walk through grocery store with min pain   6- Pt will be able to negotiate stairs with min pain          Progress per Plan of Care    Medicaid/Medicare requires progress note be sent to the referring MD every 30 days or 10 visits, whichever comes first.            Timed:         Manual Therapy:         mins  81667;     Therapeutic Exercise:         mins  66235;     Neuromuscular Shelbi:        mins  01836;    Therapeutic Activity:          mins  88244;     Gait Training:           mins  73344;     Ultrasound:          mins  00922;    Ionto                                   mins   15779  Self Care                            mins   19331  Canalith Repos                   mins  4209  Aquatic                         45      mins 87479    Un-Timed:  Electrical Stimulation:          mins  35030 ( );  Dry Needling          mins self-pay  Traction          mins 42387  Low Eval          Mins  88162  Mod Eval          Mins  60012  High Eval                            Mins  14004  Re-Eval                               mins  17702    Timed Treatment:   45   mins   Total Treatment:     45   mins    Kory Forbes, PT PT, DPT, [id number]     Chaparro Monae, SPT

## 2022-08-16 ENCOUNTER — LAB (OUTPATIENT)
Dept: LAB | Facility: HOSPITAL | Age: 62
End: 2022-08-16

## 2022-08-16 ENCOUNTER — OFFICE VISIT (OUTPATIENT)
Dept: PULMONOLOGY | Facility: HOSPITAL | Age: 62
End: 2022-08-16

## 2022-08-16 VITALS
HEART RATE: 111 BPM | RESPIRATION RATE: 17 BRPM | BODY MASS INDEX: 34.99 KG/M2 | HEIGHT: 65 IN | DIASTOLIC BLOOD PRESSURE: 87 MMHG | OXYGEN SATURATION: 93 % | WEIGHT: 210 LBS | SYSTOLIC BLOOD PRESSURE: 142 MMHG

## 2022-08-16 DIAGNOSIS — G47.33 OBSTRUCTIVE SLEEP APNEA: Primary | ICD-10-CM

## 2022-08-16 DIAGNOSIS — J43.1 PANLOBULAR EMPHYSEMA: ICD-10-CM

## 2022-08-16 DIAGNOSIS — F31.32 BIPOLAR AFFECTIVE DISORDER, CURRENTLY DEPRESSED, MODERATE: ICD-10-CM

## 2022-08-16 DIAGNOSIS — U07.1 COVID-19: ICD-10-CM

## 2022-08-16 LAB
BASOPHILS # BLD AUTO: 0.04 10*3/MM3 (ref 0–0.2)
BASOPHILS NFR BLD AUTO: 0.4 % (ref 0–1.5)
DEPRECATED RDW RBC AUTO: 43.6 FL (ref 37–54)
EOSINOPHIL # BLD AUTO: 0.1 10*3/MM3 (ref 0–0.4)
EOSINOPHIL NFR BLD AUTO: 1 % (ref 0.3–6.2)
ERYTHROCYTE [DISTWIDTH] IN BLOOD BY AUTOMATED COUNT: 12.8 % (ref 12.3–15.4)
HCT VFR BLD AUTO: 45.2 % (ref 34–46.6)
HGB BLD-MCNC: 14.9 G/DL (ref 12–15.9)
IMM GRANULOCYTES # BLD AUTO: 0.04 10*3/MM3 (ref 0–0.05)
IMM GRANULOCYTES NFR BLD AUTO: 0.4 % (ref 0–0.5)
LYMPHOCYTES # BLD AUTO: 3.04 10*3/MM3 (ref 0.7–3.1)
LYMPHOCYTES NFR BLD AUTO: 29.2 % (ref 19.6–45.3)
MCH RBC QN AUTO: 31.4 PG (ref 26.6–33)
MCHC RBC AUTO-ENTMCNC: 33 G/DL (ref 31.5–35.7)
MCV RBC AUTO: 95.2 FL (ref 79–97)
MONOCYTES # BLD AUTO: 0.58 10*3/MM3 (ref 0.1–0.9)
MONOCYTES NFR BLD AUTO: 5.6 % (ref 5–12)
NEUTROPHILS NFR BLD AUTO: 6.6 10*3/MM3 (ref 1.7–7)
NEUTROPHILS NFR BLD AUTO: 63.4 % (ref 42.7–76)
NRBC BLD AUTO-RTO: 0 /100 WBC (ref 0–0.2)
PLATELET # BLD AUTO: 272 10*3/MM3 (ref 140–450)
PMV BLD AUTO: 10.7 FL (ref 6–12)
RBC # BLD AUTO: 4.75 10*6/MM3 (ref 3.77–5.28)
WBC NRBC COR # BLD: 10.4 10*3/MM3 (ref 3.4–10.8)

## 2022-08-16 PROCEDURE — 85025 COMPLETE CBC W/AUTO DIFF WBC: CPT

## 2022-08-16 PROCEDURE — G0463 HOSPITAL OUTPT CLINIC VISIT: HCPCS

## 2022-08-16 PROCEDURE — 36415 COLL VENOUS BLD VENIPUNCTURE: CPT

## 2022-08-16 RX ORDER — BUDESONIDE AND FORMOTEROL FUMARATE DIHYDRATE 160; 4.5 UG/1; UG/1
2 AEROSOL RESPIRATORY (INHALATION) 2 TIMES DAILY
Qty: 1 EACH | Refills: 11 | Status: SHIPPED | OUTPATIENT
Start: 2022-08-16

## 2022-08-16 NOTE — PROGRESS NOTES
SLEEP/PULMONARY  CLINIC NOTE      PATIENT IDENTIFICATION:  Name: Carli Dubon  Age: 61 y.o.  Sex: female  :  1960  MRN: [id number]    DATE OF CONSULTATION:  2022                     CHIEF COMPLAINT: Follow-up on sleep study    History of Present Illness:   Carli Dubon is a 61 y.o. female patient has sleep study showed the patient has obstructive sleep apnea AHI 5.3, details a sleep study was discussed with the patient patient reported to me she is feeling tired weak during the day and lethargic eventually maintaining sleep 6 to 8 hours  Patient still getting some dyspnea exertion on and off she is using her Symbicort but less shortness of breath less coughing less wheezing  Patient still getting swelling in her legs on and off    Review of Systems:   Constitutional:  As above   Eyes: negative   ENT/oropharynx: negative   Cardiovascular: negative   Respiratory:  As above   Gastrointestinal: negative   Genitourinary: negative   Neurological: negative   Musculoskeletal: negative   Integument/breast: negative   Endocrine: negative   Allergic/Immunologic: negative     Past Medical History:  Past Medical History:   Diagnosis Date   • Asthma    • Bipolar 1 disorder (HCC)    • Chronic obstructive lung disease (HCC) 2015   • COVID-19 08/15/2021   • CTS (carpal tunnel syndrome)    • Diabetes mellitus (HCC)    • Difficulty walking    • Hypertension    • Migraine    • Mixed hyperlipidemia 2020   • Pancreatitis    • PTSD (post-traumatic stress disorder)    • Sleep apnea    • Tobacco abuse 06/15/2020       Past Surgical History:  History reviewed. No pertinent surgical history.     Family History:  Family History   Problem Relation Age of Onset   • Hypertension Mother    • Diabetes Mother    • Asthma Daughter    • COPD Daughter         Social History:   Social History     Tobacco Use   • Smoking status: Current Every Day Smoker     Packs/day: 1.00     Years: 30.00     Pack years: 30.00      Types: Cigarettes, Cigarettes, Cigarettes   • Smokeless tobacco: Never Used   Substance Use Topics   • Alcohol use: Not Currently        Allergies:  Allergies   Allergen Reactions   • Iodine Swelling   • Adhesive Tape Rash       Home Meds:  (Not in a hospital admission)      Objective:    Vitals Ranges:   Heart Rate:  [111] 111  Resp:  [17] 17  BP: (142)/(87) 142/87  Body mass index is 34.95 kg/m².     Exam:  General Appearance:  WDWN    HEENT:   without obvious abnormality,  Conjunctiva/corneas clear,  Normal external ear canals, no drainage    Clear orsalmucosa,  Mallampati score 3    Neck:  Supple, symmetrical, trachea midline. No JVD.  Lungs:   Bilateral basal rhonchi bilaterally, respirations unlabored symmetrical wall movement.    Chest wall:  No tenderness or deformity.    Heart:  Regular rate and rhythm, S1 and S2 normal.  Extremities: Trace edema no clubbing or Cyanosis        Data Review:  All labs (24hrs): No results found for this or any previous visit (from the past 24 hour(s)).     Imaging:  XR Hips Bilateral With or Without Pelvis 3-4 View  bilateral Hip X-Ray  Indication: Evaluation of implant position after total hip arthroplasty.  AP and lateral  Findings: Early degenerative osteoarthritis with some narrowing of the   lateral joint space.    Soft tissues within normal limits  within normal limits joint spaces  Hardware appropriately positioned yes    no prior studies available for comparison.    X-RAY was ordered and reviewed by Ace Hale MD       ASSESSMENT:  Diagnoses and all orders for this visit:    Obstructive sleep apnea  -     Polysomnography 4 or More Parameters With CPAP; Future  -     COVID PRE-OP / PRE-PROCEDURE SCREENING ORDER (NO ISOLATION) - Swab, Nasopharynx; Future    Panlobular emphysema (HCC)    COVID-19    Other orders  -     budesonide-formoterol (SYMBICORT) 160-4.5 MCG/ACT inhaler; Inhale 2 puffs 2 (Two) Times a Day.    Cor pulmonale    PLAN:   This is patient with symptoms  of obstructive sleep apnea, titration study as soon as possible, Avoid supine avoid sedative meds in pm, weight loss, Avoid driving. Long discussion with patient about the physiology of NEVIN, and long term and short term   benefit of treating NEVIN     Encouraged to use I-S more frequently, PFT results was discussed with the patient.    Bronchodilator inhaled corticosteroid    Education how to use inhalers    Encouraged to use incentive spirometer    Continue to exercise slowly as tolerated    Monitor for any change in the color of the sputum    Avoid any exposure to fumes, gas or any irritant        Follow-up after titration    Ryan Bingham MD. D, ABSM.  8/16/2022  15:27 EDT

## 2022-08-17 ENCOUNTER — TREATMENT (OUTPATIENT)
Dept: PHYSICAL THERAPY | Facility: CLINIC | Age: 62
End: 2022-08-17

## 2022-08-17 DIAGNOSIS — M25.561 CHRONIC PAIN OF BOTH KNEES: ICD-10-CM

## 2022-08-17 DIAGNOSIS — M25.551 BILATERAL HIP PAIN: Primary | ICD-10-CM

## 2022-08-17 DIAGNOSIS — M25.552 BILATERAL HIP PAIN: Primary | ICD-10-CM

## 2022-08-17 DIAGNOSIS — M25.562 CHRONIC PAIN OF BOTH KNEES: ICD-10-CM

## 2022-08-17 DIAGNOSIS — G89.29 CHRONIC PAIN OF BOTH KNEES: ICD-10-CM

## 2022-08-17 PROCEDURE — 97113 AQUATIC THERAPY/EXERCISES: CPT | Performed by: PHYSICAL THERAPIST

## 2022-08-17 NOTE — PROGRESS NOTES
Physical Therapy Daily/Progress Note     Diagnosis Plan   1. Bilateral hip pain     2. Chronic pain of both knees         VISIT#: 6    Subjective   Carli Dubon reports: hips sore - good in pool, but hip pain returns.       Objective     Pool via steps.    See Exercise, Manual, and Modality Logs for complete treatment.     Patient Education:     Assessment/Plan - No pain in pool or with exercise; STG's met or progressing. Verbal cues for exercise. Discussed transitioning to land after next visit.       Goals  Plan Goals: STG's (2 - 4 weeks)  1. Tolerate 45 minutes aquatic therapy with reduction in B knee and hip pain. MET  2. Able to ambulate x 10 min on Aquatic TM with less pain. MET  3. Able to tolerate initial HEP w/ progression. MET  4. Improved tolerance with reduction of pain with functional activities; walking, shopping; squatting; cooking and related ADL's/IADL's. MET    LTGs: by DC   1- Pt will report at least 60% improvement and pain reduction  2- Pt will be I with final HEP and self management of her condition  3- LEFS score will be 40% or less, indicating improved function, pain and symptom reduction  4- Pt will voice readiness for DC with I program   5- Pt will be able to walk through grocery store with min pain   6- Pt will be able to negotiate stairs with min pain          Progress per Plan of Care    Medicaid/Medicare requires progress note be sent to the referring MD every 30 days or 10 visits, whichever comes first.            Timed:         Manual Therapy:         mins  18444;     Therapeutic Exercise:         mins  91496;     Neuromuscular Shelbi:        mins  81197;    Therapeutic Activity:          mins  93897;     Gait Training:           mins  31048;     Ultrasound:          mins  49814;    Ionto                                   mins   50844  Self Care                            mins   34566  Canalith Repos                   mins  4209  Aquatic                         45      mins  19036    Un-Timed:  Electrical Stimulation:         mins  76007 ( );  Dry Needling          mins self-pay  Traction          mins 12751  Low Eval          Mins  36457  Mod Eval          Mins  62468  High Eval                            Mins  19171  Re-Eval                               mins  50430    Timed Treatment:   45   mins   Total Treatment:     45   mins    Kory Forbes, PT PT, DPT, [id number]     Chaparro Monae, SPT

## 2022-08-19 DIAGNOSIS — E11.9 TYPE 2 DIABETES MELLITUS WITHOUT COMPLICATION, WITHOUT LONG-TERM CURRENT USE OF INSULIN: ICD-10-CM

## 2022-08-24 ENCOUNTER — OFFICE VISIT (OUTPATIENT)
Dept: FAMILY MEDICINE CLINIC | Facility: CLINIC | Age: 62
End: 2022-08-24

## 2022-08-24 VITALS
OXYGEN SATURATION: 94 % | TEMPERATURE: 96.9 F | BODY MASS INDEX: 36.94 KG/M2 | WEIGHT: 222 LBS | HEART RATE: 111 BPM | DIASTOLIC BLOOD PRESSURE: 85 MMHG | SYSTOLIC BLOOD PRESSURE: 130 MMHG

## 2022-08-24 DIAGNOSIS — R05.9 COUGH: ICD-10-CM

## 2022-08-24 DIAGNOSIS — I10 HYPERTENSION, UNSPECIFIED TYPE: Primary | ICD-10-CM

## 2022-08-24 DIAGNOSIS — E11.9 TYPE 2 DIABETES MELLITUS WITHOUT COMPLICATION, WITHOUT LONG-TERM CURRENT USE OF INSULIN: ICD-10-CM

## 2022-08-24 PROCEDURE — 99213 OFFICE O/P EST LOW 20 MIN: CPT | Performed by: NURSE PRACTITIONER

## 2022-08-24 RX ORDER — ATORVASTATIN CALCIUM 40 MG/1
40 TABLET, FILM COATED ORAL DAILY
Qty: 90 TABLET | Refills: 1 | Status: SHIPPED | OUTPATIENT
Start: 2022-08-24 | End: 2022-12-14 | Stop reason: SDUPTHER

## 2022-08-24 RX ORDER — AMLODIPINE BESYLATE 5 MG/1
5 TABLET ORAL DAILY
Qty: 90 TABLET | Refills: 1 | Status: SHIPPED | OUTPATIENT
Start: 2022-08-24

## 2022-08-24 RX ORDER — GLIPIZIDE 10 MG/1
10 TABLET ORAL
Qty: 180 TABLET | Refills: 1 | Status: SHIPPED | OUTPATIENT
Start: 2022-08-24

## 2022-08-24 NOTE — PROGRESS NOTES
Subjective     Carli Dubon is a 61 y.o. female.     Patient is here today for 1 month follow-up on hypertension.    She is currently on losartan 100 mg daily and Norvasc 5 mg daily, which was added at her last visit.  She is doing well on the medication  Ran out of it 2 days ago.  Denies CP, SOA, dizziness, HA.   Pt states the mucinex has not helped much with her cough  She sees pulmonary  She is going to have another sleep study.  Denies any fever or chills.  She uses tessalon perles.  She needs a new          The following portions of the patient's history were reviewed and updated as appropriate: allergies, current medications, past family history, past medical history, past social history, past surgical history and problem list.    Review of Systems   Constitutional: Negative for chills, fatigue and fever.   HENT: Negative for congestion.    Respiratory: Positive for cough and shortness of breath. Negative for chest tightness.    Cardiovascular: Negative for chest pain and palpitations.   Neurological: Negative for dizziness and headache.       Objective     /85   Pulse 111   Temp 96.9 °F (36.1 °C) (Tympanic)   Wt 101 kg (222 lb)   SpO2 94%   BMI 36.94 kg/m²     Current Outpatient Medications on File Prior to Visit   Medication Sig Dispense Refill   • albuterol (ACCUNEB) 0.63 MG/3ML nebulizer solution Take 3 mL by nebulization Every 6 (Six) Hours As Needed for Wheezing. 30 each 2   • albuterol sulfate  (90 Base) MCG/ACT inhaler Inhale 2 puffs Every 4 (Four) Hours As Needed for Wheezing. 18 g 2   • amitriptyline (ELAVIL) 150 MG tablet TAKE 1 TABLET BY MOUTH ONCE DAILY AT NIGHT 90 tablet 0   • budesonide-formoterol (Symbicort) 160-4.5 MCG/ACT inhaler Inhale 2 puffs 2 (Two) Times a Day. 6 g 12   • budesonide-formoterol (SYMBICORT) 160-4.5 MCG/ACT inhaler Inhale 2 puffs 2 (Two) Times a Day. 1 each 11   • clonazePAM (KlonoPIN) 0.5 MG tablet Take 1 tablet by mouth 2 (Two) Times a Day. 60 tablet 2    • divalproex (DEPAKOTE ER) 500 MG 24 hr tablet Take 3 tablets by mouth Every Night. 270 tablet 1   • empagliflozin (Jardiance) 25 MG tablet tablet Take 1 tablet by mouth Daily. 90 tablet 1   • famotidine (PEPCID) 40 MG tablet      • fluticasone (Flonase) 50 MCG/ACT nasal spray 2 sprays into the nostril(s) as directed by provider Daily. 9.9 mL 2   • guaiFENesin (Mucinex) 600 MG 12 hr tablet Take 2 tablets by mouth 2 (Two) Times a Day As Needed for Cough. 60 tablet 2   • losartan (Cozaar) 100 MG tablet Take 1 tablet by mouth Daily. 90 tablet 1   • nicotine (Nicotine Step 1) 21 MG/24HR patch Place 1 patch on the skin as directed by provider Daily. 30 patch 11   • nystatin-triamcinolone (MYCOLOG) 290703-4.1 UNIT/GM-% ointment      • omeprazole (priLOSEC) 20 MG capsule Take 1 capsule by mouth Daily. 30 capsule 0   • ondansetron ODT (ZOFRAN-ODT) 4 MG disintegrating tablet Place 1 tablet on the tongue Every 6 (Six) Hours As Needed for Nausea. 20 tablet 0   • promethazine-dextromethorphan (PROMETHAZINE-DM) 6.25-15 MG/5ML syrup Take 2.5 mL by mouth 4 (Four) Times a Day As Needed for Cough. 118 mL 0   • zolpidem (AMBIEN) 10 MG tablet Take 1 tablet by mouth At Night As Needed for Sleep. 30 tablet 2   • [DISCONTINUED] amLODIPine (Norvasc) 5 MG tablet Take 1 tablet by mouth Daily. 30 tablet 0   • [DISCONTINUED] atorvastatin (Lipitor) 40 MG tablet Take 1 tablet by mouth Daily. 90 tablet 1   • [DISCONTINUED] glipizide (GLUCOTROL) 10 MG tablet Take 1 tablet by mouth 2 (Two) Times a Day Before Meals. 180 tablet 1   • [DISCONTINUED] metFORMIN (GLUCOPHAGE) 1000 MG tablet TAKE 1 TABLET BY MOUTH TWICE DAILY WITH MEALS 180 tablet 0     No current facility-administered medications on file prior to visit.        Physical Exam  Vitals reviewed.   Constitutional:       General: She is not in acute distress.     Appearance: Normal appearance. She is well-developed. She is not diaphoretic.   HENT:      Head: Normocephalic and atraumatic.    Eyes:      General:         Right eye: No discharge.         Left eye: No discharge.      Extraocular Movements: Extraocular movements intact.      Conjunctiva/sclera: Conjunctivae normal.   Cardiovascular:      Rate and Rhythm: Normal rate and regular rhythm.      Heart sounds: No murmur heard.  Pulmonary:      Effort: Pulmonary effort is normal. No respiratory distress.      Breath sounds: Normal breath sounds. No wheezing or rales.   Abdominal:      General: Bowel sounds are normal.      Palpations: Abdomen is soft.   Musculoskeletal:         General: Normal range of motion.      Cervical back: Normal range of motion.   Skin:     General: Skin is warm and dry.   Neurological:      General: No focal deficit present.      Mental Status: She is alert and oriented to person, place, and time.   Psychiatric:         Mood and Affect: Mood normal.         Behavior: Behavior normal.         Thought Content: Thought content normal.         Judgment: Judgment normal.           Assessment & Plan     Diagnoses and all orders for this visit:    1. Hypertension, unspecified type (Primary)  Comments:  Improved  cont losartan and norvasc    Orders:  -     amLODIPine (Norvasc) 5 MG tablet; Take 1 tablet by mouth Daily.  Dispense: 90 tablet; Refill: 1    2. Cough  Comments:  Likely due to COPD  Continue inhalers  Tessalon Perles as needed  Lungs clear with auscultation    3. Type 2 diabetes mellitus without complication, without long-term current use of insulin ([facility])  Comments:  Renew meds    Orders:  -     metFORMIN (GLUCOPHAGE) 1000 MG tablet; Take 1 tablet by mouth 2 (Two) Times a Day With Meals.  Dispense: 180 tablet; Refill: 1  -     glipizide (GLUCOTROL) 10 MG tablet; Take 1 tablet by mouth 2 (Two) Times a Day Before Meals.  Dispense: 180 tablet; Refill: 1    Other orders  -     atorvastatin (Lipitor) 40 MG tablet; Take 1 tablet by mouth Daily.  Dispense: 90 tablet; Refill: 1

## 2022-08-25 ENCOUNTER — TREATMENT (OUTPATIENT)
Dept: PHYSICAL THERAPY | Facility: CLINIC | Age: 62
End: 2022-08-25

## 2022-08-25 DIAGNOSIS — M25.551 BILATERAL HIP PAIN: Primary | ICD-10-CM

## 2022-08-25 DIAGNOSIS — M25.552 BILATERAL HIP PAIN: Primary | ICD-10-CM

## 2022-08-25 DIAGNOSIS — M25.561 CHRONIC PAIN OF BOTH KNEES: ICD-10-CM

## 2022-08-25 DIAGNOSIS — G89.29 CHRONIC PAIN OF BOTH KNEES: ICD-10-CM

## 2022-08-25 DIAGNOSIS — M25.562 CHRONIC PAIN OF BOTH KNEES: ICD-10-CM

## 2022-08-25 PROCEDURE — 97110 THERAPEUTIC EXERCISES: CPT | Performed by: PHYSICAL THERAPIST

## 2022-08-25 PROCEDURE — 97140 MANUAL THERAPY 1/> REGIONS: CPT | Performed by: PHYSICAL THERAPIST

## 2022-08-25 NOTE — PROGRESS NOTES
Physical Therapy Daily/Progress Note     Diagnosis Plan   1. Bilateral hip pain     2. Chronic pain of both knees         VISIT#: 7    Subjective   Carli Dubon reports: wanted to try land; but states it is more difficult and believes she wants to return to pool.       Objective     Pool via steps.    See Exercise, Manual, and Modality Logs for complete treatment.     Patient Education:     Assessment/Plan - will assess effects of land on patient and may return to pool on next visit; patient able to complete exercise but had some mild discomfort.  Had to stop Nustep at ~8 min ruth due to pain in hips.    Goals  Plan Goals: STG's (2 - 4 weeks)  1. Tolerate 45 minutes aquatic therapy with reduction in B knee and hip pain. MET  2. Able to ambulate x 10 min on Aquatic TM with less pain. MET  3. Able to tolerate initial HEP w/ progression. MET  4. Improved tolerance with reduction of pain with functional activities; walking, shopping; squatting; cooking and related ADL's/IADL's. MET    LTGs: by DC   1- Pt will report at least 60% improvement and pain reduction  2- Pt will be I with final HEP and self management of her condition  3- LEFS score will be 40% or less, indicating improved function, pain and symptom reduction  4- Pt will voice readiness for DC with I program   5- Pt will be able to walk through grocery store with min pain   6- Pt will be able to negotiate stairs with min pain          Progress per Plan of Care - begin on land.    Medicaid/Medicare requires progress note be sent to the referring MD every 30 days or 10 visits, whichever comes first.            Timed:         Manual Therapy:  10       mins  23901;     Therapeutic Exercise:    35     mins  17752;     Neuromuscular Shebli:        mins  57246;    Therapeutic Activity:          mins  89398;     Gait Training:           mins  63045;     Ultrasound:          mins  68963;    Ionto                                   mins   84906  Self Care                             mins   01709  Canalith Repos                   mins  4209  Aquatic                             mins 27849    Un-Timed:  Electrical Stimulation:         mins  95991 ( );  Dry Needling          mins self-pay  Traction          mins 12279  Low Eval          Mins  63446  Mod Eval          Mins  16296  High Eval                            Mins  62236  Re-Eval                               mins  15024    Timed Treatment:   45   mins   Total Treatment:     45   mins    Kory Forbes PT PT, DPT, [id number]

## 2022-08-30 ENCOUNTER — TREATMENT (OUTPATIENT)
Dept: PHYSICAL THERAPY | Facility: CLINIC | Age: 62
End: 2022-08-30

## 2022-08-30 DIAGNOSIS — M25.551 BILATERAL HIP PAIN: Primary | ICD-10-CM

## 2022-08-30 DIAGNOSIS — M25.552 BILATERAL HIP PAIN: Primary | ICD-10-CM

## 2022-08-30 DIAGNOSIS — M25.562 CHRONIC PAIN OF BOTH KNEES: ICD-10-CM

## 2022-08-30 DIAGNOSIS — M25.561 CHRONIC PAIN OF BOTH KNEES: ICD-10-CM

## 2022-08-30 DIAGNOSIS — G89.29 CHRONIC PAIN OF BOTH KNEES: ICD-10-CM

## 2022-08-30 PROCEDURE — 97113 AQUATIC THERAPY/EXERCISES: CPT | Performed by: PHYSICAL THERAPIST

## 2022-08-30 NOTE — PROGRESS NOTES
Physical Therapy Daily/Progress Note     Diagnosis Plan   1. Bilateral hip pain     2. Chronic pain of both knees         VISIT#: 8    Subjective   Carli Dubon reports: increase in pain after land therapy; much better in pool.  Decrease in pain after aquatic today.    Objective     Pool via steps.    See Exercise, Manual, and Modality Logs for complete treatment.     Patient Education:     Assessment/Plan - more tolerable than land with no pain in pool or after; verbal cues for exercise and form.    Goals  Plan Goals: STG's (2 - 4 weeks)  1. Tolerate 45 minutes aquatic therapy with reduction in B knee and hip pain. MET  2. Able to ambulate x 10 min on Aquatic TM with less pain. MET  3. Able to tolerate initial HEP w/ progression. MET  4. Improved tolerance with reduction of pain with functional activities; walking, shopping; squatting; cooking and related ADL's/IADL's. MET    LTGs: by DC   1- Pt will report at least 60% improvement and pain reduction  2- Pt will be I with final HEP and self management of her condition  3- LEFS score will be 40% or less, indicating improved function, pain and symptom reduction  4- Pt will voice readiness for DC with I program   5- Pt will be able to walk through grocery store with min pain   6- Pt will be able to negotiate stairs with min pain          Progress per Plan of Care - reassess on 10th visit.    Medicaid/Medicare requires progress note be sent to the referring MD every 30 days or 10 visits, whichever comes first.            Timed:         Manual Therapy:         mins  25362;     Therapeutic Exercise:         mins  98326;     Neuromuscular Shelbi:        mins  54321;    Therapeutic Activity:          mins  79338;     Gait Training:           mins  89927;     Ultrasound:          mins  36024;    Ionto                                   mins   71218  Self Care                            mins   04022  Canalith Repos                   mins  4209  Aquatic                            45 mins 68596    Un-Timed:  Electrical Stimulation:         mins  48402 ( );  Dry Needling          mins self-pay  Traction          mins 59522  Low Eval          Mins  90522  Mod Eval          Mins  59984  High Eval                            Mins  41066  Re-Eval                               mins  41527    Timed Treatment:   45   mins   Total Treatment:     45   mins    Kory Forbes, PT PT, DPT, [id number]

## 2022-09-01 ENCOUNTER — TREATMENT (OUTPATIENT)
Dept: PHYSICAL THERAPY | Facility: CLINIC | Age: 62
End: 2022-09-01

## 2022-09-01 DIAGNOSIS — M25.551 BILATERAL HIP PAIN: Primary | ICD-10-CM

## 2022-09-01 DIAGNOSIS — M25.562 CHRONIC PAIN OF BOTH KNEES: ICD-10-CM

## 2022-09-01 DIAGNOSIS — M25.552 BILATERAL HIP PAIN: Primary | ICD-10-CM

## 2022-09-01 DIAGNOSIS — M25.561 CHRONIC PAIN OF BOTH KNEES: ICD-10-CM

## 2022-09-01 DIAGNOSIS — G89.29 CHRONIC PAIN OF BOTH KNEES: ICD-10-CM

## 2022-09-01 PROCEDURE — 97113 AQUATIC THERAPY/EXERCISES: CPT | Performed by: PHYSICAL THERAPIST

## 2022-09-01 NOTE — PROGRESS NOTES
Re-Assessment / Re-Certification        Patient: Carli Dubon   : 1960  Diagnosis/ICD-10 Code:  Bilateral hip pain [M25.551, M25.552]; Chronic pain of both knees [M25.561, M25.562, G89.29]    Referring practitioner: Ace Hale MD  Date of Initial Visit: Type: THERAPY  Noted: 2022  Today's Date: 2022  Patient seen for 9 sessions      Subjective:   Carli Dubon reports: 50% improvement in knees; 30% improvement in back and bilateral hips with walking and activities at home. Wants to continue in aquatic setting. To f/u with MD in February. Is going to investigate membership at [employer], but does not like [employer].    Subjective Questionnaire: LEFS: (31% function, 69% disability); [place] hip: 44% disability - has shown improvement.    Pain range: 5 to 8/10 - knees better, hips still very painful at times.    Clinical Progress: improved  Home Program Compliance: Yes  Treatment has included: therapeutic exercise and aquatic    Subjective - see above  Objective          Tenderness     Right Knee   Tenderness in the inferior patella, lateral patella, medial patella and superior patella.     Additional Tenderness Details  Lateral aspects of both hips and lumbar paraspinals: less tender than on exam  Less tenderness in knees, but still present.    Lumbar Screen  Lumbar range of motion within normal limits with the following exceptions:Lumbar flex min limitation; ext with mod limitation and co inc pain in both hips and LB    Neurological Testing     Sensation     Knee   Left Knee   Intact: light touch    Right Knee   Intact: light touch     Active Range of Motion   Left Hip   Flexion: 95 degrees with pain    Right Hip   Flexion: 100 degrees with pain  Left Knee   Flexion: 110 degrees with pain  Extension: 0 degrees     Right Knee   Flexion: 115 degrees with pain  Extension: 0 degrees     Additional Active Range of Motion Details  IR/ ER with mod limitation and painful B    Passive Range of Motion      Additional Passive Range of Motion Details  Extremely limited in internal rotation in bilateral hips    Strength/Myotome Testing     Left Hip   Planes of Motion   Flexion: 4  Abduction: 4  External rotation: 4  Internal rotation: 4    Right Hip   Planes of Motion   Flexion: 4  Abduction: 4  External rotation: 4  Internal rotation: 4    Left Knee   Flexion: 4+  Extension: 4+  Quadriceps contraction: good    Right Knee   Flexion: 4+  Extension: 4+  Quadriceps contraction: good    Additional Strength Details  Pain with functional squat. Unable to perform with patellofemoral pain and pain in hips.    Tests     Left Hip   Positive GARY and scour.     Right Hip   Positive GARY and scour.     Swelling     Left Knee Girth Measurement (cm)   Joint line: 35 cm    Right Knee Girth Measurement (cm)   Joint line: 36 cm      Assessment/Plan - patient has shown better response to aquatic exercise than land and has shown Improvement; she would benefit from additional visits to imporve function and achieve goals. I am in process of trying to encourage transition to [facility] if possible: financial constraints and apprehension of crowds.     Progress toward previous goals: Partially Met  oals  Plan Goals: STG's (2 - 4 weeks)  1. Tolerate 45 minutes aquatic therapy with reduction in B knee and hip pain. MET  2. Able to ambulate x 10 min on Aquatic TM with less pain. MEt  3. Able to tolerate initial HEP w/ progression. MET  4. Improved tolerance with reduction of pain with functional activities; walking, shopping; squatting; cooking and related ADL's/IADL's. MET    LTGs: by DC   1- Pt will report at least 60% improvement and pain reduction  2- Pt will be I with final HEP and self management of her condition  3- LEFS score will be 40% or less, indicating improved function, pain and symptom reduction  4- Pt will voice readiness for DC with I program   5- Pt will be able to walk through grocery store with min pain   6- Pt will be able to  negotiate stairs with min pain      Recommendations: Continue with recommendations add 8 more visits to POC  Timeframe: 3 months  Prognosis to achieve goals: fair    PT: Kory Forbes PT     [place] License #:  [id number]    Electronically signed by Kory Forbes PT, 09/01/22, 9:51 AM EDT    Certification Period: 9/1/2022 thru 11/29/2022  I certify that the therapy services are furnished while this patient is under my care.  The services outlined above are required by this patient, and will be reviewed every 90 days.         Physician Signature:__________________________________________________    PHYSICIAN: Ace Hale MD      DATE:     Please sign and return via fax to [email or web address] . Thank you, [facility]      Timed:         Manual Therapy:         mins  19008;     Therapeutic Exercise:         mins  25783;     Neuromuscular Shelbi:        mins  16302;    Therapeutic Activity:          mins  51030;     Gait Training:           mins  06210;     Ultrasound:          mins  89245;    Ionto                                   mins   57189  Self Care                            mins   76827  Aquatic                          40     mins 92604      Un-Timed:  Electrical Stimulation:         mins  56402 ( );  Dry Needling         mins self-pay  Traction          mins 15553  Low Eval          Mins  09445  Mod Eval          Mins  31277  High Eval                            Mins  11847  Re-Eval                               mins  87898      Timed Treatment:  40    mins   Total Treatment:    40    mins

## 2022-09-06 ENCOUNTER — TELEPHONE (OUTPATIENT)
Dept: FAMILY MEDICINE CLINIC | Facility: CLINIC | Age: 62
End: 2022-09-06

## 2022-09-06 NOTE — TELEPHONE ENCOUNTER
Patient should not be taking this every day.  She needs to be following up with her pulmonary doctor if she is coughing that much

## 2022-09-06 NOTE — TELEPHONE ENCOUNTER
Caller: Carli Dubon    Relationship: Self    Best call back number: [phone]    What was the call regarding: PATIENT STATED THAT SHE CHECKED WITH [facility] AND THEY COULD NOT LOCATE HER REFERRAL TO A GI DOCTOR. PATIENT IS CALLING TO SEE IF THAT REFERRAL CAN BE SENT BACK OVER TO [facility]. PLEASE ADVISE.    Do you require a callback: YES

## 2022-09-06 NOTE — TELEPHONE ENCOUNTER
Caller: DubonCarli    Relationship: Self    Best call back number: [phone]    What medication are you requesting: BENZONATATE 100 MG    If a prescription is needed, what is your preferred pharmacy and phone number:      [facility] [address] [place] [address], [place] - [address] [facility] [phone] [facility] [phone]       Additional notes: PATIENT WAS ALREADY TAKING THIS MEDICATION AND HAD ASKEDMS JACKIE MARTINEZ FOR A  REFILL WHEN SHE WAS IN THE OFFICE. PATIENT HAS NOT GOTTEN THE PRESCRIPTION YET. PATIENT IS OUT OF MEDICATION. PLEASE ADVISE.

## 2022-09-07 ENCOUNTER — TELEPHONE (OUTPATIENT)
Dept: FAMILY MEDICINE CLINIC | Facility: CLINIC | Age: 62
End: 2022-09-07

## 2022-09-07 RX ORDER — BENZONATATE 100 MG/1
100 CAPSULE ORAL 3 TIMES DAILY PRN
Qty: 30 CAPSULE | Refills: 0 | Status: SHIPPED | OUTPATIENT
Start: 2022-09-07 | End: 2023-03-05

## 2022-09-07 NOTE — TELEPHONE ENCOUNTER
Caller: Carli Dubon    Relationship to patient: Self    Best call back number: [phone]    Patient is needing: PATIENT READ [employer] MESSAGE THAT WAS SENT YESTERDAY REGARDING THE REQUEST FOR COUGH MEDICATION. PATIENT STATES THAT SHE DOES NOT TAKE IT EVERY DAY. PATIENT STATES THAT SHE ONLY WANTS THE MEDICATION TO TAKE AS NEEDED. PLEASE ADVISE.        [address] - [phone] [place] [phone] [phone]

## 2022-09-08 DIAGNOSIS — J44.9 CHRONIC OBSTRUCTIVE PULMONARY DISEASE, UNSPECIFIED COPD TYPE: ICD-10-CM

## 2022-09-08 RX ORDER — ALBUTEROL SULFATE 90 UG/1
AEROSOL, METERED RESPIRATORY (INHALATION)
Qty: 18 G | Refills: 0 | Status: SHIPPED | OUTPATIENT
Start: 2022-09-08 | End: 2022-12-19

## 2022-09-12 ENCOUNTER — HOSPITAL ENCOUNTER (OUTPATIENT)
Dept: SLEEP MEDICINE | Facility: HOSPITAL | Age: 62
Discharge: HOME | End: 2022-09-12
Admitting: INTERNAL MEDICINE

## 2022-09-12 DIAGNOSIS — G47.33 OBSTRUCTIVE SLEEP APNEA: ICD-10-CM

## 2022-09-12 PROCEDURE — 95811 POLYSOM 6/>YRS CPAP 4/> PARM: CPT

## 2022-09-13 VITALS — BODY MASS INDEX: 37.1 KG/M2 | WEIGHT: 222.66 LBS | HEIGHT: 65 IN

## 2022-09-20 DIAGNOSIS — G47.33 OSA (OBSTRUCTIVE SLEEP APNEA): Primary | ICD-10-CM

## 2022-09-26 ENCOUNTER — TREATMENT (OUTPATIENT)
Dept: PHYSICAL THERAPY | Facility: CLINIC | Age: 62
End: 2022-09-26

## 2022-09-26 ENCOUNTER — TRANSCRIBE ORDERS (OUTPATIENT)
Dept: ADMINISTRATIVE | Facility: HOSPITAL | Age: 62
End: 2022-09-26

## 2022-09-26 DIAGNOSIS — Z02.71 ENCOUNTER FOR DISABILITY DETERMINATION: Primary | ICD-10-CM

## 2022-09-26 DIAGNOSIS — M25.561 CHRONIC PAIN OF BOTH KNEES: ICD-10-CM

## 2022-09-26 DIAGNOSIS — M25.552 BILATERAL HIP PAIN: Primary | ICD-10-CM

## 2022-09-26 DIAGNOSIS — M25.551 BILATERAL HIP PAIN: Primary | ICD-10-CM

## 2022-09-26 DIAGNOSIS — M25.562 CHRONIC PAIN OF BOTH KNEES: ICD-10-CM

## 2022-09-26 DIAGNOSIS — G89.29 CHRONIC PAIN OF BOTH KNEES: ICD-10-CM

## 2022-09-26 PROCEDURE — 97113 AQUATIC THERAPY/EXERCISES: CPT | Performed by: PHYSICAL THERAPIST

## 2022-09-26 NOTE — PROGRESS NOTES
Physical Therapy Daily/Progress Note     Diagnosis Plan   1. Bilateral hip pain     2. Chronic pain of both knees         VISIT#: 10    Subjective   Carli Dubon reports: as on last re-assess - missed aquatic and pain a little more at times.    Objective          Tenderness     Right Knee   Tenderness in the inferior patella, lateral patella, medial patella and superior patella.     Additional Tenderness Details  Lateral aspects of both hips and lumbar paraspinals: less tender than on exam  Less tenderness in knees, but still present.    Lumbar Screen  Lumbar range of motion within normal limits with the following exceptions:Lumbar flex min limitation; ext with mod limitation and co inc pain in both hips and LB    Neurological Testing     Sensation     Knee   Left Knee   Intact: light touch    Right Knee   Intact: light touch     Active Range of Motion   Left Hip   Flexion: 95 degrees with pain    Right Hip   Flexion: 100 degrees with pain  Left Knee   Flexion: 110 degrees with pain  Extension: 0 degrees     Right Knee   Flexion: 115 degrees with pain  Extension: 0 degrees     Additional Active Range of Motion Details  IR/ ER with mod limitation and painful B    Passive Range of Motion     Additional Passive Range of Motion Details  Extremely limited in internal rotation in bilateral hips    Strength/Myotome Testing     Left Hip   Planes of Motion   Flexion: 4  Abduction: 4  External rotation: 4  Internal rotation: 4    Right Hip   Planes of Motion   Flexion: 4  Abduction: 4  External rotation: 4  Internal rotation: 4    Left Knee   Flexion: 4+  Extension: 4+  Quadriceps contraction: good    Right Knee   Flexion: 4+  Extension: 4+  Quadriceps contraction: good    Additional Strength Details  Pain with functional squat. Unable to perform with patellofemoral pain and pain in hips.    Tests     Left Hip   Positive GARY and scour.     Right Hip   Positive GARY and scour.     Swelling     Left Knee Girth Measurement  (cm)   Joint line: 35 cm    Right Knee Girth Measurement (cm)   Joint line: 36 cm        Pool via steps.    See Exercise, Manual, and Modality Logs for complete treatment.   RE-assess - no change since 9/1/22 - will use data for insurance authorization.    Patient Education:     Assessment/Plan - POC signed by MD to continue PT - will send off for additional authorization with insurance using prior re-assess. Pain decreased in pool with aquatic exercise. See 9/1/22 assessment for Goals.     Goals  Plan Goals: STG's (2 - 4 weeks)  1. Tolerate 45 minutes aquatic therapy with reduction in B knee and hip pain. MET  2. Able to ambulate x 10 min on Aquatic TM with less pain. MET  3. Able to tolerate initial HEP w/ progression. MET  4. Improved tolerance with reduction of pain with functional activities; walking, shopping; squatting; cooking and related ADL's/IADL's. MET    LTGs: by DC   1- Pt will report at least 60% improvement and pain reduction  2- Pt will be I with final HEP and self management of her condition  3- LEFS score will be 40% or less, indicating improved function, pain and symptom reduction  4- Pt will voice readiness for DC with I program   5- Pt will be able to walk through grocery store with min pain   6- Pt will be able to negotiate stairs with min pain          Progress per Plan of Care   Medicaid/Medicare requires progress note be sent to the referring MD every 30 days or 10 visits, whichever comes first.            Timed:         Manual Therapy:         mins  98160;     Therapeutic Exercise:         mins  17279;     Neuromuscular Shelbi:        mins  09230;    Therapeutic Activity:          mins  14330;     Gait Training:           mins  73030;     Ultrasound:          mins  12186;    Ionto                                   mins   00541  Self Care                            mins   62368  Canalith Repos                   mins  4209  Aquatic                           45 mins  55562    Un-Timed:  Electrical Stimulation:         mins  42439 ( );  Dry Needling          mins self-pay  Traction          mins 63709  Low Eval          Mins  32685  Mod Eval          Mins  50048  High Eval                            Mins  44643  Re-Eval                               mins  37043    Timed Treatment:   45   mins   Total Treatment:     45   mins    Kory Forbes, PT PT, DPT, [id number]

## 2022-09-27 ENCOUNTER — OFFICE VISIT (OUTPATIENT)
Dept: PULMONOLOGY | Facility: HOSPITAL | Age: 62
End: 2022-09-27

## 2022-09-27 VITALS
WEIGHT: 222 LBS | OXYGEN SATURATION: 95 % | RESPIRATION RATE: 16 BRPM | HEART RATE: 103 BPM | HEIGHT: 65 IN | BODY MASS INDEX: 36.99 KG/M2 | DIASTOLIC BLOOD PRESSURE: 85 MMHG | SYSTOLIC BLOOD PRESSURE: 146 MMHG

## 2022-09-27 DIAGNOSIS — I10 ESSENTIAL HYPERTENSION: ICD-10-CM

## 2022-09-27 DIAGNOSIS — E66.01 MORBID OBESITY: ICD-10-CM

## 2022-09-27 DIAGNOSIS — G47.33 OBSTRUCTIVE SLEEP APNEA: Primary | ICD-10-CM

## 2022-09-27 DIAGNOSIS — J43.1 PANLOBULAR EMPHYSEMA: ICD-10-CM

## 2022-09-27 PROCEDURE — G0463 HOSPITAL OUTPT CLINIC VISIT: HCPCS

## 2022-09-27 RX ORDER — BENZONATATE 100 MG/1
100 CAPSULE ORAL 3 TIMES DAILY PRN
Qty: 42 CAPSULE | Refills: 3 | Status: SHIPPED | OUTPATIENT
Start: 2022-09-27 | End: 2023-03-05

## 2022-09-27 NOTE — PROGRESS NOTES
SLEEP/PULMONARY  CLINIC NOTE      PATIENT IDENTIFICATION:  Name: Carli Dubon  Age: 61 y.o.  Sex: female  :  1960  MRN: [id number]    DATE OF CONSULTATION:  2022                     CHIEF COMPLAINT: NEVIN    History of Present Illness:   Carli Dubon is a 61 y.o. female obstructive sleep apnea, she has titration study did not get her equipment yet he still feeling tired and fatigued, still sleepiness during the day and snoring  Patient with chronic obstructive airway disease her family still smoking around her she still have intermittent shortness of breath coughing and wheezing no hemoptysis      Review of Systems:   Constitutional: negative   Eyes: negative   ENT/oropharynx: negative   Cardiovascular: negative   Respiratory: negative   Gastrointestinal: negative   Genitourinary: negative   Neurological: negative   Musculoskeletal: negative   Integument/breast: negative   Endocrine: negative   Allergic/Immunologic: negative     Past Medical History:  Past Medical History:   Diagnosis Date   • Asthma    • Bipolar 1 disorder (HCC)    • Chronic obstructive lung disease (HCC) 2015   • COVID-19 08/15/2021   • CTS (carpal tunnel syndrome)    • Diabetes mellitus (HCC)    • Difficulty walking    • Hypertension    • Migraine    • Mixed hyperlipidemia 2020   • Pancreatitis    • PTSD (post-traumatic stress disorder)    • Tobacco abuse 06/15/2020       Past Surgical History:  History reviewed. No pertinent surgical history.     Family History:  Family History   Problem Relation Age of Onset   • Hypertension Mother    • Diabetes Mother    • Asthma Daughter    • COPD Daughter         Social History:   Social History     Tobacco Use   • Smoking status: Current Every Day Smoker     Packs/day: 1.00     Years: 30.00     Pack years: 30.00     Types: Cigarettes, Cigarettes, Cigarettes   • Smokeless tobacco: Never Used   Substance Use Topics   • Alcohol use: Not Currently        Allergies:  Allergies  "  Allergen Reactions   • Iodine Swelling   • Adhesive Tape Rash       Home Meds:  (Not in a hospital admission)      Objective:    Vitals Ranges:   Heart Rate:  [103] 103  Resp:  [16] 16  BP: (146)/(85) 146/85  Body mass index is 36.94 kg/m².     Exam:  General Appearance:  WDWN    HEENT:   without obvious abnormality,  Conjunctiva/corneas clear,  Normal external ear canals, no drainage    Clear orsalmucosa,  Mallampati score 3    Neck:  Supple, symmetrical, trachea midline. No JVD.  Lungs:   Bilateral basal rhonchi bilaterally, respirations unlabored symmetrical wall movement.    Chest wall:  No tenderness or deformity.    Heart:  Regular rate and rhythm, S1 and S2 normal.  Extremities: Trace edema no clubbing or Cyanosis        Data Review:  All labs (24hrs): No results found for this or any previous visit (from the past 24 hour(s)).     Imaging:  Polysomnography 4 or More Parameters With CPAP  Positive airway pressure titration STUDY    PATIENT IDENTIFICATION:  Name: Carli Dubon  Age: 61 y.o.  Sex: female  :  1960  MRN: [id number]    DATE OF Study: 2022   Referring Physician: [unfilled]    WEIGHT: 222   LB       HEIGHT: 60\"       BMI: 37    Reason For study:   Patient with daytime sleepiness  snoring at night sleep study showed   obstructive sleep apnea recommendation to proceed was titration study    Technical description:   this is an overnight polysomnography study using standardized parameters   including EEG, EOG, EKG, anterior tibialis and chin EMG, blood oxygen   saturation, oral nasal airflow, respiratory area for monitoring with both   abdominal and thoracic gauges and snore monitoring using a standard   titration protocol.     Summary of sleep parameters:  patient went to bed at(10: 26) p.m, woke up with lightt on at(5: 41) a.m   total study time(433) minutes, total sleep time(364) minutes, latency to   sleep onset(5) minutes, latency to REM sleep (169) minutes, sleep   efficiency was  " (84) %, sleep stages where(10)% REM sleep , the rest is   non-REM .    Summary of respiratory efforts:  Patient went to sleep starting with CPAP pressure 5 cm H2O, pressure was   titrated to (9) centimeter H2O at this pressure patient patient slept    (119) minutes did not have any apnea hypopnea or snoring or desaturation.     Summary over the heart rate:  Heart rate was fluctuating between (90) and  (110) bpm.     Summary of periodic leg movement:  Patient had a total of (1) periodic leg movement with arousal and index   (0.2) times per hour.      Impression:  This is a PAP titration study  showing the patient has obstructive sleep   apnea requiring the pressure of (9) centimeters H2O to overcome most apnea   hypopnea in the snoring.     Recommendation:  Start the patient on positive airway pressure of (auto CPAP pressure of   6-14) centimeters H2O follow up with a sleep specialist for further   recommendation and follow-up on compliance, meanwhile avoid supine   sleeping,  sedative medication, avoid long-distance driving till all   symptoms resolved.    Ryan Bingham MD. D, ABSM.  9/20/2022  15:04 EDT          ASSESSMENT:  Diagnoses and all orders for this visit:    Obstructive sleep apnea    Panlobular emphysema (HCC)    Essential hypertension    Morbid obesity (HCC)        PLAN:   This is patient with symptoms of obstructive sleep apnea, get O2 CPAP as soon as possible, Avoid supine avoid sedative meds in pm, weight loss, Avoid driving. Long discussion with patient about the physiology of NEVIN, and long term and short term   benefit of treating NEVIN     Education patient  About tips for  smoking cessation and risk factors and benefit, was to a discussion with the patient.     Bronchodilator inhaled corticosteroid    Education how to use inhalers    Encouraged to use incentive spirometer    Continue to exercise slowly as tolerated    Monitor for any change in the color of the sputum    Avoid any exposure to  fumes, gas or any irritant        Follow-up 6 weeks    Ryan Bingham MD. D, ABSM.  9/27/2022  11:03 EDT

## 2022-09-28 ENCOUNTER — TELEMEDICINE (OUTPATIENT)
Dept: PSYCHIATRY | Facility: CLINIC | Age: 62
End: 2022-09-28

## 2022-09-28 DIAGNOSIS — F31.32 BIPOLAR AFFECTIVE DISORDER, CURRENTLY DEPRESSED, MODERATE: Primary | Chronic | ICD-10-CM

## 2022-09-28 DIAGNOSIS — G47.00 INSOMNIA, UNSPECIFIED TYPE: ICD-10-CM

## 2022-09-28 DIAGNOSIS — F43.10 PTSD (POST-TRAUMATIC STRESS DISORDER): Chronic | ICD-10-CM

## 2022-09-28 PROCEDURE — 99214 OFFICE O/P EST MOD 30 MIN: CPT | Performed by: PHYSICIAN ASSISTANT

## 2022-09-28 RX ORDER — AMITRIPTYLINE HYDROCHLORIDE 150 MG/1
150 TABLET, FILM COATED ORAL NIGHTLY
Qty: 90 TABLET | Refills: 1 | Status: SHIPPED | OUTPATIENT
Start: 2022-09-28

## 2022-09-29 ENCOUNTER — HOSPITAL ENCOUNTER (OUTPATIENT)
Dept: RESPIRATORY THERAPY | Facility: HOSPITAL | Age: 62
Discharge: HOME | End: 2022-09-29

## 2022-09-29 DIAGNOSIS — Z02.71 ENCOUNTER FOR DISABILITY DETERMINATION: ICD-10-CM

## 2022-09-29 PROCEDURE — 94060 EVALUATION OF WHEEZING: CPT

## 2022-09-29 RX ORDER — ALBUTEROL SULFATE 90 UG/1
2 AEROSOL, METERED RESPIRATORY (INHALATION) ONCE
Status: COMPLETED | OUTPATIENT
Start: 2022-09-29 | End: 2022-09-29

## 2022-09-29 RX ADMIN — ALBUTEROL SULFATE 2 PUFF: 108 INHALANT RESPIRATORY (INHALATION) at 14:21

## 2022-10-05 DIAGNOSIS — F31.32 BIPOLAR AFFECTIVE DISORDER, CURRENTLY DEPRESSED, MODERATE: Chronic | ICD-10-CM

## 2022-10-06 DIAGNOSIS — I10 HYPERTENSION, UNSPECIFIED TYPE: ICD-10-CM

## 2022-10-06 RX ORDER — LOSARTAN POTASSIUM 100 MG/1
TABLET ORAL
Qty: 90 TABLET | Refills: 0 | Status: SHIPPED | OUTPATIENT
Start: 2022-10-06

## 2022-10-11 DIAGNOSIS — F31.32 BIPOLAR AFFECTIVE DISORDER, CURRENTLY DEPRESSED, MODERATE: Chronic | ICD-10-CM

## 2022-10-11 RX ORDER — DIVALPROEX SODIUM 500 MG/1
1500 TABLET, EXTENDED RELEASE ORAL NIGHTLY
Qty: 270 TABLET | Refills: 1 | Status: SHIPPED | OUTPATIENT
Start: 2022-10-11 | End: 2023-01-10

## 2022-10-12 ENCOUNTER — TREATMENT (OUTPATIENT)
Dept: PHYSICAL THERAPY | Facility: CLINIC | Age: 62
End: 2022-10-12

## 2022-10-12 DIAGNOSIS — M25.552 BILATERAL HIP PAIN: Primary | ICD-10-CM

## 2022-10-12 DIAGNOSIS — M25.551 BILATERAL HIP PAIN: Primary | ICD-10-CM

## 2022-10-12 DIAGNOSIS — M25.561 CHRONIC PAIN OF BOTH KNEES: ICD-10-CM

## 2022-10-12 DIAGNOSIS — M25.562 CHRONIC PAIN OF BOTH KNEES: ICD-10-CM

## 2022-10-12 DIAGNOSIS — G89.29 CHRONIC PAIN OF BOTH KNEES: ICD-10-CM

## 2022-10-12 PROCEDURE — 97113 AQUATIC THERAPY/EXERCISES: CPT | Performed by: PHYSICAL THERAPIST

## 2022-10-12 NOTE — PROGRESS NOTES
"Physical Therapy Daily/Progress Note     Diagnosis Plan   1. Bilateral hip pain        2. Chronic pain of both knees            VISIT#: 11    Subjective   Carli Dubon reports: the other day, both hips and knees terrible - probably due to not being in water and recent fall; fell over dog leash - R knee more painful than L and \"really bad\".     To f/u with MD about R knee. Unable to walk for long distance in store recently without motorized scooter to shop. Pain with steps.      Objective     Pool via steps.    See Exercise, Manual, and Modality Logs for complete treatment.     Patient Education: advised to f/u with MD regarding knees.    Assessment/Plan - mild pain with exercise and ROM R knee in pool; but, less than on land - still exhibits same ROM R knee as before. Increased cycling time for ROM and joint lubrication.      Goals  Plan Goals: STG's (2 - 4 weeks)  1. Tolerate 45 minutes aquatic therapy with reduction in B knee and hip pain. MET  2. Able to ambulate x 10 min on Aquatic TM with less pain. MET  3. Able to tolerate initial HEP w/ progression. MET  4. Improved tolerance with reduction of pain with functional activities; walking, shopping; squatting; cooking and related ADL's/IADL's. MET    LTGs: by DC   1- Pt will report at least 60% improvement and pain reduction  2- Pt will be I with final HEP and self management of her condition  3- LEFS score will be 40% or less, indicating improved function, pain and symptom reduction  4- Pt will voice readiness for DC with I program   5- Pt will be able to walk through grocery store with min pain   6- Pt will be able to negotiate stairs with min pain          Progress per Plan of Care     Medicaid/Medicare requires progress note be sent to the referring MD every 30 days or 10 visits, whichever comes first.            Timed:         Manual Therapy:         mins  60912;     Therapeutic Exercise:         mins  58005;     Neuromuscular Shelbi:        mins  41873;  "   Therapeutic Activity:          mins  85816;     Gait Training:           mins  90361;     Ultrasound:          mins  47828;    Ionto                                   mins   36308  Self Care                            mins   35249  Canalith Repos                   mins  4209  Aquatic                           45 mins 49814    Un-Timed:  Electrical Stimulation:         mins  20756 ( );  Dry Needling          mins self-pay  Traction          mins 00042  Low Eval          Mins  33830  Mod Eval          Mins  03559  High Eval                            Mins  42620  Re-Eval                               mins  96310    Timed Treatment:   45   mins   Total Treatment:     45   mins    Kory Forbes PT PT, DPT, [id number]

## 2022-10-16 RX ORDER — DIVALPROEX SODIUM 500 MG/1
TABLET, EXTENDED RELEASE ORAL
Qty: 180 TABLET | Refills: 0 | OUTPATIENT
Start: 2022-10-16

## 2022-10-26 ENCOUNTER — TELEPHONE (OUTPATIENT)
Dept: PHYSICAL THERAPY | Facility: CLINIC | Age: 62
End: 2022-10-26

## 2022-11-03 ENCOUNTER — TELEPHONE (OUTPATIENT)
Dept: PHYSICAL THERAPY | Facility: OTHER | Age: 62
End: 2022-11-03

## 2022-11-06 DIAGNOSIS — F43.10 PTSD (POST-TRAUMATIC STRESS DISORDER): Chronic | ICD-10-CM

## 2022-11-07 RX ORDER — CLONAZEPAM 0.5 MG/1
TABLET ORAL
Qty: 60 TABLET | Refills: 0 | Status: SHIPPED | OUTPATIENT
Start: 2022-11-07 | End: 2023-01-05

## 2022-11-10 DIAGNOSIS — F51.05 INSOMNIA DUE TO MENTAL CONDITION: ICD-10-CM

## 2022-11-10 RX ORDER — ZOLPIDEM TARTRATE 10 MG/1
10 TABLET ORAL NIGHTLY PRN
Qty: 30 TABLET | Refills: 2 | Status: SHIPPED | OUTPATIENT
Start: 2022-11-10 | End: 2023-02-01 | Stop reason: SDUPTHER

## 2022-11-28 DIAGNOSIS — F51.05 INSOMNIA DUE TO MENTAL CONDITION: ICD-10-CM

## 2022-11-28 NOTE — TELEPHONE ENCOUNTER
Patient educated on the importance of wearing mask when out of room, proper handwashing and importance of refraining from touching face, mouth, verbalized understanding    Patient needs refills. Please advise.

## 2022-11-29 RX ORDER — ZOLPIDEM TARTRATE 10 MG/1
10 TABLET ORAL NIGHTLY PRN
Qty: 30 TABLET | Refills: 2 | OUTPATIENT
Start: 2022-11-29

## 2022-11-29 NOTE — PROGRESS NOTES
A prescription for Ambien 10 mg #30 with 2 additional refills was already sent on 11/10/2022.  She needs to call [facility] pharmacy and have them pulled a prescription and fill it.

## 2022-12-14 RX ORDER — ATORVASTATIN CALCIUM 40 MG/1
40 TABLET, FILM COATED ORAL DAILY
Qty: 90 TABLET | Refills: 1 | Status: SHIPPED | OUTPATIENT
Start: 2022-12-14

## 2022-12-14 NOTE — TELEPHONE ENCOUNTER
Caller: Carli Dubon    Relationship: Self    Best call back number: [phone]    Requested Prescriptions:   Requested Prescriptions     Pending Prescriptions Disp Refills   • atorvastatin (Lipitor) 40 MG tablet 90 tablet 1     Sig: Take 1 tablet by mouth Daily.        Pharmacy where request should be sent: [facility] [address] [place] [address] [phone] PH - [phone] FX     Additional details provided by patient: PATIENT IS OUT OF THIS MEDICATION    Does the patient have less than a 3 day supply:  [x] Yes  [] No    Would you like a call back once the refill request has been completed: [x] Yes [] No    If the office needs to give you a call back, can they leave a voicemail: [x] Yes [] No    Shahnaz Wallace Rep   12/14/22 15:16 EST

## 2022-12-19 DIAGNOSIS — J44.9 CHRONIC OBSTRUCTIVE PULMONARY DISEASE, UNSPECIFIED COPD TYPE: ICD-10-CM

## 2022-12-19 RX ORDER — ALBUTEROL SULFATE 90 UG/1
AEROSOL, METERED RESPIRATORY (INHALATION)
Qty: 18 G | Refills: 0 | Status: SHIPPED | OUTPATIENT
Start: 2022-12-19 | End: 2023-03-27

## 2022-12-28 ENCOUNTER — APPOINTMENT (OUTPATIENT)
Dept: GENERAL RADIOLOGY | Facility: HOSPITAL | Age: 62
End: 2022-12-28
Payer: MEDICAID

## 2022-12-28 ENCOUNTER — HOSPITAL ENCOUNTER (EMERGENCY)
Facility: HOSPITAL | Age: 62
Discharge: HOME | End: 2022-12-28
Attending: EMERGENCY MEDICINE | Admitting: EMERGENCY MEDICINE
Payer: MEDICAID

## 2022-12-28 VITALS
HEIGHT: 65 IN | TEMPERATURE: 98 F | HEART RATE: 98 BPM | SYSTOLIC BLOOD PRESSURE: 132 MMHG | WEIGHT: 214.51 LBS | BODY MASS INDEX: 35.74 KG/M2 | DIASTOLIC BLOOD PRESSURE: 68 MMHG | OXYGEN SATURATION: 99 % | RESPIRATION RATE: 18 BRPM

## 2022-12-28 DIAGNOSIS — M25.561 RIGHT KNEE PAIN, UNSPECIFIED CHRONICITY: ICD-10-CM

## 2022-12-28 DIAGNOSIS — J06.9 UPPER RESPIRATORY TRACT INFECTION, UNSPECIFIED TYPE: Primary | ICD-10-CM

## 2022-12-28 LAB
B PARAPERT DNA SPEC QL NAA+PROBE: NOT DETECTED
B PERT DNA SPEC QL NAA+PROBE: NOT DETECTED
C PNEUM DNA NPH QL NAA+NON-PROBE: NOT DETECTED
FLUAV SUBTYP SPEC NAA+PROBE: NOT DETECTED
FLUBV RNA ISLT QL NAA+PROBE: NOT DETECTED
HADV DNA SPEC NAA+PROBE: NOT DETECTED
HCOV 229E RNA SPEC QL NAA+PROBE: NOT DETECTED
HCOV HKU1 RNA SPEC QL NAA+PROBE: NOT DETECTED
HCOV NL63 RNA SPEC QL NAA+PROBE: NOT DETECTED
HCOV OC43 RNA SPEC QL NAA+PROBE: DETECTED
HMPV RNA NPH QL NAA+NON-PROBE: NOT DETECTED
HPIV1 RNA ISLT QL NAA+PROBE: NOT DETECTED
HPIV2 RNA SPEC QL NAA+PROBE: NOT DETECTED
HPIV3 RNA NPH QL NAA+PROBE: NOT DETECTED
HPIV4 P GENE NPH QL NAA+PROBE: NOT DETECTED
M PNEUMO IGG SER IA-ACNC: NOT DETECTED
RHINOVIRUS RNA SPEC NAA+PROBE: NOT DETECTED
RSV RNA NPH QL NAA+NON-PROBE: NOT DETECTED
SARS-COV-2 RNA NPH QL NAA+NON-PROBE: NOT DETECTED

## 2022-12-28 PROCEDURE — 0202U NFCT DS 22 TRGT SARS-COV-2: CPT | Performed by: EMERGENCY MEDICINE

## 2022-12-28 PROCEDURE — 71045 X-RAY EXAM CHEST 1 VIEW: CPT

## 2022-12-28 PROCEDURE — 73564 X-RAY EXAM KNEE 4 OR MORE: CPT

## 2022-12-28 PROCEDURE — 99283 EMERGENCY DEPT VISIT LOW MDM: CPT

## 2022-12-28 RX ORDER — PREDNISONE 20 MG/1
20 TABLET ORAL DAILY
Qty: 3 TABLET | Refills: 0 | Status: SHIPPED | OUTPATIENT
Start: 2022-12-28 | End: 2022-12-31

## 2022-12-28 NOTE — ED PROVIDER NOTES
Subjective      Provider in Triage Note  Patient is a 62-year-old female with history of asthma, bipolar, COPD, diabetes presents to the ER with complaints of bilateral knee pain, right greater than left for 1 month.  Patient states that her dog ran towards her and knocked her down causing her to fall on her knees.  She did not hit her head, no syncope or LOC.  She describes her knee pain as a constant throbbing pain that she rates a 7/10.  No numbness tingling.  Patient is also complaining of cough and congestion for the last 2 weeks.  She reports mildly productive cough, no hemoptysis.  Denies any chest pain shortness of breath.  No abdominal pain, nausea vomiting or diarrhea.  Mild headache and body aches.  No fever.  No recent sick contacts.  No recent travel or surgery.  No history of PE or DVT.      History of Present Illness  Agree with above unless otherwise noted.  Family also with similar congestion symptoms.  Denies shortness of breath and chest pain.  Has had some mild wheezing but states this happens often her baseline.  Is a smoker.  Review of Systems   HENT: Positive for congestion and sore throat.    Respiratory: Positive for cough and wheezing. Negative for shortness of breath.    Musculoskeletal:        Positive for right knee pain   All other systems reviewed and are negative.      Past Medical History:   Diagnosis Date   • Asthma    • Bipolar 1 disorder (HCC)    • Chronic obstructive lung disease (HCC) 01/28/2015   • COVID-19 08/15/2021   • CTS (carpal tunnel syndrome)    • Diabetes mellitus (HCC)    • Difficulty walking 03/22   • Hypertension    • Migraine    • Mixed hyperlipidemia 03/03/2020   • Pancreatitis    • PTSD (post-traumatic stress disorder)    • Tobacco abuse 06/15/2020       Allergies   Allergen Reactions   • Iodine Swelling   • Adhesive Tape Rash       No past surgical history on file.    Family History   Problem Relation Age of Onset   • Hypertension Mother    • Diabetes Mother     • Asthma Daughter    • COPD Daughter        Social History     Socioeconomic History   • Marital status: Single   Tobacco Use   • Smoking status: Every Day     Packs/day: 1.00     Years: 30.00     Pack years: 30.00     Types: Cigarettes   • Smokeless tobacco: Never   Vaping Use   • Vaping Use: Never used   Substance and Sexual Activity   • Alcohol use: Not Currently   • Drug use: Never   • Sexual activity: Not Currently     Partners: Female           Objective   Physical Exam  Constitutional:  No acute distress.  Head:  Atraumatic.  Normocephalic.   Eyes:  No scleral icterus. Normal conjunctiva  ENT:  Moist mucosa.  Clear nasal discharge present.  Erythematous oropharynx.  Midline uvula.  No mass-effect.  No exudate.  Cardiovascular:  Well perfused.  Equal pulses.  Regular rhythm and normal rate.  Normal capillary refill.    Pulmonary/Chest: Slight expiratory wheezes right greater than left.  No tachypnea.  No increased work of breathing.  Abdominal:  Non-distended. Non-tender.   Extremities:  No peripheral edema.  No Deformity.  No swelling.  No ecchymosis.  No point tenderness to palpation.  Range of motion intact.  No laxity with valgus or varus stress.  Negative anterior and posterior drawer test.  Sensation intact light touch.  Skin:  Warm, dry  Neurological:  Alert, awake, and appropriate.  Normal speech.      Procedures           ED Course      /68   Pulse 98   Temp 98 °F (36.7 °C) (Temporal)   Resp 18   Ht 165.1 cm (65\")   Wt 97.3 kg (214 lb 8.1 oz)   SpO2 99%   BMI 35.70 kg/m²   Labs Reviewed   RESPIRATORY PANEL PCR W/ COVID-19 (SARS-COV-2) GONZALO/NGUYEN/ITALO/PAD/COR/[facility] IN-HOUSE, NP SWAB IN [facility], 3-4 HR TAT - Abnormal; Notable for the following components:       Result Value    Coronavirus OC43 Detected (*)     All other components within normal limits    Narrative:     In the setting of a positive respiratory panel with a viral infection PLUS a negative procalcitonin without other  underlying concern for bacterial infection, consider observing off antibiotics or discontinuation of antibiotics and continue supportive care. If the respiratory panel is positive for atypical bacterial infection (Bordetella pertussis, Chlamydophila pneumoniae, or Mycoplasma pneumoniae), consider antibiotic de-escalation to target atypical bacterial infection.     Medications - No data to display  XR Knee 4+ View Right    Result Date: 12/28/2022   1. No definite fractures identified. 2. Findings consistent with osteoarthritis. 3. Suprapatellar soft tissue swelling with question raised of a small joint effusion  Electronically Signed By-Zackery Milligan MD On:12/28/2022 2:37 PM This report was finalized on [id number] by  Zackery Milligan MD.    XR Chest 1 View    Result Date: 12/28/2022  Borderline heart size with no acute process identified  Electronically Signed By-Zackery Milligan MD On:12/28/2022 2:37 PM This report was finalized on [id number] by  Zackery Milligan MD.                                         MDM  Knee exam reassuring.  Patient with mild wheezing.  History of COPD.  No shortness of breath or chest pain.  Patient coronavirus positive but not COVID-positive.  Will give short course of steroids here.  States she just refilled her inhaler and has plenty of this.  Knee x-ray with osteoarthritis.  Exam reassuring.  Final diagnoses:   Upper respiratory tract infection, unspecified type   Right knee pain, unspecified chronicity       ED Disposition  ED Disposition     ED Disposition   Discharge    Condition   Stable    Comment   --             Cindy Huddleston, APRN  [address]  [address]  [place] [address] [phone]    In 3 days           Medication List      New Prescriptions    predniSONE 20 MG tablet  Commonly known as: DELTASONE  Take 1 tablet by mouth Daily for 3 days.           Where to Get Your Medications      These medications were sent to [facility] - [place], [place] - [address] - [phone] [place] [phone] FX  [address] [facility] [place] [address]    Phone: [phone]   · predniSONE 20 MG tablet          Uche Abbott MD  12/28/22 2727

## 2023-01-03 ENCOUNTER — TELEPHONE (OUTPATIENT)
Dept: FAMILY MEDICINE CLINIC | Facility: CLINIC | Age: 63
End: 2023-01-03

## 2023-01-03 RX ORDER — AMOXICILLIN 875 MG/1
875 TABLET, COATED ORAL 2 TIMES DAILY
Qty: 20 TABLET | Refills: 0 | Status: SHIPPED | OUTPATIENT
Start: 2023-01-03 | End: 2023-01-13

## 2023-01-03 NOTE — TELEPHONE ENCOUNTER
Caller: Carli Dubon    Relationship: Self    Best call back number: [phone]    What medication are you requesting: ZPACK     What are your current symptoms: COUGH,CONGESTION     How long have you been experiencing symptoms: 2 WEEKS    Have you had these symptoms before:    [] Yes  [] No    Have you been treated for these symptoms before:   [x] Yes  [] No    If a prescription is needed, what is your preferred pharmacy and phone number: [place] [phone] [place] [phone] FX     Additional notes:PATIENT WENT TO THE ER ON THE 28TH, PATIENT GOT MEDICATION IT IS NOT FEELING ANY BETTER.

## 2023-01-05 ENCOUNTER — TELEMEDICINE (OUTPATIENT)
Dept: PSYCHIATRY | Facility: CLINIC | Age: 63
End: 2023-01-05
Payer: COMMERCIAL

## 2023-01-05 DIAGNOSIS — F51.04 PSYCHOPHYSIOLOGICAL INSOMNIA: Chronic | ICD-10-CM

## 2023-01-05 DIAGNOSIS — F43.10 PTSD (POST-TRAUMATIC STRESS DISORDER): Primary | ICD-10-CM

## 2023-01-05 DIAGNOSIS — F43.10 PTSD (POST-TRAUMATIC STRESS DISORDER): Chronic | ICD-10-CM

## 2023-01-05 DIAGNOSIS — F31.32 BIPOLAR AFFECTIVE DISORDER, CURRENTLY DEPRESSED, MODERATE: Primary | Chronic | ICD-10-CM

## 2023-01-05 PROCEDURE — 99214 OFFICE O/P EST MOD 30 MIN: CPT | Performed by: PHYSICIAN ASSISTANT

## 2023-01-05 RX ORDER — CLONAZEPAM 0.5 MG/1
0.5 TABLET ORAL 2 TIMES DAILY PRN
Qty: 60 TABLET | Refills: 2 | Status: SHIPPED | OUTPATIENT
Start: 2023-01-05

## 2023-01-05 RX ORDER — VILAZODONE HYDROCHLORIDE 10 MG/1
10 TABLET ORAL DAILY
Qty: 30 TABLET | Refills: 2 | Status: SHIPPED | OUTPATIENT
Start: 2023-01-05
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
## 2023-01-09 ENCOUNTER — TELEPHONE (OUTPATIENT)
Dept: PULMONOLOGY | Facility: HOSPITAL | Age: 63
End: 2023-01-09

## 2023-01-09 ENCOUNTER — LAB (OUTPATIENT)
Dept: LAB | Facility: HOSPITAL | Age: 63
End: 2023-01-09
Payer: MEDICAID

## 2023-01-09 ENCOUNTER — OFFICE VISIT (OUTPATIENT)
Dept: PULMONOLOGY | Facility: HOSPITAL | Age: 63
End: 2023-01-09
Payer: MEDICAID

## 2023-01-09 VITALS
DIASTOLIC BLOOD PRESSURE: 88 MMHG | WEIGHT: 217.6 LBS | TEMPERATURE: 97.9 F | SYSTOLIC BLOOD PRESSURE: 146 MMHG | HEIGHT: 65 IN | BODY MASS INDEX: 36.25 KG/M2 | HEART RATE: 105 BPM | RESPIRATION RATE: 18 BRPM | OXYGEN SATURATION: 97 %

## 2023-01-09 DIAGNOSIS — J43.1 PANLOBULAR EMPHYSEMA: ICD-10-CM

## 2023-01-09 DIAGNOSIS — F17.200 TOBACCO DEPENDENCE SYNDROME: ICD-10-CM

## 2023-01-09 DIAGNOSIS — E66.9 OBESITY (BMI 30-39.9): Chronic | ICD-10-CM

## 2023-01-09 DIAGNOSIS — G47.33 OBSTRUCTIVE SLEEP APNEA: Primary | ICD-10-CM

## 2023-01-09 DIAGNOSIS — F31.32 BIPOLAR AFFECTIVE DISORDER, CURRENTLY DEPRESSED, MODERATE: Chronic | ICD-10-CM

## 2023-01-09 PROCEDURE — 80164 ASSAY DIPROPYLACETIC ACD TOT: CPT

## 2023-01-09 PROCEDURE — 36415 COLL VENOUS BLD VENIPUNCTURE: CPT

## 2023-01-09 PROCEDURE — 80053 COMPREHEN METABOLIC PANEL: CPT

## 2023-01-09 PROCEDURE — G0463 HOSPITAL OUTPT CLINIC VISIT: HCPCS

## 2023-01-09 RX ORDER — DOXYCYCLINE HYCLATE 100 MG
100 TABLET ORAL 2 TIMES DAILY
Qty: 20 TABLET | Refills: 0 | Status: SHIPPED | OUTPATIENT
Start: 2023-01-09

## 2023-01-09 RX ORDER — PREDNISONE 10 MG/1
TABLET ORAL
Qty: 31 TABLET | Refills: 0 | Status: SHIPPED | OUTPATIENT
Start: 2023-01-09

## 2023-01-09 NOTE — TELEPHONE ENCOUNTER
Amauri from Toby at [facility].  Dr. Bingham sent rx for Prednisone taper with quantity 31 tablets.  Script dosing only calls for #30 tablets.      I left amauri at pharmacy, ok to dispense #30 tablets.

## 2023-01-09 NOTE — PROGRESS NOTES
SLEEP/PULMONARY  CLINIC NOTE      PATIENT IDENTIFICATION:  Name: Carli Dubon  Age: 62 y.o.  Sex: female  :  1960  MRN: [id number]    DATE OF CONSULTATION:  2023                     CHIEF COMPLAINT: Obstructive sleep apnea    History of Present Illness:   Carli Dubon is a 62 y.o. female Pt on CPAP feeling better more energy especially the night he use it more than 4 hours, no sleepiness no fatigue no tiredness, no mask irritation no dryness, compliance report reviewed with pt d discussed with the patient the download data and encouarged the patient to continue to use the CPAP. The residual AHI is acceptable.  Patient still smoking with chronic obstructive airway disease was in the emergency room recently treated with some prednisone some improvement but still coughing with yellow-green sputum's no hemoptysis no fever no chills no dizziness no lightheadedness      Review of Systems:   Constitutional:  As above   Eyes: negative   ENT/oropharynx: negative   Cardiovascular: negative   Respiratory:  As above   Gastrointestinal: negative   Genitourinary: negative   Neurological: negative   Musculoskeletal: negative   Integument/breast: negative   Endocrine: negative   Allergic/Immunologic: negative     Past Medical History:  Past Medical History:   Diagnosis Date   • Asthma    • Bipolar 1 disorder (HCC)    • Chronic obstructive lung disease (HCC) 2015   • COVID-19 08/15/2021   • CTS (carpal tunnel syndrome)    • Diabetes mellitus (HCC)    • Difficulty walking    • Hypertension    • Migraine    • Mixed hyperlipidemia 2020   • Pancreatitis    • PTSD (post-traumatic stress disorder)    • Tobacco abuse 06/15/2020       Past Surgical History:  History reviewed. No pertinent surgical history.     Family History:  Family History   Problem Relation Age of Onset   • Hypertension Mother    • Diabetes Mother    • Asthma Daughter    • COPD Daughter         Social History:   Social History      Tobacco Use   • Smoking status: Every Day     Packs/day: 0.75     Years: 30.00     Pack years: 22.50     Types: Cigarettes   • Smokeless tobacco: Never   Substance Use Topics   • Alcohol use: Not Currently        Allergies:  Allergies   Allergen Reactions   • Iodine Swelling   • Adhesive Tape Rash       Home Meds:  (Not in a hospital admission)      Objective:    Vitals Ranges:   Temp:  [97.9 °F (36.6 °C)] 97.9 °F (36.6 °C)  Heart Rate:  [105] 105  Resp:  [18] 18  BP: (146)/(88) 146/88  Body mass index is 36.21 kg/m².     Exam:  General Appearance:  WDWN    HEENT:   without obvious abnormality,  Conjunctiva/corneas clear,  Normal external ear canals, no drainage    Clear orsalmucosa,  Mallampati score 3    Neck:  Supple, symmetrical, trachea midline. No JVD.  Lungs:   Bilateral basal rhonchi bilaterally, respirations unlabored symmetrical wall movement.    Chest wall:  No tenderness or deformity.    Heart:  Regular rate and rhythm, S1 and S2 normal.  Extremities: Trace edema no clubbing or Cyanosis        Data Review:  All labs (24hrs): No results found for this or any previous visit (from the past 24 hour(s)).     Imaging:  XR Chest 1 View  Narrative: DATE OF EXAM:  12/28/2022 2:13 PM     PROCEDURE:  XR CHEST 1 VW-     INDICATIONS:  cough     COMPARISON:  No Comparisons Available     TECHNIQUE:   Single radiographic AP view of the chest was obtained.     FINDINGS:  Heart size is at the upper limits of normal. The pulmonary vascular  markings are normal. The lungs appear clear with no acute process  identified.      Impression: Borderline heart size with no acute process identified     Electronically Signed By-Zackery Milligan MD On:12/28/2022 2:37 PM  This report was finalized on [id number] by  Zackery Milligan MD.  XR Knee 4+ View Right  Narrative: DATE OF EXAM:  12/28/2022 2:13 PM     PROCEDURE:  XR KNEE 4+ VW RIGHT-     INDICATIONS:  fall pain     COMPARISON:  No Comparisons Available     TECHNIQUE:   Four  radiologic views or more of the right knee were obtained.        FINDINGS:  There is generalized osteopenia. There is mild joint space narrowing in  the medial compartment with marginal spur formation along the  intercondylar notch. There is ossification of the quadriceps insertion.  There is mild degenerative spur formation at the patellofemoral joint.  There is soft tissue swelling in the suprapatellar area with question  raised of a small joint effusion.      Impression:    1. No definite fractures identified.  2. Findings consistent with osteoarthritis.  3. Suprapatellar soft tissue swelling with question raised of a small  joint effusion     Electronically Signed By-Zackery Milligan MD On:12/28/2022 2:37 PM  This report was finalized on [id number] by  Zackery Milligan MD.       ASSESSMENT:  Diagnoses and all orders for this visit:    Obstructive sleep apnea    Panlobular emphysema (HCC)    Tobacco dependence syndrome    Obesity (BMI 30-39.9)    Other orders  -     doxycycline (VIBRAMYICN) 100 MG tablet; Take 1 tablet by mouth 2 (Two) Times a Day.  -     predniSONE (DELTASONE) 10 MG tablet; Take 4 tabs daily x 3 days, then take 3 tabs daily x 3 days, then take 2 tabs daily x 3 days, then take 1 tab daily x 3 days        PLAN:  Adding doxycycline and prednisone  Bronchodilator inhaled corticosteroid    Education how to use inhalers    Encouraged to use incentive spirometer    Continue to exercise slowly as tolerated    Monitor for any change in the color of the sputum    Avoid any exposure to fumes, gas or any irritant        This patient with obstructive sleep apnea, compliance is improved. Encourage to use it more frequent, I re-emphasized on pt the long and short term benefit of treating NEVIN. The device is benefiting the patient.  The patient is also instructed to get the CPAP supplies from the [facility] and see me in 1 year for follow-up.    Education patient  About tips for  smoking cessation and risk factors  and benefit, was to a discussion with the patient.        Follow-up 3 months    Ryan Bingham MD. D, ABSM.  1/9/2023  15:29 EST

## 2023-01-10 DIAGNOSIS — F31.32 BIPOLAR AFFECTIVE DISORDER, CURRENTLY DEPRESSED, MODERATE: Chronic | ICD-10-CM

## 2023-01-10 LAB
ALBUMIN SERPL-MCNC: 4.2 G/DL (ref 3.5–5.2)
ALBUMIN/GLOB SERPL: 1.4 G/DL
ALP SERPL-CCNC: 83 U/L (ref 39–117)
ALT SERPL W P-5'-P-CCNC: 19 U/L (ref 1–33)
ANION GAP SERPL CALCULATED.3IONS-SCNC: 12 MMOL/L (ref 5–15)
AST SERPL-CCNC: 21 U/L (ref 1–32)
BILIRUB SERPL-MCNC: <0.2 MG/DL (ref 0–1.2)
BUN SERPL-MCNC: 22 MG/DL (ref 8–23)
BUN/CREAT SERPL: 41.5 (ref 7–25)
CALCIUM SPEC-SCNC: 9.5 MG/DL (ref 8.6–10.5)
CHLORIDE SERPL-SCNC: 104 MMOL/L (ref 98–107)
CO2 SERPL-SCNC: 25 MMOL/L (ref 22–29)
CREAT SERPL-MCNC: 0.53 MG/DL (ref 0.57–1)
EGFRCR SERPLBLD CKD-EPI 2021: 106 ML/MIN/1.73
GLOBULIN UR ELPH-MCNC: 3 GM/DL
GLUCOSE SERPL-MCNC: 129 MG/DL (ref 65–99)
POTASSIUM SERPL-SCNC: 4.1 MMOL/L (ref 3.5–5.2)
PROT SERPL-MCNC: 7.2 G/DL (ref 6–8.5)
SODIUM SERPL-SCNC: 141 MMOL/L (ref 136–145)
VALPROATE SERPL-MCNC: 38 MCG/ML (ref 50–125)

## 2023-01-10 RX ORDER — DIVALPROEX SODIUM 500 MG/1
1000 TABLET, EXTENDED RELEASE ORAL 2 TIMES DAILY
Qty: 360 TABLET | Refills: 0 | Status: SHIPPED | OUTPATIENT
Start: 2023-01-10

## 2023-01-10 NOTE — PROGRESS NOTES
New Rx sent for Depakote 500 mg tabs, take two tabs twice daily.  Her Depakote level was subtherapeutic and patient still struggling with mood swings and irritability.

## 2023-01-10 NOTE — PROGRESS NOTES
Please call patient and let her know that her Depakote level was sub-therapeutic so I am going to increase the Depakote.  I want her to take two in the AM and two at bedtime for a total of 4 per day in divided dose. Her blood sugar level was 129, which is okay if she had eaten breakfast, but if she was fasting, it was a bit too high.  We need to recheck the Depakote level after she has been on the new dosage for two weeks, so I will enter the order for her to do at the end of January.  I have sent a new Rx for the Depakote, as well, to her pharmacy.

## 2023-01-11 ENCOUNTER — TELEPHONE (OUTPATIENT)
Dept: PSYCHIATRY | Facility: CLINIC | Age: 63
End: 2023-01-11
Payer: MEDICAID

## 2023-01-11 NOTE — TELEPHONE ENCOUNTER
----- Message from Kitty Carlos PA-C sent at 1/10/2023  2:17 PM EST -----  Please call patient and let her know that her Depakote level was sub-therapeutic so I am going to increase the Depakote.  I want her to take two in the AM and two at bedtime for a total of 4 per day in divided dose. Her blood sugar level was 129, which is okay if she had eaten breakfast, but if she was fasting, it was a bit too high.  We need to recheck the Depakote level after she has been on the new dosage for two weeks, so I will enter the order for her to do at the end of January.  I have sent a new Rx for the Depakote, as well, to her pharmacy.

## 2023-01-27 ENCOUNTER — TELEPHONE (OUTPATIENT)
Dept: ORTHOPEDIC SURGERY | Facility: CLINIC | Age: 63
End: 2023-01-27

## 2023-01-27 NOTE — TELEPHONE ENCOUNTER
Caller: Carli Dubon    Relationship: Self    Best call back number:     What is the best time to reach you: AROUND 2PM    Who are you requesting to speak with (clinical staff, provider,  specific staff member): ANGE    What was the call regarding: DISABILITY PAPERWORK    Do you require a callback: YES

## 2023-02-01 ENCOUNTER — TELEMEDICINE (OUTPATIENT)
Dept: PSYCHIATRY | Facility: CLINIC | Age: 63
End: 2023-02-01
Payer: COMMERCIAL

## 2023-02-01 DIAGNOSIS — F51.05 INSOMNIA DUE TO MENTAL CONDITION: ICD-10-CM

## 2023-02-01 DIAGNOSIS — F51.04 PSYCHOPHYSIOLOGICAL INSOMNIA: ICD-10-CM

## 2023-02-01 DIAGNOSIS — F31.32 BIPOLAR AFFECTIVE DISORDER, CURRENTLY DEPRESSED, MODERATE: Primary | Chronic | ICD-10-CM

## 2023-02-01 DIAGNOSIS — F43.10 PTSD (POST-TRAUMATIC STRESS DISORDER): Chronic | ICD-10-CM

## 2023-02-01 PROCEDURE — 99214 OFFICE O/P EST MOD 30 MIN: CPT | Performed by: PHYSICIAN ASSISTANT

## 2023-02-01 RX ORDER — ZOLPIDEM TARTRATE 10 MG/1
10 TABLET ORAL NIGHTLY PRN
Qty: 30 TABLET | Refills: 2 | Status: SHIPPED | OUTPATIENT
Start: 2023-02-01
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
## 2023-02-08 ENCOUNTER — OFFICE VISIT (OUTPATIENT)
Dept: ORTHOPEDIC SURGERY | Facility: CLINIC | Age: 63
End: 2023-02-08
Payer: MEDICAID

## 2023-02-08 VITALS — WEIGHT: 219 LBS | HEIGHT: 65 IN | BODY MASS INDEX: 36.49 KG/M2

## 2023-02-08 DIAGNOSIS — M25.561 CHRONIC PAIN OF BOTH KNEES: ICD-10-CM

## 2023-02-08 DIAGNOSIS — M25.552 BILATERAL HIP PAIN: Primary | ICD-10-CM

## 2023-02-08 DIAGNOSIS — G89.29 CHRONIC PAIN OF BOTH KNEES: ICD-10-CM

## 2023-02-08 DIAGNOSIS — M25.562 CHRONIC PAIN OF BOTH KNEES: ICD-10-CM

## 2023-02-08 DIAGNOSIS — M25.551 BILATERAL HIP PAIN: Primary | ICD-10-CM

## 2023-02-08 PROCEDURE — 99213 OFFICE O/P EST LOW 20 MIN: CPT | Performed by: ORTHOPAEDIC SURGERY

## 2023-02-08 NOTE — PROGRESS NOTES
"Chief Complaint  Follow-up of the Right Knee, Follow-up of the Left Knee, Follow-up of the Left Hip, and Follow-up of the Right Hip    Subjective    History of Present Illness      Carli Dubon is a 62 y.o. female who presents to [facility] for bilateral hip and bilateral knee pain.  History of Present Illness the patient is about status quo in terms of pain and discomfort of both hips and both knees.  She does not have any risk factors for avascular necrosis.  She states that it is difficult for her to walk on uneven surfaces.  Going up and down the steps bothers her significantly.  She states that the hips are worse than her knees.  She was knocked down by a dog and that led to contusion and bruising in different parts of the lower extremities with worsening of her symptoms.  She does not want any surgical intervention at this point.  She is undergoing carpal tunnel release this coming Monday.  Pain Location:  BILATERAL knee and BILATERAL hip  Radiation: none  Quality: dull, aching  Intensity/Severity: mild-moderate  Duration: Several months  Progression of symptoms: no worsening, symptoms stable/unchanged  Onset quality: gradual   Timing: intermittent  Aggravating Factors: going up and down stairs, kneeling, squatting  Alleviating Factors: NSAIDs  Previous Episodes: yes  Associated Symptoms: pain, swelling, clicking/popping  ADLs Affected: ambulating, recreational activities/sports  Previous Treatment: NSAIDs       Objective   Vital Signs:   Ht 165.1 cm (65\")   Wt 99.3 kg (219 lb)   BMI 36.44 kg/m²     Physical Exam  Physical Exam  Vitals signs and nursing note reviewed.   Constitutional:       Appearance: Normal appearance.   Pulmonary:      Effort: Pulmonary effort is normal.   Skin:     General: Skin is warm and dry.      Capillary Refill: Capillary refill takes less than 2 seconds.   Neurological:      General: No focal deficit present.      Mental Status: He is alert and " oriented to person, place, and time. Mental status is at baseline.   Psychiatric:         Mood and Affect: Mood normal.         Behavior: Behavior normal.         Thought Content: Thought content normal.         Judgment: Judgment normal.     Ortho Exam   Bilateral knee (varus). Patient has crepitus throughout range of motion. Positive patellar grind test. Mild effusion. Lachman is negative. Pivot shift is negative. Anterior and posterior drawer signs are negative. Significant joint line tenderness is noted on the medial aspect of the knee. Patient has a varus orientation of the knee. There is fullness and tenderness in the Popliteal fossa. Mild distention of a Popliteal cyst is noted in this location. Range of motion in flexion is from 0-110 degrees. Neurovascular status is intact.  Dorsalis pedis and posterior tibial artery pulses are palpable. Common peroneal nerve function is well preserved. Patient's gait is cautious and antalgic. Skin and soft tissues are mildly swollen, consistent with synovitis and effusion. The patient has a significant limp with the first few steps after starting the gait cycle. Getting out of a chair takes a lot of effort due to pain on knee flexion.   Bilateral hip (gtb): Skin and soft tissues over the greater trochanteric bursa are tender upon palpation, along with IT band tenderness. Cross body adduction is negative. Internal and external rotations are bothersome and cause tenderness over the greater trochanter. There is no clinical deformity and no shortening. She does not have a limp upon walking. There is not piriformis tightness and there  is not capsular tightness with any rotation. Dorsalis pedis and posterior tibial artery pulses are palpable. Neurovascular status is intact and capillary refill is less than 2 seconds. Common peroneal nerve function is well preserved.          Result Review :   The following data was reviewed by: Ace Hale MD on  02/08/2023:                Procedures           Assessment   Assessment and Plan    Diagnoses and all orders for this visit:    1. Bilateral hip pain (Primary)    2. Chronic pain of both knees          Follow Up   · Compression/brace to the knees to prevent them from buckling and giving out.  · Calcium and vitamin D for bone health.  · Tablet meloxicam 7.5 mg tab 1 p.o. nightly for pain swelling and discomfort.  · Glucosamine, chondroitin and turmeric for cartilage health.  · Steroid injection for the knees and for the hip discussed with the patient at length.  She will let me know when her symptoms are bad enough to need that type of intervention.  · Nonoperative management of the joint pain at this point.  · Rest, ice, compression, and elevation (RICE) therapy  · Stretching and strengthening exercises of the quads and the hamstrings.  · OTC Tylenol 500-1000mg by mouth every 6 hours as needed for pain   · Follow up in 6 month(s)  • Patient was given instructions and counseling regarding her condition or for health maintenance advice. Please see specific information pulled into the AVS if appropriate.     Ace Hale MD   Date of Encounter: 2/8/2023       EMR Dragon/Transcription disclaimer:  Much of this encounter note is an electronic transcription/translation of spoken language to printed text. The electronic translation of spoken language may permit erroneous, or at times, nonsensical words or phrases to be inadvertently transcribed; Although I have reviewed the note for such errors, some may still exist.

## 2023-02-20 ENCOUNTER — TELEPHONE (OUTPATIENT)
Dept: PULMONOLOGY | Facility: HOSPITAL | Age: 63
End: 2023-02-20
Payer: MEDICAID

## 2023-02-20 DIAGNOSIS — E11.9 TYPE 2 DIABETES MELLITUS WITHOUT COMPLICATION, WITHOUT LONG-TERM CURRENT USE OF INSULIN: ICD-10-CM

## 2023-02-20 NOTE — TELEPHONE ENCOUNTER
Caller: Carli Dubon    Relationship: Self    Best call back number: [phone]    Requested Prescriptions:   Requested Prescriptions     Pending Prescriptions Disp Refills   • empagliflozin (Jardiance) 25 MG tablet tablet 90 tablet 1     Sig: Take 1 tablet by mouth Daily.        Pharmacy where request should be sent: [facility] [address] [place], [place] [phone] [place], [place] [phone] FX     Additional details provided by patient:     Does the patient have less than a 3 day supply:  [x] Yes  [] No    Would you like a call back once the refill request has been completed: [] Yes [x] No    If the office needs to give you a call back, can they leave a voicemail: [] Yes [x] No    Shahnaz Joshi Rep   02/20/23 13:17 EST

## 2023-02-20 NOTE — TELEPHONE ENCOUNTER
Incoming Refill Request      Medication requested (name and dose): Tessalon perles    Pharmacy where request should be sent: [facility] [address]    Additional details provided by patient:     Best call back number: [phone]    Does the patient have less than a 3 day supply:  [x] Yes  [] No    Jina Roberts  02/20/23, 15:42 EST

## 2023-03-03 ENCOUNTER — TELEPHONE (OUTPATIENT)
Dept: FAMILY MEDICINE CLINIC | Facility: CLINIC | Age: 63
End: 2023-03-03
Payer: MEDICAID

## 2023-03-03 NOTE — TELEPHONE ENCOUNTER
Caller: Sean Dubona    Relationship: Self    Best call back number: [phone]   What medication are you requesting: GEL CAPSULES FOR COUGH   What are your current symptoms: COUGH , HEADACHE     How long have you been experiencing symptoms: 3 WEEKS     Have you had these symptoms before:    [x] Yes  [] No    Have you been treated for these symptoms before:   [x] Yes  [] No    If a prescription is needed, what is your preferred pharmacy and phone number:      [address] - [address] [place] [address] [phone] [place] [phone] [phone]    Additional notes:

## 2023-03-05 RX ORDER — BENZONATATE 100 MG/1
100 CAPSULE ORAL 3 TIMES DAILY PRN
Qty: 42 CAPSULE | Refills: 3 | Status: SHIPPED | OUTPATIENT
Start: 2023-03-05

## 2023-03-25 DIAGNOSIS — J44.9 CHRONIC OBSTRUCTIVE PULMONARY DISEASE, UNSPECIFIED COPD TYPE: ICD-10-CM

## 2023-03-27 RX ORDER — ALBUTEROL SULFATE 90 UG/1
AEROSOL, METERED RESPIRATORY (INHALATION)
Qty: 18 G | Refills: 0 | Status: SHIPPED | OUTPATIENT
Start: 2023-03-27 | End: 2023-04-07 | Stop reason: SDUPTHER

## 2023-04-07 DIAGNOSIS — I10 HYPERTENSION, UNSPECIFIED TYPE: ICD-10-CM

## 2023-04-07 DIAGNOSIS — F43.10 PTSD (POST-TRAUMATIC STRESS DISORDER): ICD-10-CM

## 2023-04-07 DIAGNOSIS — F51.05 INSOMNIA DUE TO MENTAL CONDITION: ICD-10-CM

## 2023-04-07 DIAGNOSIS — J44.9 CHRONIC OBSTRUCTIVE PULMONARY DISEASE, UNSPECIFIED COPD TYPE: ICD-10-CM

## 2023-04-07 DIAGNOSIS — F31.32 BIPOLAR AFFECTIVE DISORDER, CURRENTLY DEPRESSED, MODERATE: Chronic | ICD-10-CM

## 2023-04-07 DIAGNOSIS — G47.00 INSOMNIA, UNSPECIFIED TYPE: ICD-10-CM

## 2023-04-09 RX ORDER — LOSARTAN POTASSIUM 100 MG/1
100 TABLET ORAL DAILY
Qty: 90 TABLET | Refills: 0 | Status: SHIPPED | OUTPATIENT
Start: 2023-04-09

## 2023-04-09 RX ORDER — AMLODIPINE BESYLATE 5 MG/1
5 TABLET ORAL DAILY
Qty: 90 TABLET | Refills: 1 | Status: SHIPPED | OUTPATIENT
Start: 2023-04-09 | End: 2023-04-10 | Stop reason: SDUPTHER

## 2023-04-09 RX ORDER — ALBUTEROL SULFATE 90 UG/1
2 AEROSOL, METERED RESPIRATORY (INHALATION) EVERY 4 HOURS PRN
Qty: 18 G | Refills: 0 | Status: SHIPPED | OUTPATIENT
Start: 2023-04-09

## 2023-04-09 NOTE — PROGRESS NOTES
Subjective     Carli Dubon is a 62 y.o. female.     History of Present Illness  Pt is here today with c/o nausea.   She states that her symptoms started about 1.5months ago.  She will wake up feeling nauseous and then will vomit.  She states it doesn't occur that frequently.  It happens about 1-2 weeks.  It is only when she wakes up  Not having nausea throughout the day  Eating fine  Denies abdominal pain.   She is no longer on a PPI       The following portions of the patient's history were reviewed and updated as appropriate: allergies, current medications, past family history, past medical history, past social history, past surgical history and problem list.    Review of Systems   Constitutional: Negative for chills, fatigue and fever.   HENT: Positive for tinnitus. Negative for congestion and sinus pressure.    Respiratory: Positive for cough (with CPAP). Negative for chest tightness and shortness of breath.    Cardiovascular: Negative for chest pain and palpitations.   Gastrointestinal: Positive for nausea and vomiting. Negative for constipation and diarrhea.   Neurological: Positive for headache. Negative for dizziness.       Objective     /86   Pulse 103   Temp 97.7 °F (36.5 °C) (Tympanic)   Wt 98.4 kg (217 lb)   SpO2 92%   BMI 36.11 kg/m²     Current Outpatient Medications on File Prior to Visit   Medication Sig Dispense Refill   • albuterol (ACCUNEB) 0.63 MG/3ML nebulizer solution Take 3 mL by nebulization Every 6 (Six) Hours As Needed for Wheezing. 30 each 2   • albuterol sulfate  (90 Base) MCG/ACT inhaler Inhale 2 puffs Every 4 (Four) Hours As Needed for Wheezing. 18 g 0   • amitriptyline (ELAVIL) 150 MG tablet Take 1 tablet by mouth Every Night. 90 tablet 1   • atorvastatin (Lipitor) 40 MG tablet Take 1 tablet by mouth Daily. 90 tablet 1   • benzonatate (TESSALON) 100 MG capsule Take 1 capsule by mouth 3 (Three) Times a Day As Needed for Cough. 42 capsule 3   • budesonide-formoterol  (SYMBICORT) 160-4.5 MCG/ACT inhaler Inhale 2 puffs 2 (Two) Times a Day. 1 each 11   • clonazePAM (KlonoPIN) 0.5 MG tablet Take 1 tablet by mouth 2 (Two) Times a Day As Needed for Anxiety. 60 tablet 2   • divalproex (DEPAKOTE ER) 500 MG 24 hr tablet Take 2 tablets by mouth 2 (Two) Times a Day. 360 tablet 0   • empagliflozin (Jardiance) 25 MG tablet tablet Take 1 tablet by mouth Daily. 90 tablet 1   • fluticasone (Flonase) 50 MCG/ACT nasal spray 2 sprays into the nostril(s) as directed by provider Daily. 9.9 mL 2   • glipizide (GLUCOTROL) 10 MG tablet Take 1 tablet by mouth 2 (Two) Times a Day Before Meals. 180 tablet 1   • guaiFENesin (Mucinex) 600 MG 12 hr tablet Take 2 tablets by mouth 2 (Two) Times a Day As Needed for Cough. 60 tablet 2   • losartan (COZAAR) 100 MG tablet Take 1 tablet by mouth Daily. 90 tablet 0   • metFORMIN (GLUCOPHAGE) 1000 MG tablet Take 1 tablet by mouth 2 (Two) Times a Day With Meals. 180 tablet 1   • nicotine (Nicotine Step 1) 21 MG/24HR patch Place 1 patch on the skin as directed by provider Daily. 30 patch 11   • nystatin-triamcinolone (MYCOLOG) 018900-8.1 UNIT/GM-% ointment      • ondansetron ODT (ZOFRAN-ODT) 4 MG disintegrating tablet Place 1 tablet on the tongue Every 6 (Six) Hours As Needed for Nausea. 20 tablet 0   • promethazine-dextromethorphan (PROMETHAZINE-DM) 6.25-15 MG/5ML syrup Take 2.5 mL by mouth 4 (Four) Times a Day As Needed for Cough. 118 mL 0   • vilazodone (VIIBRYD) 10 MG tablet tablet Take 1 tablet by mouth Daily. 30 tablet 2   • zolpidem (AMBIEN) 10 MG tablet Take 1 tablet by mouth At Night As Needed for Sleep. 30 tablet 2   • [DISCONTINUED] amLODIPine (Norvasc) 5 MG tablet Take 1 tablet by mouth Daily. 90 tablet 1   • [DISCONTINUED] doxycycline (VIBRAMYICN) 100 MG tablet Take 1 tablet by mouth 2 (Two) Times a Day. 20 tablet 0   • [DISCONTINUED] famotidine (PEPCID) 40 MG tablet      • [DISCONTINUED] omeprazole (priLOSEC) 20 MG capsule Take 1 capsule by mouth Daily.  30 capsule 0   • [DISCONTINUED] predniSONE (DELTASONE) 10 MG tablet Take 4 tabs daily x 3 days, then take 3 tabs daily x 3 days, then take 2 tabs daily x 3 days, then take 1 tab daily x 3 days 31 tablet 0     No current facility-administered medications on file prior to visit.        Physical Exam  Constitutional:       General: She is not in acute distress.     Appearance: Normal appearance. She is obese. She is not ill-appearing.   HENT:      Head: Normocephalic and atraumatic.   Cardiovascular:      Rate and Rhythm: Normal rate and regular rhythm.      Heart sounds: No murmur heard.  Pulmonary:      Effort: Pulmonary effort is normal. No respiratory distress.      Breath sounds: Normal breath sounds.   Abdominal:      General: Abdomen is flat. There is no distension.      Palpations: Abdomen is soft.   Skin:     General: Skin is warm and dry.   Neurological:      General: No focal deficit present.      Mental Status: She is alert and oriented to person, place, and time.   Psychiatric:         Mood and Affect: Mood normal.         Behavior: Behavior normal.         Thought Content: Thought content normal.         Judgment: Judgment normal.           Assessment & Plan     Diagnoses and all orders for this visit:    1. Nausea and vomiting, unspecified vomiting type (Primary)  Comments:  appears to be reflux related  restart oemprazole  call if no imp  sees GI  Orders:  -     omeprazole (priLOSEC) 40 MG capsule; Take 1 capsule by mouth Daily.  Dispense: 90 capsule; Refill: 0    2. Hypertension, unspecified type  Comments:  Improved  cont losartan and norvasc    Orders:  -     amLODIPine (Norvasc) 5 MG tablet; Take 1 tablet by mouth Daily.  Dispense: 90 tablet; Refill: 1    3. Papanicolaou smear  -     Ambulatory Referral to Gynecology

## 2023-04-10 ENCOUNTER — PRIOR AUTHORIZATION (OUTPATIENT)
Dept: PSYCHIATRY | Facility: CLINIC | Age: 63
End: 2023-04-10
Payer: MEDICAID

## 2023-04-10 ENCOUNTER — OFFICE VISIT (OUTPATIENT)
Dept: FAMILY MEDICINE CLINIC | Facility: CLINIC | Age: 63
End: 2023-04-10
Payer: MEDICAID

## 2023-04-10 VITALS
HEART RATE: 103 BPM | TEMPERATURE: 97.7 F | BODY MASS INDEX: 36.11 KG/M2 | DIASTOLIC BLOOD PRESSURE: 86 MMHG | OXYGEN SATURATION: 92 % | SYSTOLIC BLOOD PRESSURE: 136 MMHG | WEIGHT: 217 LBS

## 2023-04-10 DIAGNOSIS — R11.2 NAUSEA AND VOMITING, UNSPECIFIED VOMITING TYPE: Primary | ICD-10-CM

## 2023-04-10 DIAGNOSIS — Z12.4 PAPANICOLAOU SMEAR: ICD-10-CM

## 2023-04-10 DIAGNOSIS — F43.10 PTSD (POST-TRAUMATIC STRESS DISORDER): ICD-10-CM

## 2023-04-10 DIAGNOSIS — F51.05 INSOMNIA DUE TO MENTAL CONDITION: ICD-10-CM

## 2023-04-10 DIAGNOSIS — I10 HYPERTENSION, UNSPECIFIED TYPE: ICD-10-CM

## 2023-04-10 PROCEDURE — 3075F SYST BP GE 130 - 139MM HG: CPT | Performed by: NURSE PRACTITIONER

## 2023-04-10 PROCEDURE — 3079F DIAST BP 80-89 MM HG: CPT | Performed by: NURSE PRACTITIONER

## 2023-04-10 PROCEDURE — 99213 OFFICE O/P EST LOW 20 MIN: CPT | Performed by: NURSE PRACTITIONER

## 2023-04-10 RX ORDER — VILAZODONE HYDROCHLORIDE 10 MG/1
10 TABLET ORAL DAILY
Qty: 90 TABLET | Refills: 1 | Status: SHIPPED | OUTPATIENT
Start: 2023-04-10

## 2023-04-10 RX ORDER — ZOLPIDEM TARTRATE 10 MG/1
10 TABLET ORAL NIGHTLY PRN
Qty: 30 TABLET | Refills: 2 | Status: SHIPPED | OUTPATIENT
Start: 2023-04-10 | End: 2023-04-17

## 2023-04-10 RX ORDER — OMEPRAZOLE 40 MG/1
40 CAPSULE, DELAYED RELEASE ORAL DAILY
Qty: 90 CAPSULE | Refills: 0 | Status: SHIPPED | OUTPATIENT
Start: 2023-04-10

## 2023-04-10 RX ORDER — ZOLPIDEM TARTRATE 10 MG/1
10 TABLET ORAL NIGHTLY PRN
Qty: 30 TABLET | Refills: 2 | OUTPATIENT
Start: 2023-04-10

## 2023-04-10 RX ORDER — AMITRIPTYLINE HYDROCHLORIDE 150 MG/1
150 TABLET, FILM COATED ORAL NIGHTLY
Qty: 90 TABLET | Refills: 1 | Status: SHIPPED | OUTPATIENT
Start: 2023-04-10

## 2023-04-10 RX ORDER — CLONAZEPAM 0.5 MG/1
0.5 TABLET ORAL 2 TIMES DAILY PRN
Qty: 60 TABLET | Refills: 2 | OUTPATIENT
Start: 2023-04-10

## 2023-04-10 RX ORDER — CLONAZEPAM 0.5 MG/1
0.5 TABLET ORAL 2 TIMES DAILY PRN
Qty: 60 TABLET | Refills: 2 | Status: SHIPPED | OUTPATIENT
Start: 2023-04-10

## 2023-04-10 RX ORDER — AMLODIPINE BESYLATE 5 MG/1
5 TABLET ORAL DAILY
Qty: 90 TABLET | Refills: 1 | Status: SHIPPED | OUTPATIENT
Start: 2023-04-10

## 2023-04-10 RX ORDER — DIVALPROEX SODIUM 500 MG/1
1000 TABLET, EXTENDED RELEASE ORAL 2 TIMES DAILY
Qty: 360 TABLET | Refills: 0 | Status: SHIPPED | OUTPATIENT
Start: 2023-04-10

## 2023-04-17 ENCOUNTER — TELEPHONE (OUTPATIENT)
Dept: PSYCHIATRY | Facility: CLINIC | Age: 63
End: 2023-04-17
Payer: MEDICAID

## 2023-04-17 DIAGNOSIS — F51.05 INSOMNIA DUE TO MENTAL CONDITION: ICD-10-CM

## 2023-04-17 RX ORDER — ZOLPIDEM TARTRATE 5 MG/1
5 TABLET ORAL NIGHTLY PRN
Qty: 30 TABLET | Refills: 2 | Status: SHIPPED | OUTPATIENT
Start: 2023-04-17

## 2023-05-08 ENCOUNTER — TELEMEDICINE (OUTPATIENT)
Dept: PSYCHIATRY | Facility: CLINIC | Age: 63
End: 2023-05-08
Payer: MEDICAID

## 2023-05-08 DIAGNOSIS — F51.04 PSYCHOPHYSIOLOGICAL INSOMNIA: Chronic | ICD-10-CM

## 2023-05-08 DIAGNOSIS — F31.32 BIPOLAR AFFECTIVE DISORDER, CURRENTLY DEPRESSED, MODERATE: Primary | Chronic | ICD-10-CM

## 2023-05-08 DIAGNOSIS — F43.10 PTSD (POST-TRAUMATIC STRESS DISORDER): Chronic | ICD-10-CM

## 2023-05-08 PROCEDURE — 99214 OFFICE O/P EST MOD 30 MIN: CPT | Performed by: PHYSICIAN ASSISTANT

## 2023-05-08 PROCEDURE — 1160F RVW MEDS BY RX/DR IN RCRD: CPT | Performed by: PHYSICIAN ASSISTANT

## 2023-05-08 PROCEDURE — 1159F MED LIST DOCD IN RCRD: CPT | Performed by: PHYSICIAN ASSISTANT
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
## 2023-05-11 DIAGNOSIS — E11.9 TYPE 2 DIABETES MELLITUS WITHOUT COMPLICATION, WITHOUT LONG-TERM CURRENT USE OF INSULIN: ICD-10-CM

## 2023-05-12 RX ORDER — GLIPIZIDE 10 MG/1
TABLET ORAL
Qty: 180 TABLET | Refills: 0 | Status: SHIPPED | OUTPATIENT
Start: 2023-05-12

## 2023-05-18 ENCOUNTER — TELEPHONE (OUTPATIENT)
Dept: PSYCHIATRY | Facility: CLINIC | Age: 63
End: 2023-05-18
Payer: MEDICAID

## 2023-05-18 DIAGNOSIS — F51.04 PSYCHOPHYSIOLOGICAL INSOMNIA: Primary | ICD-10-CM

## 2023-05-18 RX ORDER — ZOLPIDEM TARTRATE 10 MG/1
10 TABLET ORAL NIGHTLY PRN
Qty: 30 TABLET | Refills: 2 | Status: SHIPPED | OUTPATIENT
Start: 2023-05-18

## 2023-05-18 NOTE — TELEPHONE ENCOUNTER
Patient states she is supposed to have ambein 10mg but her prescription is for ambein 5mg.  Patient states that pharmacy will need a new script.  Please advise. Provider out of office.

## 2023-07-05 ENCOUNTER — TELEPHONE (OUTPATIENT)
Dept: ORTHOPEDIC SURGERY | Facility: CLINIC | Age: 63
End: 2023-07-05

## 2023-07-06 ENCOUNTER — TELEPHONE (OUTPATIENT)
Dept: ORTHOPEDIC SURGERY | Facility: CLINIC | Age: 63
End: 2023-07-06

## 2023-07-06 NOTE — TELEPHONE ENCOUNTER
Caller: CLIFF DIETRICH  Relationship to Patient: SELF  Phone Number: [phone] (home)     Reason for Call: PT RETURNED CALL TO SCHEDULE FOR NEXT WEDNESDAY PER DR ABRAMS.

## 2023-07-12 ENCOUNTER — OFFICE VISIT (OUTPATIENT)
Dept: ORTHOPEDIC SURGERY | Facility: CLINIC | Age: 63
End: 2023-07-12
Payer: MEDICAID

## 2023-07-12 VITALS — HEIGHT: 65 IN | WEIGHT: 209.8 LBS | BODY MASS INDEX: 34.95 KG/M2

## 2023-07-12 DIAGNOSIS — M25.561 CHRONIC PAIN OF RIGHT KNEE: Primary | ICD-10-CM

## 2023-07-12 DIAGNOSIS — G89.29 CHRONIC PAIN OF RIGHT KNEE: Primary | ICD-10-CM

## 2023-07-12 RX ADMIN — METHYLPREDNISOLONE ACETATE 160 MG: 80 INJECTION, SUSPENSION INTRA-ARTICULAR; INTRALESIONAL; INTRAMUSCULAR; SOFT TISSUE at 15:17

## 2023-07-12 RX ADMIN — LIDOCAINE HYDROCHLORIDE 2 ML: 10 INJECTION, SOLUTION EPIDURAL; INFILTRATION; INTRACAUDAL; PERINEURAL at 15:17

## 2023-07-12 NOTE — PROGRESS NOTES
"Chief Complaint  Follow-up of the Right Knee    Subjective    History of Present Illness      Carli Dubon is a 62 y.o. female who presents to [facility] for follow-up on right knee pain and discomfort.  History of Present Illness the patient has increasing pain and discomfort on the medial aspect of the right knee.  She has a sense of tightness in the knee as well.  It appears to me that she has developed an effusion which is causing symptoms.  She states that she is limping just about all the time.  She has difficulty with squatting on the ground.  She has been thinking about knee surgery in the future but at this point would like to continue with nonoperative management of the knee pathology.  Pain Location:  RIGHT knee  Radiation: none  Quality: dull, aching  Intensity/Severity: moderate  Duration:  Several months  Progression of symptoms: yes, progressive worsening  Onset quality: gradual   Timing: intermittent  Aggravating Factors: going up and down stairs, rising after sitting, squatting  Alleviating Factors: Tylenol  Previous Episodes: yes  Associated Symptoms: pain, swelling  ADLs Affected: recreational activities/sports  Previous Treatment: NSAIDs       Objective   Vital Signs:   Ht 165.1 cm (65\")   Wt 95.2 kg (209 lb 12.8 oz)   BMI 34.91 kg/m²     Physical Exam  Physical Exam  Vitals signs and nursing note reviewed.   Constitutional:       Appearance: Normal appearance.   Pulmonary:      Effort: Pulmonary effort is normal.   Skin:     General: Skin is warm and dry.      Capillary Refill: Capillary refill takes less than 2 seconds.   Neurological:      General: No focal deficit present.      Mental Status: He is alert and oriented to person, place, and time. Mental status is at baseline.   Psychiatric:         Mood and Affect: Mood normal.         Behavior: Behavior normal.         Thought Content: Thought content normal.         Judgment: Judgment normal.     Ortho Exam "   Right knee (effusion). The knee is swollen. The patella is ballotable. There is thickening of the synovial membrane. There appears to be a fluid flow in the supra-patellar space. The patient's knee feels tight in flexion. Range of motion is restricted because of the limited flexion. There is some quadriceps inhibition on account of the distension of the joint. There is diffuse tenderness around the knee. The popliteal fossa is full and tender as well. No evidence of a compartmental syndrome is noted. Anterior and posterior drawer signs are negative. No medial or lateral instability is noted. Pivot shift sign is negative. Dorsalis pedis and posterior tibial artery pulses are palpable. Common peroneal nerve function is well preserved.           Result Review :  The following data was reviewed by: Ace Hale MD on 07/12/2023:      X-rays are reviewed.  The patient has some narrowing of the patellofemoral joint space.  There is a suprapatellar effusion noted as well.  No fractures are noted.          Large Joint Arthrocentesis: R knee  Date/Time: 7/12/2023 3:17 PM  Consent given by: patient  Site marked: site marked  Timeout: Immediately prior to procedure a time out was called to verify the correct patient, procedure, equipment, support staff and site/side marked as required   Supporting Documentation  Indications: pain   Procedure Details  Location: knee - R knee  Preparation: Patient was prepped and draped in the usual sterile fashion  Needle size: 25 G  Approach: anteromedial  Medications administered: 160 mg methylPREDNISolone acetate 80 MG/ML; 2 mL lidocaine PF 1% 1 %  Patient tolerance: patient tolerated the procedure well with no immediate complications               Assessment   Assessment and Plan    Diagnoses and all orders for this visit:    1. Chronic pain of right knee (Primary)    Other orders  -     Large Joint Arthrocentesis: R knee          Follow Up   Compression/brace to prevent the knee from  buckling and giving out.  The knee was prepped and draped in a standard fashion and 40 cc of straw-colored fluid were removed from the joint.  There was no evidence of sepsis.  Steroid was injected after the aspiration and the patient tolerated the procedure well.  Calcium and vitamin D for bone health.  Glucosamine, chondroitin and turmeric for cartilage health.  Rest, ice, compression, and elevation (RICE) therapy  Stretching and strengthening exercises of the quads and hamstrings  OTC Tylenol 500-1000mg by mouth every 6 hours as needed for pain   Follow up in 3 month(s)  Patient was given instructions and counseling regarding her condition or for health maintenance advice. Please see specific information pulled into the AVS if appropriate.     Ace Hale MD   Date of Encounter: 7/12/2023      EMR Dragon/Transcription disclaimer:  Much of this encounter note is an electronic transcription/translation of spoken language to printed text. The electronic translation of spoken language may permit erroneous, or at times, nonsensical words or phrases to be inadvertently transcribed; Although I have reviewed the note for such errors, some may still exist.

## 2023-07-23 DIAGNOSIS — I10 HYPERTENSION, UNSPECIFIED TYPE: ICD-10-CM

## 2023-07-24 RX ORDER — LOSARTAN POTASSIUM 100 MG/1
TABLET ORAL
Qty: 90 TABLET | Refills: 0 | Status: SHIPPED | OUTPATIENT
Start: 2023-07-24 | End: 2023-07-25

## 2023-07-25 DIAGNOSIS — I10 HYPERTENSION, UNSPECIFIED TYPE: ICD-10-CM

## 2023-07-25 RX ORDER — METHYLPREDNISOLONE ACETATE 80 MG/ML
160 INJECTION, SUSPENSION INTRA-ARTICULAR; INTRALESIONAL; INTRAMUSCULAR; SOFT TISSUE
Status: COMPLETED | OUTPATIENT
Start: 2023-07-12 | End: 2023-07-12

## 2023-07-25 RX ORDER — LIDOCAINE HYDROCHLORIDE 10 MG/ML
2 INJECTION, SOLUTION EPIDURAL; INFILTRATION; INTRACAUDAL; PERINEURAL
Status: COMPLETED | OUTPATIENT
Start: 2023-07-12 | End: 2023-07-12

## 2023-07-25 RX ORDER — LOSARTAN POTASSIUM 100 MG/1
TABLET ORAL
Qty: 90 TABLET | Refills: 0 | Status: SHIPPED | OUTPATIENT
Start: 2023-07-25

## 2023-08-02 ENCOUNTER — TRANSCRIBE ORDERS (OUTPATIENT)
Dept: ADMINISTRATIVE | Facility: HOSPITAL | Age: 63
End: 2023-08-02
Payer: MEDICAID

## 2023-08-02 DIAGNOSIS — Z12.31 VISIT FOR SCREENING MAMMOGRAM: Primary | ICD-10-CM

## 2023-08-11 NOTE — TELEPHONE ENCOUNTER
Caller: Carli Dubon    Relationship: Self    Best call back number: [phone]    Requested Prescriptions:   Requested Prescriptions     Pending Prescriptions Disp Refills    atorvastatin (Lipitor) 40 MG tablet 90 tablet 1     Sig: Take 1 tablet by mouth Daily.        Pharmacy where request should be sent: [address] [facility] [phone] [place] [phone]      Last office visit with prescribing clinician: 4/10/2023   Last telemedicine visit with prescribing clinician: Visit date not found   Next office visit with prescribing clinician: 10/10/2023     Additional details provided by patient: OUT    Does the patient have less than a 3 day supply:  [x] Yes  [] No    Would you like a call back once the refill request has been completed: [] Yes [] No    If the office needs to give you a call back, can they leave a voicemail: [] Yes [] No    Zackery Marcos   08/11/23 13:13 EDT

## 2023-08-13 RX ORDER — ATORVASTATIN CALCIUM 40 MG/1
40 TABLET, FILM COATED ORAL DAILY
Qty: 90 TABLET | Refills: 1 | Status: SHIPPED | OUTPATIENT
Start: 2023-08-13

## 2023-08-18 ENCOUNTER — HOSPITAL ENCOUNTER (OUTPATIENT)
Dept: MAMMOGRAPHY | Facility: HOSPITAL | Age: 63
Discharge: HOME | End: 2023-08-18
Payer: MEDICAID

## 2023-08-18 ENCOUNTER — HOSPITAL ENCOUNTER (OUTPATIENT)
Dept: CT IMAGING | Facility: HOSPITAL | Age: 63
Discharge: HOME | End: 2023-08-18
Payer: MEDICAID

## 2023-08-18 DIAGNOSIS — Z12.31 VISIT FOR SCREENING MAMMOGRAM: ICD-10-CM

## 2023-08-18 DIAGNOSIS — Z72.0 TOBACCO ABUSE: Chronic | ICD-10-CM

## 2023-08-18 PROCEDURE — 77063 BREAST TOMOSYNTHESIS BI: CPT

## 2023-08-18 PROCEDURE — 71271 CT THORAX LUNG CANCER SCR C-: CPT

## 2023-08-18 PROCEDURE — 77067 SCR MAMMO BI INCL CAD: CPT

## 2023-08-27 DIAGNOSIS — E11.9 TYPE 2 DIABETES MELLITUS WITHOUT COMPLICATION, WITHOUT LONG-TERM CURRENT USE OF INSULIN: ICD-10-CM

## 2023-08-28 RX ORDER — EMPAGLIFLOZIN 25 MG/1
TABLET, FILM COATED ORAL
Qty: 90 TABLET | Refills: 0 | Status: SHIPPED | OUTPATIENT
Start: 2023-08-28

## 2023-08-31 DIAGNOSIS — F51.04 PSYCHOPHYSIOLOGICAL INSOMNIA: ICD-10-CM

## 2023-08-31 RX ORDER — ZOLPIDEM TARTRATE 10 MG/1
10 TABLET ORAL NIGHTLY PRN
Qty: 30 TABLET | Refills: 0 | Status: SHIPPED | OUTPATIENT
Start: 2023-08-31

## 2023-09-03 DIAGNOSIS — R11.2 NAUSEA AND VOMITING, UNSPECIFIED VOMITING TYPE: ICD-10-CM

## 2023-09-05 ENCOUNTER — TELEMEDICINE (OUTPATIENT)
Dept: PSYCHIATRY | Facility: CLINIC | Age: 63
End: 2023-09-05
Payer: MEDICAID

## 2023-09-05 ENCOUNTER — PRIOR AUTHORIZATION (OUTPATIENT)
Dept: PSYCHIATRY | Facility: CLINIC | Age: 63
End: 2023-09-05

## 2023-09-05 DIAGNOSIS — F43.10 PTSD (POST-TRAUMATIC STRESS DISORDER): Primary | Chronic | ICD-10-CM

## 2023-09-05 DIAGNOSIS — F31.32 BIPOLAR AFFECTIVE DISORDER, CURRENTLY DEPRESSED, MODERATE: Chronic | ICD-10-CM

## 2023-09-05 DIAGNOSIS — F51.04 PSYCHOPHYSIOLOGICAL INSOMNIA: Chronic | ICD-10-CM

## 2023-09-05 DIAGNOSIS — F43.10 PTSD (POST-TRAUMATIC STRESS DISORDER): ICD-10-CM

## 2023-09-05 RX ORDER — CLONAZEPAM 0.5 MG/1
0.5 TABLET ORAL 2 TIMES DAILY PRN
Qty: 60 TABLET | Refills: 2 | Status: SHIPPED | OUTPATIENT
Start: 2023-09-05

## 2023-09-05 RX ORDER — DIVALPROEX SODIUM 500 MG/1
1000 TABLET, EXTENDED RELEASE ORAL 2 TIMES DAILY
Qty: 360 TABLET | Refills: 0 | Status: SHIPPED | OUTPATIENT
Start: 2023-09-05

## 2023-09-05 RX ORDER — OMEPRAZOLE 40 MG/1
40 CAPSULE, DELAYED RELEASE ORAL DAILY
Qty: 90 CAPSULE | Refills: 0 | Status: SHIPPED | OUTPATIENT
Start: 2023-09-05

## 2023-09-05 RX ORDER — VILAZODONE HYDROCHLORIDE 20 MG/1
20 TABLET ORAL DAILY
Qty: 90 TABLET | Refills: 1 | Status: SHIPPED | OUTPATIENT
Start: 2023-09-05
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
## 2023-09-12 ENCOUNTER — OFFICE VISIT (OUTPATIENT)
Dept: PODIATRY | Facility: CLINIC | Age: 63
End: 2023-09-12
Payer: MEDICAID

## 2023-09-12 VITALS — HEIGHT: 65 IN | BODY MASS INDEX: 34.82 KG/M2 | OXYGEN SATURATION: 93 % | WEIGHT: 209 LBS | HEART RATE: 106 BPM

## 2023-09-12 DIAGNOSIS — M19.072 ARTHRITIS OF BOTH FEET: ICD-10-CM

## 2023-09-12 DIAGNOSIS — M79.671 BILATERAL FOOT PAIN: Primary | ICD-10-CM

## 2023-09-12 DIAGNOSIS — E11.65 TYPE 2 DIABETES MELLITUS WITH HYPERGLYCEMIA, WITHOUT LONG-TERM CURRENT USE OF INSULIN: ICD-10-CM

## 2023-09-12 DIAGNOSIS — M19.071 ARTHRITIS OF BOTH FEET: ICD-10-CM

## 2023-09-12 DIAGNOSIS — I87.303 CHRONIC VENOUS HYPERTENSION WITHOUT COMPLICATIONS, BILATERAL: ICD-10-CM

## 2023-09-12 DIAGNOSIS — M79.672 BILATERAL FOOT PAIN: Primary | ICD-10-CM

## 2023-09-12 RX ORDER — GABAPENTIN 100 MG/1
100 CAPSULE ORAL
COMMUNITY
Start: 2023-09-04

## 2023-09-12 NOTE — PROGRESS NOTES
09/12/2023  Foot and Ankle Surgery - Established Patient/Follow-up  Provider: Dr. Florentin Syed DPM  Location: [facility]    Subjective:  Carli Dubon is a 62 y.o. female.     Chief Complaint   Patient presents with    Right Foot - Edema, Pain     Top of foot     Left Foot - Pain     Top of foot     Follow-up       HPI: The patient states that she has been experiencing swelling in her bilateral lower extremities. She denies any medical issues such as kidney, lung, or heart issues. She denies wearing stockings. The patient states that her blood sugar has been okay. She denies any other problems with her feet. She states that her feet hurt sometimes when she gets up.    Allergies   Allergen Reactions    Iodine Swelling    Adhesive Tape Rash       Current Outpatient Medications on File Prior to Visit   Medication Sig Dispense Refill    albuterol (ACCUNEB) 1.25 MG/3ML nebulizer solution Take 3 mL by nebulization Every 6 (Six) Hours As Needed for Wheezing or Shortness of Air. Dispense as 1 box of unit dose 1 each 1    albuterol sulfate  (90 Base) MCG/ACT inhaler INHALE 2 PUFFS BY MOUTH EVERY 4 HOURS AS NEEDED FOR WHEEZING 18 g 0    albuterol sulfate  (90 Base) MCG/ACT inhaler Inhale 2 puffs Every 4 (Four) Hours As Needed for Wheezing or Shortness of Air. 6.7 g 1    amitriptyline (ELAVIL) 150 MG tablet Take 1 tablet by mouth Every Night. 90 tablet 1    amLODIPine (Norvasc) 5 MG tablet Take 1 tablet by mouth Daily. 90 tablet 1    atorvastatin (Lipitor) 40 MG tablet Take 1 tablet by mouth Daily. 90 tablet 1    benzonatate (TESSALON) 100 MG capsule Take 1 capsule by mouth 3 (Three) Times a Day As Needed for Cough. 42 capsule 3    budesonide-formoterol (SYMBICORT) 160-4.5 MCG/ACT inhaler Inhale 2 puffs by mouth twice daily 11 g 0    clonazePAM (KlonoPIN) 0.5 MG tablet Take 1 tablet by mouth 2 (Two) Times a Day As Needed for Anxiety. 60 tablet 2    divalproex (DEPAKOTE ER) 500 MG 24 hr tablet Take 2  "tablets by mouth 2 (Two) Times a Day. 360 tablet 0    fluticasone (Flonase) 50 MCG/ACT nasal spray 2 sprays into the nostril(s) as directed by provider Daily. 9.9 mL 2    gabapentin (NEURONTIN) 100 MG capsule Take 1 capsule by mouth.      glipizide (GLUCOTROL) 10 MG tablet Take 1 tablet by mouth 2 (Two) Times a Day Before Meals. 180 tablet 0    guaiFENesin (Mucinex) 600 MG 12 hr tablet Take 2 tablets by mouth 2 (Two) Times a Day As Needed for Cough. 60 tablet 2    Jardiance 25 MG tablet tablet Take 1 tablet by mouth once daily 90 tablet 0    losartan (COZAAR) 100 MG tablet Take 1 tablet by mouth once daily 90 tablet 0    metFORMIN (GLUCOPHAGE) 1000 MG tablet Take 1 tablet by mouth 2 (Two) Times a Day With Meals. 180 tablet 1    nicotine (Nicotine Step 1) 21 MG/24HR patch Place 1 patch on the skin as directed by provider Daily. 30 patch 11    nystatin-triamcinolone (MYCOLOG) 738805-5.1 UNIT/GM-% ointment       omeprazole (priLOSEC) 40 MG capsule Take 1 capsule by mouth once daily 90 capsule 0    ondansetron ODT (ZOFRAN-ODT) 4 MG disintegrating tablet Place 1 tablet on the tongue Every 6 (Six) Hours As Needed for Nausea. 20 tablet 0    predniSONE (DELTASONE) 20 MG tablet Take 2 tablets by mouth Daily. 10 tablet 0    traMADol (ULTRAM) 50 MG tablet Take 1 tablet by mouth Every 6 (Six) Hours As Needed for Moderate Pain. 12 tablet 0    vilazodone (VIIBRYD) 20 MG tablet tablet Take 1 tablet by mouth Daily. 90 tablet 1    zolpidem (AMBIEN) 10 MG tablet TAKE 1 TABLET BY MOUTH AT NIGHT AS NEEDED FOR SLEEP 30 tablet 0    promethazine-dextromethorphan (PROMETHAZINE-DM) 6.25-15 MG/5ML syrup Take 2.5 mL by mouth 4 (Four) Times a Day As Needed for Cough. (Patient not taking: Reported on 9/12/2023) 118 mL 0     No current facility-administered medications on file prior to visit.       Objective   Pulse 106   Ht 165.1 cm (65\")   Wt 94.8 kg (209 lb)   SpO2 93%   BMI 34.78 kg/m²     Foot/Ankle Exam     Right foot additional " comments: General:   Appearance: appears stated age and healthy    Orientation: AAOx3    Affect: appropriate    Gait: unimpaired       VASCULAR       Right Foot Vascularity   Normal vascular exam    Dorsalis pedis:  2+  Posterior tibial:  2+  Skin Temperature: warm    Edema Grading:  None  CFT:  < 3 seconds  Pedal Hair Growth:  Present  Varicosities: none        Left Foot Vascularity   Normal vascular exam    Dorsalis pedis:  2+  Posterior tibial:  2+  Skin Temperature: warm    Edema Grading:  None  CFT:  < 3 seconds  Pedal Hair Growth:  Present  Varicosities: none        NEUROLOGIC      Right Foot Neurologic   Light touch sensation:  Normal  Hot/Cold sensation: normal    Protective Sensation using [place] Monofilament:  10  Achilles reflex:  2+      Left Foot Neurologic   Light touch sensation:  Normal  Hot/cold sensation: normal    Protective Sensation using [place]mina Monofilament:  10  Achilles reflex:  2+      MUSCULOSKELETAL       Right Foot Musculoskeletal   Ecchymosis:  None  Tenderness: arch and dorsal foot    Arch:  Normal      Left Foot Musculoskeletal   Ecchymosis:  None  Tenderness: arch and dorsal foot    Arch:  Normal      MUSCLE STRENGTH      Right Foot Muscle Strength   Normal strength    Foot dorsiflexion:  5  Foot plantar flexion:  5  Foot inversion:  5  Foot eversion:  5      Left Foot Muscle Strength   Normal strength    Foot dorsiflexion:  5  Foot plantar flexion:  5  Foot inversion:  5  Foot eversion:  5      DERMATOLOGIC      Right Foot Dermatologic   Skin: skin intact        Left Foot Dermatologic   Skin:  No significant inflammation or signs of infection involving the left foot or ankle. No substantial swelling noted on exam.      TESTS      Right Foot Tests   Anterior drawer: negative    Varus tilt: negative    Heel raise: pain        Left Foot Tests   Anterior drawer: negative    Varus tilt: negative    Heel raise: pain      Assessment & Plan   Diagnoses and all orders for  this visit:    1. Bilateral foot pain (Primary)    2. Chronic venous hypertension without complications, left    3. Equinus deformity of left foot  -     Ambulatory Referral to Physical Therapy Evaluate and treat    4. Arthritis of both feet    5. Type 2 diabetes mellitus with hyperglycemia, without long-term current use of insulin ([facility])        Carli Dubon is a 61-year-old female who presents to the office today for a new issue involving her left foot after being bitten by fire ants while in [place].  This issue was approximately a month ago.  Today, she has no concerning features or latent effects from the bites.  She does complain of swelling that affects her left lower extremity.  I explained that this is likely just gravity and increased activity.  I have recommended that she consider a compression stocking on a daily basis.  Patient states that she will be restarting physical therapy in the near future.  Patient is to return to clinic as scheduled.  She is to call with any new issues.  Greater than 20 minutes was spent before, during, and after evaluation for patient care.    09/12/2023: Mild swelling and hyperpigmentation involving both lower extremities. No open wounds or signs of infection. No further concerns or issues. No progressive deformity or instability involving the feet.     Left foot additional comments: General:   Appearance: appears stated age and healthy    Orientation: AAOx3    Affect: appropriate    Gait: unimpaired       VASCULAR       Right Foot Vascularity   Normal vascular exam    Dorsalis pedis:  2+  Posterior tibial:  2+  Skin Temperature: warm    Edema Grading:  None  CFT:  < 3 seconds  Pedal Hair Growth:  Present  Varicosities: none        Left Foot Vascularity   Normal vascular exam    Dorsalis pedis:  2+  Posterior tibial:  2+  Skin Temperature: warm    Edema Grading:  None  CFT:  < 3 seconds  Pedal Hair Growth:  Present  Varicosities: none        NEUROLOGIC      Right Foot  Neurologic   Light touch sensation:  Normal  Hot/Cold sensation: normal    Protective Sensation using [place]-Amina Monofilament:  10  Achilles reflex:  2+      Left Foot Neurologic   Light touch sensation:  Normal  Hot/cold sensation: normal    Protective Sensation using [place]-Amina Monofilament:  10  Achilles reflex:  2+      MUSCULOSKELETAL       Right Foot Musculoskeletal   Ecchymosis:  None  Tenderness: arch and dorsal foot    Arch:  Normal      Left Foot Musculoskeletal   Ecchymosis:  None  Tenderness: arch and dorsal foot    Arch:  Normal      MUSCLE STRENGTH      Right Foot Muscle Strength   Normal strength    Foot dorsiflexion:  5  Foot plantar flexion:  5  Foot inversion:  5  Foot eversion:  5      Left Foot Muscle Strength   Normal strength    Foot dorsiflexion:  5  Foot plantar flexion:  5  Foot inversion:  5  Foot eversion:  5      DERMATOLOGIC      Right Foot Dermatologic   Skin: skin intact        Left Foot Dermatologic   Skin:  No significant inflammation or signs of infection involving the left foot or ankle. No substantial swelling noted on exam.      TESTS      Right Foot Tests   Anterior drawer: negative    Varus tilt: negative    Heel raise: pain        Left Foot Tests   Anterior drawer: negative    Varus tilt: negative    Heel raise: pain      Assessment & Plan   Diagnoses and all orders for this visit:    1. Bilateral foot pain (Primary)    2. Chronic venous hypertension without complications, left    3. Equinus deformity of left foot  -     Ambulatory Referral to Physical Therapy Evaluate and treat    4. Arthritis of both feet    5. Type 2 diabetes mellitus with hyperglycemia, without long-term current use of insulin ([facility])        Carli Dubon is a 61-year-old female who presents to the office today for a new issue involving her left foot after being bitten by fire ants while in [place].  This issue was approximately a month ago.  Today, she has no concerning features or latent  effects from the bites.  She does complain of swelling that affects her left lower extremity.  I explained that this is likely just gravity and increased activity.  I have recommended that she consider a compression stocking on a daily basis.  Patient states that she will be restarting physical therapy in the near future.  Patient is to return to clinic as scheduled.  She is to call with any new issues.  Greater than 20 minutes was spent before, during, and after evaluation for patient care.    09/12/2023: Mild swelling and hyperpigmentation involving both lower extremities. No open wounds or signs of infection. No further concerns or issues. No progressive deformity or instability involving the feet.    Assessment & Plan   Diagnoses and all orders for this visit:    1. Bilateral foot pain (Primary)  -     XR Foot 3+ View Bilateral    2. Chronic venous hypertension without complications, bilateral    3. Arthritis of both feet    4. Type 2 diabetes mellitus with hyperglycemia, without long-term current use of insulin      Patient presents with discoloration involving the lower legs.  She denies any significant pain or limitation that is different from previous evaluation.  She does have intermittent foot pain which is likely secondary to her arthritic changes.  Regarding the legs, I explained that this is due to venous stasis dermatitis.  She does not have any maceration or open wounds that which are concerning at this time.  Swelling is mild.  She states that it is more episodic in nature and typically after activity.  I did discuss the importance of compression therapy with her.  I did provide her with Tubigrip's today.  I have advised her to obtain mild compression garments and we did review appropriate use and effects.  I have asked that she continue to perform daily diabetic foot checks and we reviewed the importance of glycemic control.  I do feel that appropriate supportive shoes and inserts would be helpful  for patient's feet.  Patient is to call with any additional issues or concerns.  I have asked that she return in 3 months for routine foot check with Enriqueta Cisneros.  Greater than 20 minutes spent before, during, and after evaluation for patient care.    Orders Placed This Encounter   Procedures    XR Foot 3+ View Bilateral     Weight Bearing     Order Specific Question:   Reason for Exam:     Answer:   pain in toip of foot, edema rm 9 wb     Order Specific Question:   Release to patient     Answer:   Routine Release [[id number]]          Note is dictated utilizing voice recognition software. Unfortunately this leads to occasional typographical errors. I apologize in advance if the situation occurs. If questions occur please do not hesitate to call our office.    Transcribed from ambient dictation for WIL Syed DPM by Yeison Ward.  09/12/23   16:56 EDT    Patient or patient representative verbalized consent to the visit recording.  I have personally performed the services described in this document as transcribed by the above individual, and it is both accurate and complete.

## 2023-09-17 ENCOUNTER — HOSPITAL ENCOUNTER (EMERGENCY)
Facility: HOSPITAL | Age: 63
Discharge: HOME | End: 2023-09-17
Attending: EMERGENCY MEDICINE | Admitting: EMERGENCY MEDICINE
Payer: MEDICAID

## 2023-09-17 ENCOUNTER — APPOINTMENT (OUTPATIENT)
Dept: CT IMAGING | Facility: HOSPITAL | Age: 63
End: 2023-09-17
Payer: MEDICAID

## 2023-09-17 VITALS
HEIGHT: 65 IN | TEMPERATURE: 98.3 F | RESPIRATION RATE: 16 BRPM | WEIGHT: 213.41 LBS | SYSTOLIC BLOOD PRESSURE: 143 MMHG | OXYGEN SATURATION: 93 % | BODY MASS INDEX: 35.56 KG/M2 | DIASTOLIC BLOOD PRESSURE: 78 MMHG | HEART RATE: 95 BPM

## 2023-09-17 DIAGNOSIS — D25.9 UTERINE LEIOMYOMA, UNSPECIFIED LOCATION: ICD-10-CM

## 2023-09-17 DIAGNOSIS — R10.31 RIGHT LOWER QUADRANT ABDOMINAL PAIN: Primary | ICD-10-CM

## 2023-09-17 LAB
ALBUMIN SERPL-MCNC: 3.4 G/DL (ref 3.5–5.2)
ALBUMIN/GLOB SERPL: 1.2 G/DL
ALP SERPL-CCNC: 81 U/L (ref 39–117)
ALT SERPL W P-5'-P-CCNC: 15 U/L (ref 1–33)
ANION GAP SERPL CALCULATED.3IONS-SCNC: 11 MMOL/L (ref 5–15)
AST SERPL-CCNC: 14 U/L (ref 1–32)
BASOPHILS # BLD AUTO: 0.1 10*3/MM3 (ref 0–0.2)
BASOPHILS NFR BLD AUTO: 0.7 % (ref 0–1.5)
BILIRUB SERPL-MCNC: 0.2 MG/DL (ref 0–1.2)
BILIRUB UR QL STRIP: NEGATIVE
BUN SERPL-MCNC: 14 MG/DL (ref 8–23)
BUN/CREAT SERPL: 21.5 (ref 7–25)
CALCIUM SPEC-SCNC: 8.2 MG/DL (ref 8.6–10.5)
CHLORIDE SERPL-SCNC: 101 MMOL/L (ref 98–107)
CLARITY UR: CLEAR
CO2 SERPL-SCNC: 23 MMOL/L (ref 22–29)
COLOR UR: YELLOW
CREAT SERPL-MCNC: 0.65 MG/DL (ref 0.57–1)
DEPRECATED RDW RBC AUTO: 52.1 FL (ref 37–54)
EGFRCR SERPLBLD CKD-EPI 2021: 100.9 ML/MIN/1.73
EOSINOPHIL # BLD AUTO: 0.2 10*3/MM3 (ref 0–0.4)
EOSINOPHIL NFR BLD AUTO: 1.6 % (ref 0.3–6.2)
ERYTHROCYTE [DISTWIDTH] IN BLOOD BY AUTOMATED COUNT: 15.1 % (ref 12.3–15.4)
GLOBULIN UR ELPH-MCNC: 2.9 GM/DL
GLUCOSE SERPL-MCNC: 319 MG/DL (ref 65–99)
GLUCOSE UR STRIP-MCNC: ABNORMAL MG/DL
HCT VFR BLD AUTO: 45.6 % (ref 34–46.6)
HGB BLD-MCNC: 14.8 G/DL (ref 12–15.9)
HGB UR QL STRIP.AUTO: NEGATIVE
KETONES UR QL STRIP: ABNORMAL
LEUKOCYTE ESTERASE UR QL STRIP.AUTO: NEGATIVE
LIPASE SERPL-CCNC: 29 U/L (ref 13–60)
LYMPHOCYTES # BLD AUTO: 3.5 10*3/MM3 (ref 0.7–3.1)
LYMPHOCYTES NFR BLD AUTO: 35.1 % (ref 19.6–45.3)
MCH RBC QN AUTO: 32.1 PG (ref 26.6–33)
MCHC RBC AUTO-ENTMCNC: 32.5 G/DL (ref 31.5–35.7)
MCV RBC AUTO: 98.8 FL (ref 79–97)
MONOCYTES # BLD AUTO: 0.7 10*3/MM3 (ref 0.1–0.9)
MONOCYTES NFR BLD AUTO: 6.7 % (ref 5–12)
NEUTROPHILS NFR BLD AUTO: 5.5 10*3/MM3 (ref 1.7–7)
NEUTROPHILS NFR BLD AUTO: 55.9 % (ref 42.7–76)
NITRITE UR QL STRIP: NEGATIVE
NRBC BLD AUTO-RTO: 0 /100 WBC (ref 0–0.2)
PH UR STRIP.AUTO: 5.5 [PH] (ref 5–8)
PLATELET # BLD AUTO: 256 10*3/MM3 (ref 140–450)
PMV BLD AUTO: 8.4 FL (ref 6–12)
POTASSIUM SERPL-SCNC: 4.1 MMOL/L (ref 3.5–5.2)
PROT SERPL-MCNC: 6.3 G/DL (ref 6–8.5)
PROT UR QL STRIP: NEGATIVE
RBC # BLD AUTO: 4.62 10*6/MM3 (ref 3.77–5.28)
SODIUM SERPL-SCNC: 135 MMOL/L (ref 136–145)
SP GR UR STRIP: 1.04 (ref 1–1.03)
UROBILINOGEN UR QL STRIP: ABNORMAL
WBC NRBC COR # BLD: 9.9 10*3/MM3 (ref 3.4–10.8)

## 2023-09-17 PROCEDURE — 96375 TX/PRO/DX INJ NEW DRUG ADDON: CPT

## 2023-09-17 PROCEDURE — 25010000002 MORPHINE PER 10 MG: Performed by: PHYSICIAN ASSISTANT

## 2023-09-17 PROCEDURE — 80053 COMPREHEN METABOLIC PANEL: CPT | Performed by: PHYSICIAN ASSISTANT

## 2023-09-17 PROCEDURE — 85025 COMPLETE CBC W/AUTO DIFF WBC: CPT | Performed by: PHYSICIAN ASSISTANT

## 2023-09-17 PROCEDURE — 81003 URINALYSIS AUTO W/O SCOPE: CPT | Performed by: PHYSICIAN ASSISTANT

## 2023-09-17 PROCEDURE — 96361 HYDRATE IV INFUSION ADD-ON: CPT

## 2023-09-17 PROCEDURE — 83690 ASSAY OF LIPASE: CPT | Performed by: PHYSICIAN ASSISTANT

## 2023-09-17 PROCEDURE — 74176 CT ABD & PELVIS W/O CONTRAST: CPT

## 2023-09-17 PROCEDURE — 96374 THER/PROPH/DIAG INJ IV PUSH: CPT

## 2023-09-17 PROCEDURE — 99284 EMERGENCY DEPT VISIT MOD MDM: CPT

## 2023-09-17 PROCEDURE — 25010000002 ONDANSETRON PER 1 MG: Performed by: PHYSICIAN ASSISTANT

## 2023-09-17 RX ORDER — SODIUM CHLORIDE 0.9 % (FLUSH) 0.9 %
10 SYRINGE (ML) INJECTION AS NEEDED
Status: DISCONTINUED | OUTPATIENT
Start: 2023-09-17 | End: 2023-09-17 | Stop reason: HOSPADM

## 2023-09-17 RX ORDER — ONDANSETRON 2 MG/ML
4 INJECTION INTRAMUSCULAR; INTRAVENOUS ONCE
Status: COMPLETED | OUTPATIENT
Start: 2023-09-17 | End: 2023-09-17

## 2023-09-17 RX ADMIN — ONDANSETRON 4 MG: 2 INJECTION INTRAMUSCULAR; INTRAVENOUS at 13:25

## 2023-09-17 RX ADMIN — SODIUM CHLORIDE 1000 ML: 9 INJECTION, SOLUTION INTRAVENOUS at 13:25

## 2023-09-17 RX ADMIN — MORPHINE SULFATE 4 MG: 4 INJECTION INTRAVENOUS at 13:25

## 2023-09-17 NOTE — ED PROVIDER NOTES
Subjective   History of Present Illness  Patient is a 62-year-old female presents to the ED with complaints of right lower quadrant abdominal pain x5 days.  She describes as a constant sharp type pain that she rates as moderate severity.  She states the pain is nonradiating from the right side of her abdomen.  Pertinent negatives include nausea, vomiting, diarrhea, urinary complaints, fever, chills, chest pain, shortness of breath.  She also denies any pertinent abdominal surgical history.  Does report history of pancreatitis but states this pain feels different.      Review of Systems   Constitutional: Negative.    Respiratory: Negative.     Cardiovascular: Negative.    Gastrointestinal:  Positive for abdominal pain. Negative for abdominal distention, constipation, diarrhea, nausea and vomiting.   Genitourinary:  Negative for difficulty urinating, dysuria, flank pain, frequency, hematuria and urgency.   Musculoskeletal:  Negative for back pain, neck pain and neck stiffness.   Skin: Negative.      Past Medical History:   Diagnosis Date    Arthritis of back     Asthma     Bipolar 1 disorder     Chronic obstructive lung disease 01/28/2015    COVID-19 08/15/2021    CTS (carpal tunnel syndrome)     Diabetes mellitus     Difficulty walking 03/2022    Hip arthrosis     Hypertension     Knee swelling     Low back strain     Migraine     Mixed hyperlipidemia 03/03/2020    Neck strain     Pancreatitis     PTSD (post-traumatic stress disorder)     Tobacco abuse 06/15/2020       Allergies   Allergen Reactions    Iodine Swelling    Adhesive Tape Rash       No past surgical history on file.    Family History   Problem Relation Age of Onset    Hypertension Mother     Diabetes Mother     Asthma Daughter     COPD Daughter        Social History     Socioeconomic History    Marital status: Single   Tobacco Use    Smoking status: Every Day     Packs/day: 1.50     Years: 30.00     Pack years: 45.00     Types: Cigarettes     Passive  exposure: Current    Smokeless tobacco: Never   Vaping Use    Vaping Use: Never used   Substance and Sexual Activity    Alcohol use: Never    Drug use: Never    Sexual activity: Not Currently     Partners: Female           Objective   Physical Exam  Vitals and nursing note reviewed.   Constitutional:       General: She is not in acute distress.     Appearance: Normal appearance. She is well-developed. She is not ill-appearing, toxic-appearing or diaphoretic.   HENT:      Head: Normocephalic and atraumatic.      Mouth/Throat:      Mouth: Mucous membranes are moist.      Pharynx: Oropharynx is clear.   Eyes:      General: No scleral icterus.     Extraocular Movements: Extraocular movements intact.      Pupils: Pupils are equal, round, and reactive to light.   Cardiovascular:      Rate and Rhythm: Normal rate and regular rhythm.      Pulses: Normal pulses.      Heart sounds: No murmur heard.    No friction rub. No gallop.   Pulmonary:      Effort: Pulmonary effort is normal. No respiratory distress.      Breath sounds: Normal breath sounds. No stridor. No wheezing, rhonchi or rales.   Chest:      Chest wall: No tenderness.   Abdominal:      General: Bowel sounds are normal. There is no distension. There are no signs of injury.      Palpations: Abdomen is soft. There is no fluid wave, hepatomegaly, splenomegaly or mass.      Tenderness: There is no abdominal tenderness in the right lower quadrant, suprapubic area and left lower quadrant. There is no right CVA tenderness, left CVA tenderness, guarding or rebound. Negative signs include McBurney's sign.      Hernia: No hernia is present.   Skin:     General: Skin is warm.      Capillary Refill: Capillary refill takes less than 2 seconds.      Coloration: Skin is not cyanotic, jaundiced or pale.      Findings: No rash.   Neurological:      General: No focal deficit present.      Mental Status: She is alert and oriented to person, place, and time.   Psychiatric:          "Mood and Affect: Mood normal.         Behavior: Behavior normal.       Procedures           ED Course  ED Course as of 09/17/23 1523   Sun Sep 17, 2023   1324 Patient has an iodine allergy this was discussed with her she states she feels like her throat is closing and she gets itching and hives.  We did discuss pretreatment with Benadryl and steroids but she states she does not want to be pretreated and would rather do a CT without contrast [AA]      ED Course User Index  [AA] Margarita Hagan PA      /76   Pulse 102   Temp 98.3 °F (36.8 °C) (Oral)   Resp 16   Ht 165.1 cm (65\")   Wt 96.8 kg (213 lb 6.5 oz)   SpO2 94%   BMI 35.51 kg/m² .  Medications   sodium chloride 0.9 % flush 10 mL (has no administration in time range)   ondansetron (ZOFRAN) injection 4 mg (4 mg Intravenous Given 9/17/23 1325)   morphine injection 4 mg (4 mg Intravenous Given 9/17/23 1325)   sodium chloride 0.9 % bolus 1,000 mL (1,000 mL Intravenous New Bag 9/17/23 1325)     Labs Reviewed   COMPREHENSIVE METABOLIC PANEL - Abnormal; Notable for the following components:       Result Value    Glucose 319 (*)     Sodium 135 (*)     Calcium 8.2 (*)     Albumin 3.4 (*)     All other components within normal limits    Narrative:     GFR Normal >60  Chronic Kidney Disease <60  Kidney Failure <15     URINALYSIS W/ MICROSCOPIC IF INDICATED (NO CULTURE) - Abnormal; Notable for the following components:    Specific Gravity, UA 1.041 (*)     Glucose, UA >=1000 mg/dL (3+) (*)     Ketones, UA Trace (*)     All other components within normal limits    Narrative:     Urine microscopic not indicated.   CBC WITH AUTO DIFFERENTIAL - Abnormal; Notable for the following components:    MCV 98.8 (*)     Lymphocytes, Absolute 3.50 (*)     All other components within normal limits   LIPASE - Normal   CBC AND DIFFERENTIAL    Narrative:     The following orders were created for panel order CBC & Differential.  Procedure                               " Abnormality         Status                     ---------                               -----------         ------                     CBC Auto Differential[[id number]]        Abnormal            Final result                 Please view results for these tests on the individual orders.     CT Abdomen Pelvis Without Contrast   Final Result   Impression:      1. Hepatic steatosis and hepatomegaly      2. Uterine fibroids                  Electronically Signed: Trace Lopez MD     9/17/2023 2:51 PM EDT     Workstation ID: [id number]                                             Medical Decision Making  Chart Review: Psychiatry note reviewed from 9/5/23    Comorbidity: As per past medical history  Differentials: DKA, intra-abdominal infection, dissection, peritonitis, peptic ulcer disease, pancreatitis, hepatitis, ischemic bowel, bowel obstruction, appendicitis    ;this list is not all inclusive and does not constitute the entirety of considered causes  Labs: as above   Radiology: My interpretation CT abdomen pelvis shows no obvious bowel obstruction correlated with radiologist dictation as below  CT Abdomen Pelvis Without Contrast   Final Result    Impression:    1. Hepatic steatosis and hepatomegaly    2. Uterine fibroids        Electronically Signed: Trace Lopez MD      9/17/2023 2:51 PM EDT      Workstation ID: [id number]     Disposition/Treatment:  Appropriate PPE was worn during exam and throughout all encounters with the patient.  When the ED IV was placed and labs were obtained patient was placed on proper monitors presents to the ED with complaints of right lower quadrant abdominal pain for the past 5 days.  She was given morphine Zofran and fluids.  Labs and imaging were obtained to look for acute infection or obstruction.    Labs today show no leukocytosis or anemia on CBC.  Metabolic panel showed glucose 319 no signs of DKA within normal anion gap of 11 bicarb 23.  Otherwise fairly unremarkable.  Liver  enzymes and kidney function normal.  Lipase normal 29.  Urinalysis showed trace ketones and glucose otherwise no signs of UTI.  CT abdomen pelvis was also ordered which showed hepatic stenosis and hepatomegaly along with uterine fibroids.  Uterine fibroids could be source of her lower abdominal pain.  Appendix appeared normal with no stranding noted on CT abdomen and pelvis.    Findings were discussed with the patient at bedside voiced understanding of discharge along with signs and symptoms to return.  She was advised to follow-up with GYN for further management of her fibroids.  All questions were answered.    This document is intended for medical expert use only. Reading of this document by patients and/or patient's family without participating medical staff guidance may result in misinterpretation and unintended morbidity.  Any interpretation of such data is the responsibility of the patient and/or family member responsible for the patient in concert with their primary or specialist providers, not to be left for sources of online searches such as [employer], [employer] or similar queries. Relying on these approaches to knowledge may result in misinterpretation, misguided goals of care and even death should patients or family members try recommendations outside of the realm of professional medical care in a supervised inpatient environment.       Amount and/or Complexity of Data Reviewed  Labs: ordered. Decision-making details documented in ED Course.  Radiology: ordered. Decision-making details documented in ED Course.    Risk  Prescription drug management.        Final diagnoses:   Right lower quadrant abdominal pain   Uterine leiomyoma, unspecified location       ED Disposition  ED Disposition       ED Disposition   Discharge    Condition   Stable    Comment   --               Cindy Huddleston, APRN  [address]  [place] [id number]  [phone]    Schedule an appointment as soon as possible for a  visit in 3 days      [facility] EMERGENCY DEPARTMENT  [address] [address] [phone]  Go to   If symptoms worsen    [facility] [place] [facility]  [address] [address] [phone]  Schedule an appointment as soon as possible for a visit       Yazmin Enrique MD  [address]  [place] [address] [phone]    Call   As needed if you do not have an OB/GYN for further management of her fibroids         Medication List      No changes were made to your prescriptions during this visit.            Margarita Hagan PA  09/17/23 1527

## 2023-09-17 NOTE — DISCHARGE INSTRUCTIONS
Drink plenty of clear fluids eat small bland meals.  Advance diet as tolerated.  Follow-up with gastroenterology if your pain continues.    Tylenol or ibuprofen as needed for pain.    Follow-up with your primary care provider in 3-5 days.  If you do not have a primary care provider call [phone] for help in finding one, or you may follow up with [facility] at [phone].    Return to ED for any new or worsening symptoms

## 2023-09-20 ENCOUNTER — PATIENT OUTREACH (OUTPATIENT)
Dept: CASE MANAGEMENT | Facility: OTHER | Age: 63
End: 2023-09-20
Payer: MEDICAID

## 2023-09-20 DIAGNOSIS — E78.2 MIXED HYPERLIPIDEMIA: ICD-10-CM

## 2023-09-20 DIAGNOSIS — E11.9 TYPE 2 DIABETES MELLITUS WITHOUT COMPLICATION, WITHOUT LONG-TERM CURRENT USE OF INSULIN: Primary | ICD-10-CM

## 2023-09-20 NOTE — OUTREACH NOTE
AMBULATORY CASE MANAGEMENT NOTE    Name and Relationship of Patient/Support Person: Jagdish Carli - Self    CCM Interim Update    Outreach complete.    Patient enrolled in CCM program on this date.  Patient scheduled for hospital f/u visit for 9/29/23 with her PCP related to ED visit on 9/17/23.      Adult Patient Profile  Questions/Answers      Flowsheet Row Most Recent Value   Symptoms/Conditions Managed at Home musculoskeletal, respiratory   Barriers to Managing Health none   Cardiovascular Symptoms/Conditions high blood cholesterol   Cardiovascular Management Strategies activity, adequate rest, diet modification, exercise, medication therapy, routine screening, weight management   Cardiovascular Self-Management Outcome 3 (uncertain)   Diabetes Management Strategies activity, diet modification, exercise, medication therapy, routine screenings, weight management, blood glucose testing   A1C Target less than 7   Last A1C Result 7.8 on 7/18/22   Diabetes Self-Management Outcome 3 (uncertain)   Musculoskeletal Symptoms/Conditions joint pain   Musculoskeletal Management Strategies activity, adequate rest, exercise, medication therapy   Musculoskeletal Self-Management Outcome 3 (uncertain)   Respiratory Symptoms/Conditions COPD   Respiratory Management Strategies activity, adequate rest, diet modification, weight management, routine screening, medication therapy   Respiratory Self-Management Outcome 3 (uncertain)              Education Documentation  No documentation found.        Mick CRAWLEY  Ambulatory Case Management    9/20/2023, 09:23 EDT

## 2023-09-28 ENCOUNTER — TELEPHONE (OUTPATIENT)
Dept: CASE MANAGEMENT | Facility: OTHER | Age: 63
End: 2023-09-28
Payer: MEDICAID

## 2023-09-28 ENCOUNTER — PATIENT OUTREACH (OUTPATIENT)
Dept: CASE MANAGEMENT | Facility: OTHER | Age: 63
End: 2023-09-28
Payer: MEDICAID

## 2023-09-28 DIAGNOSIS — E11.9 TYPE 2 DIABETES MELLITUS WITHOUT COMPLICATION, WITHOUT LONG-TERM CURRENT USE OF INSULIN: Primary | ICD-10-CM

## 2023-09-28 DIAGNOSIS — E78.2 MIXED HYPERLIPIDEMIA: ICD-10-CM

## 2023-09-28 NOTE — OUTREACH NOTE
CCM End of Month Documentation    This Chronic Medical Management Care Plan for Carli Dubon, 62 y.o. female, has been a new plan of care implemented; established and a new plan of care implemented for the month of September.  A cumulative time of 24 minutes was spent on this patient record this month, including phone call with patient; chart review; electronic communication with primary care provider.    Regarding the patient's problems: has Diabetes; Mixed hyperlipidemia; Essential hypertension; PTSD (post-traumatic stress disorder); Tobacco abuse; Obesity (BMI 30-39.9); Insomnia; Helicobacter pylori gastritis; Bipolar affective disorder, currently depressed, moderate; COVID-19; Chronic obstructive lung disease; Tobacco dependence syndrome; Hyperglycemia due to type 2 diabetes mellitus; Obstructive sleep apnea; Chronic pain of right knee; Bilateral hip pain; and Morbid obesity on their problem list., the following items were addressed: medical records; medications and any changes can be found within the plan section of the note.  A detailed listing of time spent for chronic care management is tracked within each outreach encounter.  Current medications include:  has a current medication list which includes the following prescription(s): albuterol, albuterol sulfate hfa, albuterol sulfate hfa, amitriptyline, amlodipine, atorvastatin, benzonatate, budesonide-formoterol, clonazepam, divalproex, fluticasone, gabapentin, glipizide, guaifenesin, jardiance, losartan, metformin, nicotine, nystatin-triamcinolone, omeprazole, ondansetron odt, prednisone, promethazine-dextromethorphan, tramadol, vilazodone, and zolpidem. and the patient is reported to be patient is compliant with medication protocol,  Medications are reported to be effective.  All notes on chart for PCP to review.    The patient was monitored remotely for activity level; medications; blood glucose.    The patient's physical needs include:  physical  healthcare.     The patient's mental support needs include:  continued support    The patient's cognitive support needs include:  not applicable    The patient's psychosocial support needs include:  not applicable    The patient's functional needs include: physical healthcare    The patient's environmental needs include:  not applicable    Care Plan overall comments:  No data recorded    Refer to previous outreach notes for more information on the areas listed above.    Monthly Billing Diagnoses  (E11.9) Type 2 diabetes mellitus without complication, without long-term current use of insulin    (E78.2) Mixed hyperlipidemia    Medications   Medications have been reconciled    Care Plan progress this month:      Recently Modified Care Plans Updates made since 8/28/2023 12:00 AM       Diabetes Type 2 (Adult)           Problem Priority Last Modified     Glycemic Management (Diabetes, Type 2) --  9/20/2023  9:20 AM by Mick Estrada RN              Goal Recent Progress Last Modified     Glycemic Management Optimized --  9/20/2023  9:20 AM by Mick Estrada RN     Evidence-based guidance:   Anticipate A1C testing (point-of-care) every 3 to 6 months based on goal attainment.   Review mutually-set A1C goal or target range.   Anticipate use of antihyperglycemic with or without insulin and periodic adjustments; consider active involvement of pharmacist.   Provide medical nutrition therapy and development of individualized eating.   Compare self-reported symptoms of hypo or hyperglycemia to blood glucose levels, diet and fluid intake, current medications, psychosocial and physiologic stressors, change in activity and barriers to care adherence.   Promote self-monitoring of blood glucose levels.   Assess and address barriers to management plan, such as food insecurity, age, developmental ability, depression, anxiety, fear of hypoglycemia or weight gain, as well as medication cost, side effects and complicated regimen.    Consider referral to community-based diabetes education program, visiting nurse, community health worker or health .   Encourage regular dental care for treatment of periodontal disease; refer to dental provider when needed.    Notes:            Task Due Date Last Modified     Alleviate Barriers to Glycemic Management --  9/20/2023  9:20 AM by Mick Estrada RN     Care Management Activities:      - not discussed during this outreach      Notes:                Problem Priority Last Modified     Disease Progression (Diabetes, Type 2) --  9/20/2023  9:20 AM by Mick Estrada RN              Goal Recent Progress Last Modified     Disease Progression Prevented or Minimized --  9/20/2023  9:20 AM by Mick Estrada RN     Evidence-based guidance:   Prepare patient for laboratory and diagnostic exams based on risk and presentation.   Encourage lifestyle changes, such as increased intake of plant-based foods, stress reduction, consistent physical activity and smoking cessation to prevent long-term complications and chronic disease.    Individualize activity and exercise recommendations while considering potential limitations, such as neuropathy, retinopathy or the ability to prevent hyperglycemia or hypoglycemia.    Prepare patient for use of pharmacologic therapy that may include antihypertensive, analgesic, prostaglandin E1 with periodic adjustments, based on presenting chronic condition and laboratory results.   Assess signs/symptoms and risk factors for hypertension, sleep-disordered breathing, neuropathy (including changes in gait and balance), retinopathy, nephropathy and sexual dysfunction.   Address pregnancy planning and contraceptive choice, especially when prescribing antihypertensive or statin.   Ensure completion of annual comprehensive foot exam and dilated eye exam.    Implement additional individualized goals and interventions based on identified risk factors.   Prepare patient for  consultation or referral for specialist care, such as ophthalmology, neurology, cardiology, podiatry, nephrology or perinatology.    Notes:            Task Due Date Last Modified     Monitor and Manage Follow-up for Comorbidities --  9/20/2023  9:20 AM by Mick Estrada RN     Care Management Activities:      - not discussed during this outreach      Notes:                          Current Specialty Plan of Care Status signed by both patient and provider    Instructions   Patient was provided an electronic copy of care plan  CCM services were explained and offered and patient has accepted these services.  Patient has given their written consent to receive CCM services and understands that this includes the authorization of electronic communication of medical information with the other treating providers.  Patient understands that they may stop CCM services at any time and these changes will be effective at the end of the calendar month and will effectively revocate the agreement of CCM services.  Patient understands that only one practitioner can furnish and be paid for CCM services during one calendar month.  Patient also understands that there may be co-payment or deductible fees in association with CCM services.  Patient will continue with at least monthly follow-up calls with the Ambulatory .    Mick CRAWLEY  Ambulatory Case Management    9/28/2023, 14:03 EDT

## 2023-09-28 NOTE — TELEPHONE ENCOUNTER
Outreach attempted.  VM left, which included appointment reminder for PCP hospital f/u tomorrow (9/29/23).

## 2023-09-29 ENCOUNTER — OFFICE VISIT (OUTPATIENT)
Dept: FAMILY MEDICINE CLINIC | Facility: CLINIC | Age: 63
End: 2023-09-29
Payer: MEDICAID

## 2023-09-29 VITALS
TEMPERATURE: 97.7 F | WEIGHT: 214 LBS | HEART RATE: 112 BPM | SYSTOLIC BLOOD PRESSURE: 142 MMHG | OXYGEN SATURATION: 93 % | DIASTOLIC BLOOD PRESSURE: 81 MMHG | BODY MASS INDEX: 35.61 KG/M2

## 2023-09-29 DIAGNOSIS — J44.9 CHRONIC OBSTRUCTIVE PULMONARY DISEASE, UNSPECIFIED COPD TYPE: ICD-10-CM

## 2023-09-29 DIAGNOSIS — R10.31 RIGHT LOWER QUADRANT ABDOMINAL PAIN: Primary | ICD-10-CM

## 2023-09-29 RX ORDER — FLUTICASONE FUROATE, UMECLIDINIUM BROMIDE AND VILANTEROL TRIFENATATE 200; 62.5; 25 UG/1; UG/1; UG/1
1 POWDER RESPIRATORY (INHALATION) DAILY
Qty: 60 EACH | Refills: 2 | Status: SHIPPED | OUTPATIENT
Start: 2023-09-29

## 2023-09-29 NOTE — PROGRESS NOTES
Subjective     Carli Dubon is a 62 y.o. female.     History of Present Illness  Pt is here today for a [facility] ER follow up  She went to the ER on 9/17 with c/o RLQ pain  She states it was persistent for 5 days.  Pt had a CT scan which showed hepatic steatosis and uterine fibroids  WBC was normal.  It was thought pain could be from fibroids and pt was advised to see GYN  She sees Dr. Denise  She reports that the pain is mostly resolved  Denies fever or chills  Denies nausea or vomiting.  Pt reports she is getting SOA easily  She continue to smoke.  She is on symbicort  Has albuterol as needed  She sees Dr. Bingham     The following portions of the patient's history were reviewed and updated as appropriate: allergies, current medications, past family history, past medical history, past social history, past surgical history, and problem list.    Review of Systems   Constitutional:  Negative for chills, fatigue and fever.   Respiratory:  Positive for cough and shortness of breath. Negative for chest tightness.    Cardiovascular:  Negative for chest pain and palpitations.   Gastrointestinal:  Positive for abdominal pain. Negative for nausea and vomiting.   Neurological:  Negative for dizziness and headache.     Objective     /81 (BP Location: Left arm, Patient Position: Sitting, Cuff Size: Adult)   Pulse 112   Temp 97.7 °F (36.5 °C) (Oral)   Wt 97.1 kg (214 lb)   SpO2 93%   BMI 35.61 kg/m²     Current Outpatient Medications on File Prior to Visit   Medication Sig Dispense Refill    albuterol (ACCUNEB) 1.25 MG/3ML nebulizer solution Take 3 mL by nebulization Every 6 (Six) Hours As Needed for Wheezing or Shortness of Air. Dispense as 1 box of unit dose 1 each 1    albuterol sulfate  (90 Base) MCG/ACT inhaler INHALE 2 PUFFS BY MOUTH EVERY 4 HOURS AS NEEDED FOR WHEEZING 18 g 0    albuterol sulfate  (90 Base) MCG/ACT inhaler Inhale 2 puffs Every 4 (Four) Hours As Needed for Wheezing or Shortness of Air.  6.7 g 1    amitriptyline (ELAVIL) 150 MG tablet Take 1 tablet by mouth Every Night. 90 tablet 1    amLODIPine (Norvasc) 5 MG tablet Take 1 tablet by mouth Daily. 90 tablet 1    atorvastatin (Lipitor) 40 MG tablet Take 1 tablet by mouth Daily. 90 tablet 1    benzonatate (TESSALON) 100 MG capsule Take 1 capsule by mouth 3 (Three) Times a Day As Needed for Cough. 42 capsule 3    clonazePAM (KlonoPIN) 0.5 MG tablet Take 1 tablet by mouth 2 (Two) Times a Day As Needed for Anxiety. 60 tablet 2    divalproex (DEPAKOTE ER) 500 MG 24 hr tablet Take 2 tablets by mouth 2 (Two) Times a Day. 360 tablet 0    fluticasone (Flonase) 50 MCG/ACT nasal spray 2 sprays into the nostril(s) as directed by provider Daily. 9.9 mL 2    gabapentin (NEURONTIN) 100 MG capsule Take 1 capsule by mouth.      glipizide (GLUCOTROL) 10 MG tablet Take 1 tablet by mouth 2 (Two) Times a Day Before Meals. 180 tablet 0    guaiFENesin (Mucinex) 600 MG 12 hr tablet Take 2 tablets by mouth 2 (Two) Times a Day As Needed for Cough. 60 tablet 2    Jardiance 25 MG tablet tablet Take 1 tablet by mouth once daily 90 tablet 0    losartan (COZAAR) 100 MG tablet Take 1 tablet by mouth once daily 90 tablet 0    metFORMIN (GLUCOPHAGE) 1000 MG tablet Take 1 tablet by mouth 2 (Two) Times a Day With Meals. 180 tablet 1    nicotine (Nicotine Step 1) 21 MG/24HR patch Place 1 patch on the skin as directed by provider Daily. 30 patch 11    nystatin-triamcinolone (MYCOLOG) 686695-5.1 UNIT/GM-% ointment       omeprazole (priLOSEC) 40 MG capsule Take 1 capsule by mouth once daily 90 capsule 0    ondansetron ODT (ZOFRAN-ODT) 4 MG disintegrating tablet Place 1 tablet on the tongue Every 6 (Six) Hours As Needed for Nausea. 20 tablet 0    predniSONE (DELTASONE) 20 MG tablet Take 2 tablets by mouth Daily. 10 tablet 0    promethazine-dextromethorphan (PROMETHAZINE-DM) 6.25-15 MG/5ML syrup Take 2.5 mL by mouth 4 (Four) Times a Day As Needed for Cough. (Patient not taking: Reported on  9/12/2023) 118 mL 0    traMADol (ULTRAM) 50 MG tablet Take 1 tablet by mouth Every 6 (Six) Hours As Needed for Moderate Pain. 12 tablet 0    vilazodone (VIIBRYD) 20 MG tablet tablet Take 1 tablet by mouth Daily. 90 tablet 1    zolpidem (AMBIEN) 10 MG tablet TAKE 1 TABLET BY MOUTH AT NIGHT AS NEEDED FOR SLEEP 30 tablet 0    [DISCONTINUED] budesonide-formoterol (SYMBICORT) 160-4.5 MCG/ACT inhaler Inhale 2 puffs by mouth twice daily 11 g 0     No current facility-administered medications on file prior to visit.           Physical Exam  Constitutional:       General: She is not in acute distress.     Appearance: Normal appearance. She is obese. She is not ill-appearing.   HENT:      Head: Normocephalic and atraumatic.   Cardiovascular:      Rate and Rhythm: Regular rhythm. Tachycardia present.   Pulmonary:      Effort: Pulmonary effort is normal.      Breath sounds: Examination of the left-lower field reveals wheezing. Wheezing present.   Abdominal:      Tenderness: There is abdominal tenderness (mild lower abdomen).   Musculoskeletal:         General: Normal range of motion.   Skin:     General: Skin is warm and dry.   Neurological:      General: No focal deficit present.      Mental Status: She is alert and oriented to person, place, and time.   Psychiatric:         Mood and Affect: Mood normal.         Behavior: Behavior normal.         Thought Content: Thought content normal.         Judgment: Judgment normal.         Assessment & Plan     Diagnoses and all orders for this visit:    1. Right lower quadrant abdominal pain (Primary)  Comments:  improved  reviewed ER notes and tests  possibly related to fibroids  pt to schedule appt with her GYN    2. Chronic obstructive pulmonary disease, unspecified COPD type  Comments:  deteriorated  schedule appt with pulm  stop symbicort  trelegy sample given  sees me again in 2 weeks  Orders:  -     RSVPreF3 Vac Recomb Adjuvanted (AREXVY) 120 MCG/0.5ML reconstituted suspension  injection; Inject 0.5 mL into the appropriate muscle as directed by prescriber 1 (One) Time for 1 dose.  Dispense: 0.5 mL; Refill: 0  -     Fluticasone-Umeclidin-Vilant (Trelegy Ellipta) 200-62.5-25 MCG/ACT aerosol powder ; Inhale 1 puff Daily.  Dispense: 60 each; Refill: 2

## 2023-09-29 NOTE — PATIENT INSTRUCTIONS
Stop symbicort  Start trelegy inhaler once daily  Rinse mouth  Schedule appt with GYN  Call for issues

## 2023-10-06 DIAGNOSIS — F51.04 PSYCHOPHYSIOLOGICAL INSOMNIA: ICD-10-CM

## 2023-10-09 DIAGNOSIS — F51.04 PSYCHOPHYSIOLOGICAL INSOMNIA: ICD-10-CM

## 2023-10-09 RX ORDER — ZOLPIDEM TARTRATE 10 MG/1
10 TABLET ORAL NIGHTLY PRN
Qty: 30 TABLET | Refills: 2 | Status: SHIPPED | OUTPATIENT
Start: 2023-10-09

## 2023-10-09 RX ORDER — ZOLPIDEM TARTRATE 10 MG/1
10 TABLET ORAL NIGHTLY PRN
Qty: 30 TABLET | Refills: 2 | Status: CANCELLED | OUTPATIENT
Start: 2023-10-09

## 2023-10-10 ENCOUNTER — TELEPHONE (OUTPATIENT)
Dept: FAMILY MEDICINE CLINIC | Facility: CLINIC | Age: 63
End: 2023-10-10

## 2023-10-10 ENCOUNTER — OFFICE VISIT (OUTPATIENT)
Dept: FAMILY MEDICINE CLINIC | Facility: CLINIC | Age: 63
End: 2023-10-10
Payer: MEDICAID

## 2023-10-10 ENCOUNTER — LAB (OUTPATIENT)
Dept: FAMILY MEDICINE CLINIC | Facility: CLINIC | Age: 63
End: 2023-10-10
Payer: MEDICAID

## 2023-10-10 VITALS
SYSTOLIC BLOOD PRESSURE: 133 MMHG | TEMPERATURE: 97.8 F | OXYGEN SATURATION: 94 % | BODY MASS INDEX: 35.78 KG/M2 | DIASTOLIC BLOOD PRESSURE: 80 MMHG | HEART RATE: 107 BPM | WEIGHT: 215 LBS

## 2023-10-10 DIAGNOSIS — G47.00 INSOMNIA, UNSPECIFIED TYPE: ICD-10-CM

## 2023-10-10 DIAGNOSIS — J44.9 CHRONIC OBSTRUCTIVE PULMONARY DISEASE, UNSPECIFIED COPD TYPE: Primary | ICD-10-CM

## 2023-10-10 DIAGNOSIS — E78.5 HYPERLIPIDEMIA, UNSPECIFIED HYPERLIPIDEMIA TYPE: ICD-10-CM

## 2023-10-10 DIAGNOSIS — F31.32 BIPOLAR AFFECTIVE DISORDER, CURRENTLY DEPRESSED, MODERATE: Chronic | ICD-10-CM

## 2023-10-10 DIAGNOSIS — E11.9 TYPE 2 DIABETES MELLITUS WITHOUT COMPLICATION, WITHOUT LONG-TERM CURRENT USE OF INSULIN: ICD-10-CM

## 2023-10-10 DIAGNOSIS — Z23 NEED FOR IMMUNIZATION AGAINST INFLUENZA: ICD-10-CM

## 2023-10-10 DIAGNOSIS — I10 HYPERTENSION, UNSPECIFIED TYPE: ICD-10-CM

## 2023-10-10 DIAGNOSIS — R25.1 TREMOR OF BOTH HANDS: ICD-10-CM

## 2023-10-10 LAB
ALBUMIN SERPL-MCNC: 3.9 G/DL (ref 3.5–5.2)
ALBUMIN/GLOB SERPL: 1.3 G/DL
ALP SERPL-CCNC: 99 U/L (ref 39–117)
ALT SERPL W P-5'-P-CCNC: 19 U/L (ref 1–33)
ANION GAP SERPL CALCULATED.3IONS-SCNC: 10.8 MMOL/L (ref 5–15)
AST SERPL-CCNC: 19 U/L (ref 1–32)
BILIRUB SERPL-MCNC: <0.2 MG/DL (ref 0–1.2)
BUN SERPL-MCNC: 14 MG/DL (ref 8–23)
BUN/CREAT SERPL: 17.3 (ref 7–25)
CALCIUM SPEC-SCNC: 9.4 MG/DL (ref 8.6–10.5)
CHLORIDE SERPL-SCNC: 99 MMOL/L (ref 98–107)
CHOLEST SERPL-MCNC: 133 MG/DL (ref 0–200)
CO2 SERPL-SCNC: 27.2 MMOL/L (ref 22–29)
CREAT SERPL-MCNC: 0.81 MG/DL (ref 0.57–1)
EGFRCR SERPLBLD CKD-EPI 2021: 83.2 ML/MIN/1.73
GLOBULIN UR ELPH-MCNC: 3 GM/DL
GLUCOSE SERPL-MCNC: 288 MG/DL (ref 65–99)
HBA1C MFR BLD: 8.7 % (ref 4.8–5.6)
HDLC SERPL-MCNC: 36 MG/DL (ref 40–60)
LDLC SERPL CALC-MCNC: 49 MG/DL (ref 0–100)
LDLC/HDLC SERPL: 0.93 {RATIO}
POTASSIUM SERPL-SCNC: 4.3 MMOL/L (ref 3.5–5.2)
PROT SERPL-MCNC: 6.9 G/DL (ref 6–8.5)
SODIUM SERPL-SCNC: 137 MMOL/L (ref 136–145)
TRIGL SERPL-MCNC: 317 MG/DL (ref 0–150)
VLDLC SERPL-MCNC: 48 MG/DL (ref 5–40)

## 2023-10-10 PROCEDURE — 83036 HEMOGLOBIN GLYCOSYLATED A1C: CPT | Performed by: NURSE PRACTITIONER

## 2023-10-10 PROCEDURE — 36415 COLL VENOUS BLD VENIPUNCTURE: CPT

## 2023-10-10 PROCEDURE — 80061 LIPID PANEL: CPT | Performed by: NURSE PRACTITIONER

## 2023-10-10 PROCEDURE — 80053 COMPREHEN METABOLIC PANEL: CPT | Performed by: NURSE PRACTITIONER

## 2023-10-10 RX ORDER — LOSARTAN POTASSIUM 100 MG/1
100 TABLET ORAL DAILY
Qty: 90 TABLET | Refills: 1 | Status: SHIPPED | OUTPATIENT
Start: 2023-10-10

## 2023-10-10 RX ORDER — AMLODIPINE BESYLATE 5 MG/1
5 TABLET ORAL DAILY
Qty: 90 TABLET | Refills: 1 | Status: SHIPPED | OUTPATIENT
Start: 2023-10-10

## 2023-10-10 RX ORDER — BENZONATATE 100 MG/1
100 CAPSULE ORAL 3 TIMES DAILY PRN
Qty: 42 CAPSULE | Refills: 3 | Status: SHIPPED | OUTPATIENT
Start: 2023-10-10

## 2023-10-10 NOTE — TELEPHONE ENCOUNTER
Prior Auth completed for José Miguel Flores 200-62.5-25MCG/ACT aerosol powder via covermymeds. Pending determination.    (Key: [id number])  Rx #: [id number]    Your information has been sent to [facility] [employer].

## 2023-10-10 NOTE — PROGRESS NOTES
Subjective     Carli Dubon is a 62 y.o. female.     History of Present Illness  Pt is here today for a 6 mo follow up on hypertension, diabetes, hyperlipidemia, COPD, bipolar disorder, and insomnia.  Pt states she wants to see a neurologist.  She reports its feels like her insides are shaking and she occasionally gets hand tremors  She feels off balance at times.      Hypertension-patient is currently on amlodipine 5 mg daily, losartan 100 mg daily. She is doing well on the medication. Denies CP, SOA, dizziness. Does get HA at night.      Diabetes-patient is currently on Jardiance 25 mg daily, glipizide 10 mg twice daily, Metformin 1000 mg twice daily. She does not monitor her blood sugars regularly.  Doesn't always stay active or eats a well balanced diet.      Hyperlipidemia- she is currently on lipitor 40mg daily.      COPD- Pt is seeing pulmonology.  At her last visit her Symbicort was stopped and she was started on trilogy.  She was given a sample of this medication.  She reports that her breathing is doing much better with the inhaler. She continues to smoke 1 ppd.     Bipolar disorder-patient is seeing psychiatry for this.  She is currently on Depakote at 1000 mg twice daily and Viibryd 20 mg daily.  She also has Klonopin as needed. Denies SI or HI     Insomnia-patient is seeing psychiatry for this.  She is on ambien 10mg prn.  She is also on Elavil 150 mg nightly. She is doing well on it.    GERD-patient is currently on omeprazole 40 mg daily.    Hot flashes- pt states GYN placed her on gabapentin 100mg nightly. It is helping       Labs- due  Pap smear- 08/2019- normal  Mammogram- 8/18/23  DEXA- post menopausal for 4 years.  Colonoscopy- 7/2/20     Vaccines:  Flu- N/A  PNA- UTD  Shingles-due- needs to get at the pharmacy  Tdap- UTD  covid- UTD     Dental exam- due  Eye exam- due         The following portions of the patient's history were reviewed and updated as appropriate: allergies, current medications,  past family history, past medical history, past social history, past surgical history, and problem list.    Review of Systems   Constitutional:  Negative for chills, fatigue and fever.   Respiratory:  Negative for chest tightness and shortness of breath.    Cardiovascular:  Negative for chest pain and palpitations.   Gastrointestinal:  Negative for abdominal pain, nausea and vomiting.   Genitourinary:  Negative for pelvic pain and urgency.   Musculoskeletal:  Negative for arthralgias.   Skin:  Negative for rash.   Neurological:  Negative for dizziness and headache.   Psychiatric/Behavioral:  Negative for depressed mood and stress. The patient is not nervous/anxious.        Objective     /80 (BP Location: Left arm, Patient Position: Sitting, Cuff Size: Adult)   Pulse 107   Temp 97.8 øF (36.6 øC) (Tympanic)   Wt 97.5 kg (215 lb)   SpO2 94%   BMI 35.78 kg/mý     Current Outpatient Medications on File Prior to Visit   Medication Sig Dispense Refill    albuterol (ACCUNEB) 1.25 MG/3ML nebulizer solution Take 3 mL by nebulization Every 6 (Six) Hours As Needed for Wheezing or Shortness of Air. Dispense as 1 box of unit dose 1 each 1    albuterol sulfate  (90 Base) MCG/ACT inhaler Inhale 2 puffs Every 4 (Four) Hours As Needed for Wheezing or Shortness of Air. 6.7 g 1    amitriptyline (ELAVIL) 150 MG tablet Take 1 tablet by mouth Every Night. 90 tablet 1    atorvastatin (Lipitor) 40 MG tablet Take 1 tablet by mouth Daily. 90 tablet 1    clonazePAM (KlonoPIN) 0.5 MG tablet Take 1 tablet by mouth 2 (Two) Times a Day As Needed for Anxiety. 60 tablet 2    divalproex (DEPAKOTE ER) 500 MG 24 hr tablet Take 2 tablets by mouth 2 (Two) Times a Day. 360 tablet 0    Fluticasone-Umeclidin-Vilant (Trelegy Ellipta) 200-62.5-25 MCG/ACT aerosol powder  Inhale 1 puff Daily. 60 each 2    gabapentin (NEURONTIN) 100 MG capsule Take 1 capsule by mouth.      glipizide (GLUCOTROL) 10 MG tablet Take 1 tablet by mouth 2 (Two) Times  a Day Before Meals. 180 tablet 0    metFORMIN (GLUCOPHAGE) 1000 MG tablet Take 1 tablet by mouth 2 (Two) Times a Day With Meals. 180 tablet 1    nicotine (Nicotine Step 1) 21 MG/24HR patch Place 1 patch on the skin as directed by provider Daily. 30 patch 11    nystatin-triamcinolone (MYCOLOG) 534366-3.1 UNIT/GM-% ointment       omeprazole (priLOSEC) 40 MG capsule Take 1 capsule by mouth once daily 90 capsule 0    ondansetron ODT (ZOFRAN-ODT) 4 MG disintegrating tablet Place 1 tablet on the tongue Every 6 (Six) Hours As Needed for Nausea. 20 tablet 0    vilazodone (VIIBRYD) 20 MG tablet tablet Take 1 tablet by mouth Daily. 90 tablet 1    zolpidem (AMBIEN) 10 MG tablet Take 1 tablet by mouth At Night As Needed for Sleep. 30 tablet 2    [DISCONTINUED] albuterol sulfate  (90 Base) MCG/ACT inhaler INHALE 2 PUFFS BY MOUTH EVERY 4 HOURS AS NEEDED FOR WHEEZING 18 g 0    [DISCONTINUED] amLODIPine (Norvasc) 5 MG tablet Take 1 tablet by mouth Daily. 90 tablet 1    [DISCONTINUED] benzonatate (TESSALON) 100 MG capsule Take 1 capsule by mouth 3 (Three) Times a Day As Needed for Cough. 42 capsule 3    [DISCONTINUED] fluticasone (Flonase) 50 MCG/ACT nasal spray 2 sprays into the nostril(s) as directed by provider Daily. 9.9 mL 2    [DISCONTINUED] guaiFENesin (Mucinex) 600 MG 12 hr tablet Take 2 tablets by mouth 2 (Two) Times a Day As Needed for Cough. 60 tablet 2    [DISCONTINUED] Jardiance 25 MG tablet tablet Take 1 tablet by mouth once daily 90 tablet 0    [DISCONTINUED] losartan (COZAAR) 100 MG tablet Take 1 tablet by mouth once daily 90 tablet 0    [DISCONTINUED] predniSONE (DELTASONE) 20 MG tablet Take 2 tablets by mouth Daily. 10 tablet 0    [DISCONTINUED] promethazine-dextromethorphan (PROMETHAZINE-DM) 6.25-15 MG/5ML syrup Take 2.5 mL by mouth 4 (Four) Times a Day As Needed for Cough. (Patient not taking: Reported on 9/12/2023) 118 mL 0    [DISCONTINUED] traMADol (ULTRAM) 50 MG tablet Take 1 tablet by mouth Every 6  (Six) Hours As Needed for Moderate Pain. 12 tablet 0     No current facility-administered medications on file prior to visit.        Class 2 Severe Obesity (BMI >=35 and <=39.9). Obesity-related health conditions include the following: hypertension, diabetes mellitus, and dyslipidemias. Obesity is unchanged. BMI is is above average; BMI management plan is completed. We discussed portion control and increasing exercise.       Physical Exam  Vitals reviewed.   Constitutional:       General: She is not in acute distress.     Appearance: Normal appearance. She is well-developed. She is obese. She is not diaphoretic.   HENT:      Head: Normocephalic and atraumatic.   Eyes:      General:         Right eye: No discharge.         Left eye: No discharge.      Extraocular Movements: Extraocular movements intact.      Conjunctiva/sclera: Conjunctivae normal.   Cardiovascular:      Rate and Rhythm: Normal rate and regular rhythm.      Heart sounds: No murmur heard.  Pulmonary:      Effort: Pulmonary effort is normal. No respiratory distress.      Breath sounds: Normal breath sounds. No wheezing or rales.   Abdominal:      General: Bowel sounds are normal. There is no distension.      Palpations: Abdomen is soft.      Tenderness: There is no abdominal tenderness.   Musculoskeletal:         General: Normal range of motion.      Cervical back: Normal range of motion.   Skin:     General: Skin is warm and dry.   Neurological:      General: No focal deficit present.      Mental Status: She is alert and oriented to person, place, and time.      Comments: Negative romberg- no tremors noted   Psychiatric:         Mood and Affect: Mood normal.         Behavior: Behavior normal.         Thought Content: Thought content normal.         Judgment: Judgment normal.           Assessment & Plan     Diagnoses and all orders for this visit:    1. Chronic obstructive pulmonary disease, unspecified COPD type (Primary)  Comments:  improved  cont  amanuel  sees pulmonary  smoking cessation    2. Type 2 diabetes mellitus without complication, without long-term current use of insulin  Comments:  stable  check labs  cont current treatment  Orders:  -     Comprehensive Metabolic Panel; Future  -     Hemoglobin A1c; Future  -     Lipid Panel; Future  -     Microalbumin / Creatinine Urine Ratio - Urine, Clean Catch; Future  -     empagliflozin (Jardiance) 25 MG tablet tablet; Take 1 tablet by mouth Daily.  Dispense: 90 tablet; Refill: 0    3. Hypertension, unspecified type  Comments:  stable  cont losartan and norvasc  Orders:  -     Comprehensive Metabolic Panel; Future  -     Lipid Panel; Future  -     losartan (COZAAR) 100 MG tablet; Take 1 tablet by mouth Daily.  Dispense: 90 tablet; Refill: 1  -     amLODIPine (Norvasc) 5 MG tablet; Take 1 tablet by mouth Daily.  Dispense: 90 tablet; Refill: 1    4. Hyperlipidemia, unspecified hyperlipidemia type  Comments:  stable  cont tx  work on diet and exercise  check labs  Orders:  -     Comprehensive Metabolic Panel; Future  -     Lipid Panel; Future    5. Insomnia, unspecified type  Comments:  stable  sees psych    6. Bipolar affective disorder, currently depressed, moderate  Comments:  stable  cont treatment  sees psych    7. Tremor of both hands  Comments:  not present on exam  pt is concerned for MS or parkinsons- discussed symptoms of these  wants referral to neuro  Orders:  -     Ambulatory Referral to Neurology    Other orders  -     benzonatate (TESSALON) 100 MG capsule; Take 1 capsule by mouth 3 (Three) Times a Day As Needed for Cough.  Dispense: 42 capsule; Refill: 3

## 2023-10-11 ENCOUNTER — TELEPHONE (OUTPATIENT)
Dept: CASE MANAGEMENT | Facility: OTHER | Age: 63
End: 2023-10-11
Payer: MEDICAID

## 2023-10-11 DIAGNOSIS — G47.00 INSOMNIA, UNSPECIFIED TYPE: ICD-10-CM

## 2023-10-11 DIAGNOSIS — E11.9 TYPE 2 DIABETES MELLITUS WITHOUT COMPLICATION, WITHOUT LONG-TERM CURRENT USE OF INSULIN: Primary | ICD-10-CM

## 2023-10-12 RX ORDER — UMECLIDINIUM BROMIDE AND VILANTEROL TRIFENATATE 62.5; 25 UG/1; UG/1
1 POWDER RESPIRATORY (INHALATION)
Qty: 60 EACH | Refills: 2 | Status: SHIPPED | OUTPATIENT
Start: 2023-10-12

## 2023-10-12 RX ORDER — AMITRIPTYLINE HYDROCHLORIDE 150 MG/1
150 TABLET ORAL NIGHTLY
Qty: 90 TABLET | Refills: 1 | Status: SHIPPED | OUTPATIENT
Start: 2023-10-12

## 2023-10-12 NOTE — TELEPHONE ENCOUNTER
Let pt know insurance is denying the trelegy. We have to try anoro first. I will send this in. She could also follow up with her pulmonologist to see if they can get the trelegy

## 2023-10-12 NOTE — TELEPHONE ENCOUNTER
Prior Authorization for relegy Ellipta 200-62.5-25MCG/ACT aerosol powder was Denied.    See below:    Message from Plan  Denied. Unable to approve (TRELEGY ELLIPTA Aero Pow Br Act 200;62.5;25MCG/ACT). Per the [facility] Formulary Prior Authorization Criteria, member must have a trial and failure with at least 1 of the following medications, unless contraindicated, before this request can be considered for approval: (Anoro Ellipta therapy for at least 90 of the past 120 days) (generics when available).    Please advise.

## 2023-10-12 NOTE — TELEPHONE ENCOUNTER
Left message to return call to doctor's office at [facility]. Either to get test results or message from provider.

## 2023-10-16 ENCOUNTER — PATIENT OUTREACH (OUTPATIENT)
Dept: CASE MANAGEMENT | Facility: OTHER | Age: 63
End: 2023-10-16
Payer: MEDICAID

## 2023-10-16 DIAGNOSIS — J44.9 CHRONIC OBSTRUCTIVE PULMONARY DISEASE, UNSPECIFIED COPD TYPE: ICD-10-CM

## 2023-10-16 DIAGNOSIS — E11.9 TYPE 2 DIABETES MELLITUS WITHOUT COMPLICATION, WITHOUT LONG-TERM CURRENT USE OF INSULIN: Primary | ICD-10-CM

## 2023-10-16 DIAGNOSIS — E78.2 MIXED HYPERLIPIDEMIA: ICD-10-CM

## 2023-10-16 NOTE — OUTREACH NOTE
"AMBULATORY CASE MANAGEMENT NOTE    Name and Relationship of Patient/Support Person: Carli Dubon - Self    CCM Interim Update    Outreach complete.    Followed up with the patient on her PCP visit from 9/29/23.  Patient was asked about whether she has scheduled f/u with her GYN and/or Pulmonologist as of this date.  \"I have an appointment with the GYN tomorrow (10/17/23).\"  Patient stated that she \"still needs to call them\" when asked about the Pulmonologist and the two new specialist referrals (Neurology and Endocrinology).  Patient was asked if she was made aware of her recent lab work, including her most recent A1C.  \"Yes.  I was told about that\".  Patient was asked if she has a glucometer in the home in order to track and log her BG in the home setting.  \"I don't know where it is right now\".  Will f/u on obtaining appropriate DME.      SDOH, ACP and Community Resources addressed on this outreach.  Patient declined any social assistance related to food insecurity.  \"I have food stamps so I am okay\".      SDOH updated and reviewed with the patient during this program:  Financial Resource Strain: Low Risk  (10/16/2023)    Overall Financial Resource Strain (CARDIA)     Difficulty of Paying Living Expenses: Not hard at all      Food Insecurity: Food Insecurity Present (10/16/2023)    Hunger Vital Sign     Worried About Running Out of Food in the Last Year: Often true     Ran Out of Food in the Last Year: Never true      Transportation Needs: No Transportation Needs (10/16/2023)    PRAPARE - Transportation     Lack of Transportation (Medical): No     Lack of Transportation (Non-Medical): No      Housing Stability: Low Risk  (10/16/2023)    Housing Stability Vital Sign     Unable to Pay for Housing in the Last Year: No     Number of Places Lived in the Last Year: 1     Unstable Housing in the Last Year: No      Advance Care Planning     ACP discussion was declined by the patient. Patient does not have an advance " directive, declines further assistance.         Education Documentation  No documentation found.        Mick CRAWLEY  Ambulatory Case Management    10/16/2023, 14:11 EDT

## 2023-10-23 ENCOUNTER — TELEPHONE (OUTPATIENT)
Dept: PEDIATRICS | Facility: OTHER | Age: 63
End: 2023-10-23
Payer: MEDICAID

## 2023-10-23 NOTE — TELEPHONE ENCOUNTER
Caller Name: Carli Dubon  Relationship: Self  Best Contact Number: [phone]     Patient is requesting samples of: empagliflozin (Jardiance) 25 MG tablet tablet     How many days of medication do you have left? 3 LEFT    Additional Information: PATIENT STATES THE PHARMACY DID NOT GIVE HER THE PRESCRIPTION. [facility] INFORMED PATIENT THAT IT WAS CONFIRMED B THE PHARMACY ON 10/10/23. [facility] ADVISED PATIENT TO REACH OUT TO PHARMACY TO SEE WHY THEY WERE UNABLE TO REFILL IT. BUT IN THE MEAN TIME, PATIENT WOULD LIKE ANOTHER SAMPLE, IF POSSIBLE.

## 2023-10-23 NOTE — TELEPHONE ENCOUNTER
I called the pharmacy. It was too soon to fill. Pt filled 90 day supply on 8/28/23. Can't fill until 11/13/23.

## 2023-10-30 DIAGNOSIS — E11.9 TYPE 2 DIABETES MELLITUS WITHOUT COMPLICATION, WITHOUT LONG-TERM CURRENT USE OF INSULIN: ICD-10-CM

## 2023-10-30 RX ORDER — GLIPIZIDE 10 MG/1
10 TABLET ORAL
Qty: 180 TABLET | Refills: 0 | Status: SHIPPED | OUTPATIENT
Start: 2023-10-30

## 2023-10-31 ENCOUNTER — PATIENT OUTREACH (OUTPATIENT)
Dept: CASE MANAGEMENT | Facility: OTHER | Age: 63
End: 2023-10-31
Payer: MEDICAID

## 2023-10-31 DIAGNOSIS — E11.9 TYPE 2 DIABETES MELLITUS WITHOUT COMPLICATION, WITHOUT LONG-TERM CURRENT USE OF INSULIN: Primary | ICD-10-CM

## 2023-10-31 DIAGNOSIS — E78.2 MIXED HYPERLIPIDEMIA: ICD-10-CM

## 2023-10-31 DIAGNOSIS — J44.9 CHRONIC OBSTRUCTIVE PULMONARY DISEASE, UNSPECIFIED COPD TYPE: ICD-10-CM

## 2023-10-31 NOTE — OUTREACH NOTE
CCM End of Month Documentation    This Chronic Medical Management Care Plan for Carli Dubon, 62 y.o. female, has been monitored and managed; reviewed and a new plan of care implemented for the month of October.  A cumulative time of 26 minutes was spent on this patient record this month, including phone call with patient; chart review.    Regarding the patient's problems: has Diabetes; Mixed hyperlipidemia; Essential hypertension; PTSD (post-traumatic stress disorder); Tobacco abuse; Obesity (BMI 30-39.9); Insomnia; Helicobacter pylori gastritis; Bipolar affective disorder, currently depressed, moderate; COVID-19; Chronic obstructive lung disease; Tobacco dependence syndrome; Hyperglycemia due to type 2 diabetes mellitus; Obstructive sleep apnea; Chronic pain of right knee; Bilateral hip pain; and Morbid obesity on their problem list., the following items were addressed: medical records and any changes can be found within the plan section of the note.  A detailed listing of time spent for chronic care management is tracked within each outreach encounter.  Current medications include:  has a current medication list which includes the following prescription(s): albuterol, albuterol sulfate hfa, amitriptyline, amlodipine, atorvastatin, benzonatate, clonazepam, divalproex, empagliflozin, gabapentin, glipizide, losartan, metformin, nicotine, nystatin-triamcinolone, omeprazole, ondansetron odt, anoro ellipta, vilazodone, and zolpidem. and the patient is reported to be patient is compliant with medication protocol,  Medications are reported to be effective.  All notes on chart for PCP to review.    The patient was monitored remotely for activity level; medications; blood glucose.    The patient's physical needs include:  physical healthcare; DME supplies; physician referral.     The patient's mental support needs include:  continued support    The patient's cognitive support needs include:  not applicable    The  patient's psychosocial support needs include:  not applicable    The patient's functional needs include: physical healthcare; DME; physician referral    The patient's environmental needs include:  not applicable    Care Plan overall comments:  No data recorded    Refer to previous outreach notes for more information on the areas listed above.    Monthly Billing Diagnoses  (E11.9) Type 2 diabetes mellitus without complication, without long-term current use of insulin    (J44.9) Chronic obstructive pulmonary disease, unspecified COPD type    (E78.2) Mixed hyperlipidemia    Medications   Medications have been reconciled    Care Plan progress this month:      Recently Modified Care Plans Updates made since 9/30/2023 12:00 AM       Diabetes Type 2 (Adult)           Problem Priority Last Modified     Glycemic Management (Diabetes, Type 2) --  9/20/2023  9:20 AM by Mikc Estrada RN              Goal Recent Progress Last Modified     Glycemic Management Optimized --  9/20/2023  9:20 AM by Mick Estrada RN     Evidence-based guidance:   Anticipate A1C testing (point-of-care) every 3 to 6 months based on goal attainment.   Review mutually-set A1C goal or target range.   Anticipate use of antihyperglycemic with or without insulin and periodic adjustments; consider active involvement of pharmacist.   Provide medical nutrition therapy and development of individualized eating.   Compare self-reported symptoms of hypo or hyperglycemia to blood glucose levels, diet and fluid intake, current medications, psychosocial and physiologic stressors, change in activity and barriers to care adherence.   Promote self-monitoring of blood glucose levels.   Assess and address barriers to management plan, such as food insecurity, age, developmental ability, depression, anxiety, fear of hypoglycemia or weight gain, as well as medication cost, side effects and complicated regimen.   Consider referral to community-based diabetes education  program, visiting nurse, community health worker or health .   Encourage regular dental care for treatment of periodontal disease; refer to dental provider when needed.    Notes:            Task Due Date Last Modified     Alleviate Barriers to Glycemic Management --  10/16/2023  2:09 PM by Mick Estrada RN     Care Management Activities:      - blood glucose monitoring encouraged  - use of blood glucose monitoring log promoted      Notes:                Problem Priority Last Modified     Disease Progression (Diabetes, Type 2) --  9/20/2023  9:20 AM by Mick Estrada RN              Goal Recent Progress Last Modified     Disease Progression Prevented or Minimized --  9/20/2023  9:20 AM by Mick Estrada RN     Evidence-based guidance:   Prepare patient for laboratory and diagnostic exams based on risk and presentation.   Encourage lifestyle changes, such as increased intake of plant-based foods, stress reduction, consistent physical activity and smoking cessation to prevent long-term complications and chronic disease.    Individualize activity and exercise recommendations while considering potential limitations, such as neuropathy, retinopathy or the ability to prevent hyperglycemia or hypoglycemia.    Prepare patient for use of pharmacologic therapy that may include antihypertensive, analgesic, prostaglandin E1 with periodic adjustments, based on presenting chronic condition and laboratory results.   Assess signs/symptoms and risk factors for hypertension, sleep-disordered breathing, neuropathy (including changes in gait and balance), retinopathy, nephropathy and sexual dysfunction.   Address pregnancy planning and contraceptive choice, especially when prescribing antihypertensive or statin.   Ensure completion of annual comprehensive foot exam and dilated eye exam.    Implement additional individualized goals and interventions based on identified risk factors.   Prepare patient for consultation or  referral for specialist care, such as ophthalmology, neurology, cardiology, podiatry, nephrology or perinatology.    Notes:            Task Due Date Last Modified     Monitor and Manage Follow-up for Comorbidities --  10/16/2023  2:09 PM by Mick Estrada RN     Care Management Activities:      - healthy lifestyle promoted      Notes:                          Instructions   Patient was provided an electronic copy of care plan  CCM services were explained and offered and patient has accepted these services.  Patient has given their written consent to receive CCM services and understands that this includes the authorization of electronic communication of medical information with the other treating providers.  Patient understands that they may stop CCM services at any time and these changes will be effective at the end of the calendar month and will effectively revocate the agreement of CCM services.  Patient understands that only one practitioner can furnish and be paid for CCM services during one calendar month.  Patient also understands that there may be co-payment or deductible fees in association with CCM services.  Patient will continue with at least monthly follow-up calls with the Ambulatory .    Mick CRAWLEY  Ambulatory Case Management    10/31/2023, 11:36 EDT

## 2023-11-02 ENCOUNTER — TELEPHONE (OUTPATIENT)
Dept: CASE MANAGEMENT | Facility: OTHER | Age: 63
End: 2023-11-02
Payer: MEDICAID

## 2023-11-06 ENCOUNTER — TELEPHONE (OUTPATIENT)
Dept: CASE MANAGEMENT | Facility: OTHER | Age: 63
End: 2023-11-06
Payer: MEDICAID

## 2023-11-08 ENCOUNTER — TELEPHONE (OUTPATIENT)
Dept: CASE MANAGEMENT | Facility: OTHER | Age: 63
End: 2023-11-08
Payer: MEDICAID

## 2023-11-12 ENCOUNTER — APPOINTMENT (OUTPATIENT)
Dept: GENERAL RADIOLOGY | Facility: HOSPITAL | Age: 63
End: 2023-11-12
Payer: MEDICAID

## 2023-11-12 ENCOUNTER — HOSPITAL ENCOUNTER (EMERGENCY)
Facility: HOSPITAL | Age: 63
Discharge: HOME | End: 2023-11-12
Attending: EMERGENCY MEDICINE | Admitting: EMERGENCY MEDICINE
Payer: MEDICAID

## 2023-11-12 VITALS
HEART RATE: 98 BPM | SYSTOLIC BLOOD PRESSURE: 130 MMHG | HEIGHT: 65 IN | WEIGHT: 216 LBS | TEMPERATURE: 98 F | RESPIRATION RATE: 18 BRPM | DIASTOLIC BLOOD PRESSURE: 76 MMHG | BODY MASS INDEX: 35.99 KG/M2 | OXYGEN SATURATION: 96 %

## 2023-11-12 DIAGNOSIS — J06.9 ACUTE UPPER RESPIRATORY INFECTION: Primary | ICD-10-CM

## 2023-11-12 LAB
FLUAV SUBTYP SPEC NAA+PROBE: NOT DETECTED
FLUBV RNA ISLT QL NAA+PROBE: NOT DETECTED
SARS-COV-2 RNA RESP QL NAA+PROBE: NOT DETECTED

## 2023-11-12 PROCEDURE — 71045 X-RAY EXAM CHEST 1 VIEW: CPT

## 2023-11-12 PROCEDURE — 87636 SARSCOV2 & INF A&B AMP PRB: CPT | Performed by: EMERGENCY MEDICINE

## 2023-11-12 PROCEDURE — 99283 EMERGENCY DEPT VISIT LOW MDM: CPT

## 2023-11-12 RX ORDER — AZITHROMYCIN 500 MG/1
500 TABLET, FILM COATED ORAL DAILY
Qty: 3 TABLET | Refills: 0 | Status: SHIPPED | OUTPATIENT
Start: 2023-11-12 | End: 2023-11-15

## 2023-11-12 RX ORDER — ACETAMINOPHEN 500 MG
1000 TABLET ORAL ONCE
Status: COMPLETED | OUTPATIENT
Start: 2023-11-12 | End: 2023-11-12

## 2023-11-12 RX ADMIN — ACETAMINOPHEN 1000 MG: 500 TABLET, FILM COATED ORAL at 17:28

## 2023-11-12 NOTE — ED PROVIDER NOTES
Subjective   History of Present Illness  62-year-old female presents for sore throat, runny nose, ear pain, mild headache.  Going on for couple of days.  Denies any fevers.  No shortness of breath.  No vomiting.  No diarrhea.  No known sick exposures.  No chest pain.  Headache not acute in onset.      Review of Systems  See HPI.  Past Medical History:   Diagnosis Date    Arthritis of back     Asthma     Bipolar 1 disorder     Chronic obstructive lung disease 01/28/2015    COVID-19 08/15/2021    CTS (carpal tunnel syndrome)     Diabetes mellitus     Difficulty walking 03/2022    Hip arthrosis     Hypertension     Knee swelling     Low back strain     Migraine     Mixed hyperlipidemia 03/03/2020    Neck strain     Pancreatitis     PTSD (post-traumatic stress disorder)     Tobacco abuse 06/15/2020       Allergies   Allergen Reactions    Iodine Swelling    Adhesive Tape Rash       No past surgical history on file.    Family History   Problem Relation Age of Onset    Hypertension Mother     Diabetes Mother     Asthma Mother     Asthma Daughter     COPD Daughter     Anxiety disorder Daughter     Arthritis Daughter        Social History     Socioeconomic History    Marital status: Single   Tobacco Use    Smoking status: Every Day     Packs/day: 1.00     Years: 30.00     Additional pack years: 0.00     Total pack years: 30.00     Types: Cigarettes     Passive exposure: Current    Smokeless tobacco: Never   Vaping Use    Vaping Use: Never used   Substance and Sexual Activity    Alcohol use: Never    Drug use: Never    Sexual activity: Not Currently     Partners: Male           Objective   Physical Exam  No acute distress.  Very slight bilateral expiratory wheezes.  Erythematous oropharynx with midline uvula.  No tonsillar exudate.  No mass effect.  Clear nasal discharge present.  Cobblestoning posterior oropharynx.  Abdomen soft and nontender.  Regular rate.  Procedures           ED Course    /76 (BP Location: Left  "arm, Patient Position: Lying)   Pulse 98   Temp 98 °F (36.7 °C) (Oral)   Resp 18   Ht 165.1 cm (65\")   Wt 98 kg (216 lb)   SpO2 96%   BMI 35.94 kg/m²   Labs Reviewed   COVID-19 AND FLU A/B, NP SWAB IN TRANSPORT MEDIA 1 HR TAT - Normal    Narrative:     Fact sheet for providers: https://www.fda.gov/media/633138/download    Fact sheet for patients: https://www.fda.gov/media/201385/download    Test performed by PCR.     Medications   acetaminophen (TYLENOL) tablet 1,000 mg (1,000 mg Oral Given 11/12/23 1728)     XR Chest 1 View    Result Date: 11/12/2023  Impression: Chronic interstitial changes are present suggesting some mild bronchiectasis and likely vascular congestion, without overt edema. There is no focal consolidation concerning for pneumonia. Electronically Signed: Jeremi Castillo MD  11/12/2023 5:25 PM EST  Workstation ID: [id number]                                          Medical Decision Making  Problems Addressed:  Acute upper respiratory infection: complicated acute illness or injury    Amount and/or Complexity of Data Reviewed  Radiology: ordered.    Risk  OTC drugs.  Prescription drug management.      My interpretation of chest x-ray is no focal infiltrate or pneumothorax.  See above radiology interpretation.    Patient with reassuring exam exam and vital signs.  Suspect URI.  Will DC.  Return ER precautions discussed.  Final diagnoses:   Acute upper respiratory infection       ED Disposition  ED Disposition       ED Disposition   Discharge    Condition   Stable    Comment   --               Cindy Huddleston, APRN  [address] [facility]  [address]  [place] [address]  [phone]    In 3 days           Medication List        New Prescriptions      azithromycin 500 MG tablet  Commonly known as: ZITHROMAX  Take 1 tablet by mouth Daily for 3 days.               Where to Get Your Medications        These medications were sent to [id number] - [place], [place] - [address] - " [phone]  - [phone] FX  [address] [facility] [place] [address]      Phone: [phone]   azithromycin 500 MG tablet            Uche Abbott MD  11/13/23 3894

## 2023-11-16 ENCOUNTER — TELEPHONE (OUTPATIENT)
Dept: FAMILY MEDICINE CLINIC | Facility: CLINIC | Age: 63
End: 2023-11-16

## 2023-11-16 NOTE — TELEPHONE ENCOUNTER
Caller: Carli Dubon    Relationship to patient: Self    Best call back number: [phone]    Type of visit: [facility]-11/12/2023    Requested date: NEXT 7-10 DAYS  (CAN NOT MAKE THE 8:15 AM ON 11/17)    PLEASE CALL TO SCHEDULE

## 2023-11-20 ENCOUNTER — OFFICE VISIT (OUTPATIENT)
Dept: FAMILY MEDICINE CLINIC | Facility: CLINIC | Age: 63
End: 2023-11-20
Payer: MEDICAID

## 2023-11-20 VITALS
OXYGEN SATURATION: 91 % | BODY MASS INDEX: 35.52 KG/M2 | HEIGHT: 65 IN | WEIGHT: 213.2 LBS | SYSTOLIC BLOOD PRESSURE: 144 MMHG | TEMPERATURE: 98.5 F | DIASTOLIC BLOOD PRESSURE: 87 MMHG | HEART RATE: 100 BPM

## 2023-11-20 DIAGNOSIS — R05.9 COUGH, UNSPECIFIED TYPE: Primary | ICD-10-CM

## 2023-11-20 RX ORDER — DEXTROMETHORPHAN HYDROBROMIDE AND PROMETHAZINE HYDROCHLORIDE 15; 6.25 MG/5ML; MG/5ML
5 SYRUP ORAL 4 TIMES DAILY PRN
Qty: 118 ML | Refills: 0 | Status: SHIPPED | OUTPATIENT
Start: 2023-11-20

## 2023-11-20 RX ORDER — CEFDINIR 300 MG/1
300 CAPSULE ORAL 2 TIMES DAILY
Qty: 14 CAPSULE | Refills: 0 | Status: SHIPPED | OUTPATIENT
Start: 2023-11-20 | End: 2023-11-27

## 2023-11-20 NOTE — PROGRESS NOTES
"Subjective     Carli Dubon is a 62 y.o. female.     History of Present Illness  Patient is here today for [facility] ER follow-up.  She went in on November 12 with complaints of sore throat, runny nose, ear pain, mild headache.   Patient was thought to have a URI and was started on azithromycin.  Chest x-ray did not show any acute pneumonia  Pt states she is feeling somewhat better  She states she continue to have some coughing spells.  She is no longer having congestion or sinus pressure.  She sees a pulmonologist  Denies fevers  Denies CP or SOA.  She uses her anoro and albuterol  She has been taking mucinex and robutessin.  She is also using tessalon perles.   She doesn't feel ill, just has the cough.        The following portions of the patient's history were reviewed and updated as appropriate: allergies, current medications, past family history, past medical history, past social history, past surgical history, and problem list.    Review of Systems   Constitutional:  Negative for fatigue and fever.   HENT:  Negative for congestion, ear pain and sore throat.    Respiratory:  Positive for cough. Negative for chest tightness and shortness of breath.    Cardiovascular:  Negative for chest pain and palpitations.   Neurological:  Positive for dizziness and headache.       Objective     /87 (BP Location: Left arm, Patient Position: Sitting, Cuff Size: Adult)   Pulse 100   Temp 98.5 °F (36.9 °C) (Temporal)   Ht 165.1 cm (65\")   Wt 96.7 kg (213 lb 3.2 oz)   SpO2 91%   BMI 35.48 kg/m²     Current Outpatient Medications on File Prior to Visit   Medication Sig Dispense Refill    albuterol (ACCUNEB) 1.25 MG/3ML nebulizer solution Take 3 mL by nebulization Every 6 (Six) Hours As Needed for Wheezing or Shortness of Air. Dispense as 1 box of unit dose 1 each 1    albuterol sulfate  (90 Base) MCG/ACT inhaler Inhale 2 puffs Every 4 (Four) Hours As Needed for Wheezing or Shortness of Air. 6.7 g 1 "    amitriptyline (ELAVIL) 150 MG tablet TAKE 1 TABLET BY MOUTH ONCE DAILY AT NIGHT 90 tablet 1    amLODIPine (Norvasc) 5 MG tablet Take 1 tablet by mouth Daily. 90 tablet 1    atorvastatin (Lipitor) 40 MG tablet Take 1 tablet by mouth Daily. 90 tablet 1    benzonatate (TESSALON) 100 MG capsule Take 1 capsule by mouth 3 (Three) Times a Day As Needed for Cough. 42 capsule 3    clonazePAM (KlonoPIN) 0.5 MG tablet Take 1 tablet by mouth 2 (Two) Times a Day As Needed for Anxiety. 60 tablet 2    divalproex (DEPAKOTE ER) 500 MG 24 hr tablet Take 2 tablets by mouth 2 (Two) Times a Day. 360 tablet 0    empagliflozin (Jardiance) 25 MG tablet tablet Take 1 tablet by mouth Daily. 90 tablet 0    gabapentin (NEURONTIN) 100 MG capsule Take 1 capsule by mouth.      glipizide (GLUCOTROL) 10 MG tablet TAKE 1 TABLET BY MOUTH TWICE DAILY BEFORE MEAL(S) 180 tablet 0    losartan (COZAAR) 100 MG tablet Take 1 tablet by mouth Daily. 90 tablet 1    metFORMIN (GLUCOPHAGE) 1000 MG tablet Take 1 tablet by mouth 2 (Two) Times a Day With Meals. 180 tablet 1    nicotine (Nicotine Step 1) 21 MG/24HR patch Place 1 patch on the skin as directed by provider Daily. 30 patch 11    nystatin-triamcinolone (MYCOLOG) 335837-7.1 UNIT/GM-% ointment       omeprazole (priLOSEC) 40 MG capsule Take 1 capsule by mouth once daily 90 capsule 0    ondansetron ODT (ZOFRAN-ODT) 4 MG disintegrating tablet Place 1 tablet on the tongue Every 6 (Six) Hours As Needed for Nausea. 20 tablet 0    Umeclidinium-Vilanterol (Anoro Ellipta) 62.5-25 MCG/ACT aerosol powder  inhaler Inhale 1 puff Daily. 60 each 2    vilazodone (VIIBRYD) 20 MG tablet tablet Take 1 tablet by mouth Daily. 90 tablet 1    zolpidem (AMBIEN) 10 MG tablet Take 1 tablet by mouth At Night As Needed for Sleep. 30 tablet 2     No current facility-administered medications on file prior to visit.                 Physical Exam  Constitutional:       Appearance: Normal appearance. She is obese.   HENT:      Head:  Normocephalic and atraumatic.   Cardiovascular:      Rate and Rhythm: Normal rate and regular rhythm.      Heart sounds: No murmur heard.  Pulmonary:      Effort: Pulmonary effort is normal. No respiratory distress.      Breath sounds: Normal breath sounds. Examination of the left-middle field reveals rhonchi. Examination of the left-lower field reveals rhonchi.   Skin:     General: Skin is warm and dry.   Neurological:      General: No focal deficit present.      Mental Status: She is alert and oriented to person, place, and time.   Psychiatric:         Mood and Affect: Mood normal.         Behavior: Behavior normal.         Thought Content: Thought content normal.         Judgment: Judgment normal.           Assessment & Plan     Diagnoses and all orders for this visit:    1. Cough, unspecified type (Primary)  Comments:  CXR 1 wk ago normal  has rhonchi in LLL  will go ahead and treat with longer duration abx since it is one sided  avoid steroid- DM  phenergan DM  call if no imp  Orders:  -     cefdinir (OMNICEF) 300 MG capsule; Take 1 capsule by mouth 2 (Two) Times a Day for 7 days.  Dispense: 14 capsule; Refill: 0  -     promethazine-dextromethorphan (PROMETHAZINE-DM) 6.25-15 MG/5ML syrup; Take 5 mL by mouth 4 (Four) Times a Day As Needed for Cough.  Dispense: 118 mL; Refill: 0

## 2023-11-30 RX ORDER — ATORVASTATIN CALCIUM 40 MG/1
40 TABLET, FILM COATED ORAL DAILY
Qty: 90 TABLET | Refills: 0 | Status: SHIPPED | OUTPATIENT
Start: 2023-11-30

## 2023-12-01 DIAGNOSIS — R11.2 NAUSEA AND VOMITING, UNSPECIFIED VOMITING TYPE: ICD-10-CM

## 2023-12-01 RX ORDER — OMEPRAZOLE 40 MG/1
40 CAPSULE, DELAYED RELEASE ORAL DAILY
Qty: 90 CAPSULE | Refills: 0 | Status: SHIPPED | OUTPATIENT
Start: 2023-12-01

## 2023-12-04 ENCOUNTER — TELEPHONE (OUTPATIENT)
Dept: FAMILY MEDICINE CLINIC | Facility: CLINIC | Age: 63
End: 2023-12-04
Payer: MEDICAID

## 2023-12-04 NOTE — TELEPHONE ENCOUNTER
Prior Auth completed for Omeprazole 40MG dr capsules via covermymeds. Pending determination.  (Key: [id number])  PA Case ID #: [id number]

## 2023-12-04 NOTE — TELEPHONE ENCOUNTER
Prior Authorization for Omeprazole 40MG dr capsules approved.       Approved today  Approved. This drug has been approved. Approved quantity: 90 units per 90 day(s). The drug has been approved from 11/20/2023 to 12/03/2024. Please call the pharmacy to process your prescription claim. Generic or biosimilar substitution may be required when available and preferred on the formulary.  Authorization Expiration Date: 12/3/2024        Faxed approval to pharmacy.

## 2023-12-05 ENCOUNTER — PRIOR AUTHORIZATION (OUTPATIENT)
Dept: PSYCHIATRY | Facility: CLINIC | Age: 63
End: 2023-12-05

## 2023-12-05 ENCOUNTER — TELEMEDICINE (OUTPATIENT)
Dept: PSYCHIATRY | Facility: CLINIC | Age: 63
End: 2023-12-05
Payer: MEDICAID

## 2023-12-05 DIAGNOSIS — F43.10 PTSD (POST-TRAUMATIC STRESS DISORDER): ICD-10-CM

## 2023-12-05 DIAGNOSIS — F51.04 PSYCHOPHYSIOLOGICAL INSOMNIA: ICD-10-CM

## 2023-12-05 DIAGNOSIS — F31.32 BIPOLAR AFFECTIVE DISORDER, CURRENTLY DEPRESSED, MODERATE: Primary | Chronic | ICD-10-CM

## 2023-12-05 DIAGNOSIS — F43.10 PTSD (POST-TRAUMATIC STRESS DISORDER): Chronic | ICD-10-CM

## 2023-12-05 RX ORDER — CLONAZEPAM 0.5 MG/1
0.5 TABLET ORAL 2 TIMES DAILY PRN
Qty: 60 TABLET | Refills: 2 | Status: SHIPPED | OUTPATIENT
Start: 2023-12-05
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
## 2023-12-14 ENCOUNTER — TELEPHONE (OUTPATIENT)
Dept: CASE MANAGEMENT | Facility: OTHER | Age: 63
End: 2023-12-14
Payer: MEDICAID

## 2023-12-22 RX ORDER — ATORVASTATIN CALCIUM 40 MG/1
40 TABLET, FILM COATED ORAL DAILY
Qty: 90 TABLET | Refills: 0 | Status: SHIPPED | OUTPATIENT
Start: 2023-12-22

## 2023-12-22 NOTE — TELEPHONE ENCOUNTER
Caller: Carli Dubon    Relationship: Self    Best call back number: [phone]     Requested Prescriptions:   Requested Prescriptions     Pending Prescriptions Disp Refills    atorvastatin (LIPITOR) 40 MG tablet 90 tablet 0     Sig: Take 1 tablet by mouth Daily.        Pharmacy where request should be sent: [address] [phone] [facility] [phone]      Last office visit with prescribing clinician: 11/20/2023   Last telemedicine visit with prescribing clinician: Visit date not found   Next office visit with prescribing clinician: 4/10/2024     Additional details provided by patient: OUT OF MEDICINE    Does the patient have less than a 3 day supply:  [x] Yes  [] No    Would you like a call back once the refill request has been completed: [] Yes [] No    If the office needs to give you a call back, can they leave a voicemail: [] Yes [] No    Shahnaz Toledo Rep   12/22/23 14:25 EST

## 2023-12-31 DIAGNOSIS — F31.32 BIPOLAR AFFECTIVE DISORDER, CURRENTLY DEPRESSED, MODERATE: Chronic | ICD-10-CM

## 2024-01-01 RX ORDER — DIVALPROEX SODIUM 500 MG/1
1000 TABLET, EXTENDED RELEASE ORAL 2 TIMES DAILY
Qty: 360 TABLET | Refills: 0 | Status: SHIPPED | OUTPATIENT
Start: 2024-01-01

## 2024-01-05 DIAGNOSIS — J44.9 CHRONIC OBSTRUCTIVE PULMONARY DISEASE, UNSPECIFIED COPD TYPE: ICD-10-CM

## 2024-01-08 RX ORDER — UMECLIDINIUM BROMIDE AND VILANTEROL TRIFENATATE 62.5; 25 UG/1; UG/1
1 POWDER RESPIRATORY (INHALATION) DAILY
Qty: 60 EACH | Refills: 0 | Status: SHIPPED | OUTPATIENT
Start: 2024-01-08

## 2024-01-12 DIAGNOSIS — E11.9 TYPE 2 DIABETES MELLITUS WITHOUT COMPLICATION, WITHOUT LONG-TERM CURRENT USE OF INSULIN: ICD-10-CM

## 2024-01-14 DIAGNOSIS — F51.04 PSYCHOPHYSIOLOGICAL INSOMNIA: ICD-10-CM

## 2024-01-15 ENCOUNTER — PATIENT OUTREACH (OUTPATIENT)
Dept: CASE MANAGEMENT | Facility: OTHER | Age: 64
End: 2024-01-15
Payer: MEDICAID

## 2024-01-15 DIAGNOSIS — E11.9 TYPE 2 DIABETES MELLITUS WITHOUT COMPLICATION, WITHOUT LONG-TERM CURRENT USE OF INSULIN: Primary | ICD-10-CM

## 2024-01-15 DIAGNOSIS — J44.9 CHRONIC OBSTRUCTIVE PULMONARY DISEASE, UNSPECIFIED COPD TYPE: ICD-10-CM

## 2024-01-15 DIAGNOSIS — E78.2 MIXED HYPERLIPIDEMIA: ICD-10-CM

## 2024-01-15 RX ORDER — ZOLPIDEM TARTRATE 10 MG/1
10 TABLET ORAL NIGHTLY PRN
Qty: 30 TABLET | Refills: 2 | Status: SHIPPED | OUTPATIENT
Start: 2024-01-15

## 2024-01-15 NOTE — OUTREACH NOTE
AMBULATORY CASE MANAGEMENT NOTE    Name and Relationship of Patient/Support Person: Carli Dubon - Self    CCM Interim Update    60 day CR complete.  Outreach attempted, VM left.         Education Documentation  No documentation found.        Mick CRAWLEY  Ambulatory Case Management    1/15/2024, 11:41 EST

## 2024-01-24 ENCOUNTER — PATIENT OUTREACH (OUTPATIENT)
Dept: CASE MANAGEMENT | Facility: OTHER | Age: 64
End: 2024-01-24
Payer: MEDICAID

## 2024-01-24 DIAGNOSIS — J44.9 CHRONIC OBSTRUCTIVE PULMONARY DISEASE, UNSPECIFIED COPD TYPE: ICD-10-CM

## 2024-01-24 DIAGNOSIS — E78.2 MIXED HYPERLIPIDEMIA: ICD-10-CM

## 2024-01-24 DIAGNOSIS — E11.9 TYPE 2 DIABETES MELLITUS WITHOUT COMPLICATION, WITHOUT LONG-TERM CURRENT USE OF INSULIN: Primary | ICD-10-CM

## 2024-01-24 NOTE — OUTREACH NOTE
AMBULATORY CASE MANAGEMENT NOTE    Name and Relationship of Patient/Support Person: Carli Dubon - Self    CCM Interim Update    Monitoring outreach attempted.  Left voicemail.          Education Documentation  No documentation found.        Mick CRAWLEY  Ambulatory Case Management    1/24/2024, 09:32 EST

## 2024-01-25 DIAGNOSIS — E11.9 TYPE 2 DIABETES MELLITUS WITHOUT COMPLICATION, WITHOUT LONG-TERM CURRENT USE OF INSULIN: ICD-10-CM

## 2024-01-25 RX ORDER — GLIPIZIDE 10 MG/1
10 TABLET ORAL
Qty: 180 TABLET | Refills: 0 | Status: SHIPPED | OUTPATIENT
Start: 2024-01-25

## 2024-02-07 ENCOUNTER — OFFICE VISIT (OUTPATIENT)
Dept: ORTHOPEDIC SURGERY | Facility: CLINIC | Age: 64
End: 2024-02-07
Payer: MEDICAID

## 2024-02-07 VITALS — HEIGHT: 65 IN | RESPIRATION RATE: 20 BRPM | OXYGEN SATURATION: 98 % | WEIGHT: 213 LBS | BODY MASS INDEX: 35.49 KG/M2

## 2024-02-07 DIAGNOSIS — G89.29 CHRONIC BILATERAL LOW BACK PAIN WITHOUT SCIATICA: ICD-10-CM

## 2024-02-07 DIAGNOSIS — M70.62 TROCHANTERIC BURSITIS OF BOTH HIPS: ICD-10-CM

## 2024-02-07 DIAGNOSIS — M25.551 RIGHT HIP PAIN: Primary | ICD-10-CM

## 2024-02-07 DIAGNOSIS — M25.552 LEFT HIP PAIN: ICD-10-CM

## 2024-02-07 DIAGNOSIS — M70.61 TROCHANTERIC BURSITIS OF BOTH HIPS: ICD-10-CM

## 2024-02-07 DIAGNOSIS — M54.50 CHRONIC BILATERAL LOW BACK PAIN WITHOUT SCIATICA: ICD-10-CM

## 2024-02-07 RX ORDER — POLYETHYLENE GLYCOL 3350, SODIUM SULFATE ANHYDROUS, SODIUM BICARBONATE, SODIUM CHLORIDE, POTASSIUM CHLORIDE 236; 22.74; 6.74; 5.86; 2.97 G/4L; G/4L; G/4L; G/4L; G/4L
POWDER, FOR SOLUTION ORAL
COMMUNITY
Start: 2024-02-02

## 2024-02-07 RX ORDER — TRAMADOL HYDROCHLORIDE 50 MG/1
50 TABLET ORAL EVERY 8 HOURS PRN
COMMUNITY
Start: 2024-02-02

## 2024-02-07 RX ADMIN — LIDOCAINE HYDROCHLORIDE 2 ML: 10 INJECTION, SOLUTION INFILTRATION; PERINEURAL at 11:01

## 2024-02-07 RX ADMIN — TRIAMCINOLONE ACETONIDE 80 MG: 40 INJECTION, SUSPENSION INTRA-ARTICULAR; INTRAMUSCULAR at 11:02

## 2024-02-07 RX ADMIN — LIDOCAINE HYDROCHLORIDE 2 ML: 10 INJECTION, SOLUTION INFILTRATION; PERINEURAL at 11:02

## 2024-02-07 RX ADMIN — TRIAMCINOLONE ACETONIDE 80 MG: 40 INJECTION, SUSPENSION INTRA-ARTICULAR; INTRAMUSCULAR at 11:01

## 2024-02-12 RX ORDER — LIDOCAINE HYDROCHLORIDE 10 MG/ML
2 INJECTION, SOLUTION INFILTRATION; PERINEURAL
Status: COMPLETED | OUTPATIENT
Start: 2024-02-07 | End: 2024-02-07

## 2024-02-12 RX ORDER — TRIAMCINOLONE ACETONIDE 40 MG/ML
80 INJECTION, SUSPENSION INTRA-ARTICULAR; INTRAMUSCULAR
Status: COMPLETED | OUTPATIENT
Start: 2024-02-07 | End: 2024-02-07

## 2024-02-19 DIAGNOSIS — E11.9 TYPE 2 DIABETES MELLITUS WITHOUT COMPLICATION, WITHOUT LONG-TERM CURRENT USE OF INSULIN: ICD-10-CM

## 2024-02-19 RX ORDER — EMPAGLIFLOZIN 25 MG/1
25 TABLET, FILM COATED ORAL DAILY
Qty: 90 TABLET | Refills: 0 | Status: SHIPPED | OUTPATIENT
Start: 2024-02-19

## 2024-02-20 NOTE — PROGRESS NOTES
"Subjective   History of Present Illness: Carli Dubon is a 63 y.o. female is being seen for consultation today at the request of DILLON Thacker for back and hip pain.  Patient has had a chronic history of lower back pain with bilateral radiation and midline component for about a year that has been progressively worsening.  The pain does radiate down into her posterior lateral hips and into her groin.   She does have chronic diabetic neuropathy in her toes.  Patient reports that her pain is worse when she is up walking or standing and must use \"a cart at [employer] \"to shop as she is unable to walk behind a cart.  She reports no leg pain, paresthesias, leg weakness although she does report that when she gets up she feels like her legs shake \"until they get underneath of her \".  There are no current or past treatments.  she reports no bowel/bladder dysfunction or saddle anesthesia.    History of Present Illness    The following portions of the patient's history were reviewed and updated as appropriate: allergies, current medications, past family history, past medical history, past social history, past surgical history, and problem list.    Review of Systems   Constitutional:  Positive for activity change.   HENT: Negative.     Eyes: Negative.    Respiratory: Negative.     Cardiovascular: Negative.    Gastrointestinal: Negative.    Endocrine: Negative.    Genitourinary: Negative.    Musculoskeletal:  Positive for arthralgias, back pain (bilateral hips and Buttocks) and myalgias.   Skin: Negative.    Allergic/Immunologic: Negative.    Neurological: Negative.    Hematological: Negative.    Psychiatric/Behavioral:  Positive for sleep disturbance.    All other systems reviewed and are negative.      Objective     ./85   Pulse 105   Ht 165.1 cm (65\")   Wt 96 kg (211 lb 11.2 oz)   BMI 35.23 kg/m²    Body mass index is 35.23 kg/m².    Vitals:    02/21/24 1515   PainSc:   7          Physical Exam  Neurologic " Exam  Bilateral lower extremity motor strength 5/5  0+ patellar reflexes  Negative Yoana  Tenderness to palpation mid lumbar facet joints      Assessment & Plan   Independent Review of Radiographic Studies:      I personally reviewed the images from the following studies.    CT abdomen pelvis 9/17/2023    Lumbar spine remains well aligned with no significant subluxation.  There appears to be some moderate stenosis from L3 down to S1.    Medical Decision Making:      Carli Augustin a 63 y.o. female with medical history significant for DM type II and last A1c of 8.7 presenting to clinic today as a new patient for back and hip pain.  Nondedicated imaging completed about a year ago with some moderate stenosis from L3-S1 possibly causing some of her symptoms. Motor function appears preserved.  Patient has undergone no conservative measures pharmacologic therapy.  I am recommending course of physical therapy and will increase her gabapentin to 300 mg nightly to see if this helps.  We will obtain imaging for any further recommendations that likely will include injection therapy as patient is currently not a surgical candidate given her A1c of 8.7.     Advanced imaging (i.e. MRI, CT scan, or CT myelogram) was ordered today during your office visit. Once this order is approved by insurance, our office will call you ASAP in regards to appointment details for your test. If there is a waiting time on this, it is because we are waiting on approval from your insurance.     Please schedule a follow-up appointment to discuss your test results in the office.    For the fastest turn around time for your advanced imaging to be reviewed by a provider and uploaded into our system, pick a [facility] facility.     If you choose non-[facility] facility, you will be responsible for bringing the images on a CD to your follow-up appointment. If you forget the CD at the time of your appointment, you may be asked to reschedule.            Diagnoses and all orders for this visit:    1. Chronic bilateral low back pain without sciatica (Primary)  -     MRI Lumbar Spine Without Contrast; Future  -     XR Spine Lumbar Complete With Flex & Ext; Future  -     Ambulatory Referral to Physical Therapy Evaluate and treat    2. Bilateral hip pain  -     MRI Lumbar Spine Without Contrast; Future  -     XR Spine Lumbar Complete With Flex & Ext; Future  -     Ambulatory Referral to Physical Therapy Evaluate and treat    3. DDD (degenerative disc disease), lumbar  -     MRI Lumbar Spine Without Contrast; Future  -     XR Spine Lumbar Complete With Flex & Ext; Future  -     Ambulatory Referral to Physical Therapy Evaluate and treat    Other orders  -     gabapentin (NEURONTIN) 300 MG capsule; Take 1 capsule by mouth Every Night.  Dispense: 30 capsule; Refill: 0      Return for Recheck.    This patient was examined wearing appropriate personal protective equipment.     Carli Dubon  reports that she has been smoking cigarettes. She has a 30.00 pack-year smoking history. She has been exposed to tobacco smoke. She has never used smokeless tobacco.. I have educated her on the risk of diseases from using tobacco products such as cancer, COPD, and heart disease.     I advised her to quit and she is not willing to quit.    I spent 3  minutes counseling the patient.         Body mass index is 35.23 kg/m².         Patient's blood pressure was reviewed.  Recommendations for  a low-salt diet and exercise to maintain/improve BP in addition to taking any presribed medications.           Dian Samson, APRN  02/21/24  15:39 EST

## 2024-02-21 ENCOUNTER — OFFICE VISIT (OUTPATIENT)
Dept: NEUROSURGERY | Facility: CLINIC | Age: 64
End: 2024-02-21
Payer: MEDICAID

## 2024-02-21 VITALS
SYSTOLIC BLOOD PRESSURE: 139 MMHG | BODY MASS INDEX: 35.27 KG/M2 | HEIGHT: 65 IN | DIASTOLIC BLOOD PRESSURE: 85 MMHG | HEART RATE: 105 BPM | WEIGHT: 211.7 LBS

## 2024-02-21 DIAGNOSIS — M51.36 DDD (DEGENERATIVE DISC DISEASE), LUMBAR: ICD-10-CM

## 2024-02-21 DIAGNOSIS — M54.50 CHRONIC BILATERAL LOW BACK PAIN WITHOUT SCIATICA: Primary | ICD-10-CM

## 2024-02-21 DIAGNOSIS — G89.29 CHRONIC BILATERAL LOW BACK PAIN WITHOUT SCIATICA: Primary | ICD-10-CM

## 2024-02-21 DIAGNOSIS — M25.552 BILATERAL HIP PAIN: ICD-10-CM

## 2024-02-21 DIAGNOSIS — M25.551 BILATERAL HIP PAIN: ICD-10-CM

## 2024-02-21 RX ORDER — GABAPENTIN 300 MG/1
300 CAPSULE ORAL NIGHTLY
Qty: 30 CAPSULE | Refills: 0 | Status: SHIPPED | OUTPATIENT
Start: 2024-02-21

## 2024-02-22 ENCOUNTER — PATIENT ROUNDING (BHMG ONLY) (OUTPATIENT)
Dept: NEUROSURGERY | Facility: CLINIC | Age: 64
End: 2024-02-22
Payer: MEDICAID

## 2024-03-03 RX ORDER — VILAZODONE HYDROCHLORIDE 20 MG/1
20 TABLET ORAL DAILY
Qty: 90 TABLET | Refills: 1 | Status: SHIPPED | OUTPATIENT
Start: 2024-03-03

## 2024-03-04 DIAGNOSIS — R11.2 NAUSEA AND VOMITING, UNSPECIFIED VOMITING TYPE: ICD-10-CM

## 2024-03-04 RX ORDER — OMEPRAZOLE 40 MG/1
40 CAPSULE, DELAYED RELEASE ORAL DAILY
Qty: 90 CAPSULE | Refills: 0 | Status: SHIPPED | OUTPATIENT
Start: 2024-03-04

## 2024-03-07 ENCOUNTER — OFFICE VISIT (OUTPATIENT)
Dept: PULMONOLOGY | Facility: HOSPITAL | Age: 64
End: 2024-03-07
Payer: MEDICAID

## 2024-03-07 VITALS
WEIGHT: 211 LBS | OXYGEN SATURATION: 94 % | BODY MASS INDEX: 35.16 KG/M2 | RESPIRATION RATE: 16 BRPM | HEIGHT: 65 IN | DIASTOLIC BLOOD PRESSURE: 84 MMHG | HEART RATE: 110 BPM | SYSTOLIC BLOOD PRESSURE: 135 MMHG

## 2024-03-07 DIAGNOSIS — I10 ESSENTIAL HYPERTENSION: ICD-10-CM

## 2024-03-07 DIAGNOSIS — Z72.0 TOBACCO ABUSE: Chronic | ICD-10-CM

## 2024-03-07 DIAGNOSIS — J43.1 PANLOBULAR EMPHYSEMA: ICD-10-CM

## 2024-03-07 DIAGNOSIS — G47.33 OBSTRUCTIVE SLEEP APNEA: Primary | ICD-10-CM

## 2024-03-07 RX ORDER — BENZONATATE 200 MG/1
200 CAPSULE ORAL 3 TIMES DAILY PRN
Qty: 42 CAPSULE | Refills: 0 | Status: SHIPPED | OUTPATIENT
Start: 2024-03-07

## 2024-03-07 RX ORDER — BUDESONIDE AND FORMOTEROL FUMARATE DIHYDRATE 80; 4.5 UG/1; UG/1
2 AEROSOL RESPIRATORY (INHALATION) 2 TIMES DAILY
Qty: 1 EACH | Refills: 11 | Status: SHIPPED | OUTPATIENT
Start: 2024-03-07

## 2024-03-07 RX ORDER — NICOTINE 21 MG/24HR
1 PATCH, TRANSDERMAL 24 HOURS TRANSDERMAL EVERY 24 HOURS
Qty: 30 PATCH | Refills: 11 | Status: SHIPPED | OUTPATIENT
Start: 2024-03-07

## 2024-03-07 RX ORDER — DOXYCYCLINE HYCLATE 100 MG
100 TABLET ORAL 2 TIMES DAILY
Qty: 20 TABLET | Refills: 0 | Status: SHIPPED | OUTPATIENT
Start: 2024-03-07

## 2024-03-07 NOTE — PROGRESS NOTES
SLEEP/PULMONARY  CLINIC NOTE      PATIENT IDENTIFICATION:  Name: Carli Dubon  Age: 63 y.o.  Sex: female  :  1960  MRN: [id number]    DATE OF CONSULTATION:  3/7/2024                     CHIEF COMPLAINT: Shortness of breath    History of Present Illness:   Carli Dubon is a 63 y.o. female current smoker, came for follow-up still having dyspnea exertion coughing now with yellow sputum's, no hemoptysis no fever no chills she could not get her Trelegy because the insurance did not coverage  Patient struct sleep apnea she has a CPAP machine she is using it intermittently but when she uses she feeling significantly better less tiredness less fatigue and she is asking about if she is candidate for inspire      Review of Systems:   Constitutional: As above   Eyes: negative   ENT/oropharynx: negative   Cardiovascular: negative   Respiratory: As above   Gastrointestinal: negative   Genitourinary: negative   Neurological: negative   Musculoskeletal: negative   Integument/breast: negative   Endocrine: negative   Allergic/Immunologic: negative     Past Medical History:  Past Medical History:   Diagnosis Date    Arthritis of back     Asthma     Bipolar 1 disorder     Chronic obstructive lung disease 2015    COVID-19 08/15/2021    CTS (carpal tunnel syndrome)     Diabetes mellitus     Difficulty walking 2022    Hip arthrosis     Hypertension     Knee swelling     Low back strain     Migraine     Mixed hyperlipidemia 2020    Neck strain     Pancreatitis     PTSD (post-traumatic stress disorder)     Pulmonary arterial hypertension     Tobacco abuse 06/15/2020       Past Surgical History:  History reviewed. No pertinent surgical history.     Family History:  Family History   Problem Relation Age of Onset    Hypertension Mother     Diabetes Mother     Asthma Mother     Asthma Daughter     COPD Daughter     Anxiety disorder Daughter     Arthritis Daughter         Social History:   Social History     Tobacco  Use    Smoking status: Every Day     Current packs/day: 1.00     Average packs/day: 1.1 packs/day for 40.0 years (45.0 ttl pk-yrs)     Types: Cigarettes     Passive exposure: Current    Smokeless tobacco: Never   Substance Use Topics    Alcohol use: Never        Allergies:  Allergies   Allergen Reactions    Iodine Swelling    Adhesive Tape Rash       Home Meds:  (Not in a hospital admission)      Objective:    Vitals Ranges:   Heart Rate:  [110] 110  Resp:  [16] 16  BP: (135)/(84) 135/84  Body mass index is 35.11 kg/m².     Exam:  General Appearance:  WDWN    HEENT:   without obvious abnormality,  Conjunctiva/corneas clear,  Normal external ear canals, no drainage    Clear orsalmucosa,  Mallampati score 3    Neck:  Supple, symmetrical, trachea midline. No JVD.  Lungs:   Bilateral basal rhonchi bilaterally, respirations unlabored symmetrical wall movement.    Chest wall:  No tenderness or deformity.    Heart:  Regular rate and rhythm, S1 and S2 normal.  Extremities: Trace edema no clubbing or Cyanosis        Data Review:  All labs (24hrs): No results found for this or any previous visit (from the past 24 hour(s)).     Imaging:  XR Hips Bilateral With or Without Pelvis 3-4 View  Narrative: XR HIPS BILATERAL W OR WO PELVIS 3-4 VIEW    Date of Exam: 2/7/2024 1:37 PM EST    Indication: Bilateral hip pain.    Comparison: CT abdomen pelvis bone windows 9/17/2023 bilateral hip radiographs 7/20/2022.    Findings:  Mild symmetric bilateral hip joint space narrowing. Bilateral acetabular marginal osteophytes are again noted. The findings do not appear significantly changed since 7/20/2022. Each femoral head maintains normal round configuration. Pelvic ring appears   intact. Sacroiliac joints appear unremarkable. Paraspinal soft tissues notable for calcified uterine fibroids, seen to better advantage on prior CT imaging.  Impression: Impression:  Mild symmetric bilateral hip degenerative changes, similar in appearance to  7/20/2022.    Electronically Signed: Anum Guevara MD    2/8/2024 4:00 PM EST    Workstation ID: [id number]       ASSESSMENT:  Diagnoses and all orders for this visit:    Obstructive sleep apnea    Panlobular emphysema    Tobacco abuse    Essential hypertension    Other orders  -     Pulmonary Results Scan  -     budesonide-formoterol (SYMBICORT) 80-4.5 MCG/ACT inhaler; Inhale 2 puffs 2 (Two) Times a Day.  -     benzonatate (TESSALON) 200 MG capsule; Take 1 capsule by mouth 3 (Three) Times a Day As Needed for Cough.  -     doxycycline (VIBRAMYICN) 100 MG tablet; Take 1 tablet by mouth 2 (Two) Times a Day.  -     nicotine (Nicotine Step 1) 21 MG/24HR patch; Place 1 patch on the skin as directed by provider Daily.        PLAN:  We are adding doxycycline to cover for possible underlying acute bronchitis  Bronchodilator inhaled corticosteroid    Education how to use inhalers    Encouraged to use incentive spirometer    Continue to exercise slowly as tolerated    Monitor for any change in the color of the sputum    Avoid any exposure to fumes, gas or any irritant    Education patient  About tips for  smoking cessation and risk factors and benefit, was to a discussion with the patient.     This patient with obstructive sleep apnea, compliance is improved. Encourage to use it more frequent, I re-emphasized on pt the long and short term benefit of treating NEVIN. The device is benefiting the patient.  The patient is also instructed to get the CPAP supplies from the [facility] and see me in 1 year for follow-up.    Treating NEVIN will improve BP control       Follow-up 6 weeks    Ryan Bingham MD. D, ABSM.  3/7/2024  15:20 EST

## 2024-03-19 DIAGNOSIS — E11.9 TYPE 2 DIABETES MELLITUS WITHOUT COMPLICATION, WITHOUT LONG-TERM CURRENT USE OF INSULIN: ICD-10-CM

## 2024-03-20 ENCOUNTER — TELEPHONE (OUTPATIENT)
Dept: NEUROSURGERY | Facility: CLINIC | Age: 64
End: 2024-03-20
Payer: MEDICAID

## 2024-03-20 DIAGNOSIS — G89.29 CHRONIC BILATERAL LOW BACK PAIN WITHOUT SCIATICA: Primary | ICD-10-CM

## 2024-03-20 DIAGNOSIS — M54.50 CHRONIC BILATERAL LOW BACK PAIN WITHOUT SCIATICA: Primary | ICD-10-CM

## 2024-03-20 NOTE — TELEPHONE ENCOUNTER
Blaire from [facility] had called stating they will not approve the patients Mri to be done until after she has completed six weeks of PT. I spoke with Dian about this to let her know she stated okay to cancel and have her do PT first. To please let patient know why it was canceled.

## 2024-03-21 NOTE — TELEPHONE ENCOUNTER
Patient is aware of her insurance requirements. New PT order placed as the old one was over 30 days. She will call the office when she has completed the PT.

## 2024-03-31 DIAGNOSIS — G47.00 INSOMNIA, UNSPECIFIED TYPE: ICD-10-CM

## 2024-04-01 ENCOUNTER — TELEMEDICINE (OUTPATIENT)
Dept: PSYCHIATRY | Facility: CLINIC | Age: 64
End: 2024-04-01
Payer: MEDICAID

## 2024-04-01 DIAGNOSIS — F31.32 BIPOLAR AFFECTIVE DISORDER, CURRENTLY DEPRESSED, MODERATE: Chronic | ICD-10-CM

## 2024-04-01 DIAGNOSIS — F43.10 PTSD (POST-TRAUMATIC STRESS DISORDER): Primary | Chronic | ICD-10-CM

## 2024-04-01 DIAGNOSIS — F51.04 PSYCHOPHYSIOLOGICAL INSOMNIA: Chronic | ICD-10-CM

## 2024-04-01 RX ORDER — DIVALPROEX SODIUM 500 MG/1
1000 TABLET, EXTENDED RELEASE ORAL 2 TIMES DAILY
Qty: 360 TABLET | Refills: 1 | Status: SHIPPED | OUTPATIENT
Start: 2024-04-01

## 2024-04-01 RX ORDER — AMITRIPTYLINE HYDROCHLORIDE 150 MG/1
150 TABLET ORAL NIGHTLY
Qty: 90 TABLET | Refills: 0 | Status: SHIPPED | OUTPATIENT
Start: 2024-04-01
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
## 2024-04-02 ENCOUNTER — TELEPHONE (OUTPATIENT)
Dept: PSYCHIATRY | Facility: CLINIC | Age: 64
End: 2024-04-02
Payer: MEDICAID

## 2024-04-02 NOTE — TELEPHONE ENCOUNTER
Per providers order called pt that she needs her Depakote level and drug screen and the provider has placed an order in her mychart.  Advised pt to go to [facility] (Entrance #2) next door to the [facility] on [facility] this week to get them done.

## 2024-04-05 ENCOUNTER — LAB (OUTPATIENT)
Dept: LAB | Facility: HOSPITAL | Age: 64
End: 2024-04-05
Payer: MEDICAID

## 2024-04-05 DIAGNOSIS — F31.32 BIPOLAR AFFECTIVE DISORDER, CURRENTLY DEPRESSED, MODERATE: ICD-10-CM

## 2024-04-05 DIAGNOSIS — F43.10 PTSD (POST-TRAUMATIC STRESS DISORDER): Chronic | ICD-10-CM

## 2024-04-05 DIAGNOSIS — F51.04 PSYCHOPHYSIOLOGICAL INSOMNIA: Chronic | ICD-10-CM

## 2024-04-05 LAB
ALBUMIN SERPL-MCNC: 4.1 G/DL (ref 3.5–5.2)
ALBUMIN/GLOB SERPL: 1.3 G/DL
ALP SERPL-CCNC: 101 U/L (ref 39–117)
ALT SERPL W P-5'-P-CCNC: 21 U/L (ref 1–33)
ANION GAP SERPL CALCULATED.3IONS-SCNC: 11 MMOL/L (ref 5–15)
AST SERPL-CCNC: 14 U/L (ref 1–32)
BASOPHILS # BLD AUTO: 0.03 10*3/MM3 (ref 0–0.2)
BASOPHILS NFR BLD AUTO: 0.3 % (ref 0–1.5)
BILIRUB SERPL-MCNC: 0.2 MG/DL (ref 0–1.2)
BUN SERPL-MCNC: 15 MG/DL (ref 8–23)
BUN/CREAT SERPL: 21.4 (ref 7–25)
CALCIUM SPEC-SCNC: 8.9 MG/DL (ref 8.6–10.5)
CHLORIDE SERPL-SCNC: 100 MMOL/L (ref 98–107)
CO2 SERPL-SCNC: 25 MMOL/L (ref 22–29)
CREAT SERPL-MCNC: 0.7 MG/DL (ref 0.57–1)
DEPRECATED RDW RBC AUTO: 47.1 FL (ref 37–54)
EGFRCR SERPLBLD CKD-EPI 2021: 98.5 ML/MIN/1.73
EOSINOPHIL # BLD AUTO: 0.08 10*3/MM3 (ref 0–0.4)
EOSINOPHIL NFR BLD AUTO: 0.7 % (ref 0.3–6.2)
ERYTHROCYTE [DISTWIDTH] IN BLOOD BY AUTOMATED COUNT: 13.3 % (ref 12.3–15.4)
GLOBULIN UR ELPH-MCNC: 3.2 GM/DL
GLUCOSE SERPL-MCNC: 236 MG/DL (ref 65–99)
HCT VFR BLD AUTO: 46.9 % (ref 34–46.6)
HGB BLD-MCNC: 15.8 G/DL (ref 12–15.9)
IMM GRANULOCYTES # BLD AUTO: 0.09 10*3/MM3 (ref 0–0.05)
IMM GRANULOCYTES NFR BLD AUTO: 0.8 % (ref 0–0.5)
LYMPHOCYTES # BLD AUTO: 3.53 10*3/MM3 (ref 0.7–3.1)
LYMPHOCYTES NFR BLD AUTO: 30.3 % (ref 19.6–45.3)
MCH RBC QN AUTO: 32.2 PG (ref 26.6–33)
MCHC RBC AUTO-ENTMCNC: 33.7 G/DL (ref 31.5–35.7)
MCV RBC AUTO: 95.7 FL (ref 79–97)
MONOCYTES # BLD AUTO: 0.68 10*3/MM3 (ref 0.1–0.9)
MONOCYTES NFR BLD AUTO: 5.8 % (ref 5–12)
NEUTROPHILS NFR BLD AUTO: 62.1 % (ref 42.7–76)
NEUTROPHILS NFR BLD AUTO: 7.23 10*3/MM3 (ref 1.7–7)
NRBC BLD AUTO-RTO: 0 /100 WBC (ref 0–0.2)
PLATELET # BLD AUTO: 272 10*3/MM3 (ref 140–450)
PMV BLD AUTO: 10.8 FL (ref 6–12)
POTASSIUM SERPL-SCNC: 4 MMOL/L (ref 3.5–5.2)
PROT SERPL-MCNC: 7.3 G/DL (ref 6–8.5)
RBC # BLD AUTO: 4.9 10*6/MM3 (ref 3.77–5.28)
SODIUM SERPL-SCNC: 136 MMOL/L (ref 136–145)
VALPROATE SERPL-MCNC: 56 MCG/ML (ref 50–125)
WBC NRBC COR # BLD AUTO: 11.64 10*3/MM3 (ref 3.4–10.8)

## 2024-04-05 PROCEDURE — 80164 ASSAY DIPROPYLACETIC ACD TOT: CPT

## 2024-04-05 PROCEDURE — 80307 DRUG TEST PRSMV CHEM ANLYZR: CPT

## 2024-04-05 PROCEDURE — 80053 COMPREHEN METABOLIC PANEL: CPT

## 2024-04-05 PROCEDURE — 36415 COLL VENOUS BLD VENIPUNCTURE: CPT

## 2024-04-05 PROCEDURE — 85025 COMPLETE CBC W/AUTO DIFF WBC: CPT

## 2024-04-05 NOTE — PROGRESS NOTES
Chief Complaint  NEW PATIENT (TREMORS)    Estelle Dubon presents to [facility] for TREMORS  History of Present Illness  New patient referred by Cindy CARRANZA for tremors.    Patient states tremors (bilateral hand, body) started 7-8 months ago,   patient states when she does anything w/ her hands or hold cup her hand tremors are worse, she sates nothing makes tremors better,   patient denies family h/o tremors or parkinson's, she is currently not taking any medications nor has she been tested for cause of tremors    She is on amitriptyline and Depakote which can cause tremor    Depakote level was 54.       Family History   Problem Relation Age of Onset    Hypertension Mother     Diabetes Mother     Asthma Mother     Asthma Daughter     COPD Daughter     Anxiety disorder Daughter     Arthritis Daughter        Past Medical History:   Diagnosis Date    Arthritis of back     Asthma     Bipolar 1 disorder     Chronic obstructive lung disease 01/28/2015    COVID-19 08/15/2021    CTS (carpal tunnel syndrome)     Diabetes mellitus     Difficulty walking 03/2022    Hip arthrosis     Hypertension     Knee swelling     Low back strain     Migraine     Mixed hyperlipidemia 03/03/2020    Neck strain     Pancreatitis     PTSD (post-traumatic stress disorder)     Pulmonary arterial hypertension     Tobacco abuse 06/15/2020       Social History     Socioeconomic History    Marital status: Single   Tobacco Use    Smoking status: Every Day     Current packs/day: 1.00     Average packs/day: 1.1 packs/day for 40.0 years (45.0 ttl pk-yrs)     Types: Cigarettes     Passive exposure: Current    Smokeless tobacco: Never   Vaping Use    Vaping status: Never Used   Substance and Sexual Activity    Alcohol use: Never    Drug use: Never    Sexual activity: Not Currently     Partners: Female         Current Outpatient Medications:     albuterol (ACCUNEB) 1.25 MG/3ML nebulizer solution,  Take 3 mL by nebulization Every 6 (Six) Hours As Needed for Wheezing or Shortness of Air. Dispense as 1 box of unit dose, Disp: 1 each, Rfl: 1    albuterol sulfate  (90 Base) MCG/ACT inhaler, Inhale 2 puffs Every 4 (Four) Hours As Needed for Wheezing or Shortness of Air., Disp: 6.7 g, Rfl: 1    amitriptyline (ELAVIL) 150 MG tablet, TAKE 1 TABLET BY MOUTH ONCE DAILY AT NIGHT, Disp: 90 tablet, Rfl: 0    amLODIPine (Norvasc) 5 MG tablet, Take 1 tablet by mouth Daily., Disp: 90 tablet, Rfl: 1    atorvastatin (LIPITOR) 40 MG tablet, Take 1 tablet by mouth Daily., Disp: 90 tablet, Rfl: 0    budesonide-formoterol (SYMBICORT) 80-4.5 MCG/ACT inhaler, Inhale 2 puffs 2 (Two) Times a Day., Disp: 1 each, Rfl: 11    clonazePAM (KlonoPIN) 0.5 MG tablet, Take 1 tablet by mouth 2 (Two) Times a Day As Needed for Anxiety., Disp: 60 tablet, Rfl: 2    divalproex (DEPAKOTE ER) 500 MG 24 hr tablet, Take 2 tablets by mouth 2 (Two) Times a Day., Disp: 360 tablet, Rfl: 1    gabapentin (NEURONTIN) 300 MG capsule, Take 1 capsule by mouth Every Night., Disp: 30 capsule, Rfl: 0    glipizide (GLUCOTROL) 10 MG tablet, TAKE 1 TABLET BY MOUTH TWICE DAILY BEFORE MEAL(S), Disp: 180 tablet, Rfl: 0    Jardiance 25 MG tablet tablet, Take 1 tablet by mouth once daily, Disp: 90 tablet, Rfl: 0    losartan (COZAAR) 100 MG tablet, Take 1 tablet by mouth Daily., Disp: 90 tablet, Rfl: 1    metFORMIN (GLUCOPHAGE) 1000 MG tablet, TAKE 1 TABLET BY MOUTH TWICE DAILY WITH MEALS, Disp: 180 tablet, Rfl: 0    nicotine (Nicotine Step 1) 21 MG/24HR patch, Place 1 patch on the skin as directed by provider Daily., Disp: 30 patch, Rfl: 11    omeprazole (priLOSEC) 40 MG capsule, Take 1 capsule by mouth once daily, Disp: 90 capsule, Rfl: 0    ondansetron ODT (ZOFRAN-ODT) 4 MG disintegrating tablet, Place 1 tablet on the tongue Every 6 (Six) Hours As Needed for Nausea., Disp: 20 tablet, Rfl: 0    PEG 3350-KCl-NaBcb-NaCl-NaSulf (PEG-3350/Electrolytes) 236 g reconstituted  "solution, TAKE 4,000ML BY MOUTH ONCE FOR 1 DOSE, Disp: , Rfl:     traMADol (ULTRAM) 50 MG tablet, Take 1 tablet by mouth Every 8 (Eight) Hours As Needed. for pain, Disp: , Rfl:     vilazodone (VIIBRYD) 20 MG tablet tablet, Take 1 tablet by mouth once daily, Disp: 90 tablet, Rfl: 1    zolpidem (AMBIEN) 10 MG tablet, TAKE 1 TABLET BY MOUTH AT NIGHT AS NEEDED FOR SLEEP, Disp: 30 tablet, Rfl: 2    Review of Systems   Constitutional:  Positive for fatigue.   HENT:  Positive for congestion and voice change.    Respiratory:  Positive for apnea and shortness of breath.    Neurological:  Positive for tremors, weakness, numbness and headaches.   Psychiatric/Behavioral:  Positive for sleep disturbance. The patient is nervous/anxious.    All other systems reviewed and are negative.           Objective   Vital Signs:   /87   Pulse 111   Ht 165.1 cm (65\")   Wt 99.3 kg (219 lb)   BMI 36.44 kg/m²     Physical Exam  Vitals reviewed.   Constitutional:       Appearance: Normal appearance.   Cardiovascular:      Rate and Rhythm: Normal rate.   Pulmonary:      Effort: Pulmonary effort is normal. No respiratory distress.   Neurological:      Mental Status: She is alert and oriented to person, place, and time.   Psychiatric:         Mood and Affect: Mood normal.      Result Review :                Neurologic Exam     Mental Status   Oriented to person, place, and time.            Assessment and Plan    Diagnoses and all orders for this visit:    1. Tremor (Primary)    Tremor may be due to depakote or the elavil or may be idiopathic,  Will check TSH,   Not parkinson  Try primidone 50mg daily may increase to 100 mg after two weeks       Follow Up   Return in about 1 year (around 4/9/2025).  Patient was given instructions and counseling regarding her condition or for health maintenance advice. Please see specific information pulled into the AVS if appropriate.         This document has been electronically signed by Joseph Seipel, " MD on April 9, 2024 15:49 EDT

## 2024-04-06 LAB
AMPHETAMINES UR QL SCN: NEGATIVE NG/ML
BARBITURATES UR QL SCN: NEGATIVE NG/ML
BENZODIAZ UR QL SCN: NEGATIVE NG/ML
BZE UR QL SCN: NEGATIVE NG/ML
CANNABINOIDS UR QL SCN: NEGATIVE NG/ML
CREAT UR-MCNC: 40.1 MG/DL (ref 20–300)
FENTANYL UR-MCNC: NEGATIVE PG/ML
LABORATORY COMMENT REPORT: NORMAL
MEPERIDINE UR QL: NEGATIVE NG/ML
METHADONE UR QL SCN: NEGATIVE NG/ML
OPIATES UR QL SCN: NEGATIVE NG/ML
OXYCODONE+OXYMORPHONE UR QL SCN: NEGATIVE NG/ML
PCP UR QL: NEGATIVE NG/ML
PH UR: 5.6 [PH] (ref 4.5–8.9)
PROPOXYPH UR QL SCN: NEGATIVE NG/ML
SP GR UR: 1.01
TRAMADOL UR QL SCN: NEGATIVE NG/ML

## 2024-04-08 DIAGNOSIS — E11.9 TYPE 2 DIABETES MELLITUS WITHOUT COMPLICATION, WITHOUT LONG-TERM CURRENT USE OF INSULIN: ICD-10-CM

## 2024-04-09 ENCOUNTER — TELEPHONE (OUTPATIENT)
Dept: PSYCHIATRY | Facility: CLINIC | Age: 64
End: 2024-04-09
Payer: MEDICAID

## 2024-04-09 ENCOUNTER — OFFICE VISIT (OUTPATIENT)
Dept: NEUROLOGY | Facility: CLINIC | Age: 64
End: 2024-04-09
Payer: MEDICAID

## 2024-04-09 VITALS
HEART RATE: 111 BPM | HEIGHT: 65 IN | BODY MASS INDEX: 36.49 KG/M2 | WEIGHT: 219 LBS | DIASTOLIC BLOOD PRESSURE: 87 MMHG | SYSTOLIC BLOOD PRESSURE: 150 MMHG

## 2024-04-09 DIAGNOSIS — R25.1 TREMOR: Primary | ICD-10-CM

## 2024-04-09 PROBLEM — Z86.0100 HISTORY OF COLONIC POLYPS: Status: ACTIVE | Noted: 2023-03-20

## 2024-04-09 PROBLEM — Z86.010 HISTORY OF COLONIC POLYPS: Status: ACTIVE | Noted: 2023-03-20

## 2024-04-09 PROCEDURE — 1160F RVW MEDS BY RX/DR IN RCRD: CPT | Performed by: PSYCHIATRY & NEUROLOGY

## 2024-04-09 PROCEDURE — 3079F DIAST BP 80-89 MM HG: CPT | Performed by: PSYCHIATRY & NEUROLOGY

## 2024-04-09 PROCEDURE — 1159F MED LIST DOCD IN RCRD: CPT | Performed by: PSYCHIATRY & NEUROLOGY

## 2024-04-09 PROCEDURE — 3077F SYST BP >= 140 MM HG: CPT | Performed by: PSYCHIATRY & NEUROLOGY

## 2024-04-09 PROCEDURE — 99214 OFFICE O/P EST MOD 30 MIN: CPT | Performed by: PSYCHIATRY & NEUROLOGY

## 2024-04-09 RX ORDER — PRIMIDONE 50 MG/1
TABLET ORAL
Qty: 180 TABLET | Refills: 3 | Status: SHIPPED | OUTPATIENT
Start: 2024-04-09

## 2024-04-09 NOTE — PROGRESS NOTES
Please call patient to let her know that her Depakote level was therapeutic, so no dosage change is needed but she needs to follow up with her PCP regarding her blood sugar, which was high at 236 and her elevated white blood cell count.  Her kidney and liver functions were normal.

## 2024-04-09 NOTE — TELEPHONE ENCOUNTER
Pt called back and has been made aware and pt states that she does have an appt with her PCP tomorrow 04/10/2024.

## 2024-04-09 NOTE — TELEPHONE ENCOUNTER
----- Message from Kitty Carlos PA-C sent at 4/9/2024  5:31 AM EDT -----  Please call patient to let her know that her Depakote level was therapeutic, so no dosage change is needed but she needs to follow up with her PCP regarding her blood sugar, which was high at 236 and her elevated white blood cell count.  Her kidney and liver functions were normal.

## 2024-04-10 ENCOUNTER — LAB (OUTPATIENT)
Dept: FAMILY MEDICINE CLINIC | Facility: CLINIC | Age: 64
End: 2024-04-10
Payer: MEDICAID

## 2024-04-10 ENCOUNTER — OFFICE VISIT (OUTPATIENT)
Dept: FAMILY MEDICINE CLINIC | Facility: CLINIC | Age: 64
End: 2024-04-10
Payer: MEDICAID

## 2024-04-10 ENCOUNTER — PATIENT ROUNDING (BHMG ONLY) (OUTPATIENT)
Dept: NEUROLOGY | Facility: CLINIC | Age: 64
End: 2024-04-10
Payer: MEDICAID

## 2024-04-10 VITALS
WEIGHT: 220 LBS | TEMPERATURE: 97.7 F | SYSTOLIC BLOOD PRESSURE: 132 MMHG | DIASTOLIC BLOOD PRESSURE: 80 MMHG | HEART RATE: 111 BPM | BODY MASS INDEX: 36.61 KG/M2 | OXYGEN SATURATION: 91 %

## 2024-04-10 DIAGNOSIS — J43.1 PANLOBULAR EMPHYSEMA: Primary | ICD-10-CM

## 2024-04-10 DIAGNOSIS — R25.1 TREMOR: ICD-10-CM

## 2024-04-10 DIAGNOSIS — E11.9 TYPE 2 DIABETES MELLITUS WITHOUT COMPLICATION, WITHOUT LONG-TERM CURRENT USE OF INSULIN: ICD-10-CM

## 2024-04-10 DIAGNOSIS — E78.2 MIXED HYPERLIPIDEMIA: ICD-10-CM

## 2024-04-10 DIAGNOSIS — I10 ESSENTIAL HYPERTENSION: ICD-10-CM

## 2024-04-10 DIAGNOSIS — F31.32 BIPOLAR AFFECTIVE DISORDER, CURRENTLY DEPRESSED, MODERATE: Chronic | ICD-10-CM

## 2024-04-10 LAB
ALBUMIN UR-MCNC: <1.2 MG/DL
CHOLEST SERPL-MCNC: 173 MG/DL (ref 0–200)
CREAT UR-MCNC: 33.8 MG/DL
HBA1C MFR BLD: 8.8 % (ref 4.8–5.6)
HDLC SERPL-MCNC: 47 MG/DL (ref 40–60)
LDLC SERPL CALC-MCNC: 92 MG/DL (ref 0–100)
LDLC/HDLC SERPL: 1.84 {RATIO}
MICROALBUMIN/CREAT UR: NORMAL MG/G{CREAT}
TRIGL SERPL-MCNC: 198 MG/DL (ref 0–150)
TSH SERPL DL<=0.05 MIU/L-ACNC: 2.29 UIU/ML (ref 0.27–4.2)
VLDLC SERPL-MCNC: 34 MG/DL (ref 5–40)

## 2024-04-10 PROCEDURE — 84443 ASSAY THYROID STIM HORMONE: CPT | Performed by: PSYCHIATRY & NEUROLOGY

## 2024-04-10 PROCEDURE — 36415 COLL VENOUS BLD VENIPUNCTURE: CPT

## 2024-04-10 PROCEDURE — 82570 ASSAY OF URINE CREATININE: CPT | Performed by: NURSE PRACTITIONER

## 2024-04-10 PROCEDURE — 80061 LIPID PANEL: CPT | Performed by: PSYCHIATRY & NEUROLOGY

## 2024-04-10 PROCEDURE — 82043 UR ALBUMIN QUANTITATIVE: CPT | Performed by: NURSE PRACTITIONER

## 2024-04-10 PROCEDURE — 83036 HEMOGLOBIN GLYCOSYLATED A1C: CPT | Performed by: NURSE PRACTITIONER

## 2024-04-10 RX ORDER — FLUTICASONE FUROATE, UMECLIDINIUM BROMIDE AND VILANTEROL TRIFENATATE 200; 62.5; 25 UG/1; UG/1; UG/1
1 POWDER RESPIRATORY (INHALATION)
Qty: 60 EACH | Refills: 2 | Status: SHIPPED | OUTPATIENT
Start: 2024-04-10

## 2024-04-10 NOTE — PROGRESS NOTES
Subjective     Carli Dubon is a 63 y.o. female.     History of Present Illness  Pt is here today for a 6 mo follow up on hypertension, diabetes, hyperlipidemia, COPD, bipolar disorder, and insomnia.     Hypertension-patient is currently on amlodipine 5 mg daily, losartan 100 mg daily. She is doing well on the medication. Denies CP, SOA, dizziness, HA.     Tremor- pt saw neuro.  Believes it is an essential tremor- possibly from meds. Was started on primidone- just got yesterday. Has not been able to see results yet.      Diabetes-patient is currently on Jardiance 25 mg daily, glipizide 10 mg twice daily, Metformin 1000 mg twice daily. She does not monitor her blood sugars regularly.       Hyperlipidemia- she is currently on lipitor 40mg daily.      COPD- Pt is seeing pulmonology.  Pt is currently on symbicort. States she did much better on Trelegy and would like to try to get that again.      Bipolar disorder-patient is seeing psychiatry for this.  She is currently on Depakote at 1000 mg twice daily and Viibryd 20 mg daily.  She also has Klonopin as needed. Denies SI or HI     Insomnia-patient is seeing psychiatry for this.  She is on ambien 10mg prn.  She is also on Elavil 150 mg nightly. She is doing well on it.     GERD-patient is currently on omeprazole 40 mg daily.     Hot flashes- pt states GYN placed her on gabapentin 300mg nightly. It is helping     Back pain- pt sees neurosurgeon. She is on gabapentin 300mg nightly. Will start therapy tomorrow.     NEVIN- wears a CPAP. Sees sleep med     Labs- due  Pap smear- 08/2019- normal  Mammogram- 8/18/23  DEXA- post menopausal for 4 years.  Colonoscopy- has colonoscopy at [facility] 8/25/23     Vaccines:  Flu- N/A  PNA- UTD  Shingles-due- needs to get at the pharmacy  Tdap- UTD  covid- UTD     Dental exam- due  Eye exam- due               The following portions of the patient's history were reviewed and updated as appropriate: allergies, current medications, past family  history, past medical history, past social history, past surgical history, and problem list.    Review of Systems   Constitutional:  Negative for chills, fever and unexpected weight loss.   HENT:  Negative for ear pain, postnasal drip and sore throat.    Respiratory:  Positive for cough. Negative for chest tightness, shortness of breath and wheezing.    Cardiovascular:  Negative for chest pain and palpitations.   Gastrointestinal:  Negative for abdominal pain and vomiting.   Musculoskeletal:  Positive for back pain. Negative for myalgias.   Skin:  Negative for rash.   Neurological:  Negative for dizziness and headache.   Psychiatric/Behavioral:  Negative for self-injury, suicidal ideas, depressed mood and stress. The patient is not nervous/anxious.        Objective     /80 (BP Location: Left arm, Patient Position: Sitting, Cuff Size: Large Adult)   Pulse 111   Temp 97.7 °F (36.5 °C) (Tympanic)   Wt 99.8 kg (220 lb)   SpO2 91%   BMI 36.61 kg/m²     Current Outpatient Medications on File Prior to Visit   Medication Sig Dispense Refill    albuterol (ACCUNEB) 1.25 MG/3ML nebulizer solution Take 3 mL by nebulization Every 6 (Six) Hours As Needed for Wheezing or Shortness of Air. Dispense as 1 box of unit dose 1 each 1    albuterol sulfate  (90 Base) MCG/ACT inhaler Inhale 2 puffs Every 4 (Four) Hours As Needed for Wheezing or Shortness of Air. 6.7 g 1    amitriptyline (ELAVIL) 150 MG tablet TAKE 1 TABLET BY MOUTH ONCE DAILY AT NIGHT 90 tablet 0    amLODIPine (Norvasc) 5 MG tablet Take 1 tablet by mouth Daily. 90 tablet 1    atorvastatin (LIPITOR) 40 MG tablet Take 1 tablet by mouth Daily. 90 tablet 0    clonazePAM (KlonoPIN) 0.5 MG tablet Take 1 tablet by mouth 2 (Two) Times a Day As Needed for Anxiety. 60 tablet 2    divalproex (DEPAKOTE ER) 500 MG 24 hr tablet Take 2 tablets by mouth 2 (Two) Times a Day. 360 tablet 1    gabapentin (NEURONTIN) 300 MG capsule Take 1 capsule by mouth Every Night. 30  capsule 0    glipizide (GLUCOTROL) 10 MG tablet TAKE 1 TABLET BY MOUTH TWICE DAILY BEFORE MEAL(S) 180 tablet 0    Jardiance 25 MG tablet tablet Take 1 tablet by mouth once daily 90 tablet 0    losartan (COZAAR) 100 MG tablet Take 1 tablet by mouth Daily. 90 tablet 1    metFORMIN (GLUCOPHAGE) 1000 MG tablet TAKE 1 TABLET BY MOUTH TWICE DAILY WITH MEALS 180 tablet 0    nicotine (Nicotine Step 1) 21 MG/24HR patch Place 1 patch on the skin as directed by provider Daily. 30 patch 11    omeprazole (priLOSEC) 40 MG capsule Take 1 capsule by mouth once daily 90 capsule 0    ondansetron ODT (ZOFRAN-ODT) 4 MG disintegrating tablet Place 1 tablet on the tongue Every 6 (Six) Hours As Needed for Nausea. 20 tablet 0    PEG 3350-KCl-NaBcb-NaCl-NaSulf (PEG-3350/Electrolytes) 236 g reconstituted solution TAKE 4,000ML BY MOUTH ONCE FOR 1 DOSE      primidone (MYSOLINE) 50 MG tablet One at hs, may increase to two at hs after two weeks if needed 180 tablet 3    traMADol (ULTRAM) 50 MG tablet Take 1 tablet by mouth Every 8 (Eight) Hours As Needed. for pain      vilazodone (VIIBRYD) 20 MG tablet tablet Take 1 tablet by mouth once daily 90 tablet 1    zolpidem (AMBIEN) 10 MG tablet TAKE 1 TABLET BY MOUTH AT NIGHT AS NEEDED FOR SLEEP 30 tablet 2    [DISCONTINUED] budesonide-formoterol (SYMBICORT) 80-4.5 MCG/ACT inhaler Inhale 2 puffs 2 (Two) Times a Day. 1 each 11     No current facility-administered medications on file prior to visit.                 Physical Exam  Constitutional:       General: She is not in acute distress.     Appearance: Normal appearance. She is obese. She is not ill-appearing.   HENT:      Head: Normocephalic and atraumatic.   Cardiovascular:      Rate and Rhythm: Normal rate and regular rhythm.      Heart sounds: No murmur heard.  Pulmonary:      Effort: Pulmonary effort is normal. No respiratory distress.      Breath sounds: Normal breath sounds.   Musculoskeletal:         General: Normal range of motion.   Skin:      General: Skin is warm and dry.   Neurological:      General: No focal deficit present.      Mental Status: She is alert and oriented to person, place, and time.   Psychiatric:         Mood and Affect: Mood normal.         Behavior: Behavior normal.         Thought Content: Thought content normal.         Judgment: Judgment normal.           Assessment & Plan     Diagnoses and all orders for this visit:    1. Panlobular emphysema (Primary)  Comments:  tolerated Trelegy better  stop symbicort  smoking cessation  Orders:  -     Fluticasone-Umeclidin-Vilant (Trelegy Ellipta) 200-62.5-25 MCG/ACT inhaler; Inhale 1 puff Daily.  Dispense: 60 each; Refill: 2    2. Type 2 diabetes mellitus without complication, without long-term current use of insulin  Comments:  stable  cont metformin, jardiance, and glipizide  check levels  work on diet and exercise  Orders:  -     Hemoglobin A1c; Future  -     Lipid Panel; Future    3. Essential hypertension  Comments:  stable  cont meds    4. Mixed hyperlipidemia  Comments:  stable  cont statin  work on diet and exercise    5. Bipolar affective disorder, currently depressed, moderate  Comments:  stable  cont meds  see psych

## 2024-04-11 ENCOUNTER — TREATMENT (OUTPATIENT)
Dept: PHYSICAL THERAPY | Facility: CLINIC | Age: 64
End: 2024-04-11
Payer: MEDICAID

## 2024-04-11 ENCOUNTER — TELEPHONE (OUTPATIENT)
Dept: FAMILY MEDICINE CLINIC | Facility: CLINIC | Age: 64
End: 2024-04-11
Payer: MEDICAID

## 2024-04-11 DIAGNOSIS — G89.29 CHRONIC BILATERAL LOW BACK PAIN WITHOUT SCIATICA: Primary | ICD-10-CM

## 2024-04-11 DIAGNOSIS — M79.18 GLUTEAL PAIN: ICD-10-CM

## 2024-04-11 DIAGNOSIS — M54.50 CHRONIC BILATERAL LOW BACK PAIN WITHOUT SCIATICA: Primary | ICD-10-CM

## 2024-04-11 PROCEDURE — 97161 PT EVAL LOW COMPLEX 20 MIN: CPT | Performed by: PHYSICAL THERAPIST

## 2024-04-11 PROCEDURE — 97110 THERAPEUTIC EXERCISES: CPT | Performed by: PHYSICAL THERAPIST

## 2024-04-11 NOTE — TELEPHONE ENCOUNTER
Prior Authorization for Trelegy Ellipta 200-62.5-25MCG/ACT aerosol powder approved.       Approved today  Approved. Approved for TRELEGY ELLIPTA Aero Pow Br Act 200;62.5;25MCG/ACT, quantity up to 60 actuations per 30 days, under the pharmacy benefit. The drug has been approved from 04/11/2024 to 04/11/2025. Generic or biosimilar substitution may be required when available and preferred on the formulary. Please note that dispensing of non-maintenance and specialty medications may be limited to a monthly supply.  Authorization Expiration Date: 4/11/2025          Faxed approval to pharmacy.

## 2024-04-11 NOTE — TELEPHONE ENCOUNTER
Prior Auth completed for José Miguel Ellipta 200-62.5-25MCG/ACT aerosol powder via covermymeds. Pending determination.  (Key: [id number])  Rx #: [id number]  PA Case ID #: [id number]    Form  [employer] ([facility] [place] Medicaid Electronic Prior Authorization Request Form (2017 NCPDP)    Sent with most recent chart notes.

## 2024-04-11 NOTE — PROGRESS NOTES
" Physical Therapy Initial Evaluation and Plan of Care        [address] [place] [address]     Patient: Carli Dubon   : 1960  Diagnosis/ICD-10 Code:  Chronic bilateral low back pain without sciatica [M54.50, G89.29]  Referring practitioner: DILLON Powell  Date of Initial Visit: 2024  Today's Date: 2024  Patient seen for 1 sessions           Subjective Questionnaire: Oswestry: 48% limited      Subjective Evaluation    History of Present Illness  Onset date: more than 3 years ago.  Mechanism of injury: Per office visit on 24 with Dian Samson:  \"Carli Dubon is a 63 y.o. female is being seen for consultation today at the request of DILLON Thacker for back and hip pain.  Patient has had a chronic history of lower back pain with bilateral radiation and midline component for about a year that has been progressively worsening.  The pain does radiate down into her posterior lateral hips and into her groin.   She does have chronic diabetic neuropathy in her toes.  Patient reports that her pain is worse when she is up walking or standing and must use \"a cart at [facility] \"to shop as she is unable to walk behind a cart.  She reports no leg pain, paresthesias, leg weakness although she does report that when she gets up she feels like her legs shake \"until they get underneath of her \".  There are no current or past treatments.  she reports no bowel/bladder dysfunction or saddle anesthesia\"      Patient Occupation: n/a Pain  Current pain ratin  At best pain ratin  At worst pain rating: 10  Quality: tight and sharp  Relieving factors: change in position (sometimes sitting)  Aggravating factors: ambulation, squatting, sleeping, prolonged positioning, standing, stairs, lifting and movement  Progression: no change    Social Support  Lives in: multiple-level home  Lives with: grandson.    Hand dominance: right    Treatments  Current treatment: physical therapy  Patient " Goals  Patient goals for therapy: decreased pain, improved balance, increased motion, decreased edema, increased strength and independence with ADLs/IADLs           Precautions: PTSD, hip arthritis, arthritis of back, asthma, neck strain, DM II, migraines, tobacco use (45 total pack years), emphysema, SOA    Objective        Special Questions  Patient is experiencing disturbed sleep, headaches and tinnitus.       Postural Observations  Seated posture: fair  Standing posture: fair  Correction of posture: makes symptoms worse    Additional Postural Observation Details  B rounded shoulders and flattened lumbar lordosis and excessive thoracic kyphosis     Palpation   Left   No palpable tenderness to the rectus femoris.   Hypertonic in the erector spinae, gluteus medius, iliopsoas, lateral gastrocnemius, lumbar paraspinals and medial gastrocnemius.   Tenderness of the erector spinae, gluteus medius, iliopsoas, lateral gastrocnemius, lumbar paraspinals and medial gastrocnemius.     Right   No palpable tenderness to the rectus femoris.   Hypertonic in the erector spinae, gluteus medius, iliopsoas, lateral gastrocnemius, lumbar paraspinals and medial gastrocnemius. Tenderness of the erector spinae, gluteus medius, iliopsoas, lateral gastrocnemius, lumbar paraspinals and medial gastrocnemius.     Tenderness     Left Hip   Tenderness in the ASIS and PSIS.     Right Hip   Tenderness in the ASIS and PSIS.     Additional Tenderness Details  B ITB hypertonicity and ttp    Neurological Testing     Sensation     Lumbar   Left   Intact: light touch    Right   Intact: light touch    Additional Neurological Details  PN in B feet and B ankle swelling (pitting edema L > R)    Active Range of Motion     Lumbar   Flexion: WFL  Extension: Active lumbar extension: limited to 75% and painful. with pain  Left lateral flexion: Active left lumbar lateral flexion: to distal thighs and painful. with pain  Right lateral flexion: Active right  lumbar lateral flexion: to distal thighs and painful. with pain  Left rotation: Active left lumbar rotation: 50% with pain  Right rotation: Active right lumbar rotation: 50% with pain    Strength/Myotome Testing     Left Hip   Planes of Motion   Flexion: 4    Right Hip   Planes of Motion   Flexion: 4- (painful)      See Exercise, Manual, and Modality Logs for complete treatment.     Assessment & Plan       Assessment  Impairments: abnormal gait, abnormal muscle firing, abnormal muscle tone, abnormal or restricted ROM, activity intolerance, impaired physical strength, lacks appropriate home exercise program, pain with function and weight-bearing intolerance   Functional limitations: lifting, sleeping, walking, uncomfortable because of pain, standing and unable to perform repetitive tasks   Assessment details: The patient is a 63 y.o. female who presents to physical therapy today for chronic low back pain and hip pain/tightness. Upon initial evaluation, the patient demonstrates the following impairments: B glute and lumbar ttp and hypertonicity, lumbar AROM deficits, core stabilization deficits, postural deficits, hip strength deficits. Due to these impairments, the patient is unable to walk, stand for prolonged periods of time, perform ADLs, lift or get restorative rest. The patient would benefit from skilled PT services to address functional limitations and impairments and to improve patient quality of life.        Goals  Plan Goals: LTG 1: 12 weeks:  The patient will report a pain rating of 4 or better in order to improve tolerance to activities of daily living and improve sleep quality.  STG 1a: 4 weeks:  The patient will report a pain rating of 7 or better.    LTG 2: 12 weeks:  The patient will demonstrate 5 /5 strength for B hip flexion, abduction, and extension in order to improve hip stability.  STG 2a: 4 weeks:  The patient will demonstrate 4+ /5 strength for B hip flexion, abduction,and extension.    LTG 3:  12 weeks:  The patient will ambulate without assistive device, independently, for   community distances with minimal limp to the B lower extremity in order to improve mobility and allow patient to perform activities such as grocery shopping with greater ease.  STG 3a: I with HEP        Plan  Therapy options: will be seen for skilled therapy services  Planned modality interventions: cryotherapy, dry needling, TENS, thermotherapy (hydrocollator packs), traction, ultrasound and high voltage pulsed current (pain management)  Planned therapy interventions: manual therapy, neuromuscular re-education, postural training, soft tissue mobilization, spinal/joint mobilization, strengthening, stretching, therapeutic activities, joint mobilization, home exercise program, gait training, flexibility, functional ROM exercises, abdominal trunk stabilization and body mechanics training  Frequency: 2x week  Duration in weeks: 12  Treatment plan discussed with: patient        Visit Diagnoses:    ICD-10-CM ICD-9-CM   1. Chronic bilateral low back pain without sciatica  M54.50 724.2    G89.29 338.29       History # of Personal Factors and/or Comorbidities: MODERATE (1-2)  Examination of Body System(s): # of elements: LOW (1-2)  Clinical Presentation: STABLE   Clinical Decision Making: LOW       Timed:         Manual Therapy:         mins  29306;     Therapeutic Exercise:    10     mins  17647;     Neuromuscular Shelbi:        mins  87032;    Therapeutic Activity:          mins  35610;     Gait Training:           mins  46466;     Ultrasound:          mins  52900;    Ionto                                   mins   36628  Self Care                       5     mins   91753  Canalith Repos         mins 48475      Un-Timed:  Electrical Stimulation:         mins  84935 (MC );  Dry Needling          mins self-pay  Traction          mins 15745  Low Eval     30     Mins  85207  Mod Eval          Mins  82538  High Eval                             Mins  90272  Re-Eval                               mins  38416    Timed Treatment:   15   mins   Total Treatment:     45   mins    PT SIGNATURE: Deloris Renee PT         Initial Certification  Certification Period: 4/11/2024 thru 7/10/2024  I certify that the therapy services are furnished while this patient is under my care.  The services outlined above are required by this patient, and will be reviewed every 90 days.     PHYSICIAN: Dian Samson, DILLON      DATE:     Please sign and return via fax to [phone].. Thank you, [facility].

## 2024-04-15 DIAGNOSIS — J43.1 PANLOBULAR EMPHYSEMA: Primary | ICD-10-CM

## 2024-04-15 DIAGNOSIS — F51.04 PSYCHOPHYSIOLOGICAL INSOMNIA: ICD-10-CM

## 2024-04-15 RX ORDER — BENZONATATE 200 MG/1
200 CAPSULE ORAL 3 TIMES DAILY PRN
Qty: 42 CAPSULE | Refills: 0 | Status: SHIPPED | OUTPATIENT
Start: 2024-04-15

## 2024-04-15 RX ORDER — ZOLPIDEM TARTRATE 10 MG/1
10 TABLET ORAL NIGHTLY PRN
Qty: 30 TABLET | Refills: 2 | OUTPATIENT
Start: 2024-04-15

## 2024-04-15 RX ORDER — ZOLPIDEM TARTRATE 10 MG/1
10 TABLET ORAL NIGHTLY PRN
Qty: 30 TABLET | Refills: 2 | Status: SHIPPED | OUTPATIENT
Start: 2024-04-15

## 2024-04-15 NOTE — TELEPHONE ENCOUNTER
This is a duplicate request.  She sent a request this weekend and I already sent refills early this morning

## 2024-04-18 ENCOUNTER — TREATMENT (OUTPATIENT)
Dept: PHYSICAL THERAPY | Facility: CLINIC | Age: 64
End: 2024-04-18
Payer: MEDICAID

## 2024-04-18 DIAGNOSIS — M79.18 GLUTEAL PAIN: ICD-10-CM

## 2024-04-18 DIAGNOSIS — M54.50 CHRONIC BILATERAL LOW BACK PAIN WITHOUT SCIATICA: Primary | ICD-10-CM

## 2024-04-18 DIAGNOSIS — G89.29 CHRONIC BILATERAL LOW BACK PAIN WITHOUT SCIATICA: Primary | ICD-10-CM

## 2024-04-18 NOTE — PROGRESS NOTES
Physical Therapy Daily Treatment Note    Patient: Carli Dubon   : 1960  Referring practitioner: DILLON Powell  Diagnoses: Chronic bilateral low back pain without sciatica [M54.50, G89.29]  Today's Date: 2024    VISIT#: 2    Subjective Evaluation    History of Present Illness  Mechanism of injury: Pt reports she felt OK after the evaluation as she did not do a whole lot while she was here.  She reports she had to leave for a family emergency before the evaluation was completed and she did not do any exercises that day.    Pain  Current pain ratin       Objective     See Exercise, Manual, and Modality Logs for complete treatment.     Initiated ex and NMR today; pt did not tolerate some ex as noted  Pain rated at 8/10 after exercise today    Trial of IFES at end of session for pain management; pt reported she got some relief from the ES; has had it as part of PT treatment in the past and got relief then as well    Patient Education:  work on ex issued today for HEP once per day for a few days to be sure they do not inc the pain and if they are well tolerated she can inc  to twice per day    Assessment & Plan       Assessment  Assessment details: Pt donita fair today;ex that did not cause a marked inc in pain were issued for HEP      Progress per Plan of Care        Timed:         Manual Therapy:       mins  69715;     Therapeutic Exercise:   20     mins  85055;     Neuromuscular Shelbi:  10      mins  96586;    Therapeutic Activity:          mins  97806;     Gait Training:           mins  25556;     Ultrasound:          mins  00549;    Ionto                                   mins   29632  Self Care                            mins   74271    Un-Timed:  Electrical Stimulation: 18        mins  73198 ( );  Traction          mins 97502  Canalith Repos                   mins  86304  Low Eval          Mins  05603  Mod Eval          Mins  28961  High Eval                            Mins   88969  Re-Eval                               mins  92258    Timed Treatment: 30     mins   Total Treatment:    48    mins    Chelle Matson, PT  Physical Therapist  [place] PT Lic. # [id number]

## 2024-04-23 ENCOUNTER — TREATMENT (OUTPATIENT)
Dept: PHYSICAL THERAPY | Facility: CLINIC | Age: 64
End: 2024-04-23
Payer: MEDICAID

## 2024-04-23 DIAGNOSIS — G89.29 CHRONIC BILATERAL LOW BACK PAIN WITHOUT SCIATICA: Primary | ICD-10-CM

## 2024-04-23 DIAGNOSIS — M79.18 GLUTEAL PAIN: ICD-10-CM

## 2024-04-23 DIAGNOSIS — M54.50 CHRONIC BILATERAL LOW BACK PAIN WITHOUT SCIATICA: Primary | ICD-10-CM

## 2024-04-23 PROCEDURE — 97014 ELECTRIC STIMULATION THERAPY: CPT | Performed by: PHYSICAL THERAPIST

## 2024-04-23 PROCEDURE — 97110 THERAPEUTIC EXERCISES: CPT | Performed by: PHYSICAL THERAPIST

## 2024-04-23 PROCEDURE — 97112 NEUROMUSCULAR REEDUCATION: CPT | Performed by: PHYSICAL THERAPIST

## 2024-04-23 NOTE — PROGRESS NOTES
Physical Therapy Daily Treatment Note     [facility]    [facility] [address]  [place] [address]    Patient:  Carli Dubon  :  1960  Referring practitioner:  DILLON Powell  Date of Initial Visit:  Type: THERAPY  Noted: 2024  Today's Date:  2024      Visit Diagnoses:    ICD-10-CM ICD-9-CM   1. Chronic bilateral low back pain without sciatica  M54.50 724.2    G89.29 338.29   2. Gluteal pain  M79.18 729.1       VISIT#:  3 in POC.    Subjective   Carli Dubon reports she was in a lot of pain after last visit.  She tried her HEP 1x since last appt with no problems during completion of exercises.  Complains of central LBP at start of session with no symptoms going down either LE.  Prefers to end with Estim again today.      Objective   See exercise, manual, and modality logs for complete treatment.       ASSESSMENT  Incr LBP noted after completion of 2nd set of hip abd; pt attributed this to laying Hl'ing for hip add/abd exercises and per completion of abd itself; seated rest break then occurred, which returned pain levels to baseline.    Gentle progressions today per overall low tolerance to exercises last visit and today.    Cues as noted.  Held selected exercises per time/pain.  Felt okay after exercises today.  No complications today.      PLAN  Continue current POC and progress as tolerated per PT evaluation.        Timed:  Therapeutic Exercise:    13     mins  14048;     Neuromuscular Shelbi:    10    mins  65192;        Un-Timed:  Electrical Stimulation:    15     mins  84361 ( );      Timed Treatment:   23   mins   Total Treatment:     38   mins      Edwin Jefferson, PT  [place] License # [id number]  Physical Therapist

## 2024-04-25 ENCOUNTER — TREATMENT (OUTPATIENT)
Dept: PHYSICAL THERAPY | Facility: CLINIC | Age: 64
End: 2024-04-25
Payer: MEDICAID

## 2024-04-25 DIAGNOSIS — M54.50 CHRONIC BILATERAL LOW BACK PAIN WITHOUT SCIATICA: Primary | ICD-10-CM

## 2024-04-25 DIAGNOSIS — M79.18 GLUTEAL PAIN: ICD-10-CM

## 2024-04-25 DIAGNOSIS — G89.29 CHRONIC BILATERAL LOW BACK PAIN WITHOUT SCIATICA: Primary | ICD-10-CM

## 2024-04-25 PROCEDURE — 97112 NEUROMUSCULAR REEDUCATION: CPT | Performed by: PHYSICAL THERAPIST

## 2024-04-25 PROCEDURE — 97110 THERAPEUTIC EXERCISES: CPT | Performed by: PHYSICAL THERAPIST

## 2024-04-25 PROCEDURE — 97014 ELECTRIC STIMULATION THERAPY: CPT | Performed by: PHYSICAL THERAPIST

## 2024-04-25 NOTE — PROGRESS NOTES
Physical Therapy Daily Treatment Note    [address] [facility] [address], [address]  [place] [address]  [phone]    Patient: Carli Dubon  : 1960  Referring practitioner: DILLON Powell  Diagnosis/ ICD10 code: Chronic bilateral low back pain without sciatica [M54.50, G89.29]  Today's Date: 2024    VISIT#: 4    Subjective   Pt reports: rates her pain 7/10 this afternoon. ES provides temp relief.       Objective     See Exercise, Manual, and Modality Logs for complete treatment.     Patient Education:    Assessment & Plan       Assessment  Assessment details: Pt has low tolerance to exercises but tolerated today's rx session well and no increase pain during the exercises. Decrease pain reported after the ES and at conclusion of today's session.           Progress per Plan of Care            Timed:         Manual Therapy:         mins  68871;     Therapeutic Exercise:    13    mins  32645;     Neuromuscular Shelbi:  10      mins  90661;    Therapeutic Activity:          mins  32145;     Gait Training:           mins  96045;     Ultrasound:          mins  91733;    Ionto                                   mins   29947  Self Care                            mins   79077      Un-Timed:  Electrical Stimulation:  18       mins  02530 ( );  Traction          mins 96367  Canal repositioning           mins    85491        Timed Treatment:  23    mins   Total Treatment:     41   mins    Ayo Gilmore PT, CLT  Physical Therapist  [place] License:  # [id number]

## 2024-04-30 ENCOUNTER — TELEPHONE (OUTPATIENT)
Dept: PHYSICAL THERAPY | Facility: CLINIC | Age: 64
End: 2024-04-30

## 2024-04-30 ENCOUNTER — OFFICE VISIT (OUTPATIENT)
Dept: PODIATRY | Facility: CLINIC | Age: 64
End: 2024-04-30
Payer: MEDICAID

## 2024-04-30 VITALS
BODY MASS INDEX: 36.65 KG/M2 | WEIGHT: 220 LBS | RESPIRATION RATE: 20 BRPM | HEIGHT: 65 IN | HEART RATE: 75 BPM | OXYGEN SATURATION: 99 %

## 2024-04-30 DIAGNOSIS — I87.2 VENOUS STASIS DERMATITIS: ICD-10-CM

## 2024-04-30 DIAGNOSIS — E11.65 TYPE 2 DIABETES MELLITUS WITH HYPERGLYCEMIA, WITHOUT LONG-TERM CURRENT USE OF INSULIN: ICD-10-CM

## 2024-04-30 DIAGNOSIS — M19.072 ARTHRITIS OF MIDTARSAL JOINTS OF BOTH FEET: ICD-10-CM

## 2024-04-30 DIAGNOSIS — M19.071 ARTHRITIS OF MIDTARSAL JOINTS OF BOTH FEET: ICD-10-CM

## 2024-04-30 DIAGNOSIS — I87.303 CHRONIC VENOUS HYPERTENSION WITHOUT COMPLICATIONS, BILATERAL: Primary | ICD-10-CM

## 2024-04-30 NOTE — TELEPHONE ENCOUNTER
Caller: Carli Dubon    Relationship: Self       What was the call regarding: NS OVER SLEPT,

## 2024-05-01 ENCOUNTER — TREATMENT (OUTPATIENT)
Dept: PHYSICAL THERAPY | Facility: CLINIC | Age: 64
End: 2024-05-01
Payer: MEDICAID

## 2024-05-01 DIAGNOSIS — G89.29 CHRONIC BILATERAL LOW BACK PAIN WITHOUT SCIATICA: Primary | ICD-10-CM

## 2024-05-01 DIAGNOSIS — M79.18 GLUTEAL PAIN: ICD-10-CM

## 2024-05-01 DIAGNOSIS — M54.50 CHRONIC BILATERAL LOW BACK PAIN WITHOUT SCIATICA: Primary | ICD-10-CM

## 2024-05-01 PROCEDURE — 97014 ELECTRIC STIMULATION THERAPY: CPT | Performed by: PHYSICAL THERAPIST

## 2024-05-01 PROCEDURE — 97112 NEUROMUSCULAR REEDUCATION: CPT | Performed by: PHYSICAL THERAPIST

## 2024-05-01 PROCEDURE — 97110 THERAPEUTIC EXERCISES: CPT | Performed by: PHYSICAL THERAPIST

## 2024-05-01 NOTE — PROGRESS NOTES
PRE-SEDATION ASSESSMENT    CONSENT  Risks, benefits, and alternatives have been discussed with the patient/patient representative, and patient/patient representative agrees to proceed: Yes    MEDICAL HISTORY  Significant medical/surgical history: Yes  Past Complications with Sedation/Anesthesia: No  Significant Family History: No  Smoking History: No  Possible Pregnancy (LMP): No  Cardiac History: No  Respiratory History: No    PHYSICAL EXAM  History and Physical Reviewed: Patient has valid H&P within 30 days. I have reviewed and there are no changes.  Airway Risk History: No previous complications  Airway Anatomy : Class II  Heart : Normal  Lungs : Normal  LOC/Mental Status : Normal    OTHER FINDINGS  Reviewed current medications and allergies: Yes  Pertinent lab/diagnostic test reviewed: Yes    SEDATION RISK ASSESSMENT  Risk Status ASA: Class IV - Incapacitating systemic disease that is a constant threat to life  Plan for Sedation: Moderate Sedation  Indications for Procedure/Pre-Procedure Diagnosis and Planned Procedure: abscess  EKG Monitoring: Yes    NARRATIVE FINDINGS  Narrative Findings: none   Physical Therapy Daily Treatment Note    Patient: Carli Dubon   : 1960  Referring practitioner: DILLON Powell  Diagnoses: Chronic bilateral low back pain without sciatica [M54.50, G89.29]  Today's Date: 2024    VISIT#: 5    Subjective Evaluation    History of Present Illness  Mechanism of injury: Pt reports she is not making any headway with pain from PT.      Pain  Current pain ratin         Objective     See Exercise, Manual, and Modality Logs for complete treatment.     Cont with ex, NMR and ES as noted; pt tolerated ex better today     Patient Education:  keep working on ex at home    Assessment & Plan       Assessment  Assessment details: Octavia well today with better tolerance to ex but pt reporting she does not feel like she is getting better with pain      Progress per Plan of Care        Timed:         Manual Therapy:         mins  47588;     Therapeutic Exercise:  20       mins  48252;     Neuromuscular Shelbi:  10      mins  26664;    Therapeutic Activity:          mins  30070;     Gait Training:           mins  26583;     Ultrasound:          mins  90068;    Ionto                                   mins   08809  Self Care                            mins   62462    Un-Timed:  Electrical Stimulation:  18       mins  35532 ( );  Traction          mins 09384  Canalith Repos                   mins  35322  Low Eval          Mins  82372  Mod Eval          Mins  54468  High Eval                            Mins  05688  Re-Eval                               mins  04399    Timed Treatment:  30    mins   Total Treatment:    48    mins    Chelle Matson PT  Physical Therapist  [place] PT Lic. # [id number]

## 2024-05-01 NOTE — PROGRESS NOTES
04/30/2024  Foot and Ankle Surgery - New Patient   Provider: Dr. Florentin Syed DPM  Location: [facility]    Subjective:  Carli Dubon is a 63 y.o. female.     Chief Complaint   Patient presents with    Left Lower Leg - Follow-up, Skin Problem    Right Lower Leg - Follow-up, Skin Problem    Left Foot - Follow-up, Skin Problem    Right Foot - Follow-up, Skin Problem    Follow-up     ALEX Guerra last pcp visit 02/02/2024       History of Present Illness  The patient presents for evaluation of feet arthritis.    The patient was last evaluated in 09/2023, during which she was advised to utilize compression stockings and schedule a follow-up visit. Despite this, she reports no significant changes in her condition. She denies experiencing significant pain, however, she occasionally experiences mild edema in her feet and ankles. She has been adhering to her prescribed stretching exercises. Additionally, she expresses concern about the discoloration of her feet.    Supplemental Information  She is a diabetic.       Allergies   Allergen Reactions    Iodine Swelling    Adhesive Tape Rash       Past Medical History:   Diagnosis Date    Arthritis of back     Asthma     Bipolar 1 disorder     Chronic obstructive lung disease 01/28/2015    COVID-19 08/15/2021    CTS (carpal tunnel syndrome)     Diabetes mellitus     Difficulty walking 03/2022    Hip arthrosis     Hypertension     Knee swelling     Low back strain     Migraine     Mixed hyperlipidemia 03/03/2020    Neck strain     Pancreatitis     PTSD (post-traumatic stress disorder)     Pulmonary arterial hypertension     Tobacco abuse 06/15/2020       History reviewed. No pertinent surgical history.    Family History   Problem Relation Age of Onset    Hypertension Mother     Diabetes Mother     Asthma Mother     Asthma Daughter     COPD Daughter     Anxiety disorder Daughter     Arthritis Daughter        Social History     Socioeconomic History    Marital status:  Single   Tobacco Use    Smoking status: Every Day     Current packs/day: 1.00     Average packs/day: 1.1 packs/day for 40.0 years (45.0 ttl pk-yrs)     Types: Cigarettes     Passive exposure: Current    Smokeless tobacco: Never   Vaping Use    Vaping status: Never Used   Substance and Sexual Activity    Alcohol use: Never    Drug use: Never    Sexual activity: Not Currently     Partners: Female        Current Outpatient Medications on File Prior to Visit   Medication Sig Dispense Refill    albuterol (ACCUNEB) 1.25 MG/3ML nebulizer solution Take 3 mL by nebulization Every 6 (Six) Hours As Needed for Wheezing or Shortness of Air. Dispense as 1 box of unit dose 1 each 1    albuterol sulfate  (90 Base) MCG/ACT inhaler Inhale 2 puffs Every 4 (Four) Hours As Needed for Wheezing or Shortness of Air. 6.7 g 1    amitriptyline (ELAVIL) 150 MG tablet TAKE 1 TABLET BY MOUTH ONCE DAILY AT NIGHT 90 tablet 0    amLODIPine (Norvasc) 5 MG tablet Take 1 tablet by mouth Daily. 90 tablet 1    atorvastatin (LIPITOR) 40 MG tablet Take 1 tablet by mouth Daily. 90 tablet 0    benzonatate (TESSALON) 200 MG capsule Take 1 capsule by mouth 3 (Three) Times a Day As Needed for Cough. 42 capsule 0    clonazePAM (KlonoPIN) 0.5 MG tablet Take 1 tablet by mouth 2 (Two) Times a Day As Needed for Anxiety. 60 tablet 2    divalproex (DEPAKOTE ER) 500 MG 24 hr tablet Take 2 tablets by mouth 2 (Two) Times a Day. 360 tablet 1    Fluticasone-Umeclidin-Vilant (Trelegy Ellipta) 200-62.5-25 MCG/ACT inhaler Inhale 1 puff Daily. 60 each 2    gabapentin (NEURONTIN) 300 MG capsule Take 1 capsule by mouth Every Night. 30 capsule 0    glipizide (GLUCOTROL) 10 MG tablet TAKE 1 TABLET BY MOUTH TWICE DAILY BEFORE MEAL(S) 180 tablet 0    Jardiance 25 MG tablet tablet Take 1 tablet by mouth once daily 90 tablet 0    losartan (COZAAR) 100 MG tablet Take 1 tablet by mouth Daily. 90 tablet 1    metFORMIN (GLUCOPHAGE) 1000 MG tablet TAKE 1 TABLET BY MOUTH TWICE DAILY  "WITH MEALS 180 tablet 0    nicotine (Nicotine Step 1) 21 MG/24HR patch Place 1 patch on the skin as directed by provider Daily. 30 patch 11    omeprazole (priLOSEC) 40 MG capsule Take 1 capsule by mouth once daily 90 capsule 0    ondansetron ODT (ZOFRAN-ODT) 4 MG disintegrating tablet Place 1 tablet on the tongue Every 6 (Six) Hours As Needed for Nausea. 20 tablet 0    PEG 3350-KCl-NaBcb-NaCl-NaSulf (PEG-3350/Electrolytes) 236 g reconstituted solution TAKE 4,000ML BY MOUTH ONCE FOR 1 DOSE      primidone (MYSOLINE) 50 MG tablet One at hs, may increase to two at hs after two weeks if needed 180 tablet 3    traMADol (ULTRAM) 50 MG tablet Take 1 tablet by mouth Every 8 (Eight) Hours As Needed. for pain      vilazodone (VIIBRYD) 20 MG tablet tablet Take 1 tablet by mouth once daily 90 tablet 1    zolpidem (AMBIEN) 10 MG tablet Take 1 tablet by mouth At Night As Needed for Sleep. 30 tablet 2     No current facility-administered medications on file prior to visit.         Objective   Pulse 75   Resp 20   Ht 165.1 cm (65\")   Wt 99.8 kg (220 lb)   SpO2 99%   BMI 36.61 kg/m²     Foot/Ankle Exam  Physical Exam  Right foot additional comments: General:   Appearance: appears stated age and healthy    Orientation: AAOx3    Affect: appropriate    Gait: unimpaired       VASCULAR       Right Foot Vascularity   Normal vascular exam    Dorsalis pedis:  2+  Posterior tibial:  2+  Skin Temperature: warm    Edema Grading:  None  CFT:  < 3 seconds  Pedal Hair Growth:  Present  Varicosities: none        Left Foot Vascularity   Normal vascular exam    Dorsalis pedis:  2+  Posterior tibial:  2+  Skin Temperature: warm    Edema Grading:  None  CFT:  < 3 seconds  Pedal Hair Growth:  Present  Varicosities: none        NEUROLOGIC      Right Foot Neurologic   Light touch sensation:  Normal  Hot/Cold sensation: normal    Protective Sensation using Clinton-Amina Monofilament:  10  Achilles reflex:  2+      Left Foot Neurologic   Light touch " sensation:  Normal  Hot/cold sensation: normal    Protective Sensation using Carbon-Amina Monofilament:  10  Achilles reflex:  2+      MUSCULOSKELETAL       Right Foot Musculoskeletal   Ecchymosis:  None  Tenderness: arch and dorsal foot    Arch:  Normal      Left Foot Musculoskeletal   Ecchymosis:  None  Tenderness: arch and dorsal foot    Arch:  Normal      MUSCLE STRENGTH      Right Foot Muscle Strength   Normal strength    Foot dorsiflexion:  5  Foot plantar flexion:  5  Foot inversion:  5  Foot eversion:  5      Left Foot Muscle Strength   Normal strength    Foot dorsiflexion:  5  Foot plantar flexion:  5  Foot inversion:  5  Foot eversion:  5      DERMATOLOGIC      Right Foot Dermatologic   Skin: skin intact        Left Foot Dermatologic   Skin:  No significant inflammation or signs of infection involving the left foot or ankle. No substantial swelling noted on exam.    4/30/24: Mild hyperpigmented dermatitis involving the pretibial regions of both lower extremities excess consistent with longstanding venous stasis.  Swelling is well-controlled at this time.  No open wounds or signs of infection involving the lower legs or feet.  No progressive deformity or instability.       Results  Laboratory Studies  Last A1c was slightly elevated.    Imaging  Significant amount of arthritis involving the middle of the foot.       Assessment & Plan   Diagnoses and all orders for this visit:    1. Chronic venous hypertension without complications, bilateral (Primary)    2. Venous stasis dermatitis    3. Type 2 diabetes mellitus with hyperglycemia, without long-term current use of insulin    4. 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 This condition is characterized by pigmentary changes to the skin. The patient was advised against the use of compression stockings due to  their potential to exacerbate her leg condition. She was counseled to wear compression stockings daily. A consultation with a dermatologist was suggested for potential topical treatments to alleviate skin discoloration. Should she experience progressive swelling or more issues, a consultation with a vascular surgeon could be considered. However, if this is not feasible, surgical intervention may be considered as a temporary solution.    The patient exhibits significant arthritis in the middle of her foot, which is likely contributing to her swelling, discomfort, and pain. The patient was advised to maintain good supportive tennis shoes. She was encouraged to continue with her stretching exercises. From a diabetic perspective, she was advised to keep her legs compressed. She was also advised to discuss her diabetes management with her primary care physician. She was also advised to focus on diet and exercise.    Follow-up  The patient is scheduled for a routine foot check with nurse practitioner in 6 months    Reviewed proper basic stretching and manual therapy exercises along with appropriate shoes and activity.  Discussed proper use and/or avoidance of OTC anti-inflammatories.  Patient is to call with any additional issues or concerns.  Greater than 20 minutes was spent before, during, and after evaluation for patient care.           Patient or patient representative verbalized consent for the use of Ambient Listening during the visit with  WIL Syed DPM for chart documentation. 5/1/2024  07:17 EDT    WIL Syed DPM

## 2024-05-06 ENCOUNTER — TREATMENT (OUTPATIENT)
Dept: PHYSICAL THERAPY | Facility: CLINIC | Age: 64
End: 2024-05-06
Payer: MEDICAID

## 2024-05-06 DIAGNOSIS — M54.50 CHRONIC BILATERAL LOW BACK PAIN WITHOUT SCIATICA: Primary | ICD-10-CM

## 2024-05-06 DIAGNOSIS — M79.18 GLUTEAL PAIN: ICD-10-CM

## 2024-05-06 DIAGNOSIS — G89.29 CHRONIC BILATERAL LOW BACK PAIN WITHOUT SCIATICA: Primary | ICD-10-CM

## 2024-05-06 PROCEDURE — 97112 NEUROMUSCULAR REEDUCATION: CPT | Performed by: PHYSICAL THERAPIST

## 2024-05-06 PROCEDURE — 97110 THERAPEUTIC EXERCISES: CPT | Performed by: PHYSICAL THERAPIST

## 2024-05-06 PROCEDURE — 97014 ELECTRIC STIMULATION THERAPY: CPT | Performed by: PHYSICAL THERAPIST

## 2024-05-09 ENCOUNTER — TREATMENT (OUTPATIENT)
Dept: PHYSICAL THERAPY | Facility: CLINIC | Age: 64
End: 2024-05-09
Payer: MEDICAID

## 2024-05-09 DIAGNOSIS — M54.50 CHRONIC BILATERAL LOW BACK PAIN WITHOUT SCIATICA: Primary | ICD-10-CM

## 2024-05-09 DIAGNOSIS — M79.18 GLUTEAL PAIN: ICD-10-CM

## 2024-05-09 DIAGNOSIS — G89.29 CHRONIC BILATERAL LOW BACK PAIN WITHOUT SCIATICA: Primary | ICD-10-CM

## 2024-05-09 DIAGNOSIS — E11.9 TYPE 2 DIABETES MELLITUS WITHOUT COMPLICATION, WITHOUT LONG-TERM CURRENT USE OF INSULIN: ICD-10-CM

## 2024-05-09 RX ORDER — GLIPIZIDE 10 MG/1
10 TABLET ORAL
Qty: 180 TABLET | Refills: 0 | Status: SHIPPED | OUTPATIENT
Start: 2024-05-09

## 2024-05-15 ENCOUNTER — TREATMENT (OUTPATIENT)
Dept: PHYSICAL THERAPY | Facility: CLINIC | Age: 64
End: 2024-05-15
Payer: MEDICAID

## 2024-05-15 DIAGNOSIS — M54.50 CHRONIC BILATERAL LOW BACK PAIN WITHOUT SCIATICA: Primary | ICD-10-CM

## 2024-05-15 DIAGNOSIS — G89.29 CHRONIC BILATERAL LOW BACK PAIN WITHOUT SCIATICA: Primary | ICD-10-CM

## 2024-05-15 DIAGNOSIS — M79.18 GLUTEAL PAIN: ICD-10-CM

## 2024-05-15 PROCEDURE — 97112 NEUROMUSCULAR REEDUCATION: CPT | Performed by: PHYSICAL THERAPIST

## 2024-05-15 PROCEDURE — 97014 ELECTRIC STIMULATION THERAPY: CPT | Performed by: PHYSICAL THERAPIST

## 2024-05-15 PROCEDURE — 97110 THERAPEUTIC EXERCISES: CPT | Performed by: PHYSICAL THERAPIST

## 2024-05-15 NOTE — PROGRESS NOTES
Physical Therapy Daily Treatment Note  [address], [address] [place] [address]     Patient: Carli Dubon   : 1960  Referring practitioner: DILLON Powell  Diagnosis:      ICD-10-CM ICD-9-CM   1. Chronic bilateral low back pain without sciatica  M54.50 724.2    G89.29 338.29   2. Gluteal pain  M79.18 729.1     Date of Initial Visit: Type: THERAPY  Noted: 2024  Today's Date: 5/15/2024    VISIT#: 8          Subjective   Carli Dubon reports: 5/10 pain level today and feels good.      Objective   See Exercise, Manual, and Modality Logs for complete treatment.       Assessment & Plan       Assessment  Assessment details: Pt continues to get relief with estim and MH and performed exercises while this was performed.  Added hip strengthening and stretches with cues for proper form and execution needed.           Progress per Plan of Care and Progress strengthening /stabilization /functional activity           Timed:  Manual Therapy:         mins  44538;  Therapeutic Exercise:    15     mins  38109;     Neuromuscular Shelbi:    10    mins  92518;    Therapeutic Activity:          mins  89203;     Gait Training:           mins  16031;     Ultrasound:          mins  53706;    Electrical Stimulation:         mins  78730 ( );    Untimed:  Electrical Stimulation:    25     mins  64422 ( );  Mechanical Traction:         mins  18410;   Dry needling:       Self pay    Timed Treatment:   25   mins   Total Treatment:     50   mins  Deloris Renee PT, DPT, CLT, CIDN  Physical Therapist

## 2024-05-17 ENCOUNTER — TREATMENT (OUTPATIENT)
Dept: PHYSICAL THERAPY | Facility: CLINIC | Age: 64
End: 2024-05-17
Payer: MEDICAID

## 2024-05-17 DIAGNOSIS — G89.29 CHRONIC BILATERAL LOW BACK PAIN WITHOUT SCIATICA: Primary | ICD-10-CM

## 2024-05-17 DIAGNOSIS — J43.1 PANLOBULAR EMPHYSEMA: ICD-10-CM

## 2024-05-17 DIAGNOSIS — M79.18 GLUTEAL PAIN: ICD-10-CM

## 2024-05-17 DIAGNOSIS — M54.50 CHRONIC BILATERAL LOW BACK PAIN WITHOUT SCIATICA: Primary | ICD-10-CM

## 2024-05-17 PROCEDURE — 97110 THERAPEUTIC EXERCISES: CPT | Performed by: PHYSICAL THERAPIST

## 2024-05-17 PROCEDURE — 97112 NEUROMUSCULAR REEDUCATION: CPT | Performed by: PHYSICAL THERAPIST

## 2024-05-17 PROCEDURE — 97014 ELECTRIC STIMULATION THERAPY: CPT | Performed by: PHYSICAL THERAPIST

## 2024-05-17 RX ORDER — BENZONATATE 200 MG/1
200 CAPSULE ORAL 3 TIMES DAILY PRN
Qty: 45 CAPSULE | Refills: 0 | Status: SHIPPED | OUTPATIENT
Start: 2024-05-17

## 2024-05-17 NOTE — PROGRESS NOTES
Physical Therapy Daily Treatment Note  [address] [facility] [address], [address] [place] [address]     Patient: Carli Dubon   : 1960  Referring practitioner: DILLON Powell  Diagnosis:      ICD-10-CM ICD-9-CM   1. Chronic bilateral low back pain without sciatica  M54.50 724.2    G89.29 338.29   2. Gluteal pain  M79.18 729.1     Date of Initial Visit: Type: THERAPY  Noted: 2024  Today's Date: 2024    VISIT#: [id number]          Subjective   Calri Dubon reports: 7/10 pain level in back today.  She states the pain has traveling to her upper back again.      Objective   See Exercise, Manual, and Modality Logs for complete treatment.       Assessment & Plan       Assessment  Assessment details: Added 90/90 hs stretches with cues for proper form and execution.  Estim and MH provided during exercises and still giving some relief.            Progress per Plan of Care and Progress strengthening /stabilization /functional activity           Timed:  Manual Therapy:         mins  86145;  Therapeutic Exercise:    10     mins  10441;     Neuromuscular Shelbi:    15    mins  87410;    Therapeutic Activity:          mins  38820;     Gait Training:           mins  45347;     Ultrasound:          mins  10177;    Electrical Stimulation:         mins  48695 ( );    Untimed:  Electrical Stimulation:     20    mins  06861 ( );  Mechanical Traction:         mins  76882;   Dry needling:       Self pay    Timed Treatment:   25   mins   Total Treatment:     45   mins  Deloris Renee, PT, DPT, CLT, CIDN  Physical Therapist   Libtayo Pregnancy And Lactation Text: This medication is contraindicated in pregnancy and when breast feeding. Protopic Counseling: Patient may experience a mild burning sensation during topical application. Protopic is not approved in children less than 2 years of age. There have been case reports of hematologic and skin malignancies in patients using topical calcineurin inhibitors although causality is questionable. Oxybutynin Counseling:  I discussed with the patient the risks of oxybutynin including but not limited to skin rash, drowsiness, dry mouth, difficulty urinating, and blurred vision. Metronidazole Pregnancy And Lactation Text: This medication is Pregnancy Category B and considered safe during pregnancy.  It is also excreted in breast milk. Topical Metronidazole Pregnancy And Lactation Text: This medication is Pregnancy Category B and considered safe during pregnancy.  It is also considered safe to use while breastfeeding. Klisyri Pregnancy And Lactation Text: It is unknown if this medication can harm a developing fetus or if it is excreted in breast milk. Rituxan Pregnancy And Lactation Text: This medication is Pregnancy Category C and it isn't know if it is safe during pregnancy. It is unknown if this medication is excreted in breast milk but similar antibodies are known to be excreted. Litfulo Counseling: I discussed with the patient the risks of Litfulo therapy including but not limited to upper respiratory tract infections, shingles, cold sores, and nausea. Live vaccines should be avoided.  This medication has been linked to serious infections; higher rate of mortality; malignancy and lymphoproliferative disorders; major adverse cardiovascular events; thrombosis; gastrointestinal perforations; neutropenia; lymphopenia; anemia; liver enzyme elevations; and lipid elevations. Opioid Pregnancy And Lactation Text: These medications can lead to premature delivery and should be avoided during pregnancy. These medications are also present in breast milk in small amounts. Valtrex Counseling: I discussed with the patient the risks of valacyclovir including but not limited to kidney damage, nausea, vomiting and severe allergy.  The patient understands that if the infection seems to be worsening or is not improving, they are to call. Hyrimoz Counseling:  I discussed with the patient the risks of adalimumab including but not limited to myelosuppression, immunosuppression, autoimmune hepatitis, demyelinating diseases, lymphoma, and serious infections.  The patient understands that monitoring is required including a PPD at baseline and must alert us or the primary physician if symptoms of infection or other concerning signs are noted. Xelakikoz Pregnancy And Lactation Text: This medication is Pregnancy Category D and is not considered safe during pregnancy.  The risk during breast feeding is also uncertain. Birth Control Pills Counseling: Birth Control Pill Counseling: I discussed with the patient the potential side effects of OCPs including but not limited to increased risk of stroke, heart attack, thrombophlebitis, deep venous thrombosis, hepatic adenomas, breast changes, GI upset, headaches, and depression.  The patient verbalized understanding of the proper use and possible adverse effects of OCPs. All of the patient's questions and concerns were addressed. Azelaic Acid Counseling: Patient counseled that medicine may cause skin irritation and to avoid applying near the eyes.  In the event of skin irritation, the patient was advised to reduce the amount of the drug applied or use it less frequently.   The patient verbalized understanding of the proper use and possible adverse effects of azelaic acid.  All of the patient's questions and concerns were addressed. Cyclophosphamide Pregnancy And Lactation Text: This medication is Pregnancy Category D and it isn't considered safe during pregnancy. This medication is excreted in breast milk. Niacinamide Pregnancy And Lactation Text: These medications are considered safe during pregnancy. Cimzia Pregnancy And Lactation Text: This medication crosses the placenta but can be considered safe in certain situations. Cimzia may be excreted in breast milk. Cephalexin Counseling: I counseled the patient regarding use of cephalexin as an antibiotic for prophylactic and/or therapeutic purposes. Cephalexin (commonly prescribed under brand name Keflex) is a cephalosporin antibiotic which is active against numerous classes of bacteria, including most skin bacteria. Side effects may include nausea, diarrhea, gastrointestinal upset, rash, hives, yeast infections, and in rare cases, hepatitis, kidney disease, seizures, fever, confusion, neurologic symptoms, and others. Patients with severe allergies to penicillin medications are cautioned that there is about a 10% incidence of cross-reactivity with cephalosporins. When possible, patients with penicillin allergies should use alternatives to cephalosporins for antibiotic therapy. Albendazole Counseling:  I discussed with the patient the risks of albendazole including but not limited to cytopenia, kidney damage, nausea/vomiting and severe allergy.  The patient understands that this medication is being used in an off-label manner. Elidel Counseling: Patient may experience a mild burning sensation during topical application. Elidel is not approved in children less than 2 years of age. There have been case reports of hematologic and skin malignancies in patients using topical calcineurin inhibitors although causality is questionable. Terbinafine Counseling: Patient counseling regarding adverse effects of terbinafine including but not limited to headache, diarrhea, rash, upset stomach, liver function test abnormalities, itching, taste/smell disturbance, nausea, abdominal pain, and flatulence.  There is a rare possibility of liver failure that can occur when taking terbinafine.  The patient understands that a baseline LFT and kidney function test may be required. The patient verbalized understanding of the proper use and possible adverse effects of terbinafine.  All of the patient's questions and concerns were addressed. Gabapentin Counseling: I discussed with the patient the risks of gabapentin including but not limited to dizziness, somnolence, fatigue and ataxia. Topical Retinoid counseling:  Patient advised to apply a pea-sized amount only at bedtime and wait 30 minutes after washing their face before applying.  If too drying, patient may add a non-comedogenic moisturizer. The patient verbalized understanding of the proper use and possible adverse effects of retinoids.  All of the patient's questions and concerns were addressed. Tetracycline Pregnancy And Lactation Text: This medication is Pregnancy Category D and not consider safe during pregnancy. It is also excreted in breast milk. Protopic Pregnancy And Lactation Text: This medication is Pregnancy Category C. It is unknown if this medication is excreted in breast milk when applied topically. Minocycline Counseling: Patient advised regarding possible photosensitivity and discoloration of the teeth, skin, lips, tongue and gums.  Patient instructed to avoid sunlight, if possible.  When exposed to sunlight, patients should wear protective clothing, sunglasses, and sunscreen.  The patient was instructed to call the office immediately if the following severe adverse effects occur:  hearing changes, easy bruising/bleeding, severe headache, or vision changes.  The patient verbalized understanding of the proper use and possible adverse effects of minocycline.  All of the patient's questions and concerns were addressed. Taltz Pregnancy And Lactation Text: The risk during pregnancy and breastfeeding is uncertain with this medication. Oxybutynin Pregnancy And Lactation Text: This medication is Pregnancy Category B and is considered safe during pregnancy. It is unknown if it is excreted in breast milk. Hyrimoz Pregnancy And Lactation Text: This medication is Pregnancy Category B and is considered safe during pregnancy. It is unknown if this medication is excreted in breast milk. Cyclosporine Counseling:  I discussed with the patient the risks of cyclosporine including but not limited to hypertension, gingival hyperplasia,myelosuppression, immunosuppression, liver damage, kidney damage, neurotoxicity, lymphoma, and serious infections. The patient understands that monitoring is required including baseline blood pressure, CBC, CMP, lipid panel and uric acid, and then 1-2 times monthly CMP and blood pressure. Nsaids Counseling: NSAID Counseling: I discussed with the patient that NSAIDs should be taken with food. Prolonged use of NSAIDs can result in the development of stomach ulcers.  Patient advised to stop taking NSAIDs if abdominal pain occurs.  The patient verbalized understanding of the proper use and possible adverse effects of NSAIDs.  All of the patient's questions and concerns were addressed. Azelaic Acid Pregnancy And Lactation Text: This medication is considered safe during pregnancy and breast feeding. Minoxidil Counseling: Minoxidil is a topical medication which can increase blood flow where it is applied. It is uncertain how this medication increases hair growth. Side effects are uncommon and include stinging and allergic reactions. Odomzo Counseling- I discussed with the patient the risks of Odomzo including but not limited to nausea, vomiting, diarrhea, constipation, weight loss, changes in the sense of taste, decreased appetite, muscle spasms, and hair loss.  The patient verbalized understanding of the proper use and possible adverse effects of Odomzo.  All of the patient's questions and concerns were addressed. Topical Steroids Counseling: I discussed with the patient that prolonged use of topical steroids can result in the increased appearance of superficial blood vessels (telangiectasias), lightening (hypopigmentation) and thinning of the skin (atrophy).  Patient understands to avoid using high potency steroids in skin folds, the groin or the face.  The patient verbalized understanding of the proper use and possible adverse effects of topical steroids.  All of the patient's questions and concerns were addressed. Birth Control Pills Pregnancy And Lactation Text: This medication should be avoided if pregnant and for the first 30 days post-partum. Siliq Counseling:  I discussed with the patient the risks of Siliq including but not limited to new or worsening depression, suicidal thoughts and behavior, immunosuppression, malignancy, posterior leukoencephalopathy syndrome, and serious infections.  The patient understands that monitoring is required including a PPD at baseline and must alert us or the primary physician if symptoms of infection or other concerning signs are noted. There is also a special program designed to monitor depression which is required with Siliq. Cephalexin Pregnancy And Lactation Text: This medication is Pregnancy Category B and considered safe during pregnancy.  It is also excreted in breast milk but can be used safely for shorter doses. Albendazole Pregnancy And Lactation Text: This medication is Pregnancy Category C and it isn't known if it is safe during pregnancy. It is also excreted in breast milk. Litfulo Pregnancy And Lactation Text: Based on animal studies, Lifulo may cause embryo-fetal harm when administered to pregnant women.  The medication should not be used in pregnancy.  Breastfeeding is not recommended during treatment. Acitretin Counseling:  I discussed with the patient the risks of acitretin including but not limited to hair loss, dry lips/skin/eyes, liver damage, hyperlipidemia, depression/suicidal ideation, photosensitivity.  Serious rare side effects can include but are not limited to pancreatitis, pseudotumor cerebri, bony changes, clot formation/stroke/heart attack.  Patient understands that alcohol is contraindicated since it can result in liver toxicity and significantly prolong the elimination of the drug by many years. Cosentyx Counseling:  I discussed with the patient the risks of Cosentyx including but not limited to worsening of Crohn's disease, immunosuppression, allergic reactions and infections.  The patient understands that monitoring is required including a PPD at baseline and must alert us or the primary physician if symptoms of infection or other concerning signs are noted. Valtrex Pregnancy And Lactation Text: this medication is Pregnancy Category B and is considered safe during pregnancy. This medication is not directly found in breast milk but it's metabolite acyclovir is present. Gabapentin Pregnancy And Lactation Text: This medication is Pregnancy Category C and isn't considered safe during pregnancy. It is excreted in breast milk. Propranolol Counseling:  I discussed with the patient the risks of propranolol including but not limited to low heart rate, low blood pressure, low blood sugar, restlessness and increased cold sensitivity. They should call the office if they experience any of these side effects. Arava Counseling:  Patient counseled regarding adverse effects of Arava including but not limited to nausea, vomiting, abnormalities in liver function tests. Patients may develop mouth sores, rash, diarrhea, and abnormalities in blood counts. The patient understands that monitoring is required including LFTs and blood counts.  There is a rare possibility of scarring of the liver and lung problems that can occur when taking methotrexate. Persistent nausea, loss of appetite, pale stools, dark urine, cough, and shortness of breath should be reported immediately. Patient advised to discontinue Arava treatment and consult with a physician prior to attempting conception. The patient will have to undergo a treatment to eliminate Arava from the body prior to conception. Topical Retinoid Pregnancy And Lactation Text: This medication is Pregnancy Category C. It is unknown if this medication is excreted in breast milk. Terbinafine Pregnancy And Lactation Text: This medication is Pregnancy Category B and is considered safe during pregnancy. It is also excreted in breast milk and breast feeding isn't recommended. Tremfya Counseling: I discussed with the patient the risks of guselkumab including but not limited to immunosuppression, serious infections, and drug reactions.  The patient understands that monitoring is required including a PPD at baseline and must alert us or the primary physician if symptoms of infection or other concerning signs are noted. Odomzo Pregnancy And Lactation Text: This medication is Pregnancy Category X and is absolutely contraindicated during pregnancy. It is unknown if it is excreted in breast milk. Qbrexza Counseling:  I discussed with the patient the risks of Qbrexza including but not limited to headache, mydriasis, blurred vision, dry eyes, nasal dryness, dry mouth, dry throat, dry skin, urinary hesitation, and constipation.  Local skin reactions including erythema, burning, stinging, and itching can also occur. Spironolactone Counseling: Patient advised regarding risks of diarrhea, abdominal pain, hyperkalemia, birth defects (for female patients), liver toxicity and renal toxicity. The patient may need blood work to monitor liver and kidney function and potassium levels while on therapy. The patient verbalized understanding of the proper use and possible adverse effects of spironolactone.  All of the patient's questions and concerns were addressed. Topical Steroids Applications Pregnancy And Lactation Text: Most topical steroids are considered safe to use during pregnancy and lactation.  Any topical steroid applied to the breast or nipple should be washed off before breastfeeding. Ilumya Counseling: I discussed with the patient the risks of tildrakizumab including but not limited to immunosuppression, malignancy, posterior leukoencephalopathy syndrome, and serious infections.  The patient understands that monitoring is required including a PPD at baseline and must alert us or the primary physician if symptoms of infection or other concerning signs are noted. Fluconazole Counseling:  Patient counseled regarding adverse effects of fluconazole including but not limited to headache, diarrhea, nausea, upset stomach, liver function test abnormalities, taste disturbance, and stomach pain.  There is a rare possibility of liver failure that can occur when taking fluconazole.  The patient understands that monitoring of LFTs and kidney function test may be required, especially at baseline. The patient verbalized understanding of the proper use and possible adverse effects of fluconazole.  All of the patient's questions and concerns were addressed. Use Enhanced Medication Counseling?: No Minoxidil Pregnancy And Lactation Text: This medication has not been assigned a Pregnancy Risk Category but animal studies failed to show danger with the topical medication. It is unknown if the medication is excreted in breast milk. VTAMA Counseling: I discussed with the patient that VTAMA is not for use in the eyes, mouth or mouth. They should call the office if they develop any signs of allergic reactions to VTAMA. The patient verbalized understanding of the proper use and possible adverse effects of VTAMA.  All of the patient's questions and concerns were addressed. Benzoyl Peroxide Counseling: Patient counseled that medicine may cause skin irritation and bleach clothing.  In the event of skin irritation, the patient was advised to reduce the amount of the drug applied or use it less frequently.   The patient verbalized understanding of the proper use and possible adverse effects of benzoyl peroxide.  All of the patient's questions and concerns were addressed. Cyclosporine Pregnancy And Lactation Text: This medication is Pregnancy Category C and it isn't know if it is safe during pregnancy. This medication is excreted in breast milk. Nsaids Pregnancy And Lactation Text: These medications are considered safe up to 30 weeks gestation. It is excreted in breast milk. Ivermectin Counseling:  Patient instructed to take medication on an empty stomach with a full glass of water.  Patient informed of potential adverse effects including but not limited to nausea, diarrhea, dizziness, itching, and swelling of the extremities or lymph nodes.  The patient verbalized understanding of the proper use and possible adverse effects of ivermectin.  All of the patient's questions and concerns were addressed. Clindamycin Counseling: I counseled the patient regarding use of clindamycin as an antibiotic for prophylactic and/or therapeutic purposes. Clindamycin is active against numerous classes of bacteria, including skin bacteria. Side effects may include nausea, diarrhea, gastrointestinal upset, rash, hives, yeast infections, and in rare cases, colitis. Eucrisa Counseling: Patient may experience a mild burning sensation during topical application. Eucrisa is not approved in children less than 3 months of age. Tazorac Counseling:  Patient advised that medication is irritating and drying.  Patient may need to apply sparingly and wash off after an hour before eventually leaving it on overnight.  The patient verbalized understanding of the proper use and possible adverse effects of tazorac.  All of the patient's questions and concerns were addressed. Propranolol Pregnancy And Lactation Text: This medication is Pregnancy Category C and it isn't known if it is safe during pregnancy. It is excreted in breast milk. Olumiant Counseling: I discussed with the patient the risks of Olumiant therapy including but not limited to upper respiratory tract infections, shingles, cold sores, and nausea. Live vaccines should be avoided.  This medication has been linked to serious infections; higher rate of mortality; malignancy and lymphoproliferative disorders; major adverse cardiovascular events; thrombosis; gastrointestinal perforations; neutropenia; lymphopenia; anemia; liver enzyme elevations; and lipid elevations. Acitretin Pregnancy And Lactation Text: This medication is Pregnancy Category X and should not be given to women who are pregnant or may become pregnant in the future. This medication is excreted in breast milk. Glycopyrrolate Counseling:  I discussed with the patient the risks of glycopyrrolate including but not limited to skin rash, drowsiness, dry mouth, difficulty urinating, and blurred vision. Fluconazole Pregnancy And Lactation Text: This medication is Pregnancy Category C and it isn't know if it is safe during pregnancy. It is also excreted in breast milk. Qbrexza Pregnancy And Lactation Text: There is no available data on Qbrexza use in pregnant women.  There is no available data on Qbrexza use in lactation. Quinolones Counseling:  I discussed with the patient the risks of fluoroquinolones including but not limited to GI upset, allergic reaction, drug rash, diarrhea, dizziness, photosensitivity, yeast infections, liver function test abnormalities, tendonitis/tendon rupture. Topical Sulfur Applications Counseling: Topical Sulfur Counseling: Patient counseled that this medication may cause skin irritation or allergic reactions.  In the event of skin irritation, the patient was advised to reduce the amount of the drug applied or use it less frequently.   The patient verbalized understanding of the proper use and possible adverse effects of topical sulfur application.  All of the patient's questions and concerns were addressed. Simponi Counseling:  I discussed with the patient the risks of golimumab including but not limited to myelosuppression, immunosuppression, autoimmune hepatitis, demyelinating diseases, lymphoma, and serious infections.  The patient understands that monitoring is required including a PPD at baseline and must alert us or the primary physician if symptoms of infection or other concerning signs are noted. Spironolactone Pregnancy And Lactation Text: This medication can cause feminization of the male fetus and should be avoided during pregnancy. The active metabolite is also found in breast milk. Clindamycin Pregnancy And Lactation Text: This medication can be used in pregnancy if certain situations. Clindamycin is also present in breast milk. Cimetidine Counseling:  I discussed with the patient the risks of Cimetidine including but not limited to gynecomastia, headache, diarrhea, nausea, drowsiness, arrhythmias, pancreatitis, skin rashes, psychosis, bone marrow suppression and kidney toxicity. Olanzapine Counseling- I discussed with the patient the common side effects of olanzapine including but are not limited to: lack of energy, dry mouth, increased appetite, sleepiness, tremor, constipation, dizziness, changes in behavior, or restlessness.  Explained that teenagers are more likely to experience headaches, abdominal pain, pain in the arms or legs, tiredness, and sleepiness.  Serious side effects include but are not limited: increased risk of death in elderly patients who are confused, have memory loss, or dementia-related psychosis; hyperglycemia; increased cholesterol and triglycerides; and weight gain. Vtama Pregnancy And Lactation Text: It is unknown if this medication can cause problems during pregnancy and breastfeeding. Benzoyl Peroxide Pregnancy And Lactation Text: This medication is Pregnancy Category C. It is unknown if benzoyl peroxide is excreted in breast milk. Dupixent Counseling: I discussed with the patient the risks of dupilumab including but not limited to eye infection and irritation, cold sores, injection site reactions, worsening of asthma, allergic reactions and increased risk of parasitic infection.  Live vaccines should be avoided while taking dupilumab. Dupilumab will also interact with certain medications such as warfarin and cyclosporine. The patient understands that monitoring is required and they must alert us or the primary physician if symptoms of infection or other concerning signs are noted. Bexarotene Counseling:  I discussed with the patient the risks of bexarotene including but not limited to hair loss, dry lips/skin/eyes, liver abnormalities, hyperlipidemia, pancreatitis, depression/suicidal ideation, photosensitivity, drug rash/allergic reactions, hypothyroidism, anemia, leukopenia, infection, cataracts, and teratogenicity.  Patient understands that they will need regular blood tests to check lipid profile, liver function tests, white blood cell count, thyroid function tests and pregnancy test if applicable. Glycopyrrolate Pregnancy And Lactation Text: This medication is Pregnancy Category B and is considered safe during pregnancy. It is unknown if it is excreted breast milk. Mirvaso Counseling: Mirvaso is a topical medication which can decrease superficial blood flow where applied. Side effects are uncommon and include stinging, redness and allergic reactions. Methotrexate Counseling:  Patient counseled regarding adverse effects of methotrexate including but not limited to nausea, vomiting, abnormalities in liver function tests. Patients may develop mouth sores, rash, diarrhea, and abnormalities in blood counts. The patient understands that monitoring is required including LFT's and blood counts.  There is a rare possibility of scarring of the liver and lung problems that can occur when taking methotrexate. Persistent nausea, loss of appetite, pale stools, dark urine, cough, and shortness of breath should be reported immediately. Patient advised to discontinue methotrexate treatment at least three months before attempting to become pregnant.  I discussed the need for folate supplements while taking methotrexate.  These supplements can decrease side effects during methotrexate treatment. The patient verbalized understanding of the proper use and possible adverse effects of methotrexate.  All of the patient's questions and concerns were addressed. Tazorac Pregnancy And Lactation Text: This medication is not safe during pregnancy. It is unknown if this medication is excreted in breast milk. Detail Level: Simple Dutasteride Male Counseling: Dustasteride Counseling:  I discussed with the patient the risks of use of dutasteride including but not limited to decreased libido, decreased ejaculate volume, and gynecomastia. Women who can become pregnant should not handle medication.  All of the patient's questions and concerns were addressed. Olumiant Pregnancy And Lactation Text: Based on animal studies, Olumiant may cause embryo-fetal harm when administered to pregnant women.  The medication should not be used in pregnancy.  Breastfeeding is not recommended during treatment. Xolair Counseling:  Patient informed of potential adverse effects including but not limited to fever, muscle aches, rash and allergic reactions.  The patient verbalized understanding of the proper use and possible adverse effects of Xolair.  All of the patient's questions and concerns were addressed. SSKI Counseling:  I discussed with the patient the risks of SSKI including but not limited to thyroid abnormalities, metallic taste, GI upset, fever, headache, acne, arthralgias, paraesthesias, lymphadenopathy, easy bleeding, arrhythmias, and allergic reaction. Olanzapine Pregnancy And Lactation Text: This medication is pregnancy category C.   There are no adequate and well controlled trials with olanzapine in pregnant females.  Olanzapine should be used during pregnancy only if the potential benefit justifies the potential risk to the fetus.   In a study in lactating healthy women, olanzapine was excreted in breast milk.  It is recommended that women taking olanzapine should not breast feed. Rhofade Counseling: Rhofade is a topical medication which can decrease superficial blood flow where applied. Side effects are uncommon and include stinging, redness and allergic reactions. Zoryve Counseling:  I discussed with the patient that Zoryve is not for use in the eyes, mouth or vagina. The most commonly reported side effects include diarrhea, headache, insomnia, application site pain, upper respiratory tract infections, and urinary tract infections.  All of the patient's questions and concerns were addressed. Griseofulvin Counseling:  I discussed with the patient the risks of griseofulvin including but not limited to photosensitivity, cytopenia, liver damage, nausea/vomiting and severe allergy.  The patient understands that this medication is best absorbed when taken with a fatty meal (e.g., ice cream or french fries). Clofazimine Counseling:  I discussed with the patient the risks of clofazimine including but not limited to skin and eye pigmentation, liver damage, nausea/vomiting, gastrointestinal bleeding and allergy. Topical Sulfur Applications Pregnancy And Lactation Text: This medication is considered safe during pregnancy and breast feeding secondary to limited systemic absorption. Doxycycline Counseling:  Patient counseled regarding possible photosensitivity and increased risk for sunburn.  Patient instructed to avoid sunlight, if possible.  When exposed to sunlight, patients should wear protective clothing, sunglasses, and sunscreen.  The patient was instructed to call the office immediately if the following severe adverse effects occur:  hearing changes, easy bruising/bleeding, severe headache, or vision changes.  The patient verbalized understanding of the proper use and possible adverse effects of doxycycline.  All of the patient's questions and concerns were addressed. Rinvoq Counseling: I discussed with the patient the risks of Rinvoq therapy including but not limited to upper respiratory tract infections, shingles, cold sores, bronchitis, nausea, cough, fever, acne, and headache. Live vaccines should be avoided.  This medication has been linked to serious infections; higher rate of mortality; malignancy and lymphoproliferative disorders; major adverse cardiovascular events; thrombosis; thrombocytopenia, anemia, and neutropenia; lipid elevations; liver enzyme elevations; and gastrointestinal perforations. Isotretinoin Pregnancy And Lactation Text: This medication is Pregnancy Category X and is considered extremely dangerous during pregnancy. It is unknown if it is excreted in breast milk. Mirvaso Pregnancy And Lactation Text: This medication has not been assigned a Pregnancy Risk Category. It is unknown if the medication is excreted in breast milk. Dutasteride Female Counseling: Dutasteride Counseling:  I discussed with the patient the risks of use of dutasteride including but not limited to decreased libido and sexual dysfunction. Explained the teratogenic nature of the medication and stressed the importance of not getting pregnant during treatment. All of the patient's questions and concerns were addressed. Bexarotene Pregnancy And Lactation Text: This medication is Pregnancy Category X and should not be given to women who are pregnant or may become pregnant. This medication should not be used if you are breast feeding. Infliximab Counseling:  I discussed with the patient the risks of infliximab including but not limited to myelosuppression, immunosuppression, autoimmune hepatitis, demyelinating diseases, lymphoma, and serious infections.  The patient understands that monitoring is required including a PPD at baseline and must alert us or the primary physician if symptoms of infection or other concerning signs are noted. Topical Clindamycin Counseling: Patient counseled that this medication may cause skin irritation or allergic reactions.  In the event of skin irritation, the patient was advised to reduce the amount of the drug applied or use it less frequently.   The patient verbalized understanding of the proper use and possible adverse effects of clindamycin.  All of the patient's questions and concerns were addressed. Dupixent Pregnancy And Lactation Text: This medication likely crosses the placenta but the risk for the fetus is uncertain. This medication is excreted in breast milk. Carac Counseling:  I discussed with the patient the risks of Carac including but not limited to erythema, scaling, itching, weeping, crusting, and pain. Methotrexate Pregnancy And Lactation Text: This medication is Pregnancy Category X and is known to cause fetal harm. This medication is excreted in breast milk. Sski Pregnancy And Lactation Text: This medication is Pregnancy Category D and isn't considered safe during pregnancy. It is excreted in breast milk. Hydroquinone Counseling:  Patient advised that medication may result in skin irritation, lightening (hypopigmentation), dryness, and burning.  In the event of skin irritation, the patient was advised to reduce the amount of the drug applied or use it less frequently.  Rarely, spots that are treated with hydroquinone can become darker (pseudoochronosis).  Should this occur, patient instructed to stop medication and call the office. The patient verbalized understanding of the proper use and possible adverse effects of hydroquinone.  All of the patient's questions and concerns were addressed. Hydroxychloroquine Counseling:  I discussed with the patient that a baseline ophthalmologic exam is needed at the start of therapy and every year thereafter while on therapy. A CBC may also be warranted for monitoring.  The side effects of this medication were discussed with the patient, including but not limited to agranulocytosis, aplastic anemia, seizures, rashes, retinopathy, and liver toxicity. Patient instructed to call the office should any adverse effect occur.  The patient verbalized understanding of the proper use and possible adverse effects of Plaquenil.  All the patient's questions and concerns were addressed. Azathioprine Counseling:  I discussed with the patient the risks of azathioprine including but not limited to myelosuppression, immunosuppression, hepatotoxicity, lymphoma, and infections.  The patient understands that monitoring is required including baseline LFTs, Creatinine, possible TPMP genotyping and weekly CBCs for the first month and then every 2 weeks thereafter.  The patient verbalized understanding of the proper use and possible adverse effects of azathioprine.  All of the patient's questions and concerns were addressed. Rifampin Counseling: I discussed with the patient the risks of rifampin including but not limited to liver damage, kidney damage, red-orange body fluids, nausea/vomiting and severe allergy. Adbry Counseling: I discussed with the patient the risks of tralokinumab including but not limited to eye infection and irritation, cold sores, injection site reactions, worsening of asthma, allergic reactions and increased risk of parasitic infection.  Live vaccines should be avoided while taking tralokinumab. The patient understands that monitoring is required and they must alert us or the primary physician if symptoms of infection or other concerning signs are noted. Xolair Pregnancy And Lactation Text: This medication is Pregnancy Category B and is considered safe during pregnancy. This medication is excreted in breast milk. Doxepin Counseling:  Patient advised that the medication is sedating and not to drive a car after taking this medication. Patient informed of potential adverse effects including but not limited to dry mouth, urinary retention, and blurry vision.  The patient verbalized understanding of the proper use and possible adverse effects of doxepin.  All of the patient's questions and concerns were addressed. Prednisone Counseling:  I discussed with the patient the risks of prolonged use of prednisone including but not limited to weight gain, insomnia, osteoporosis, mood changes, diabetes, susceptibility to infection, glaucoma and high blood pressure.  In cases where prednisone use is prolonged, patients should be monitored with blood pressure checks, serum glucose levels and an eye exam.  Additionally, the patient may need to be placed on GI prophylaxis, PCP prophylaxis, and calcium and vitamin D supplementation and/or a bisphosphonate.  The patient verbalized understanding of the proper use and the possible adverse effects of prednisone.  All of the patient's questions and concerns were addressed. Rinvoq Pregnancy And Lactation Text: Based on animal studies, Rinvoq may cause embryo-fetal harm when administered to pregnant women.  The medication should not be used in pregnancy.  Breastfeeding is not recommended during treatment and for 6 days after the last dose. Griseofulvin Pregnancy And Lactation Text: This medication is Pregnancy Category X and is known to cause serious birth defects. It is unknown if this medication is excreted in breast milk but breast feeding should be avoided. Oral Minoxidil Counseling- I discussed with the patient the risks of oral minoxidil including but not limited to shortness of breath, swelling of the feet or ankles, dizziness, lightheadedness, unwanted hair growth and allergic reaction.  The patient verbalized understanding of the proper use and possible adverse effects of oral minoxidil.  All of the patient's questions and concerns were addressed. Opzelura Counseling:  I discussed with the patient the risks of Opzelura including but not limited to nasopharngitis, bronchitis, ear infection, eosinophila, hives, diarrhea, folliculitis, tonsillitis, and rhinorrhea.  Taken orally, this medication has been linked to serious infections; higher rate of mortality; malignancy and lymphoproliferative disorders; major adverse cardiovascular events; thrombosis; thrombocytopenia, anemia, and neutropenia; and lipid elevations. Carac Pregnancy And Lactation Text: This medication is Pregnancy Category X and contraindicated in pregnancy and in women who may become pregnant. It is unknown if this medication is excreted in breast milk. Doxycycline Pregnancy And Lactation Text: This medication is Pregnancy Category D and not consider safe during pregnancy. It is also excreted in breast milk but is considered safe for shorter treatment courses. Wartpeel Counseling:  I discussed with the patient the risks of Wartpeel including but not limited to erythema, scaling, itching, weeping, crusting, and pain. Skyrizi Counseling: I discussed with the patient the risks of risankizumab-rzaa including but not limited to immunosuppression, and serious infections.  The patient understands that monitoring is required including a PPD at baseline and must alert us or the primary physician if symptoms of infection or other concerning signs are noted. High Dose Vitamin A Counseling: Side effects reviewed, pt to contact office should one occur. Dutasteride Pregnancy And Lactation Text: This medication is absolutely contraindicated in women, especially during pregnancy and breast feeding. Feminization of male fetuses is possible if taking while pregnant. Isotretinoin Counseling: Patient should get monthly blood tests, not donate blood, not drive at night if vision affected, not share medication, and not undergo elective surgery for 6 months after tx completed. Side effects reviewed, pt to contact office should one occur. Enbrel Counseling:  I discussed with the patient the risks of etanercept including but not limited to myelosuppression, immunosuppression, autoimmune hepatitis, demyelinating diseases, lymphoma, and infections.  The patient understands that monitoring is required including a PPD at baseline and must alert us or the primary physician if symptoms of infection or other concerning signs are noted. Hydroxychloroquine Pregnancy And Lactation Text: This medication has been shown to cause fetal harm but it isn't assigned a Pregnancy Risk Category. There are small amounts excreted in breast milk. Adbry Pregnancy And Lactation Text: It is unknown if this medication will adversely affect pregnancy or breast feeding. Thalidomide Counseling: I discussed with the patient the risks of thalidomide including but not limited to birth defects, anxiety, weakness, chest pain, dizziness, cough and severe allergy. Colchicine Counseling:  Patient counseled regarding adverse effects including but not limited to stomach upset (nausea, vomiting, stomach pain, or diarrhea).  Patient instructed to limit alcohol consumption while taking this medication.  Colchicine may reduce blood counts especially with prolonged use.  The patient understands that monitoring of kidney function and blood counts may be required, especially at baseline. The patient verbalized understanding of the proper use and possible adverse effects of colchicine.  All of the patient's questions and concerns were addressed. Azathioprine Pregnancy And Lactation Text: This medication is Pregnancy Category D and isn't considered safe during pregnancy. It is unknown if this medication is excreted in breast milk. Solaraze Counseling:  I discussed with the patient the risks of Solaraze including but not limited to erythema, scaling, itching, weeping, crusting, and pain. Rifampin Pregnancy And Lactation Text: This medication is Pregnancy Category C and it isn't know if it is safe during pregnancy. It is also excreted in breast milk and should not be used if you are breast feeding. Erythromycin Counseling:  I discussed with the patient the risks of erythromycin including but not limited to GI upset, allergic reaction, drug rash, diarrhea, increase in liver enzymes, and yeast infections. Doxepin Pregnancy And Lactation Text: This medication is Pregnancy Category C and it isn't known if it is safe during pregnancy. It is also excreted in breast milk and breast feeding isn't recommended. High Dose Vitamin A Pregnancy And Lactation Text: High dose vitamin A therapy is contraindicated during pregnancy and breast feeding. Oral Minoxidil Pregnancy And Lactation Text: This medication should only be used when clearly needed if you are pregnant, attempting to become pregnant or breast feeding. Itraconazole Counseling:  I discussed with the patient the risks of itraconazole including but not limited to liver damage, nausea/vomiting, neuropathy, and severe allergy.  The patient understands that this medication is best absorbed when taken with acidic beverages such as non-diet cola or ginger ale.  The patient understands that monitoring is required including baseline LFTs and repeat LFTs at intervals.  The patient understands that they are to contact us or the primary physician if concerning signs are noted. Low Dose Naltrexone Counseling- I discussed with the patient the potential risks and side effects of low dose naltrexone including but not limited to: more vivid dreams, headaches, nausea, vomiting, abdominal pain, fatigue, dizziness, and anxiety. Zyclara Counseling:  I discussed with the patient the risks of imiquimod including but not limited to erythema, scaling, itching, weeping, crusting, and pain.  Patient understands that the inflammatory response to imiquimod is variable from person to person and was educated regarded proper titration schedule.  If flu-like symptoms develop, patient knows to discontinue the medication and contact us. Calcipotriene Counseling:  I discussed with the patient the risks of calcipotriene including but not limited to erythema, scaling, itching, and irritation. Opzelura Pregnancy And Lactation Text: There is insufficient data to evaluate drug-associated risk for major birth defects, miscarriage, or other adverse maternal or fetal outcomes.  There is a pregnancy registry that monitors pregnancy outcomes in pregnant persons exposed to the medication during pregnancy.  It is unknown if this medication is excreted in breast milk.  Do not breastfeed during treatment and for about 4 weeks after the last dose. Erivedge Counseling- I discussed with the patient the risks of Erivedge including but not limited to nausea, vomiting, diarrhea, constipation, weight loss, changes in the sense of taste, decreased appetite, muscle spasms, and hair loss.  The patient verbalized understanding of the proper use and possible adverse effects of Erivedge.  All of the patient's questions and concerns were addressed. Finasteride Male Counseling: Finasteride Counseling:  I discussed with the patient the risks of use of finasteride including but not limited to decreased libido, decreased ejaculate volume, gynecomastia, and depression. Women should not handle medication.  All of the patient's questions and concerns were addressed. Sotyktu Counseling:  I discussed the most common side effects of Sotyktu including: common cold, sore throat, sinus infections, cold sores, canker sores, folliculitis, and acne.  I also discussed more serious side effects of Sotyktu including but not limited to: serious allergic reactions; increased risk for infections such as TB; cancers such as lymphomas; rhabdomyolysis and elevated CPK; and elevated triglycerides and liver enzymes.  Topical Ketoconazole Counseling: Patient counseled that this medication may cause skin irritation or allergic reactions.  In the event of skin irritation, the patient was advised to reduce the amount of the drug applied or use it less frequently.   The patient verbalized understanding of the proper use and possible adverse effects of ketoconazole.  All of the patient's questions and concerns were addressed. Azithromycin Pregnancy And Lactation Text: This medication is considered safe during pregnancy and is also secreted in breast milk. Calcipotriene Pregnancy And Lactation Text: The use of this medication during pregnancy or lactation is not recommended as there is insufficient data. Imiquimod Counseling:  I discussed with the patient the risks of imiquimod including but not limited to erythema, scaling, itching, weeping, crusting, and pain.  Patient understands that the inflammatory response to imiquimod is variable from person to person and was educated regarded proper titration schedule.  If flu-like symptoms develop, patient knows to discontinue the medication and contact us. Cellcept Counseling:  I discussed with the patient the risks of mycophenolate mofetil including but not limited to infection/immunosuppression, GI upset, hypokalemia, hypercholesterolemia, bone marrow suppression, lymphoproliferative disorders, malignancy, GI ulceration/bleed/perforation, colitis, interstitial lung disease, kidney failure, progressive multifocal leukoencephalopathy, and birth defects.  The patient understands that monitoring is required including a baseline creatinine and regular CBC testing. In addition, patient must alert us immediately if symptoms of infection or other concerning signs are noted. Bimzelx Counseling:  I discussed with the patient the risks of Bimzelx including but not limited to depression, immunosuppression, allergic reactions and infections.  The patient understands that monitoring is required including a PPD at baseline and must alert us or the primary physician if symptoms of infection or other concerning signs are noted. Otezla Counseling: The side effects of Otezla were discussed with the patient, including but not limited to worsening or new depression, weight loss, diarrhea, nausea, upper respiratory tract infection, and headache. Patient instructed to call the office should any adverse effect occur.  The patient verbalized understanding of the proper use and possible adverse effects of Otezla.  All the patient's questions and concerns were addressed. Solaraze Pregnancy And Lactation Text: This medication is Pregnancy Category B and is considered safe. There is some data to suggest avoiding during the third trimester. It is unknown if this medication is excreted in breast milk. Sarecycline Counseling: Patient advised regarding possible photosensitivity and discoloration of the teeth, skin, lips, tongue and gums.  Patient instructed to avoid sunlight, if possible.  When exposed to sunlight, patients should wear protective clothing, sunglasses, and sunscreen.  The patient was instructed to call the office immediately if the following severe adverse effects occur:  hearing changes, easy bruising/bleeding, severe headache, or vision changes.  The patient verbalized understanding of the proper use and possible adverse effects of sarecycline.  All of the patient's questions and concerns were addressed. Picato Counseling:  I discussed with the patient the risks of Picato including but not limited to erythema, scaling, itching, weeping, crusting, and pain. Sotyktu Pregnancy And Lactation Text: There is insufficient data to evaluate whether or not Sotyktu is safe to use during pregnancy.   It is not known if Sotyktu passes into breast milk and whether or not it is safe to use when breastfeeding.   Winlevi Counseling:  I discussed with the patient the risks of topical clascoterone including but not limited to erythema, scaling, itching, and stinging. Patient voiced their understanding. Hydroxyzine Counseling: Patient advised that the medication is sedating and not to drive a car after taking this medication.  Patient informed of potential adverse effects including but not limited to dry mouth, urinary retention, and blurry vision.  The patient verbalized understanding of the proper use and possible adverse effects of hydroxyzine.  All of the patient's questions and concerns were addressed. Stelara Counseling:  I discussed with the patient the risks of ustekinumab including but not limited to immunosuppression, malignancy, posterior leukoencephalopathy syndrome, and serious infections.  The patient understands that monitoring is required including a PPD at baseline and must alert us or the primary physician if symptoms of infection or other concerning signs are noted. Cantharidin Counseling:  I discussed with the patient the risks of Cantharidin including but not limited to pain, redness, burning, itching, and blistering. Finasteride Female Counseling: Finasteride Counseling:  I discussed with the patient the risks of use of finasteride including but not limited to decreased libido and sexual dysfunction. Explained the teratogenic nature of the medication and stressed the importance of not getting pregnant during treatment. All of the patient's questions and concerns were addressed. Humira Counseling:  I discussed with the patient the risks of adalimumab including but not limited to myelosuppression, immunosuppression, autoimmune hepatitis, demyelinating diseases, lymphoma, and serious infections.  The patient understands that monitoring is required including a PPD at baseline and must alert us or the primary physician if symptoms of infection or other concerning signs are noted. Erythromycin Pregnancy And Lactation Text: This medication is Pregnancy Category B and is considered safe during pregnancy. It is also excreted in breast milk. Low Dose Naltrexone Pregnancy And Lactation Text: Naltrexone is pregnancy category C.  There have been no adequate and well-controlled studies in pregnant women.  It should be used in pregnancy only if the potential benefit justifies the potential risk to the fetus.   Limited data indicates that naltrexone is minimally excreted into breastmilk. Aklief counseling:  Patient advised to apply a pea-sized amount only at bedtime and wait 30 minutes after washing their face before applying.  If too drying, patient may add a non-comedogenic moisturizer.  The most commonly reported side effects including irritation, redness, scaling, dryness, stinging, burning, itching, and increased risk of sunburn.  The patient verbalized understanding of the proper use and possible adverse effects of retinoids.  All of the patient's questions and concerns were addressed. Bactrim Counseling:  I discussed with the patient the risks of sulfa antibiotics including but not limited to GI upset, allergic reaction, drug rash, diarrhea, dizziness, photosensitivity, and yeast infections.  Rarely, more serious reactions can occur including but not limited to aplastic anemia, agranulocytosis, methemoglobinemia, blood dyscrasias, liver or kidney failure, lung infiltrates or desquamative/blistering drug rashes. Soolantra Counseling: I discussed with the patients the risks of topial Soolantra. This is a medicine which decreases the number of mites and inflammation in the skin. You experience burning, stinging, eye irritation or allergic reactions.  Please call our office if you develop any problems from using this medication. Cantharidin Pregnancy And Lactation Text: This medication has not been proven safe during pregnancy. It is unknown if this medication is excreted in breast milk. Cibinqo Counseling: I discussed with the patient the risks of Cibinqo therapy including but not limited to common cold, nausea, headache, cold sores, increased blood CPK levels, dizziness, UTIs, fatigue, acne, and vomitting. Live vaccines should be avoided.  This medication has been linked to serious infections; higher rate of mortality; malignancy and lymphoproliferative disorders; major adverse cardiovascular events; thrombosis; thrombocytopenia and lymphopenia; lipid elevations; and retinal detachment. Bimzelx Pregnancy And Lactation Text: This medication crosses the placenta and the safety is uncertain during pregnancy. It is unknown if this medication is present in breast milk. Tranexamic Acid Counseling:  Patient advised of the small risk of bleeding problems with tranexamic acid. They were also instructed to call if they developed any nausea, vomiting or diarrhea. All of the patient's questions and concerns were addressed. Dapsone Counseling: I discussed with the patient the risks of dapsone including but not limited to hemolytic anemia, agranulocytosis, rashes, methemoglobinemia, kidney failure, peripheral neuropathy, headaches, GI upset, and liver toxicity.  Patients who start dapsone require monitoring including baseline LFTs and weekly CBCs for the first month, then every month thereafter.  The patient verbalized understanding of the proper use and possible adverse effects of dapsone.  All of the patient's questions and concerns were addressed. Hydroxyzine Pregnancy And Lactation Text: This medication is not safe during pregnancy and should not be taken. It is also excreted in breast milk and breast feeding isn't recommended. Otezla Pregnancy And Lactation Text: This medication is Pregnancy Category C and it isn't known if it is safe during pregnancy. It is unknown if it is excreted in breast milk. Libtayo Counseling- I discussed with the patient the risks of Libtayo including but not limited to nausea, vomiting, diarrhea, and bone or muscle pain.  The patient verbalized understanding of the proper use and possible adverse effects of Libtayo.  All of the patient's questions and concerns were addressed. Drysol Counseling:  I discussed with the patient the risks of drysol/aluminum chloride including but not limited to skin rash, itching, irritation, burning. Ketoconazole Counseling:   Patient counseled regarding improving absorption with orange juice.  Adverse effects include but are not limited to breast enlargement, headache, diarrhea, nausea, upset stomach, liver function test abnormalities, taste disturbance, and stomach pain.  There is a rare possibility of liver failure that can occur when taking ketoconazole. The patient understands that monitoring of LFTs may be required, especially at baseline. The patient verbalized understanding of the proper use and possible adverse effects of ketoconazole.  All of the patient's questions and concerns were addressed. 5-Fu Counseling: 5-Fluorouracil Counseling:  I discussed with the patient the risks of 5-fluorouracil including but not limited to erythema, scaling, itching, weeping, crusting, and pain. Azithromycin Counseling:  I discussed with the patient the risks of azithromycin including but not limited to GI upset, allergic reaction, drug rash, diarrhea, and yeast infections. Winlevi Pregnancy And Lactation Text: This medication is considered safe during pregnancy and breastfeeding. Xeljanz Counseling: I discussed with the patient the risks of Xeljanz therapy including increased risk of infection, liver issues, headache, diarrhea, or cold symptoms. Live vaccines should be avoided. They were instructed to call if they have any problems. Metronidazole Counseling:  I discussed with the patient the risks of metronidazole including but not limited to seizures, nausea/vomiting, a metallic taste in the mouth, nausea/vomiting and severe allergy. Finasteride Pregnancy And Lactation Text: This medication is absolutely contraindicated during pregnancy. It is unknown if it is excreted in breast milk. Rituxan Counseling:  I discussed with the patient the risks of Rituxan infusions. Side effects can include infusion reactions, severe drug rashes including mucocutaneous reactions, reactivation of latent hepatitis and other infections and rarely progressive multifocal leukoencephalopathy.  All of the patient's questions and concerns were addressed. Niacinamide Counseling: I recommended taking niacin or niacinamide, also know as vitamin B3, twice daily. Recent evidence suggests that taking vitamin B3 (500 mg twice daily) can reduce the risk of actinic keratoses and non-melanoma skin cancers. Side effects of vitamin B3 include flushing and headache. Tranexamic Acid Pregnancy And Lactation Text: It is unknown if this medication is safe during pregnancy or breast feeding. Dapsone Pregnancy And Lactation Text: This medication is Pregnancy Category C and is not considered safe during pregnancy or breast feeding. Aklief Pregnancy And Lactation Text: It is unknown if this medication is safe to use during pregnancy.  It is unknown if this medication is excreted in breast milk.  Breastfeeding women should use the topical cream on the smallest area of the skin for the shortest time needed while breastfeeding.  Do not apply to nipple and areola. Topical Metronidazole Counseling: Metronidazole is a topical antibiotic medication. You may experience burning, stinging, redness, or allergic reactions.  Please call our office if you develop any problems from using this medication. Klisyri Counseling:  I discussed with the patient the risks of Klisyri including but not limited to erythema, scaling, itching, weeping, crusting, and pain. Cyclophosphamide Counseling:  I discussed with the patient the risks of cyclophosphamide including but not limited to hair loss, hormonal abnormalities, decreased fertility, abdominal pain, diarrhea, nausea and vomiting, bone marrow suppression and infection. The patient understands that monitoring is required while taking this medication. Cibinqo Pregnancy And Lactation Text: It is unknown if this medication will adversely affect pregnancy or breast feeding.  You should not take this medication if you are currently pregnant or planning a pregnancy or while breastfeeding. Bactrim Pregnancy And Lactation Text: This medication is Pregnancy Category D and is known to cause fetal risk.  It is also excreted in breast milk. Opioid Counseling: I discussed with the patient the potential side effects of opioids including but not limited to addiction, altered mental status, and depression. I stressed avoiding alcohol, benzodiazepines, muscle relaxants and sleep aids unless specifically okayed by a physician. The patient verbalized understanding of the proper use and possible adverse effects of opioids. All of the patient's questions and concerns were addressed. They were instructed to flush the remaining pills down the toilet if they did not need them for pain. Soolantra Pregnancy And Lactation Text: This medication is Pregnancy Category C. This medication is considered safe during breast feeding. Ketoconazole Pregnancy And Lactation Text: This medication is Pregnancy Category C and it isn't know if it is safe during pregnancy. It is also excreted in breast milk and breast feeding isn't recommended. Taltz Counseling: I discussed with the patient the risks of ixekizumab including but not limited to immunosuppression, serious infections, worsening of inflammatory bowel disease and drug reactions.  The patient understands that monitoring is required including a PPD at baseline and must alert us or the primary physician if symptoms of infection or other concerning signs are noted. Tetracycline Counseling: Patient counseled regarding possible photosensitivity and increased risk for sunburn.  Patient instructed to avoid sunlight, if possible.  When exposed to sunlight, patients should wear protective clothing, sunglasses, and sunscreen.  The patient was instructed to call the office immediately if the following severe adverse effects occur:  hearing changes, easy bruising/bleeding, severe headache, or vision changes.  The patient verbalized understanding of the proper use and possible adverse effects of tetracycline.  All of the patient's questions and concerns were addressed. Patient understands to avoid pregnancy while on therapy due to potential birth defects. Cimzia Counseling:  I discussed with the patient the risks of Cimzia including but not limited to immunosuppression, allergic reactions and infections.  The patient understands that monitoring is required including a PPD at baseline and must alert us or the primary physician if symptoms of infection or other concerning signs are noted.

## 2024-05-18 DIAGNOSIS — F43.10 PTSD (POST-TRAUMATIC STRESS DISORDER): ICD-10-CM

## 2024-05-19 DIAGNOSIS — E11.9 TYPE 2 DIABETES MELLITUS WITHOUT COMPLICATION, WITHOUT LONG-TERM CURRENT USE OF INSULIN: ICD-10-CM

## 2024-05-20 ENCOUNTER — PRIOR AUTHORIZATION (OUTPATIENT)
Dept: PSYCHIATRY | Facility: CLINIC | Age: 64
End: 2024-05-20
Payer: MEDICAID

## 2024-05-20 RX ORDER — CLONAZEPAM 0.5 MG/1
0.5 TABLET ORAL 2 TIMES DAILY PRN
Qty: 60 TABLET | Refills: 2 | Status: SHIPPED | OUTPATIENT
Start: 2024-05-20

## 2024-05-20 RX ORDER — EMPAGLIFLOZIN 25 MG/1
25 TABLET, FILM COATED ORAL DAILY
Qty: 90 TABLET | Refills: 0 | Status: SHIPPED | OUTPATIENT
Start: 2024-05-20

## 2024-05-20 NOTE — TELEPHONE ENCOUNTER
PA denied stating that patient did not meet the criteria for a duplicate sedative hypnotics/benzodiazepine/renetta therapy. Please advise.

## 2024-05-22 ENCOUNTER — TELEPHONE (OUTPATIENT)
Dept: PSYCHIATRY | Facility: CLINIC | Age: 64
End: 2024-05-22
Payer: MEDICAID

## 2024-05-22 NOTE — TELEPHONE ENCOUNTER
[facility] called wanting clarification that your aware that the patient is on the Clonzaepam,Ambien,Gabapentin.Please advise

## 2024-05-23 ENCOUNTER — TREATMENT (OUTPATIENT)
Dept: PHYSICAL THERAPY | Facility: CLINIC | Age: 64
End: 2024-05-23
Payer: MEDICAID

## 2024-05-23 DIAGNOSIS — M79.18 GLUTEAL PAIN: ICD-10-CM

## 2024-05-23 DIAGNOSIS — G89.29 CHRONIC BILATERAL LOW BACK PAIN WITHOUT SCIATICA: Primary | ICD-10-CM

## 2024-05-23 DIAGNOSIS — M54.50 CHRONIC BILATERAL LOW BACK PAIN WITHOUT SCIATICA: Primary | ICD-10-CM

## 2024-05-23 PROCEDURE — 97535 SELF CARE MNGMENT TRAINING: CPT | Performed by: PHYSICAL THERAPIST

## 2024-05-23 PROCEDURE — 97140 MANUAL THERAPY 1/> REGIONS: CPT | Performed by: PHYSICAL THERAPIST

## 2024-05-23 PROCEDURE — 97112 NEUROMUSCULAR REEDUCATION: CPT | Performed by: PHYSICAL THERAPIST

## 2024-05-23 NOTE — PROGRESS NOTES
Physical Therapy Daily Treatment Note  [address], [address] [place] [address]     Patient: Carli Dubon   : 1960  Referring practitioner: DILLON Powell  Diagnosis:      ICD-10-CM ICD-9-CM   1. Chronic bilateral low back pain without sciatica  M54.50 724.2    G89.29 338.29   2. Gluteal pain  M79.18 729.1     Date of Initial Visit: Type: THERAPY  Noted: 2024  Today's Date: 2024    VISIT#: 10          Subjective   Carli Dubon reports: 7/10 pain level today and had to cancel last visit due to pain in her lower back.      Objective   See Exercise, Manual, and Modality Logs for complete treatment.       Assessment & Plan       Assessment  Assessment details: Pt had improved flexibility and decreased pain with gait and improved mm resistance in prone hip extension after trial of DN today.  MH and positioning with stretches performed for improved flexibility and relief in new ROM. Pt left with decreased pain and limp on the right hip.          Progress per Plan of Care and Progress strengthening /stabilization /functional activity           Timed:  Manual Therapy:    10     mins  44733;  Therapeutic Exercise:         mins  70632;     Neuromuscular Shelbi:    13    mins  93485;    Therapeutic Activity:          mins  25668;     Gait Training:           mins  65281;     Ultrasound:          mins  58584;    Electrical Stimulation:         mins  47920 ( );  Self-care:    15    mins  20134    Untimed:  Electrical Stimulation:         mins  65910 ( );  Mechanical Traction:         mins  46601;   Dry needling:      10 Trial (no charge)    Timed Treatment:   38   mins   Total Treatment:     38   mins  Deloris Renee PT, DPT, CLT, CIDN  Physical Therapist

## 2024-05-28 ENCOUNTER — TREATMENT (OUTPATIENT)
Dept: PHYSICAL THERAPY | Facility: CLINIC | Age: 64
End: 2024-05-28
Payer: MEDICAID

## 2024-05-28 DIAGNOSIS — M79.18 GLUTEAL PAIN: ICD-10-CM

## 2024-05-28 DIAGNOSIS — G89.29 CHRONIC BILATERAL LOW BACK PAIN WITHOUT SCIATICA: Primary | ICD-10-CM

## 2024-05-28 DIAGNOSIS — M54.50 CHRONIC BILATERAL LOW BACK PAIN WITHOUT SCIATICA: Primary | ICD-10-CM

## 2024-05-28 PROCEDURE — 97112 NEUROMUSCULAR REEDUCATION: CPT | Performed by: PHYSICAL THERAPIST

## 2024-05-28 PROCEDURE — 97014 ELECTRIC STIMULATION THERAPY: CPT | Performed by: PHYSICAL THERAPIST

## 2024-05-28 PROCEDURE — 97110 THERAPEUTIC EXERCISES: CPT | Performed by: PHYSICAL THERAPIST

## 2024-05-28 NOTE — PROGRESS NOTES
Physical Therapy Daily Treatment Note  [address], [address] [place] [address]     Patient: Carli Dubon   : 1960  Referring practitioner: DILLON Powell  Diagnosis:      ICD-10-CM ICD-9-CM   1. Chronic bilateral low back pain without sciatica  M54.50 724.2    G89.29 338.29   2. Gluteal pain  M79.18 729.1     Date of Initial Visit: Type: THERAPY  Noted: 2024  Today's Date: 2024    VISIT#: 11          Subjective   Carli Dubon reports: 510 pain level in the right lower back and hip today.      Objective   See Exercise, Manual, and Modality Logs for complete treatment.       Assessment & Plan       Assessment  Assessment details: Pt decided to get MH and estim versus dry needling today as she did not feel much relief.  S/l position and exercises today for comfort.           Progress per Plan of Care and Progress strengthening /stabilization /functional activity           Timed:  Manual Therapy:         mins  77986;  Therapeutic Exercise:    15     mins  58042;     Neuromuscular Shelbi:    8    mins  86057;    Therapeutic Activity:          mins  24247;     Gait Training:           mins  45538;     Ultrasound:          mins  60026;    Electrical Stimulation:         mins  07355 ( );    Untimed:  Electrical Stimulation:    20     mins  09362 ( );  Mechanical Traction:         mins  23751;   Dry needling:       Self pay    Timed Treatment:   23   mins   Total Treatment:     43   mins  Deloris Renee, PT, DPT, CLT, CIDN  Physical Therapist

## 2024-05-31 DIAGNOSIS — R11.2 NAUSEA AND VOMITING, UNSPECIFIED VOMITING TYPE: ICD-10-CM

## 2024-05-31 RX ORDER — OMEPRAZOLE 40 MG/1
40 CAPSULE, DELAYED RELEASE ORAL DAILY
Qty: 90 CAPSULE | Refills: 0 | Status: SHIPPED | OUTPATIENT
Start: 2024-05-31

## 2024-06-11 ENCOUNTER — TELEPHONE (OUTPATIENT)
Dept: PSYCHIATRY | Facility: CLINIC | Age: 64
End: 2024-06-11
Payer: MEDICAID

## 2024-06-11 NOTE — TELEPHONE ENCOUNTER
PA denied again for clonazapam.  Contacted the pharmacy and made them aware patient would pick this up for cash.  Pharmacy requested a copy of the denial be faxed to them for their records.   faxed this to the pharmacy at [phone].  Pharmacy stated customer was confused about having to pay cash for the prescription.   attempted to contact patient but did not get an answer. Left a voicemail for patient to contact the office with any further questions.

## 2024-06-17 ENCOUNTER — TREATMENT (OUTPATIENT)
Dept: PHYSICAL THERAPY | Facility: CLINIC | Age: 64
End: 2024-06-17
Payer: MEDICAID

## 2024-06-17 DIAGNOSIS — G89.29 CHRONIC BILATERAL LOW BACK PAIN WITHOUT SCIATICA: Primary | ICD-10-CM

## 2024-06-17 DIAGNOSIS — M79.18 GLUTEAL PAIN: ICD-10-CM

## 2024-06-17 DIAGNOSIS — M54.50 CHRONIC BILATERAL LOW BACK PAIN WITHOUT SCIATICA: Primary | ICD-10-CM

## 2024-06-17 NOTE — PROGRESS NOTES
Physical Therapy Daily Treatment Note/Discharge Note  [address], [address] [place] [address]     Patient: Carli Dubon   : 1960  Referring practitioner: DILLON Powell  Diagnosis:      ICD-10-CM ICD-9-CM   1. Chronic bilateral low back pain without sciatica  M54.50 724.2    G89.29 338.29   2. Gluteal pain  M79.18 729.1     Date of Initial Visit: Type: THERAPY  Noted: 2024  Today's Date: 2024    VISIT#: 12          Subjective Evaluation    Pain  Current pain ratin  At best pain ratin  At worst pain ratin  Quality: discomfort, radiating and burning         Carli Dubon reports: 6/10 pain level in her B lower back.      Objective          Strength/Myotome Testing     Left Hip   Planes of Motion   Flexion: 4-  Abduction: 4+  Adduction: 4+    Right Hip   Planes of Motion   Flexion: 4+  Abduction: 4+  Adduction: 4+      See Exercise, Manual, and Modality Logs for complete treatment.       Assessment & Plan       Assessment  Assessment details: Pt reports she is 40% overall improved since starting PT, noting inconsistent and temporary pain relief.  She will call her referring provider to schedule an MRI due to failed conservative treatment.  She has burning pain in B LE and has met goals as noted below.  She will be discharged from PT and get further imaging.  STEFANO score = 56% limited, which is worse than at initial eval of 48% limited.      Goals  Plan Goals: LTG 1: 12 weeks:  The patient will report a pain rating of 4 or better in order to improve tolerance to activities of daily living and improve sleep quality. - NOT MET  STG 1a: 4 weeks:  The patient will report a pain rating of 7 or better. - MET for current and best pain, but not worst pain levels     LTG 2: 12 weeks:  The patient will demonstrate 5 /5 strength for B hip flexion, abduction, and extension in order to improve hip stability. MET for some  STG 2a: 4 weeks:  The patient will demonstrate 4+ /5 strength for B  hip flexion, abduction,and extension. - MET for some     LTG 3: 12 weeks:  The patient will ambulate without assistive device, independently, for   community distances with minimal limp to the B lower extremity in order to improve mobility and allow patient to perform activities such as grocery shopping with greater ease. - NOT MET  STG 3a: I with HEP  - MET         Plan  Therapy options: will not be seen for skilled therapy services          Other:  discharge from PT for further imaging           Timed:  Manual Therapy:         mins  20640;  Therapeutic Exercise:    15     mins  47980;     Neuromuscular Shelbi:    8    mins  42535;    Therapeutic Activity:          mins  69280;     Gait Training:           mins  15901;     Ultrasound:          mins  26608;    Electrical Stimulation:         mins  92926 ( );    Untimed:  Electrical Stimulation:    20     mins  62808 ( );  Mechanical Traction:         mins  42228;   Dry needling:       Self pay    Timed Treatment:   23   mins   Total Treatment:     43   mins  Deloris Renee, PT, DPT, CLT, CIDN  Physical Therapist

## 2024-06-19 ENCOUNTER — PATIENT ROUNDING (BHMG ONLY) (OUTPATIENT)
Dept: ENDOCRINOLOGY | Facility: CLINIC | Age: 64
End: 2024-06-19
Payer: MEDICAID

## 2024-06-19 ENCOUNTER — OFFICE VISIT (OUTPATIENT)
Dept: ENDOCRINOLOGY | Facility: CLINIC | Age: 64
End: 2024-06-19
Payer: MEDICAID

## 2024-06-19 VITALS
WEIGHT: 217 LBS | DIASTOLIC BLOOD PRESSURE: 78 MMHG | OXYGEN SATURATION: 92 % | HEART RATE: 103 BPM | BODY MASS INDEX: 36.15 KG/M2 | SYSTOLIC BLOOD PRESSURE: 118 MMHG | HEIGHT: 65 IN

## 2024-06-19 DIAGNOSIS — E11.42 TYPE 2 DIABETES MELLITUS WITH DIABETIC POLYNEUROPATHY, WITHOUT LONG-TERM CURRENT USE OF INSULIN: Primary | ICD-10-CM

## 2024-06-19 DIAGNOSIS — E55.9 VITAMIN D DEFICIENCY: ICD-10-CM

## 2024-06-19 DIAGNOSIS — E78.2 MIXED HYPERLIPIDEMIA: ICD-10-CM

## 2024-06-19 LAB — GLUCOSE BLDC GLUCOMTR-MCNC: 204 MG/DL (ref 70–105)

## 2024-06-19 PROCEDURE — 82948 REAGENT STRIP/BLOOD GLUCOSE: CPT | Performed by: INTERNAL MEDICINE

## 2024-06-19 NOTE — PROGRESS NOTES
June 19, 2024    Hello, may I speak with Carli Dubon?    My name is Chiqui    I am  with [facility]  [address] [place] [address].    Before we get started may I verify your date of birth? 1960    I am calling to officially welcome you to our practice and ask about your recent visit. Is this a good time to talk? yes    Tell me about your visit with us. What things went well?  long wait       We're always looking for ways to make our patients' experiences even better. Do you have recommendations on ways we may improve?  no    Overall were you satisfied with your first visit to our practice? yes       I appreciate you taking the time to speak with me today. Is there anything else I can do for you? no      Thank you, and have a great day.

## 2024-06-19 NOTE — PATIENT INSTRUCTIONS
Schedule diabetes education.  Continue stretching exercises.  Continue diabetes & chol meds.  Call if blood sugars are running under 100 or over 200.  F/u in 6 months, with labs prior.

## 2024-06-25 DIAGNOSIS — G47.00 INSOMNIA, UNSPECIFIED TYPE: ICD-10-CM

## 2024-06-25 RX ORDER — AMITRIPTYLINE HYDROCHLORIDE 150 MG/1
150 TABLET ORAL NIGHTLY
Qty: 90 TABLET | Refills: 0 | Status: SHIPPED | OUTPATIENT
Start: 2024-06-25

## 2024-07-03 DIAGNOSIS — I10 HYPERTENSION, UNSPECIFIED TYPE: ICD-10-CM

## 2024-07-03 RX ORDER — LOSARTAN POTASSIUM 100 MG/1
100 TABLET ORAL DAILY
Qty: 90 TABLET | Refills: 0 | Status: SHIPPED | OUTPATIENT
Start: 2024-07-03

## 2024-07-10 DIAGNOSIS — J43.1 PANLOBULAR EMPHYSEMA: ICD-10-CM

## 2024-07-11 RX ORDER — FLUTICASONE FUROATE, UMECLIDINIUM BROMIDE AND VILANTEROL TRIFENATATE 200; 62.5; 25 UG/1; UG/1; UG/1
1 POWDER RESPIRATORY (INHALATION) DAILY
Qty: 60 EACH | Refills: 0 | Status: SHIPPED | OUTPATIENT
Start: 2024-07-11

## 2024-07-14 NOTE — PROGRESS NOTES
[facility]    Referring Provider: Cindy Huddleston APRN  Reason for Consultation: Diabetes evaluation & management.    Patient Care Team:  Cindy Huddleston APRN as PCP - General (Nurse Practitioner)    Chief complaint Diabetes (Np / DM type 2 /  )      Subjective .     History of present illness:    This is a  63 y.o. female with type 2 Diabetes since 2016, diagnosed during routine exam.  Placed on metformin. Then glipizide was added.  Also on Jardiance.  Has pancreatitis while on Januvia in 2000.  Has never attended formal diabetes education.  Does not check blood sugars.   Last eye exam this month.  C/o hip pain.  Uses stationary bike.  No on vit D.  Smoker.    Review of Systems  Review of Systems   HENT:  Negative for trouble swallowing.    Eyes:  Negative for blurred vision.   Respiratory:  Positive for shortness of breath.         Snoring   Gastrointestinal:  Negative for nausea.   Endocrine: Positive for polydipsia and polyuria.   Musculoskeletal:         Hip pain   Neurological:  Positive for numbness and headache. Negative for tremors.     History  Past Medical History:   Diagnosis Date    Arthritis of back     Asthma     Bipolar 1 disorder     Chronic obstructive lung disease 01/28/2015    COVID-19 08/15/2021    CTS (carpal tunnel syndrome)     Diabetes insipidus     Diabetes mellitus     Difficulty walking 03/2022    Hip arthrosis     Hypertension     Knee swelling     Low back strain     Migraine     Mixed hyperlipidemia 03/03/2020    Neck strain     Pancreatitis     PTSD (post-traumatic stress disorder)     Pulmonary arterial hypertension     Tobacco abuse 06/15/2020    Vitamin D deficiency 6/19/2024     History reviewed. No pertinent surgical history.  Family History   Problem Relation Age of Onset    Hypertension Mother     Diabetes Mother     Asthma Mother     Asthma Daughter     COPD Daughter     Anxiety disorder Daughter     Arthritis Daughter      Social History      Tobacco Use    Smoking status: Every Day     Current packs/day: 1.00     Average packs/day: 1.1 packs/day for 40.0 years (45.0 ttl pk-yrs)     Types: Cigarettes     Passive exposure: Current    Smokeless tobacco: Never   Vaping Use    Vaping status: Never Used   Substance Use Topics    Alcohol use: Never    Drug use: Never       Allergies:  Iodine and Adhesive tape    Objective     Vital Signs      Vitals:    06/19/24 1343   BP: 118/78   Pulse: 103   SpO2: 92%         Physical Exam:     General Appearance:    Alert, cooperative, in no acute distress. Obese   Head:    Normocephalic, without obvious abnormality, atraumatic   Eyes:            Lids and lashes normal, conjunctivae and sclerae normal, no   icterus, no pallor, corneas clear, PERRLA   Throat:   No oral lesions,  oral mucosa moist. Edentulous   Neck:   No adenopathy, supple,  no thyromegaly, no carotid bruit   Lungs:     Clear    Heart:    Regular rhythm and normal rate   Chest Wall:    No abnormalities observed   Abdomen:     Normal bowel sounds, soft                 Extremities:   Moves all extremities well, no edema               Pulses:   Pulses palpable and equal bilaterally   Skin:   Dry. Toe nails need trimming   Neurologic:  DTR absent in ankles, vibratory sense 75% decreased in toes, able to feel the 10g monofilament, except in toes       Results Review  I have reviewed the patient's new clinical results, labs & imaging.      Lab Results   Component Value Date    HGBA1C 8.80 (H) 04/10/2024     Lab Results   Component Value Date    TSH 2.290 04/10/2024         Assessment & Plan     Diabetes type 2, with hyperglycemia & peripheral neuropathy  Hyperlipidemia  Vitamin D Deficiency    Schedule diabetes education.  Continue stretching exercises.  Continue diabetes & chol meds.  Call if blood sugars are running under 100 or over 200.  I discussed the patients findings and my recommendations with patient.    Neva Johnson MD  07/14/24  19:53  EDT

## 2024-07-17 DIAGNOSIS — F51.04 PSYCHOPHYSIOLOGICAL INSOMNIA: ICD-10-CM

## 2024-07-17 RX ORDER — ZOLPIDEM TARTRATE 10 MG/1
10 TABLET ORAL NIGHTLY PRN
Qty: 30 TABLET | Refills: 2 | Status: SHIPPED | OUTPATIENT
Start: 2024-07-17

## 2024-07-23 ENCOUNTER — TELEPHONE (OUTPATIENT)
Dept: ENDOCRINOLOGY | Facility: CLINIC | Age: 64
End: 2024-07-23
Payer: MEDICAID

## 2024-07-23 DIAGNOSIS — J43.1 PANLOBULAR EMPHYSEMA: ICD-10-CM

## 2024-07-23 RX ORDER — BENZONATATE 200 MG/1
200 CAPSULE ORAL 3 TIMES DAILY PRN
Qty: 90 CAPSULE | Refills: 0 | Status: SHIPPED | OUTPATIENT
Start: 2024-07-23

## 2024-07-23 NOTE — TELEPHONE ENCOUNTER
Left voicemail for pt to return call regarding class needs.     Pt scheduled for CGM however referral for Comprehensive education.

## 2024-07-23 NOTE — TELEPHONE ENCOUNTER
Attempted to call pt again regarding need to attend CGM class vs schedule for education. Voicemail left.

## 2024-08-01 ENCOUNTER — OFFICE VISIT (OUTPATIENT)
Dept: PULMONOLOGY | Facility: HOSPITAL | Age: 64
End: 2024-08-01
Payer: MEDICAID

## 2024-08-01 VITALS
SYSTOLIC BLOOD PRESSURE: 147 MMHG | OXYGEN SATURATION: 93 % | BODY MASS INDEX: 34.99 KG/M2 | WEIGHT: 210 LBS | HEIGHT: 65 IN | HEART RATE: 105 BPM | DIASTOLIC BLOOD PRESSURE: 88 MMHG

## 2024-08-01 DIAGNOSIS — F51.04 PSYCHOPHYSIOLOGICAL INSOMNIA: Chronic | ICD-10-CM

## 2024-08-01 DIAGNOSIS — Z72.0 TOBACCO ABUSE: Primary | Chronic | ICD-10-CM

## 2024-08-01 DIAGNOSIS — G47.33 OBSTRUCTIVE SLEEP APNEA: ICD-10-CM

## 2024-08-01 DIAGNOSIS — J43.1 PANLOBULAR EMPHYSEMA: ICD-10-CM

## 2024-08-01 PROCEDURE — G0463 HOSPITAL OUTPT CLINIC VISIT: HCPCS

## 2024-08-01 NOTE — PROGRESS NOTES
SLEEP/PULMONARY  CLINIC NOTE      PATIENT IDENTIFICATION:  Name: Carli Dubon  Age: 63 y.o.  Sex: female  :  1960  MRN: [id number]    DATE OF CONSULTATION:  2024                     CHIEF COMPLAINT: Follow-up on sleep apnea    History of Present Illness:   Carli Dubon is a 63 y.o. female patient with obstructive sleep apnea on CPAP using it relatively good usage, but still struggling with the mask and she thinks she might not need it anymore, patient gained weight recently, denies any dryness in her nose or mouth  Patient with chronic obstructive airway disease current smoker, still has some dyspnea exertion coughing with white sputum notices no fever no chills      Review of Systems:   Constitutional: As above   Eyes: negative   ENT/oropharynx: negative   Cardiovascular: negative   Respiratory: As above   Gastrointestinal: negative   Genitourinary: negative   Neurological: negative   Musculoskeletal: negative   Integument/breast: negative   Endocrine: negative   Allergic/Immunologic: negative     Past Medical History:  Past Medical History:   Diagnosis Date    Allergic rhinitis     Arthritis of back     Asthma     Asthma, extrinsic     Asthma, intrinsic     Bipolar 1 disorder     Chronic bronchitis     Chronic obstructive lung disease 2015    COVID-19 08/15/2021    CTS (carpal tunnel syndrome)     Diabetes insipidus     Diabetes mellitus     Difficulty walking 2022    Hip arthrosis     Hypertension     Knee swelling     Low back strain     Migraine     Mixed hyperlipidemia 2020    Neck strain     Pancreatitis     Primary central sleep apnea     PTSD (post-traumatic stress disorder)     Pulmonary arterial hypertension     Sleep apnea, obstructive     Tobacco abuse 06/15/2020    Vitamin D deficiency 2024       Past Surgical History:  History reviewed. No pertinent surgical history.     Family History:  Family History   Problem Relation Age of Onset    Hypertension Mother      Diabetes Mother     Asthma Mother     Asthma Daughter     COPD Daughter     Anxiety disorder Daughter     Arthritis Daughter         Social History:   Social History     Tobacco Use    Smoking status: Every Day     Current packs/day: 1.00     Average packs/day: 1.1 packs/day for 40.0 years (45.0 ttl pk-yrs)     Types: Cigarettes     Passive exposure: Current    Smokeless tobacco: Never   Substance Use Topics    Alcohol use: Never        Allergies:  Allergies   Allergen Reactions    Iodine Swelling    Adhesive Tape Rash       Home Meds:  (Not in a hospital admission)      Objective:    Vitals Ranges:   Heart Rate:  [105] 105  BP: (147)/(88) 147/88  Body mass index is 34.95 kg/m².     Exam:  General Appearance:  WDWN    HEENT:   without obvious abnormality,  Conjunctiva/corneas clear,  Normal external ear canals, no drainage    Clear orsalmucosa,  Mallampati score 3    Neck:  Supple, symmetrical, trachea midline. No JVD.  Lungs:   Bilateral basal rhonchi bilaterally, respirations unlabored symmetrical wall movement.    Chest wall:  No tenderness or deformity.    Heart:  Regular rate and rhythm, S1 and S2 normal.  Extremities: Trace edema no clubbing or Cyanosis        Data Review:  All labs (24hrs): No results found for this or any previous visit (from the past 24 hour(s)).     Imaging:  XR Hips Bilateral With or Without Pelvis 3-4 View  Narrative: XR HIPS BILATERAL W OR WO PELVIS 3-4 VIEW    Date of Exam: 2/7/2024 1:37 PM EST    Indication: Bilateral hip pain.    Comparison: CT abdomen pelvis bone windows 9/17/2023 bilateral hip radiographs 7/20/2022.    Findings:  Mild symmetric bilateral hip joint space narrowing. Bilateral acetabular marginal osteophytes are again noted. The findings do not appear significantly changed since 7/20/2022. Each femoral head maintains normal round configuration. Pelvic ring appears   intact. Sacroiliac joints appear unremarkable. Paraspinal soft tissues notable for calcified uterine  fibroids, seen to better advantage on prior CT imaging.  Impression: Impression:  Mild symmetric bilateral hip degenerative changes, similar in appearance to 7/20/2022.    Electronically Signed: Anum Guevara MD    2/8/2024 4:00 PM EST    Workstation ID: [id number]       ASSESSMENT:  Diagnoses and all orders for this visit:    Tobacco abuse    Obstructive sleep apnea    Psychophysiological insomnia    Panlobular emphysema    Other orders  -     Pulmonary Results Scan        PLAN:   This is patient with symptoms of obstructive sleep apnea, will repeat the sleep study to evaluate the patient for current status her last AHI was 5 avoid supine avoid sedative meds in pm, weight loss, Avoid driving. Long discussion with patient about the physiology of NEVIN, and long term and short term   benefit of treating NEVIN     Will arrange CT chest for screening low-dose  Bronchodilator inhaled corticosteroid    Education how to use inhalers    Encouraged to use incentive spirometer    Continue to exercise slowly as tolerated    Monitor for any change in the color of the sputum    Avoid any exposure to fumes, gas or any irritant      Set a bedtime that is early enough for you to get at least 7 hours of sleep.  Don’t go to bed unless you are sleepy.  If you don’t fall asleep after 20 minutes, get out of bed.  Establish a relaxing bedtime routine.  Use your bed only for sleep and sex.  Make your bedroom quiet and relaxing. Keep the room at a comfortable, cool temperature.  Limit exposure to bright light in the evenings.  Turn off electronic devices at least 30 minutes before bedtime.  Don’t eat a large meal before bedtime. If you are hungry at night, eat a light, healthy snack.  Exercise regularly and maintain a healthy diet.  Avoid consuming caffeine in the late afternoon or evening.  Avoid consuming alcohol before bedtime.  Reduce your fluid intake before bedtime.  Avoid naps during the day time.     Education patient  About tips for   smoking cessation and risk factors and benefit, was to a discussion with the patient.     Follow-up 3 weeks    Ryan Bingham MD. D, ABSM.  8/1/2024  15:38 EDT

## 2024-08-05 DIAGNOSIS — E11.9 TYPE 2 DIABETES MELLITUS WITHOUT COMPLICATION, WITHOUT LONG-TERM CURRENT USE OF INSULIN: ICD-10-CM

## 2024-08-05 RX ORDER — GLIPIZIDE 10 MG/1
10 TABLET ORAL
Qty: 180 TABLET | Refills: 0 | Status: SHIPPED | OUTPATIENT
Start: 2024-08-05

## 2024-08-05 RX ORDER — ATORVASTATIN CALCIUM 40 MG/1
40 TABLET, FILM COATED ORAL DAILY
Qty: 90 TABLET | Refills: 0 | Status: SHIPPED | OUTPATIENT
Start: 2024-08-05

## 2024-08-07 ENCOUNTER — OFFICE VISIT (OUTPATIENT)
Dept: ORTHOPEDIC SURGERY | Facility: CLINIC | Age: 64
End: 2024-08-07
Payer: MEDICAID

## 2024-08-07 VITALS — WEIGHT: 210 LBS | RESPIRATION RATE: 20 BRPM | BODY MASS INDEX: 34.99 KG/M2 | HEIGHT: 65 IN | OXYGEN SATURATION: 97 %

## 2024-08-07 DIAGNOSIS — M54.50 CHRONIC BILATERAL LOW BACK PAIN WITHOUT SCIATICA: Primary | ICD-10-CM

## 2024-08-07 DIAGNOSIS — G89.29 CHRONIC BILATERAL LOW BACK PAIN WITHOUT SCIATICA: Primary | ICD-10-CM

## 2024-08-07 PROCEDURE — 99213 OFFICE O/P EST LOW 20 MIN: CPT | Performed by: NURSE PRACTITIONER

## 2024-08-07 RX ORDER — NYSTATIN AND TRIAMCINOLONE ACETONIDE 100000; 1 [USP'U]/G; MG/G
OINTMENT TOPICAL
COMMUNITY
Start: 2024-05-29

## 2024-08-07 NOTE — PROGRESS NOTES
FOLLOW UP VISIT        Patient Name: Carli Dubon  : 1960  Primary Care Physician: Cindy Huddleston APRN        Chief Complaint:  bilateral hip pain    HPI:   Carli Dubon is a 63 y.o. year old who presents today for follow up of the above. At her last visit in February, she received bilateral GTB steroid injections. She states the injections were not very helpful with any pain relief. She was also referred to neuro surgery at that visit for evaluation of chronic lower back pain.    She has completed PT and notes no improvement in her back, hip or leg pain.     Pain location is mostly the lower, central back. She states she cannot stand or walk long distances like grocery shopping without sitting for pain relief. She denies any numbness/tingling in the legs or groin. No recent injury or trauma.       Past Medical/Surgical, Social and Family History:  I have reviewed and/or updated pertinent history as noted in the medical record including:  Past Medical History:   Diagnosis Date    Allergic rhinitis     Arthritis of back     Asthma     Asthma, extrinsic     Asthma, intrinsic     Bipolar 1 disorder     Chronic bronchitis     Chronic obstructive lung disease 2015    COVID-19 08/15/2021    CTS (carpal tunnel syndrome)     Diabetes insipidus     Diabetes mellitus     Difficulty walking 2022    Hip arthrosis     Hypertension     Knee swelling     Low back strain     Migraine     Mixed hyperlipidemia 2020    Neck strain     Pancreatitis     Primary central sleep apnea     PTSD (post-traumatic stress disorder)     Pulmonary arterial hypertension     Sleep apnea, obstructive     Tobacco abuse 06/15/2020    Vitamin D deficiency 2024     No past surgical history on file.  Social History     Occupational History    Not on file   Tobacco Use    Smoking status: Every Day     Current packs/day: 1.00     Average packs/day: 1.1 packs/day for 40.0 years (45.0 ttl pk-yrs)     Types: Cigarettes      Passive exposure: Current    Smokeless tobacco: Never   Vaping Use    Vaping status: Never Used   Substance and Sexual Activity    Alcohol use: Never    Drug use: Never    Sexual activity: Not Currently     Partners: Female      Social History     Social History Narrative    Not on file     Family History   Problem Relation Age of Onset    Hypertension Mother     Diabetes Mother     Asthma Mother     Asthma Daughter     COPD Daughter     Anxiety disorder Daughter     Arthritis Daughter        Allergies:   Allergies   Allergen Reactions    Iodine Swelling    Adhesive Tape Rash       Medications:   Home Medications:  Current Outpatient Medications on File Prior to Visit   Medication Sig    albuterol (ACCUNEB) 1.25 MG/3ML nebulizer solution Take 3 mL by nebulization Every 6 (Six) Hours As Needed for Wheezing or Shortness of Air. Dispense as 1 box of unit dose    albuterol sulfate  (90 Base) MCG/ACT inhaler Inhale 2 puffs Every 4 (Four) Hours As Needed for Wheezing or Shortness of Air.    amitriptyline (ELAVIL) 150 MG tablet TAKE 1 TABLET BY MOUTH ONCE DAILY AT NIGHT    amLODIPine (Norvasc) 5 MG tablet Take 1 tablet by mouth Daily.    atorvastatin (LIPITOR) 40 MG tablet Take 1 tablet by mouth once daily    benzonatate (TESSALON) 200 MG capsule Take 1 capsule by mouth 3 (Three) Times a Day As Needed for Cough.    clonazePAM (KlonoPIN) 0.5 MG tablet Take 1 tablet by mouth twice daily as needed for anxiety    divalproex (DEPAKOTE ER) 500 MG 24 hr tablet Take 2 tablets by mouth 2 (Two) Times a Day.    gabapentin (NEURONTIN) 300 MG capsule Take 1 capsule by mouth Every Night.    glipizide (GLUCOTROL) 10 MG tablet TAKE 1 TABLET BY MOUTH TWICE DAILY BEFORE MEAL(S)    Jardiance 25 MG tablet tablet Take 1 tablet by mouth once daily    losartan (COZAAR) 100 MG tablet Take 1 tablet by mouth once daily    metFORMIN (GLUCOPHAGE) 1000 MG tablet TAKE 1 TABLET BY MOUTH TWICE DAILY WITH MEALS    nicotine (Nicotine Step 1) 21  MG/24HR patch Place 1 patch on the skin as directed by provider Daily.    nystatin-triamcinolone (MYCOLOG) 885582-8.1 UNIT/GM-% ointment APPLY OINTMENT TOPICALLY SPARINGLY TO AFFECTED AREA (UNDER BREASTS) TWICE DAILY    omeprazole (priLOSEC) 40 MG capsule Take 1 capsule by mouth once daily    ondansetron ODT (ZOFRAN-ODT) 4 MG disintegrating tablet Place 1 tablet on the tongue Every 6 (Six) Hours As Needed for Nausea.    PEG 3350-KCl-NaBcb-NaCl-NaSulf (PEG-3350/Electrolytes) 236 g reconstituted solution TAKE 4,000ML BY MOUTH ONCE FOR 1 DOSE    primidone (MYSOLINE) 50 MG tablet One at hs, may increase to two at hs after two weeks if needed    traMADol (ULTRAM) 50 MG tablet Take 1 tablet by mouth Every 8 (Eight) Hours As Needed. for pain    Trelegy Ellipta 200-62.5-25 MCG/ACT inhaler Inhale 1 puff by mouth once daily    vilazodone (VIIBRYD) 20 MG tablet tablet Take 1 tablet by mouth once daily    zolpidem (AMBIEN) 10 MG tablet Take 1 tablet by mouth At Night As Needed for Sleep.     No current facility-administered medications on file prior to visit.         ROS:  14 point review of systems was negative except as listed in the HPI     Physical Exam:   63 y.o. female  Body mass index is 34.95 kg/m²., 95.3 kg (210 lb)  Vitals:    08/07/24 1327   Resp: 20   SpO2: 97%         General: Alert, cooperative, appears well and in no observable distress.   HEENT: Normocephalic, atraumatic on external visual inspection. No icterus.   CV: No significant peripheral edema.   Respiratory: Normal respiratory effort.   Skin: Warm & well perfused; appropriate skin turgor.  Psych: Appropriate mood & affect.  Neuro: Gross sensation and motor intact in affected extremity/extremities.  Vascular: Peripheral pulses palpable in affected extremity/extremities. Calves/compartments soft and non tender, no evidence of DVT or compartment syndrome.               Assessment:  Chronic, lower back pain  Body mass index is 34.95 kg/m².  BMI consistent  with Obese Class I: 30-34.9kg/m2             Plan:  Reviewed past imaging of hips and knees which show only mild degenerative changes  She has an order in for L spine xray per neuro - advised her to present to hospital and complete that imaging  She has completed PT without improvement and GTB injections were not helpful  I think her pain source is her lower back; encouraged her to call for follow up with [facility]  Patient encouraged to call with questions or concerns in the interim      DILLON Thacker

## 2024-08-08 ENCOUNTER — TRANSCRIBE ORDERS (OUTPATIENT)
Dept: ADMINISTRATIVE | Facility: HOSPITAL | Age: 64
End: 2024-08-08
Payer: MEDICAID

## 2024-08-08 DIAGNOSIS — Z12.31 SCREENING MAMMOGRAM, ENCOUNTER FOR: Primary | ICD-10-CM

## 2024-08-10 DIAGNOSIS — J43.1 PANLOBULAR EMPHYSEMA: ICD-10-CM

## 2024-08-10 RX ORDER — FLUTICASONE FUROATE, UMECLIDINIUM BROMIDE AND VILANTEROL TRIFENATATE 200; 62.5; 25 UG/1; UG/1; UG/1
1 POWDER RESPIRATORY (INHALATION) DAILY
Qty: 60 EACH | Refills: 0 | Status: SHIPPED | OUTPATIENT
Start: 2024-08-10

## 2024-08-14 DIAGNOSIS — E11.9 TYPE 2 DIABETES MELLITUS WITHOUT COMPLICATION, WITHOUT LONG-TERM CURRENT USE OF INSULIN: ICD-10-CM

## 2024-08-14 RX ORDER — EMPAGLIFLOZIN 25 MG/1
25 TABLET, FILM COATED ORAL DAILY
Qty: 90 TABLET | Refills: 0 | Status: SHIPPED | OUTPATIENT
Start: 2024-08-14

## 2024-08-19 ENCOUNTER — OFFICE VISIT (OUTPATIENT)
Dept: ENDOCRINOLOGY | Facility: CLINIC | Age: 64
End: 2024-08-19
Payer: MEDICAID

## 2024-08-19 ENCOUNTER — HOSPITAL ENCOUNTER (OUTPATIENT)
Dept: GENERAL RADIOLOGY | Facility: HOSPITAL | Age: 64
Discharge: HOME | End: 2024-08-19
Admitting: NURSE PRACTITIONER
Payer: MEDICAID

## 2024-08-19 DIAGNOSIS — M51.36 DDD (DEGENERATIVE DISC DISEASE), LUMBAR: ICD-10-CM

## 2024-08-19 DIAGNOSIS — E11.42 TYPE 2 DIABETES MELLITUS WITH DIABETIC POLYNEUROPATHY, WITHOUT LONG-TERM CURRENT USE OF INSULIN: Primary | ICD-10-CM

## 2024-08-19 DIAGNOSIS — M25.552 BILATERAL HIP PAIN: ICD-10-CM

## 2024-08-19 DIAGNOSIS — M25.551 BILATERAL HIP PAIN: ICD-10-CM

## 2024-08-19 DIAGNOSIS — M54.50 CHRONIC BILATERAL LOW BACK PAIN WITHOUT SCIATICA: ICD-10-CM

## 2024-08-19 DIAGNOSIS — G89.29 CHRONIC BILATERAL LOW BACK PAIN WITHOUT SCIATICA: ICD-10-CM

## 2024-08-19 PROCEDURE — G0108 DIAB MANAGE TRN  PER INDIV: HCPCS | Performed by: DIETITIAN, REGISTERED

## 2024-08-19 PROCEDURE — 72114 X-RAY EXAM L-S SPINE BENDING: CPT

## 2024-08-19 RX ORDER — LANCETS 30 GAUGE
EACH MISCELLANEOUS
Qty: 100 EACH | Refills: 5 | Status: SHIPPED | OUTPATIENT
Start: 2024-08-19

## 2024-08-19 RX ORDER — BLOOD-GLUCOSE METER
KIT MISCELLANEOUS
Qty: 1 EACH | Refills: 0 | Status: SHIPPED | OUTPATIENT
Start: 2024-08-19

## 2024-08-19 NOTE — PROGRESS NOTES
Patient seen for an initial individual assessment appointment.  Patient here for 48 minutes from 1:05 To 1:53.  Discussed action of diabetes and dx criteria.  Discussed importance of inspecting the feet daily. Discussed the one plate method with patient.     Patient is currently using the unknown Meter.  Patient was able to correctly perform a blood sugar check using a fingerstick using the demo meter.  Blood sugar 189.         Patient scheduled for Class 1 on Sept 17th.     Blood sugar meter, test strips, and lancets sent into [facility]. Pt informed.

## 2024-08-20 RX ORDER — BLOOD-GLUCOSE METER
KIT MISCELLANEOUS
Qty: 1 EACH | Refills: 0 | Status: SHIPPED | OUTPATIENT
Start: 2024-08-20

## 2024-08-20 RX ORDER — LANCETS 30 GAUGE
EACH MISCELLANEOUS
Qty: 100 EACH | Refills: 5 | Status: SHIPPED | OUTPATIENT
Start: 2024-08-20

## 2024-08-21 ENCOUNTER — HOSPITAL ENCOUNTER (OUTPATIENT)
Dept: SLEEP MEDICINE | Facility: HOSPITAL | Age: 64
Discharge: HOME | End: 2024-08-21
Admitting: INTERNAL MEDICINE
Payer: MEDICAID

## 2024-08-21 DIAGNOSIS — G47.33 OBSTRUCTIVE SLEEP APNEA: ICD-10-CM

## 2024-08-21 PROCEDURE — 95806 SLEEP STUDY UNATT&RESP EFFT: CPT

## 2024-08-25 DIAGNOSIS — I10 HYPERTENSION, UNSPECIFIED TYPE: ICD-10-CM

## 2024-08-26 DIAGNOSIS — R11.2 NAUSEA AND VOMITING, UNSPECIFIED VOMITING TYPE: ICD-10-CM

## 2024-08-26 RX ORDER — OMEPRAZOLE 40 MG/1
40 CAPSULE, DELAYED RELEASE ORAL DAILY
Qty: 90 CAPSULE | Refills: 0 | Status: SHIPPED | OUTPATIENT
Start: 2024-08-26

## 2024-08-26 RX ORDER — VILAZODONE HYDROCHLORIDE 20 MG/1
20 TABLET ORAL DAILY
Qty: 90 TABLET | Refills: 1 | Status: SHIPPED | OUTPATIENT
Start: 2024-08-26

## 2024-08-26 RX ORDER — AMLODIPINE BESYLATE 5 MG/1
5 TABLET ORAL DAILY
Qty: 90 TABLET | Refills: 0 | Status: SHIPPED | OUTPATIENT
Start: 2024-08-26

## 2024-08-30 ENCOUNTER — HOSPITAL ENCOUNTER (OUTPATIENT)
Dept: MAMMOGRAPHY | Facility: HOSPITAL | Age: 64
Discharge: HOME | End: 2024-08-30
Payer: MEDICAID

## 2024-08-30 ENCOUNTER — HOSPITAL ENCOUNTER (OUTPATIENT)
Dept: CT IMAGING | Facility: HOSPITAL | Age: 64
Discharge: HOME | End: 2024-08-30
Payer: MEDICAID

## 2024-08-30 DIAGNOSIS — Z72.0 TOBACCO ABUSE: Chronic | ICD-10-CM

## 2024-08-30 DIAGNOSIS — Z12.31 SCREENING MAMMOGRAM, ENCOUNTER FOR: ICD-10-CM

## 2024-08-30 PROCEDURE — 71271 CT THORAX LUNG CANCER SCR C-: CPT

## 2024-08-30 PROCEDURE — 77067 SCR MAMMO BI INCL CAD: CPT

## 2024-08-30 PROCEDURE — 77063 BREAST TOMOSYNTHESIS BI: CPT

## 2024-09-05 ENCOUNTER — TELEMEDICINE (OUTPATIENT)
Dept: PSYCHIATRY | Facility: CLINIC | Age: 64
End: 2024-09-05
Payer: MEDICAID

## 2024-09-05 ENCOUNTER — TELEPHONE (OUTPATIENT)
Dept: ENDOCRINOLOGY | Facility: CLINIC | Age: 64
End: 2024-09-05

## 2024-09-05 DIAGNOSIS — J43.1 PANLOBULAR EMPHYSEMA: ICD-10-CM

## 2024-09-05 DIAGNOSIS — F51.04 PSYCHOPHYSIOLOGICAL INSOMNIA: ICD-10-CM

## 2024-09-05 DIAGNOSIS — F43.10 PTSD (POST-TRAUMATIC STRESS DISORDER): Chronic | ICD-10-CM

## 2024-09-05 DIAGNOSIS — F31.32 BIPOLAR AFFECTIVE DISORDER, CURRENTLY DEPRESSED, MODERATE: Primary | Chronic | ICD-10-CM

## 2024-09-05 DIAGNOSIS — F43.10 PTSD (POST-TRAUMATIC STRESS DISORDER): ICD-10-CM

## 2024-09-05 RX ORDER — FLUTICASONE FUROATE, UMECLIDINIUM BROMIDE AND VILANTEROL TRIFENATATE 200; 62.5; 25 UG/1; UG/1; UG/1
1 POWDER RESPIRATORY (INHALATION) DAILY
Qty: 60 EACH | Refills: 0 | Status: SHIPPED | OUTPATIENT
Start: 2024-09-05

## 2024-09-05 RX ORDER — CLONAZEPAM 0.5 MG/1
0.5 TABLET ORAL 2 TIMES DAILY PRN
Qty: 60 TABLET | Refills: 2 | Status: SHIPPED | OUTPATIENT
Start: 2024-09-05

## 2024-09-05 RX ORDER — AMITRIPTYLINE HYDROCHLORIDE 150 MG/1
150 TABLET ORAL NIGHTLY
Qty: 90 TABLET | Refills: 0 | Status: SHIPPED | OUTPATIENT
Start: 2024-09-05

## 2024-09-05 NOTE — TELEPHONE ENCOUNTER
Caller: Carli Dubon    Relationship to patient: Self    Best call back number: [phone]    Patient is needing: [facility] ADVISED THE PATIENT THE RX SENT FOR THE GLUCOSE MONITORING KIT DOES NOT SPECIFY WHICH KIT SHE NEEDS TO HAVE. PLEASE SEND AN UPDATED RX TO THE [facility] ON [address]
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
## 2024-09-06 ENCOUNTER — HOSPITAL ENCOUNTER (EMERGENCY)
Facility: HOSPITAL | Age: 64
Discharge: HOME | End: 2024-09-06
Attending: EMERGENCY MEDICINE
Payer: MEDICAID

## 2024-09-06 ENCOUNTER — APPOINTMENT (OUTPATIENT)
Dept: GENERAL RADIOLOGY | Facility: HOSPITAL | Age: 64
End: 2024-09-06
Payer: MEDICAID

## 2024-09-06 VITALS
RESPIRATION RATE: 20 BRPM | TEMPERATURE: 99 F | OXYGEN SATURATION: 94 % | HEIGHT: 65 IN | DIASTOLIC BLOOD PRESSURE: 79 MMHG | SYSTOLIC BLOOD PRESSURE: 113 MMHG | BODY MASS INDEX: 35.82 KG/M2 | WEIGHT: 215 LBS | HEART RATE: 120 BPM

## 2024-09-06 DIAGNOSIS — E11.42 TYPE 2 DIABETES MELLITUS WITH DIABETIC POLYNEUROPATHY, WITHOUT LONG-TERM CURRENT USE OF INSULIN: Primary | ICD-10-CM

## 2024-09-06 DIAGNOSIS — J06.9 ACUTE URI: ICD-10-CM

## 2024-09-06 DIAGNOSIS — U07.1 COVID-19: Primary | ICD-10-CM

## 2024-09-06 LAB
FLUAV SUBTYP SPEC NAA+PROBE: NOT DETECTED
FLUBV RNA ISLT QL NAA+PROBE: NOT DETECTED
SARS-COV-2 RNA RESP QL NAA+PROBE: DETECTED

## 2024-09-06 PROCEDURE — 71045 X-RAY EXAM CHEST 1 VIEW: CPT

## 2024-09-06 PROCEDURE — 99283 EMERGENCY DEPT VISIT LOW MDM: CPT

## 2024-09-06 PROCEDURE — 94640 AIRWAY INHALATION TREATMENT: CPT

## 2024-09-06 PROCEDURE — 94799 UNLISTED PULMONARY SVC/PX: CPT

## 2024-09-06 PROCEDURE — 94664 DEMO&/EVAL PT USE INHALER: CPT

## 2024-09-06 PROCEDURE — 87636 SARSCOV2 & INF A&B AMP PRB: CPT | Performed by: EMERGENCY MEDICINE

## 2024-09-06 PROCEDURE — 94761 N-INVAS EAR/PLS OXIMETRY MLT: CPT

## 2024-09-06 RX ORDER — PREDNISONE 20 MG/1
20 TABLET ORAL DAILY
Qty: 4 TABLET | Refills: 0 | Status: SHIPPED | OUTPATIENT
Start: 2024-09-06 | End: 2024-09-10

## 2024-09-06 RX ORDER — ALBUTEROL SULFATE 90 UG/1
4 AEROSOL, METERED RESPIRATORY (INHALATION) ONCE
Status: COMPLETED | OUTPATIENT
Start: 2024-09-06 | End: 2024-09-06

## 2024-09-06 RX ADMIN — ALBUTEROL SULFATE 4 PUFF: 108 AEROSOL, METERED RESPIRATORY (INHALATION) at 09:05

## 2024-09-06 NOTE — ED PROVIDER NOTES
Subjective   History of Present Illness  63-year-old female with history of asthma presents for cough, sore throat, body aches, headache.  Started last night.  Took a Tylenol and Motrin this morning.  States she did have fevers at home.  Had a COVID exposure.    Review of Systems  See HPI.  Past Medical History:   Diagnosis Date    Allergic rhinitis     Arthritis of back     Asthma     Asthma, extrinsic     Asthma, intrinsic     Bipolar 1 disorder     Chronic bronchitis     Chronic obstructive lung disease 01/28/2015    COVID-19 08/15/2021    CTS (carpal tunnel syndrome)     Diabetes insipidus     Diabetes mellitus     Difficulty walking 03/2022    Hip arthrosis     Hypertension     Knee swelling     Low back strain     Migraine     Mixed hyperlipidemia 03/03/2020    Neck strain     Pancreatitis     Primary central sleep apnea     PTSD (post-traumatic stress disorder)     Pulmonary arterial hypertension     Sleep apnea, obstructive     Tobacco abuse 06/15/2020    Vitamin D deficiency 06/19/2024       Allergies   Allergen Reactions    Iodine Swelling    Adhesive Tape Rash       No past surgical history on file.    Family History   Problem Relation Age of Onset    Hypertension Mother     Diabetes Mother     Asthma Mother     Asthma Daughter     COPD Daughter     Anxiety disorder Daughter     Arthritis Daughter        Social History     Socioeconomic History    Marital status: Single   Tobacco Use    Smoking status: Every Day     Current packs/day: 1.00     Average packs/day: 1.1 packs/day for 40.0 years (45.0 ttl pk-yrs)     Types: Cigarettes     Passive exposure: Current    Smokeless tobacco: Never   Vaping Use    Vaping status: Never Used   Substance and Sexual Activity    Alcohol use: Never    Drug use: Never    Sexual activity: Not Currently     Partners: Female           Objective   Physical Exam  No acute distress, tachycardic rate and regular rhythm, no tachypnea or increased work of breathing, bilateral  "expiratory wheezes, elevated BMI, abdomen soft and nontender, moist oral mucosa, erythematous oropharynx with midline uvula no mass effect no exudate, no scleral icterus, alert, nontoxic-appearing.  Procedures           ED Course      /79   Pulse 120   Temp 99 °F (37.2 °C) (Oral)   Resp 20   Ht 165.1 cm (65\")   Wt 97.5 kg (215 lb)   SpO2 94%   BMI 35.78 kg/m²   Labs Reviewed   COVID-19 AND FLU A/B, NP SWAB IN TRANSPORT MEDIA 1 HR TAT - Abnormal; Notable for the following components:       Result Value    COVID19 Detected (*)     All other components within normal limits    Narrative:     Fact sheet for providers: https://www.fda.gov/media/806486/download    Fact sheet for patients: https://www.fda.gov/media/398642/download    Test performed by PCR.  Influenza A and Influenza B negative results should be considered presumptive in samples that have a positive SARS-CoV-2 result.    Competitive inhibition studies showed that SARS-CoV-2 virus, when present at concentrations above 3.6E+04 copies/mL, can inhibit the detection and amplification of influenza A and influenza B virus RNA if present at or below 1.8E+02 copies/mL or 4.9E+02 copies/mL, respectively, and may lead to false negative influenza virus results. If co-infection with influenza A or influenza B virus is suspected in samples with a positive SARS-CoV-2 result, the sample should be re-tested with another FDA cleared, approved, or authorized influenza test, if influenza virus detection would change clinical management.     Medications   albuterol sulfate HFA (PROVENTIL HFA;VENTOLIN HFA;PROAIR HFA) inhaler 4 puff (4 puffs Inhalation Given 9/6/24 0905)     XR Chest 1 View    Result Date: 9/6/2024  Impression: No active disease. Electronically Signed: Sekou Nguyen MD  9/6/2024 9:40 AM EDT  Workstation ID: [id number]                                          Medical Decision Making  Problems Addressed:  Acute URI: complicated acute illness or " injury  COVID-19: complicated acute illness or injury    Amount and/or Complexity of Data Reviewed  Radiology: ordered.    Risk  Prescription drug management.      My interpretation of chest x-ray is no focal infiltrate or pneumothorax.  See system for radiology interpretation.    Patient COVID-positive.  Reassuring O2 sat.  Patient states her breathing feels significantly better after albuterol.  Will give short course of steroids.  Instructed patient to hold primidone, atorvastatin, clonazepam while on Paxlovid.    Final diagnoses:   COVID-19   Acute URI       ED Disposition  ED Disposition       ED Disposition   Discharge    Condition   Stable    Comment   --               Cindy Huddleston, APRN  [address]  [address]  [place] [address]  [phone]    In 3 days           Medication List        New Prescriptions      Nirmatrelvir & Ritonavir (300mg/100mg)  Commonly known as: PAXLOVID  Take 3 tablets by mouth 2 (Two) Times a Day for 5 days.     predniSONE 20 MG tablet  Commonly known as: DELTASONE  Take 1 tablet by mouth Daily for 4 days.               Where to Get Your Medications        These medications were sent to [id number] - [place], [place] - [address] - [phone]  - [phone] FX  [address] [facility] [place] [address]      Phone: [phone]   Nirmatrelvir & Ritonavir (300mg/100mg)  predniSONE 20 MG tablet            Uche Abbott MD  09/07/24 5556

## 2024-09-07 RX ORDER — LANCETS
EACH MISCELLANEOUS
Qty: 100 EACH | Refills: 5 | Status: SHIPPED | OUTPATIENT
Start: 2024-09-07 | End: 2025-09-06

## 2024-09-07 RX ORDER — BLOOD-GLUCOSE METER
1 EACH MISCELLANEOUS DAILY
Qty: 1 KIT | Refills: 0 | Status: SHIPPED | OUTPATIENT
Start: 2024-09-07

## 2024-09-07 RX ORDER — BLOOD SUGAR DIAGNOSTIC
STRIP MISCELLANEOUS
Qty: 100 EACH | Refills: 5 | Status: SHIPPED | OUTPATIENT
Start: 2024-09-07

## 2024-09-16 ENCOUNTER — TELEPHONE (OUTPATIENT)
Dept: FAMILY MEDICINE CLINIC | Facility: CLINIC | Age: 64
End: 2024-09-16
Payer: MEDICAID

## 2024-09-17 ENCOUNTER — PRIOR AUTHORIZATION (OUTPATIENT)
Dept: PSYCHIATRY | Facility: CLINIC | Age: 64
End: 2024-09-17
Payer: MEDICAID

## 2024-09-17 ENCOUNTER — OFFICE VISIT (OUTPATIENT)
Dept: ENDOCRINOLOGY | Facility: CLINIC | Age: 64
End: 2024-09-17
Payer: MEDICAID

## 2024-09-17 DIAGNOSIS — E11.42 TYPE 2 DIABETES MELLITUS WITH DIABETIC POLYNEUROPATHY, WITHOUT LONG-TERM CURRENT USE OF INSULIN: Primary | ICD-10-CM

## 2024-09-18 ENCOUNTER — TELEPHONE (OUTPATIENT)
Dept: FAMILY MEDICINE CLINIC | Facility: CLINIC | Age: 64
End: 2024-09-18
Payer: MEDICAID

## 2024-09-18 DIAGNOSIS — J43.1 PANLOBULAR EMPHYSEMA: Primary | ICD-10-CM

## 2024-09-18 RX ORDER — BUDESONIDE, GLYCOPYRROLATE, AND FORMOTEROL FUMARATE 160; 9; 4.8 UG/1; UG/1; UG/1
2 AEROSOL, METERED RESPIRATORY (INHALATION) 2 TIMES DAILY
Qty: 10.7 G | Refills: 1 | Status: SHIPPED | OUTPATIENT
Start: 2024-09-18

## 2024-09-20 DIAGNOSIS — E11.9 TYPE 2 DIABETES MELLITUS WITHOUT COMPLICATION, WITHOUT LONG-TERM CURRENT USE OF INSULIN: ICD-10-CM

## 2024-09-24 ENCOUNTER — TELEPHONE (OUTPATIENT)
Dept: PULMONOLOGY | Facility: HOSPITAL | Age: 64
End: 2024-09-24
Payer: MEDICAID

## 2024-10-02 DIAGNOSIS — F31.32 BIPOLAR AFFECTIVE DISORDER, CURRENTLY DEPRESSED, MODERATE: Chronic | ICD-10-CM

## 2024-10-02 DIAGNOSIS — I10 HYPERTENSION, UNSPECIFIED TYPE: ICD-10-CM

## 2024-10-02 RX ORDER — LOSARTAN POTASSIUM 100 MG/1
100 TABLET ORAL DAILY
Qty: 90 TABLET | Refills: 0 | Status: SHIPPED | OUTPATIENT
Start: 2024-10-02

## 2024-10-02 RX ORDER — DIVALPROEX SODIUM 500 MG/1
1000 TABLET, FILM COATED, EXTENDED RELEASE ORAL 2 TIMES DAILY
Qty: 360 TABLET | Refills: 0 | Status: SHIPPED | OUTPATIENT
Start: 2024-10-02

## 2024-10-31 DIAGNOSIS — E11.9 TYPE 2 DIABETES MELLITUS WITHOUT COMPLICATION, WITHOUT LONG-TERM CURRENT USE OF INSULIN: ICD-10-CM

## 2024-10-31 RX ORDER — ATORVASTATIN CALCIUM 40 MG/1
40 TABLET, FILM COATED ORAL DAILY
Qty: 90 TABLET | Refills: 0 | Status: SHIPPED | OUTPATIENT
Start: 2024-10-31

## 2024-10-31 RX ORDER — GLIPIZIDE 10 MG/1
10 TABLET ORAL
Qty: 180 TABLET | Refills: 0 | Status: SHIPPED | OUTPATIENT
Start: 2024-10-31

## 2024-11-01 NOTE — PROGRESS NOTES
Subjective     Carli Dubon is a 63 y.o. female.     History of Present Illness  Pt is here today for a 6 mo follow up on hypertension, diabetes, hyperlipidemia, COPD, bipolar disorder, and insomnia.  She has bene having ringing in the ears  Has some dizziness and HA  She had covid 1.5mo ago and that is when symptoms started  She loses her balance  Has a history of pancreatitis.  She feels like she is having a flare  She sees GI.   She is getting epigastric pain with eating  She does not drink  Wants referral to local GI     Hypertension-patient is currently on amlodipine 5 mg daily, losartan 100 mg daily. She is doing well on the medication. Denies CP, SOA. She gets some HA and dizziness     Tremor- pt saw neuro.  Believes it is an essential tremor- possibly from meds. Was started on primidone which helps some     Diabetes-patient is currently on Jardiance 25 mg daily, glipizide 10 mg twice daily, Metformin 1000 mg twice daily. She does not monitor her blood sugars regularly.  Sees endo.      Hyperlipidemia- she is currently on lipitor 40mg daily.      COPD- Pt is seeing pulmonology.  Pt is currently on Trelegy which helps. She continues to smoke     Bipolar disorder-patient is seeing psychiatry for this.  She is currently on Depakote at 1000 mg twice daily and Viibryd 20 mg daily.  She also has Klonopin as needed. Denies SI or HI     Insomnia-patient is seeing psychiatry for this.  She is on ambien 10mg prn.  She is also on Elavil 150 mg nightly. She is doing well on it.     GERD-patient is currently on omeprazole 40 mg daily.     Hot flashes- pt states GYN placed her on gabapentin 300mg nightly. It is helping     Back pain- pt sees neurosurgeon. She is on gabapentin 300mg nightly. She did PT.     NEVIN- wears a CPAP. Sees sleep med     Labs- in system per endo  Pap smear- 08/2019- normal  Mammogram- 8/30/24  DEXA- post menopausal for 4 years.  Colonoscopy- has colonoscopy at [facility] 8/25/23     Vaccines:  Flu-  N/A  PNA- UTD  Shingles-due- needs to get at the pharmacy  Tdap- UTD  covid- UTD     Dental exam- due  Eye exam- due         The following portions of the patient's history were reviewed and updated as appropriate: allergies, current medications, past family history, past medical history, past social history, past surgical history, and problem list.    Review of Systems   Constitutional:  Positive for chills and unexpected weight loss. Negative for fatigue and fever.   HENT:  Positive for congestion, sinus pressure and tinnitus.    Eyes:  Negative for blurred vision.   Respiratory:  Positive for cough. Negative for chest tightness and shortness of breath.    Cardiovascular:  Negative for chest pain and palpitations.   Gastrointestinal:  Positive for abdominal pain.   Endocrine: Positive for polydipsia. Negative for polyuria.   Neurological:  Positive for dizziness and tremors. Negative for speech difficulty.   Psychiatric/Behavioral:  Negative for depressed mood and stress. The patient is not nervous/anxious.        Objective     /88   Pulse 96   Temp 98.4 °F (36.9 °C) (Tympanic)   Wt 94.8 kg (209 lb)   SpO2 95%   BMI 34.78 kg/m²     Current Outpatient Medications on File Prior to Visit   Medication Sig Dispense Refill    clonazePAM (KlonoPIN) 0.5 MG tablet Take 1 tablet by mouth 2 (Two) Times a Day As Needed for Anxiety. for anxiety 60 tablet 2    Accu-Chek Softclix Lancets lancets Used to check bs daily 100 each 5    albuterol (ACCUNEB) 1.25 MG/3ML nebulizer solution Take 3 mL by nebulization Every 6 (Six) Hours As Needed for Wheezing or Shortness of Air. Dispense as 1 box of unit dose 1 each 1    albuterol sulfate  (90 Base) MCG/ACT inhaler Inhale 2 puffs Every 4 (Four) Hours As Needed for Wheezing or Shortness of Air. 6.7 g 1    amitriptyline (ELAVIL) 150 MG tablet Take 1 tablet by mouth Every Night. 90 tablet 0    amLODIPine (NORVASC) 5 MG tablet Take 1 tablet by mouth once daily 90 tablet 0     atorvastatin (LIPITOR) 40 MG tablet Take 1 tablet by mouth once daily 90 tablet 0    benzonatate (TESSALON) 200 MG capsule Take 1 capsule by mouth 3 (Three) Times a Day As Needed for Cough. 90 capsule 0    Blood Glucose Monitoring Suppl (Accu-Chek Guide Me) w/Device kit Use 1 kit Daily. Used to check bs daily. 1 kit 0    Budeson-Glycopyrrol-Formoterol (Breztri Aerosphere) 160-9-4.8 MCG/ACT aerosol inhaler Inhale 2 puffs 2 (Two) Times a Day. 10.7 g 1    divalproex (DEPAKOTE ER) 500 MG 24 hr tablet Take 2 tablets by mouth twice daily 360 tablet 0    gabapentin (NEURONTIN) 300 MG capsule Take 1 capsule by mouth Every Night. 30 capsule 0    glipizide (GLUCOTROL) 10 MG tablet TAKE 1 TABLET BY MOUTH TWICE DAILY BEFORE MEAL(S) 180 tablet 0    glucose blood (Accu-Chek Guide) test strip Use as instructed 100 each 5    glucose blood test strip Use daily with checking blood glucose 100 each 5    glucose blood test strip Use one daily to blood glucose testing 100 each 5    glucose monitor monitoring kit Use daily with checking blood glucose 1 each 0    glucose monitor monitoring kit Use for daily Blood glucose testing 1 each 0    Jardiance 25 MG tablet tablet Take 1 tablet by mouth once daily 90 tablet 0    Lancets misc Use daily with checking Blood glucose 100 each 5    Lancets misc Use for daily  blood glucose testing 100 each 5    losartan (COZAAR) 100 MG tablet Take 1 tablet by mouth once daily 90 tablet 0    metFORMIN (GLUCOPHAGE) 1000 MG tablet TAKE 1 TABLET BY MOUTH TWICE DAILY WITH MEALS 180 tablet 0    nicotine (Nicotine Step 1) 21 MG/24HR patch Place 1 patch on the skin as directed by provider Daily. 30 patch 11    nystatin-triamcinolone (MYCOLOG) 476256-7.1 UNIT/GM-% ointment APPLY OINTMENT TOPICALLY SPARINGLY TO AFFECTED AREA (UNDER BREASTS) TWICE DAILY      omeprazole (priLOSEC) 40 MG capsule Take 1 capsule by mouth once daily 90 capsule 0    ondansetron ODT (ZOFRAN-ODT) 4 MG disintegrating tablet Place 1 tablet  on the tongue Every 6 (Six) Hours As Needed for Nausea. 20 tablet 0    PEG 3350-KCl-NaBcb-NaCl-NaSulf (PEG-3350/Electrolytes) 236 g reconstituted solution TAKE 4,000ML BY MOUTH ONCE FOR 1 DOSE      primidone (MYSOLINE) 50 MG tablet One at hs, may increase to two at hs after two weeks if needed 180 tablet 3    traMADol (ULTRAM) 50 MG tablet Take 1 tablet by mouth Every 8 (Eight) Hours As Needed. for pain      vilazodone (VIIBRYD) 20 MG tablet tablet Take 1 tablet by mouth once daily 90 tablet 1    zolpidem (AMBIEN) 10 MG tablet Take 1 tablet by mouth At Night As Needed for Sleep. 30 tablet 2     No current facility-administered medications on file prior to visit.                 Physical Exam  Constitutional:       General: She is not in acute distress.     Appearance: Normal appearance. She is not ill-appearing.   HENT:      Head: Normocephalic and atraumatic.   Eyes:      Extraocular Movements: Extraocular movements intact.   Cardiovascular:      Rate and Rhythm: Normal rate and regular rhythm.      Heart sounds: No murmur heard.  Pulmonary:      Effort: Pulmonary effort is normal. No respiratory distress.   Abdominal:      Tenderness: There is abdominal tenderness (epigastric).   Skin:     General: Skin is warm and dry.   Neurological:      General: No focal deficit present.      Mental Status: She is alert and oriented to person, place, and time.   Psychiatric:         Mood and Affect: Mood normal.         Behavior: Behavior normal.         Thought Content: Thought content normal.         Judgment: Judgment normal.           Assessment & Plan     Diagnoses and all orders for this visit:    1. Type 2 diabetes mellitus without complication, without long-term current use of insulin (Primary)  Comments:  sees endo  check labs  cont meds  Orders:  -     Comprehensive Metabolic Panel; Future  -     Hemoglobin A1c; Future    2. Mixed hyperlipidemia  Comments:  stable  cont statin  work on diet and exercise  Orders:  -      Lipid panel; Future    3. Essential hypertension  Comments:  slightly elevated- trending down in office  cont norvasc and losartan  check labs    4. Bipolar affective disorder, currently depressed, moderate  Comments:  stable  sees psych   cont meds  denies SI or HI    5. Chronic obstructive pulmonary disease, unspecified COPD type  Comments:  smoking cessation  cont trelegy  sees pulm    6. Dizziness  Comments:  possible ET dysfunction  start flonase  if no imp send to ENT  Orders:  -     fluticasone (FLONASE) 50 MCG/ACT nasal spray; Administer 2 sprays into the nostril(s) as directed by provider Daily.  Dispense: 11.1 mL; Refill: 0    7. Epigastric pain  Comments:  history pancreatitis  referral to GI in [place]  check labs  strict ER prec  cont omeprazole  Orders:  -     Comprehensive Metabolic Panel; Future  -     Amylase; Future  -     Lipase; Future  -     CBC & Differential  -     Ambulatory Referral to Gastroenterology    8. Post-menopausal  -     DEXA Bone Density Axial; Future

## 2024-11-04 ENCOUNTER — LAB (OUTPATIENT)
Dept: FAMILY MEDICINE CLINIC | Facility: CLINIC | Age: 64
End: 2024-11-04
Payer: MEDICAID

## 2024-11-04 ENCOUNTER — OFFICE VISIT (OUTPATIENT)
Dept: FAMILY MEDICINE CLINIC | Facility: CLINIC | Age: 64
End: 2024-11-04
Payer: MEDICAID

## 2024-11-04 VITALS
WEIGHT: 209 LBS | OXYGEN SATURATION: 95 % | HEART RATE: 96 BPM | SYSTOLIC BLOOD PRESSURE: 144 MMHG | BODY MASS INDEX: 34.78 KG/M2 | DIASTOLIC BLOOD PRESSURE: 88 MMHG | TEMPERATURE: 98.4 F

## 2024-11-04 DIAGNOSIS — Z78.0 POST-MENOPAUSAL: ICD-10-CM

## 2024-11-04 DIAGNOSIS — E11.9 TYPE 2 DIABETES MELLITUS WITHOUT COMPLICATION, WITHOUT LONG-TERM CURRENT USE OF INSULIN: Primary | ICD-10-CM

## 2024-11-04 DIAGNOSIS — E78.2 MIXED HYPERLIPIDEMIA: ICD-10-CM

## 2024-11-04 DIAGNOSIS — F51.04 PSYCHOPHYSIOLOGICAL INSOMNIA: ICD-10-CM

## 2024-11-04 DIAGNOSIS — J44.9 CHRONIC OBSTRUCTIVE PULMONARY DISEASE, UNSPECIFIED COPD TYPE: ICD-10-CM

## 2024-11-04 DIAGNOSIS — R10.13 EPIGASTRIC PAIN: ICD-10-CM

## 2024-11-04 DIAGNOSIS — F31.32 BIPOLAR AFFECTIVE DISORDER, CURRENTLY DEPRESSED, MODERATE: ICD-10-CM

## 2024-11-04 DIAGNOSIS — I10 ESSENTIAL HYPERTENSION: ICD-10-CM

## 2024-11-04 DIAGNOSIS — E11.9 TYPE 2 DIABETES MELLITUS WITHOUT COMPLICATION, WITHOUT LONG-TERM CURRENT USE OF INSULIN: ICD-10-CM

## 2024-11-04 DIAGNOSIS — R42 DIZZINESS: ICD-10-CM

## 2024-11-04 DIAGNOSIS — Z23 NEED FOR IMMUNIZATION AGAINST INFLUENZA: ICD-10-CM

## 2024-11-04 LAB
ALBUMIN SERPL-MCNC: 3.9 G/DL (ref 3.5–5.2)
ALBUMIN/GLOB SERPL: 1.4 G/DL
ALP SERPL-CCNC: 84 U/L (ref 39–117)
ALT SERPL W P-5'-P-CCNC: 18 U/L (ref 1–33)
AMYLASE SERPL-CCNC: 54 U/L (ref 28–100)
ANION GAP SERPL CALCULATED.3IONS-SCNC: 11 MMOL/L (ref 5–15)
AST SERPL-CCNC: 18 U/L (ref 1–32)
BASOPHILS # BLD AUTO: 0.03 10*3/MM3 (ref 0–0.2)
BASOPHILS NFR BLD AUTO: 0.3 % (ref 0–1.5)
BILIRUB SERPL-MCNC: 0.2 MG/DL (ref 0–1.2)
BUN SERPL-MCNC: 15 MG/DL (ref 8–23)
BUN/CREAT SERPL: 23.1 (ref 7–25)
CALCIUM SPEC-SCNC: 9 MG/DL (ref 8.6–10.5)
CHLORIDE SERPL-SCNC: 102 MMOL/L (ref 98–107)
CHOLEST SERPL-MCNC: 157 MG/DL (ref 0–200)
CO2 SERPL-SCNC: 26 MMOL/L (ref 22–29)
CREAT SERPL-MCNC: 0.65 MG/DL (ref 0.57–1)
DEPRECATED RDW RBC AUTO: 46 FL (ref 37–54)
EGFRCR SERPLBLD CKD-EPI 2021: 100.3 ML/MIN/1.73
EOSINOPHIL # BLD AUTO: 0.24 10*3/MM3 (ref 0–0.4)
EOSINOPHIL NFR BLD AUTO: 2.6 % (ref 0.3–6.2)
ERYTHROCYTE [DISTWIDTH] IN BLOOD BY AUTOMATED COUNT: 13 % (ref 12.3–15.4)
GLOBULIN UR ELPH-MCNC: 2.8 GM/DL
GLUCOSE SERPL-MCNC: 173 MG/DL (ref 65–99)
HBA1C MFR BLD: 9.4 % (ref 4.8–5.6)
HCT VFR BLD AUTO: 47.5 % (ref 34–46.6)
HDLC SERPL-MCNC: 36 MG/DL (ref 40–60)
HGB BLD-MCNC: 16 G/DL (ref 12–15.9)
IMM GRANULOCYTES # BLD AUTO: 0.05 10*3/MM3 (ref 0–0.05)
IMM GRANULOCYTES NFR BLD AUTO: 0.5 % (ref 0–0.5)
LDLC SERPL CALC-MCNC: 90 MG/DL (ref 0–100)
LDLC/HDLC SERPL: 2.35 {RATIO}
LIPASE SERPL-CCNC: 35 U/L (ref 13–60)
LYMPHOCYTES # BLD AUTO: 3.74 10*3/MM3 (ref 0.7–3.1)
LYMPHOCYTES NFR BLD AUTO: 39.7 % (ref 19.6–45.3)
MCH RBC QN AUTO: 32.5 PG (ref 26.6–33)
MCHC RBC AUTO-ENTMCNC: 33.7 G/DL (ref 31.5–35.7)
MCV RBC AUTO: 96.3 FL (ref 79–97)
MONOCYTES # BLD AUTO: 0.65 10*3/MM3 (ref 0.1–0.9)
MONOCYTES NFR BLD AUTO: 6.9 % (ref 5–12)
NEUTROPHILS NFR BLD AUTO: 4.7 10*3/MM3 (ref 1.7–7)
NEUTROPHILS NFR BLD AUTO: 50 % (ref 42.7–76)
NRBC BLD AUTO-RTO: 0 /100 WBC (ref 0–0.2)
PLATELET # BLD AUTO: 223 10*3/MM3 (ref 140–450)
PMV BLD AUTO: 11.4 FL (ref 6–12)
POTASSIUM SERPL-SCNC: 4.1 MMOL/L (ref 3.5–5.2)
PROT SERPL-MCNC: 6.7 G/DL (ref 6–8.5)
RBC # BLD AUTO: 4.93 10*6/MM3 (ref 3.77–5.28)
SODIUM SERPL-SCNC: 139 MMOL/L (ref 136–145)
TRIGL SERPL-MCNC: 182 MG/DL (ref 0–150)
VLDLC SERPL-MCNC: 31 MG/DL (ref 5–40)
WBC NRBC COR # BLD AUTO: 9.41 10*3/MM3 (ref 3.4–10.8)

## 2024-11-04 PROCEDURE — 36415 COLL VENOUS BLD VENIPUNCTURE: CPT | Performed by: NURSE PRACTITIONER

## 2024-11-04 PROCEDURE — 82150 ASSAY OF AMYLASE: CPT | Performed by: NURSE PRACTITIONER

## 2024-11-04 PROCEDURE — 1160F RVW MEDS BY RX/DR IN RCRD: CPT | Performed by: NURSE PRACTITIONER

## 2024-11-04 PROCEDURE — 1159F MED LIST DOCD IN RCRD: CPT | Performed by: NURSE PRACTITIONER

## 2024-11-04 PROCEDURE — 3079F DIAST BP 80-89 MM HG: CPT | Performed by: NURSE PRACTITIONER

## 2024-11-04 PROCEDURE — 3077F SYST BP >= 140 MM HG: CPT | Performed by: NURSE PRACTITIONER

## 2024-11-04 PROCEDURE — 85025 COMPLETE CBC W/AUTO DIFF WBC: CPT | Performed by: NURSE PRACTITIONER

## 2024-11-04 PROCEDURE — 1125F AMNT PAIN NOTED PAIN PRSNT: CPT | Performed by: NURSE PRACTITIONER

## 2024-11-04 PROCEDURE — 90471 IMMUNIZATION ADMIN: CPT | Performed by: NURSE PRACTITIONER

## 2024-11-04 PROCEDURE — 80061 LIPID PANEL: CPT | Performed by: NURSE PRACTITIONER

## 2024-11-04 PROCEDURE — 83690 ASSAY OF LIPASE: CPT | Performed by: NURSE PRACTITIONER

## 2024-11-04 PROCEDURE — 83036 HEMOGLOBIN GLYCOSYLATED A1C: CPT | Performed by: NURSE PRACTITIONER

## 2024-11-04 PROCEDURE — 3052F HG A1C>EQUAL 8.0%<EQUAL 9.0%: CPT | Performed by: NURSE PRACTITIONER

## 2024-11-04 PROCEDURE — 90656 IIV3 VACC NO PRSV 0.5 ML IM: CPT | Performed by: NURSE PRACTITIONER

## 2024-11-04 PROCEDURE — 80053 COMPREHEN METABOLIC PANEL: CPT | Performed by: NURSE PRACTITIONER

## 2024-11-04 PROCEDURE — 99214 OFFICE O/P EST MOD 30 MIN: CPT | Performed by: NURSE PRACTITIONER

## 2024-11-04 RX ORDER — FLUTICASONE PROPIONATE 50 MCG
2 SPRAY, SUSPENSION (ML) NASAL DAILY
Qty: 11.1 ML | Refills: 0 | Status: SHIPPED | OUTPATIENT
Start: 2024-11-04

## 2024-11-05 RX ORDER — AMITRIPTYLINE HYDROCHLORIDE 150 MG/1
150 TABLET ORAL NIGHTLY
Qty: 90 TABLET | Refills: 1 | Status: SHIPPED | OUTPATIENT
Start: 2024-11-05

## 2024-11-05 RX ORDER — VILAZODONE HYDROCHLORIDE 20 MG/1
20 TABLET ORAL DAILY
Qty: 90 TABLET | Refills: 1 | Status: SHIPPED | OUTPATIENT
Start: 2024-11-05

## 2024-11-05 RX ORDER — ZOLPIDEM TARTRATE 10 MG/1
10 TABLET ORAL NIGHTLY PRN
Qty: 30 TABLET | Refills: 2 | Status: SHIPPED | OUTPATIENT
Start: 2024-11-05

## 2024-11-10 DIAGNOSIS — E11.9 TYPE 2 DIABETES MELLITUS WITHOUT COMPLICATION, WITHOUT LONG-TERM CURRENT USE OF INSULIN: ICD-10-CM

## 2024-11-11 RX ORDER — EMPAGLIFLOZIN 25 MG/1
25 TABLET, FILM COATED ORAL DAILY
Qty: 90 TABLET | Refills: 0 | Status: SHIPPED | OUTPATIENT
Start: 2024-11-11

## 2024-11-15 ENCOUNTER — HOSPITAL ENCOUNTER (OUTPATIENT)
Dept: BONE DENSITY | Facility: HOSPITAL | Age: 64
Discharge: HOME | End: 2024-11-15
Payer: MEDICAID

## 2024-11-15 DIAGNOSIS — Z78.0 POST-MENOPAUSAL: ICD-10-CM

## 2024-11-15 PROCEDURE — 77080 DXA BONE DENSITY AXIAL: CPT

## 2024-11-30 DIAGNOSIS — R11.2 NAUSEA AND VOMITING, UNSPECIFIED VOMITING TYPE: ICD-10-CM

## 2024-12-02 RX ORDER — OMEPRAZOLE 40 MG/1
40 CAPSULE, DELAYED RELEASE ORAL DAILY
Qty: 90 CAPSULE | Refills: 0 | Status: SHIPPED | OUTPATIENT
Start: 2024-12-02

## 2024-12-05 ENCOUNTER — OFFICE VISIT (OUTPATIENT)
Dept: PULMONOLOGY | Facility: HOSPITAL | Age: 64
End: 2024-12-05
Payer: MEDICAID

## 2024-12-05 VITALS
HEART RATE: 108 BPM | RESPIRATION RATE: 16 BRPM | OXYGEN SATURATION: 95 % | DIASTOLIC BLOOD PRESSURE: 95 MMHG | HEIGHT: 65 IN | BODY MASS INDEX: 34.82 KG/M2 | WEIGHT: 209 LBS | SYSTOLIC BLOOD PRESSURE: 169 MMHG

## 2024-12-05 DIAGNOSIS — I10 ESSENTIAL HYPERTENSION: ICD-10-CM

## 2024-12-05 DIAGNOSIS — J43.1 PANLOBULAR EMPHYSEMA: ICD-10-CM

## 2024-12-05 DIAGNOSIS — G47.33 OBSTRUCTIVE SLEEP APNEA: ICD-10-CM

## 2024-12-05 DIAGNOSIS — F51.04 PSYCHOPHYSIOLOGICAL INSOMNIA: Chronic | ICD-10-CM

## 2024-12-05 DIAGNOSIS — Z72.0 TOBACCO ABUSE: Primary | Chronic | ICD-10-CM

## 2024-12-05 PROCEDURE — G0463 HOSPITAL OUTPT CLINIC VISIT: HCPCS

## 2024-12-05 RX ORDER — BENZONATATE 200 MG/1
200 CAPSULE ORAL 3 TIMES DAILY PRN
Qty: 90 CAPSULE | Refills: 2 | Status: SHIPPED | OUTPATIENT
Start: 2024-12-05

## 2024-12-05 RX ORDER — IPRATROPIUM BROMIDE AND ALBUTEROL SULFATE 2.5; .5 MG/3ML; MG/3ML
3 SOLUTION RESPIRATORY (INHALATION) 4 TIMES DAILY PRN
Qty: 360 ML | Refills: 0 | Status: SHIPPED | OUTPATIENT
Start: 2024-12-05 | End: 2025-01-04

## 2024-12-05 RX ORDER — FLUTICASONE FUROATE, UMECLIDINIUM BROMIDE AND VILANTEROL TRIFENATATE 200; 62.5; 25 UG/1; UG/1; UG/1
1 POWDER RESPIRATORY (INHALATION) DAILY
COMMUNITY
Start: 2024-08-12

## 2024-12-05 NOTE — PROGRESS NOTES
SLEEP/PULMONARY  CLINIC NOTE      PATIENT IDENTIFICATION:  Name: Carli Dubon  Age: 64 y.o.  Sex: female  :  1960  MRN: [id number]    DATE OF CONSULTATION:  2024     Referring physician:    Cindy Huddleston APRN            CHIEF COMPLAINT: COPD    History of Present Illness:   Carli Dubon is a 64 y.o. female patient with chronic obstructive airway disease current smoker still having intermittent shortness of breath coughing and wheezing, she is using her Trelegy and she sometimes she feels she needs to use her albuterol inhaler more frequent no hemoptysis no fever no change in her weight recently    Patient struct sleep apnea she is on CPAP she is using it in intermittently her recent sleep study was done at home but was not diagnostic  She has a CAT scan was done in August no significant mass or tumor    Review of Systems:   Constitutional: As above   Eyes: negative   ENT/oropharynx: negative   Cardiovascular: negative   Respiratory: As above   Gastrointestinal: negative   Genitourinary: negative   Neurological: negative   Musculoskeletal: negative   Integument/breast: negative   Endocrine: negative   Allergic/Immunologic: negative     Past Medical History:  Past Medical History:   Diagnosis Date    Allergic rhinitis     Arthritis of back     Asthma     Asthma, extrinsic     Asthma, intrinsic     Bipolar 1 disorder     Chronic bronchitis     Chronic obstructive lung disease 2015    COVID-19 08/15/2021    CTS (carpal tunnel syndrome)     Diabetes insipidus     Diabetes mellitus     Difficulty walking 2022    Hip arthrosis     Hypertension     Knee swelling     Low back strain     Migraine     Mixed hyperlipidemia 2020    Neck strain     Pancreatitis     Primary central sleep apnea     PTSD (post-traumatic stress disorder)     Pulmonary arterial hypertension     Sleep apnea, obstructive     Tobacco abuse 06/15/2020    Vitamin D deficiency 2024       Past Surgical  History:  History reviewed. No pertinent surgical history.     Family History:  Family History   Problem Relation Age of Onset    Hypertension Mother     Diabetes Mother     Asthma Mother     Asthma Daughter     COPD Daughter     Anxiety disorder Daughter     Arthritis Daughter         Social History:   Social History     Tobacco Use    Smoking status: Every Day     Current packs/day: 1.00     Average packs/day: 1.1 packs/day for 40.0 years (45.0 ttl pk-yrs)     Types: Cigarettes     Passive exposure: Current    Smokeless tobacco: Never   Substance Use Topics    Alcohol use: Never        Allergies:  Allergies   Allergen Reactions    Iodine Swelling    Adhesive Tape Rash       Home Meds:  (Not in a hospital admission)      Objective:    Vitals Ranges:   Heart Rate:  [108] 108  Resp:  [16] 16  BP: (169)/(95) 169/95  Body mass index is 34.78 kg/m².     Exam:  General Appearance:  WDWN morbidly obese  HEENT:   without obvious abnormality,  Conjunctiva/corneas clear,  Normal external ear canals, no drainage    Clear orsalmucosa,  Mallampati score 3    Neck:  Supple, symmetrical, trachea midline. No JVD.  Lungs:   Bilateral basal rhonchi bilaterally, respirations unlabored symmetrical wall movement.    Chest wall:  No tenderness or deformity.    Heart:  Regular rate and rhythm, S1 and S2 normal.  Extremities: Trace edema no clubbing or Cyanosis        Data Review:  All labs (24hrs): No results found for this or any previous visit (from the past 24 hours).     Imaging:  DEXA Bone Density Axial  Narrative: DATE OF EXAM:  11/15/2024 3:04 PM     PROCEDURE:  DEXA BONE DENSITY AXIAL-     INDICATIONS:  osteoporisis screening; Z78.0-Asymptomatic menopausal state     COMPARISON:  February 19, 2021     TECHNIQUE:  Lumbar vertebral and proximal femoral Dual-Energy X-ray Absorptiometry  (DEXA) was performed on the [employer].     FINDINGS:  A full detailed summary report from the DEXA scan performed is located  in the  patient's chart in Epic.     The T-score in the lumbar spine is 1.0.     The T-score in the femoral neck is -0.2.  The T-score in the total hip is 1.0.  Compared to the previous examination, there has been a significant 5.5%  increase in the bone density.        According to the World Health Organization criteria, this is classified  as normal bone mineral density.           Impression: Normal Bone Mineral Density.        Copies of the computerized summary reports can be obtained from [facility] via  the health information department of [facility].        Electronically Signed By-Sekou Queen On:11/15/2024 3:16 PM          ASSESSMENT:  Diagnoses and all orders for this visit:    Tobacco abuse    Obstructive sleep apnea    Psychophysiological insomnia    Essential hypertension    Panlobular emphysema  -     Miscellaneous DME  -     benzonatate (TESSALON) 200 MG capsule; Take 1 capsule by mouth 3 (Three) Times a Day As Needed for Cough.    Other orders  -     Fluticasone-Umeclidin-Vilant (Trelegy Ellipta) 200-62.5-25 MCG/ACT inhaler; Inhale 1 puff Daily.  -     ipratropium-albuterol (DUO-NEB) 0.5-2.5 mg/3 ml nebulizer; Take 3 mL by nebulization 4 (Four) Times a Day As Needed for Wheezing for up to 30 days.      Obesity  PLAN:  Education patient  About tips for  smoking cessation, it was open discussion about benefit and long-term complication, all question answered.     Bronchodilator inhaled corticosteroid adding nebulizer machine for DuoNeb    Education how to use inhalers    Encouraged to use incentive spirometer    Continue to exercise slowly as tolerated    Monitor for any change in the color of the sputum    Avoid any exposure to fumes, gas or any irritant        This patient with obstructive sleep apnea, compliance is improved. Encourage to use it more frequent, I re-emphasized on pt the long and short term benefit of treating NEVIN. The device is benefiting the patient.  The patient is also instructed to  get the CPAP supplies from the [employer] and see me in 1 year for follow-up.    Treating NEVIN will improve BP control       Follow-up 6 months    Ryan Bingham MD. D, ABSM.  12/5/2024  16:11 EST

## 2024-12-06 ENCOUNTER — TELEPHONE (OUTPATIENT)
Dept: PULMONOLOGY | Facility: HOSPITAL | Age: 64
End: 2024-12-06
Payer: MEDICAID

## 2024-12-09 ENCOUNTER — TELEMEDICINE (OUTPATIENT)
Dept: PSYCHIATRY | Facility: CLINIC | Age: 64
End: 2024-12-09
Payer: MEDICAID

## 2024-12-09 DIAGNOSIS — F43.10 PTSD (POST-TRAUMATIC STRESS DISORDER): ICD-10-CM

## 2024-12-09 DIAGNOSIS — F51.04 PSYCHOPHYSIOLOGICAL INSOMNIA: Chronic | ICD-10-CM

## 2024-12-09 DIAGNOSIS — F43.10 PTSD (POST-TRAUMATIC STRESS DISORDER): Chronic | ICD-10-CM

## 2024-12-09 DIAGNOSIS — F31.32 BIPOLAR AFFECTIVE DISORDER, CURRENTLY DEPRESSED, MODERATE: Primary | Chronic | ICD-10-CM

## 2024-12-09 PROCEDURE — 1160F RVW MEDS BY RX/DR IN RCRD: CPT | Performed by: PHYSICIAN ASSISTANT

## 2024-12-09 PROCEDURE — 99214 OFFICE O/P EST MOD 30 MIN: CPT | Performed by: PHYSICIAN ASSISTANT

## 2024-12-09 PROCEDURE — 1159F MED LIST DOCD IN RCRD: CPT | Performed by: PHYSICIAN ASSISTANT

## 2024-12-09 PROCEDURE — 96127 BRIEF EMOTIONAL/BEHAV ASSMT: CPT | Performed by: PHYSICIAN ASSISTANT

## 2024-12-09 RX ORDER — CLONAZEPAM 0.5 MG/1
0.5 TABLET ORAL 2 TIMES DAILY PRN
Qty: 60 TABLET | Refills: 2 | Status: SHIPPED | OUTPATIENT
Start: 2024-12-09
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
## 2024-12-10 ENCOUNTER — OFFICE VISIT (OUTPATIENT)
Dept: FAMILY MEDICINE CLINIC | Facility: CLINIC | Age: 64
End: 2024-12-10
Payer: MEDICAID

## 2024-12-10 ENCOUNTER — LAB (OUTPATIENT)
Dept: FAMILY MEDICINE CLINIC | Facility: CLINIC | Age: 64
End: 2024-12-10
Payer: MEDICAID

## 2024-12-10 VITALS
WEIGHT: 208 LBS | SYSTOLIC BLOOD PRESSURE: 159 MMHG | TEMPERATURE: 97.5 F | BODY MASS INDEX: 35.51 KG/M2 | OXYGEN SATURATION: 97 % | HEIGHT: 64 IN | DIASTOLIC BLOOD PRESSURE: 96 MMHG | HEART RATE: 107 BPM

## 2024-12-10 DIAGNOSIS — R42 DIZZINESS: ICD-10-CM

## 2024-12-10 DIAGNOSIS — R26.89 BALANCE PROBLEM: Primary | ICD-10-CM

## 2024-12-10 DIAGNOSIS — R26.89 BALANCE PROBLEM: ICD-10-CM

## 2024-12-10 PROCEDURE — 3080F DIAST BP >= 90 MM HG: CPT | Performed by: NURSE PRACTITIONER

## 2024-12-10 PROCEDURE — 1159F MED LIST DOCD IN RCRD: CPT | Performed by: NURSE PRACTITIONER

## 2024-12-10 PROCEDURE — 82607 VITAMIN B-12: CPT | Performed by: NURSE PRACTITIONER

## 2024-12-10 PROCEDURE — 85025 COMPLETE CBC W/AUTO DIFF WBC: CPT | Performed by: NURSE PRACTITIONER

## 2024-12-10 PROCEDURE — 1125F AMNT PAIN NOTED PAIN PRSNT: CPT | Performed by: NURSE PRACTITIONER

## 2024-12-10 PROCEDURE — 3077F SYST BP >= 140 MM HG: CPT | Performed by: NURSE PRACTITIONER

## 2024-12-10 PROCEDURE — 1160F RVW MEDS BY RX/DR IN RCRD: CPT | Performed by: NURSE PRACTITIONER

## 2024-12-10 PROCEDURE — 99214 OFFICE O/P EST MOD 30 MIN: CPT | Performed by: NURSE PRACTITIONER

## 2024-12-10 PROCEDURE — 3046F HEMOGLOBIN A1C LEVEL >9.0%: CPT | Performed by: NURSE PRACTITIONER

## 2024-12-10 PROCEDURE — 36415 COLL VENOUS BLD VENIPUNCTURE: CPT | Performed by: NURSE PRACTITIONER

## 2024-12-10 RX ORDER — FLUTICASONE PROPIONATE 50 MCG
2 SPRAY, SUSPENSION (ML) NASAL DAILY
Qty: 11.1 G | Refills: 0 | Status: SHIPPED | OUTPATIENT
Start: 2024-12-10

## 2024-12-10 NOTE — PATIENT INSTRUCTIONS
Make position changes slowly  Check labs  PT for vestibular therapy  Flonase  Call if no improvement

## 2024-12-10 NOTE — PROGRESS NOTES
"Subjective     Carli Dubon is a 64 y.o. female.     History of Present Illness  Pt is here today with c/o falling.  She states that she is often losing balance  This has been going on a few days  She gets occasional dizziness that causes her to lose balance  She falls when she makes position changes  She states she does ok when she is up moving.   She states once she falls she can't get up because her knees are bad  She fell this morning  The balance issues have been off and on.            The following portions of the patient's history were reviewed and updated as appropriate: allergies, current medications, past family history, past medical history, past social history, past surgical history, and problem list.    Review of Systems   Constitutional:  Negative for fatigue and fever.   HENT:  Positive for congestion. Negative for ear pain and sinus pressure.    Eyes:  Negative for blurred vision, photophobia and visual disturbance.   Respiratory:  Negative for chest tightness and shortness of breath.    Cardiovascular:  Negative for chest pain and palpitations.   Neurological:  Positive for dizziness and headache. Negative for speech difficulty.       Objective     /96 (BP Location: Left arm, Patient Position: Sitting, Cuff Size: Adult)   Pulse 107   Temp 97.5 °F (36.4 °C) (Tympanic)   Ht 162.6 cm (64\")   Wt 94.3 kg (208 lb)   SpO2 97%   BMI 35.70 kg/m²     Current Outpatient Medications on File Prior to Visit   Medication Sig Dispense Refill    clonazePAM (KlonoPIN) 0.5 MG tablet Take 1 tablet by mouth 2 (Two) Times a Day As Needed for Anxiety. for anxiety 60 tablet 2    Accu-Chek Softclix Lancets lancets Used to check bs daily 100 each 5    albuterol (ACCUNEB) 1.25 MG/3ML nebulizer solution Take 3 mL by nebulization Every 6 (Six) Hours As Needed for Wheezing or Shortness of Air. Dispense as 1 box of unit dose 1 each 1    albuterol sulfate  (90 Base) MCG/ACT inhaler Inhale 2 puffs Every 4 (Four) " Hours As Needed for Wheezing or Shortness of Air. 6.7 g 1    amitriptyline (ELAVIL) 150 MG tablet Take 1 tablet by mouth Every Night. 90 tablet 1    amLODIPine (NORVASC) 5 MG tablet Take 1 tablet by mouth once daily 90 tablet 0    atorvastatin (LIPITOR) 40 MG tablet Take 1 tablet by mouth once daily 90 tablet 0    benzonatate (TESSALON) 200 MG capsule Take 1 capsule by mouth 3 (Three) Times a Day As Needed for Cough. 90 capsule 2    Blood Glucose Monitoring Suppl (Accu-Chek Guide Me) w/Device kit Use 1 kit Daily. Used to check bs daily. 1 kit 0    divalproex (DEPAKOTE ER) 500 MG 24 hr tablet Take 2 tablets by mouth twice daily 360 tablet 0    Fluticasone-Umeclidin-Vilant (Trelegy Ellipta) 200-62.5-25 MCG/ACT inhaler Inhale 1 puff Daily.      gabapentin (NEURONTIN) 300 MG capsule Take 1 capsule by mouth Every Night. 30 capsule 0    glipizide (GLUCOTROL) 10 MG tablet TAKE 1 TABLET BY MOUTH TWICE DAILY BEFORE MEAL(S) 180 tablet 0    glucose blood (Accu-Chek Guide) test strip Use as instructed 100 each 5    glucose blood test strip Use daily with checking blood glucose 100 each 5    glucose blood test strip Use one daily to blood glucose testing 100 each 5    glucose monitor monitoring kit Use daily with checking blood glucose 1 each 0    glucose monitor monitoring kit Use for daily Blood glucose testing 1 each 0    ipratropium-albuterol (DUO-NEB) 0.5-2.5 mg/3 ml nebulizer Take 3 mL by nebulization 4 (Four) Times a Day As Needed for Wheezing for up to 30 days. 360 mL 0    Jardiance 25 MG tablet tablet Take 1 tablet by mouth once daily 90 tablet 0    Lancets misc Use daily with checking Blood glucose 100 each 5    Lancets misc Use for daily  blood glucose testing 100 each 5    losartan (COZAAR) 100 MG tablet Take 1 tablet by mouth once daily 90 tablet 0    metFORMIN (GLUCOPHAGE) 1000 MG tablet TAKE 1 TABLET BY MOUTH TWICE DAILY WITH MEALS 180 tablet 0    nicotine (Nicotine Step 1) 21 MG/24HR patch Place 1 patch on the  skin as directed by provider Daily. 30 patch 11    nystatin-triamcinolone (MYCOLOG) 566793-4.1 UNIT/GM-% ointment APPLY OINTMENT TOPICALLY SPARINGLY TO AFFECTED AREA (UNDER BREASTS) TWICE DAILY      omeprazole (priLOSEC) 40 MG capsule Take 1 capsule by mouth once daily 90 capsule 0    ondansetron ODT (ZOFRAN-ODT) 4 MG disintegrating tablet Place 1 tablet on the tongue Every 6 (Six) Hours As Needed for Nausea. 20 tablet 0    PEG 3350-KCl-NaBcb-NaCl-NaSulf (PEG-3350/Electrolytes) 236 g reconstituted solution TAKE 4,000ML BY MOUTH ONCE FOR 1 DOSE      primidone (MYSOLINE) 50 MG tablet One at hs, may increase to two at hs after two weeks if needed 180 tablet 3    traMADol (ULTRAM) 50 MG tablet Take 1 tablet by mouth Every 8 (Eight) Hours As Needed. for pain      vilazodone (VIIBRYD) 20 MG tablet tablet Take 1 tablet by mouth Daily. 90 tablet 1    zolpidem (AMBIEN) 10 MG tablet Take 1 tablet by mouth At Night As Needed for Sleep. 30 tablet 2    [DISCONTINUED] fluticasone (FLONASE) 50 MCG/ACT nasal spray Administer 2 sprays into the nostril(s) as directed by provider Daily. 11.1 mL 0     No current facility-administered medications on file prior to visit.                 Physical Exam  Constitutional:       General: She is not in acute distress.     Appearance: Normal appearance. She is not ill-appearing.   HENT:      Head: Normocephalic and atraumatic.      Right Ear: Tympanic membrane and ear canal normal.      Left Ear: Tympanic membrane and ear canal normal.   Eyes:      Extraocular Movements: Extraocular movements intact.   Cardiovascular:      Rate and Rhythm: Normal rate and regular rhythm.      Heart sounds: No murmur heard.  Pulmonary:      Effort: Pulmonary effort is normal. No respiratory distress.   Musculoskeletal:         General: Normal range of motion.   Skin:     General: Skin is warm and dry.   Neurological:      General: No focal deficit present.      Mental Status: She is alert and oriented to person,  place, and time.      Coordination: Romberg sign negative.   Psychiatric:         Mood and Affect: Mood normal.         Behavior: Behavior normal.         Thought Content: Thought content normal.         Judgment: Judgment normal.         Assessment & Plan     Diagnoses and all orders for this visit:    1. Balance problem (Primary)  Comments:  unknown etiology  orthostatic VS normal  neuro exam looks good  vestibular PT ordered  labs stable.  poss med side effect  Orders:  -     CBC & Differential  -     Vitamin B12; Future  -     fluticasone (FLONASE) 50 MCG/ACT nasal spray; Administer 2 sprays into the nostril(s) as directed by provider Daily.  Dispense: 11.1 g; Refill: 0  -     Ambulatory Referral to Physical Therapy for Evaluation & Treatment    2. Dizziness  Comments:  possible ET dysfunction  start flonase  if no imp send to ENT  Orders:  -     fluticasone (FLONASE) 50 MCG/ACT nasal spray; Administer 2 sprays into the nostril(s) as directed by provider Daily.  Dispense: 11.1 g; Refill: 0  -     Ambulatory Referral to Physical Therapy for Evaluation & Treatment

## 2024-12-11 LAB
BASOPHILS # BLD AUTO: 0.02 10*3/MM3 (ref 0–0.2)
BASOPHILS NFR BLD AUTO: 0.2 % (ref 0–1.5)
DEPRECATED RDW RBC AUTO: 45.2 FL (ref 37–54)
EOSINOPHIL # BLD AUTO: 0.14 10*3/MM3 (ref 0–0.4)
EOSINOPHIL NFR BLD AUTO: 1.3 % (ref 0.3–6.2)
ERYTHROCYTE [DISTWIDTH] IN BLOOD BY AUTOMATED COUNT: 12.8 % (ref 12.3–15.4)
HCT VFR BLD AUTO: 47.9 % (ref 34–46.6)
HGB BLD-MCNC: 15.4 G/DL (ref 12–15.9)
IMM GRANULOCYTES # BLD AUTO: 0.05 10*3/MM3 (ref 0–0.05)
IMM GRANULOCYTES NFR BLD AUTO: 0.5 % (ref 0–0.5)
LYMPHOCYTES # BLD AUTO: 3.61 10*3/MM3 (ref 0.7–3.1)
LYMPHOCYTES NFR BLD AUTO: 33.6 % (ref 19.6–45.3)
MCH RBC QN AUTO: 31 PG (ref 26.6–33)
MCHC RBC AUTO-ENTMCNC: 32.2 G/DL (ref 31.5–35.7)
MCV RBC AUTO: 96.4 FL (ref 79–97)
MONOCYTES # BLD AUTO: 0.71 10*3/MM3 (ref 0.1–0.9)
MONOCYTES NFR BLD AUTO: 6.6 % (ref 5–12)
NEUTROPHILS NFR BLD AUTO: 57.8 % (ref 42.7–76)
NEUTROPHILS NFR BLD AUTO: 6.23 10*3/MM3 (ref 1.7–7)
NRBC BLD AUTO-RTO: 0 /100 WBC (ref 0–0.2)
PLATELET # BLD AUTO: 275 10*3/MM3 (ref 140–450)
PMV BLD AUTO: 10.8 FL (ref 6–12)
RBC # BLD AUTO: 4.97 10*6/MM3 (ref 3.77–5.28)
VIT B12 BLD-MCNC: 716 PG/ML (ref 211–946)
WBC NRBC COR # BLD AUTO: 10.76 10*3/MM3 (ref 3.4–10.8)

## 2024-12-16 DIAGNOSIS — E11.9 TYPE 2 DIABETES MELLITUS WITHOUT COMPLICATION, WITHOUT LONG-TERM CURRENT USE OF INSULIN: ICD-10-CM

## 2024-12-16 DIAGNOSIS — I10 HYPERTENSION, UNSPECIFIED TYPE: ICD-10-CM

## 2024-12-16 RX ORDER — AMLODIPINE BESYLATE 5 MG/1
5 TABLET ORAL DAILY
Qty: 90 TABLET | Refills: 0 | Status: SHIPPED | OUTPATIENT
Start: 2024-12-16

## 2025-01-21 DIAGNOSIS — E11.9 TYPE 2 DIABETES MELLITUS WITHOUT COMPLICATION, WITHOUT LONG-TERM CURRENT USE OF INSULIN: ICD-10-CM

## 2025-01-21 RX ORDER — ATORVASTATIN CALCIUM 40 MG/1
40 TABLET, FILM COATED ORAL DAILY
Qty: 90 TABLET | Refills: 0 | Status: SHIPPED | OUTPATIENT
Start: 2025-01-21

## 2025-01-21 RX ORDER — GLIPIZIDE 10 MG/1
10 TABLET ORAL
Qty: 180 TABLET | Refills: 0 | Status: SHIPPED | OUTPATIENT
Start: 2025-01-21

## 2025-01-24 DIAGNOSIS — F31.32 BIPOLAR AFFECTIVE DISORDER, CURRENTLY DEPRESSED, MODERATE: Chronic | ICD-10-CM

## 2025-01-24 RX ORDER — DIVALPROEX SODIUM 500 MG/1
1000 TABLET, FILM COATED, EXTENDED RELEASE ORAL 2 TIMES DAILY
Qty: 360 TABLET | Refills: 0 | Status: SHIPPED | OUTPATIENT
Start: 2025-01-24

## 2025-01-31 DIAGNOSIS — E11.9 TYPE 2 DIABETES MELLITUS WITHOUT COMPLICATION, WITHOUT LONG-TERM CURRENT USE OF INSULIN: ICD-10-CM

## 2025-01-31 RX ORDER — EMPAGLIFLOZIN 25 MG/1
25 TABLET, FILM COATED ORAL DAILY
Qty: 90 TABLET | Refills: 0 | Status: SHIPPED | OUTPATIENT
Start: 2025-01-31

## 2025-02-04 ENCOUNTER — TELEMEDICINE (OUTPATIENT)
Dept: ENDOCRINOLOGY | Facility: CLINIC | Age: 65
End: 2025-02-04
Payer: MEDICAID

## 2025-02-04 VITALS
SYSTOLIC BLOOD PRESSURE: 140 MMHG | HEIGHT: 64 IN | HEART RATE: 99 BPM | BODY MASS INDEX: 34.83 KG/M2 | OXYGEN SATURATION: 95 % | DIASTOLIC BLOOD PRESSURE: 78 MMHG | WEIGHT: 204 LBS

## 2025-02-04 DIAGNOSIS — E78.2 MIXED HYPERLIPIDEMIA: ICD-10-CM

## 2025-02-04 DIAGNOSIS — E11.42 TYPE 2 DIABETES MELLITUS WITH DIABETIC POLYNEUROPATHY, WITHOUT LONG-TERM CURRENT USE OF INSULIN: Primary | ICD-10-CM

## 2025-02-04 DIAGNOSIS — E55.9 VITAMIN D DEFICIENCY: ICD-10-CM

## 2025-02-04 PROCEDURE — 3078F DIAST BP <80 MM HG: CPT | Performed by: INTERNAL MEDICINE

## 2025-02-04 PROCEDURE — 3077F SYST BP >= 140 MM HG: CPT | Performed by: INTERNAL MEDICINE

## 2025-02-04 PROCEDURE — 99214 OFFICE O/P EST MOD 30 MIN: CPT | Performed by: INTERNAL MEDICINE

## 2025-02-04 NOTE — PROGRESS NOTES
[facility]        Patient Care Team:  Cindy Huddleston APRN as PCP - General (Nurse Practitioner)  Kitty Carlos PA-C as Physician Assistant (Physician Assistant)    Chief Complaint:    Chief Complaint   Patient presents with    Diabetes     FU / DM type 2          Subjective   Here for diabetes f/u  Mode of visit: video  Location of patient:  home  Location of provider: [facility] [place]  You have chosen to receive care through telemedicine  The patient consent to use a video / audio communication for medical care today  The visit included audio & video interaction.   No technical issues occurred during the visit.    Blood sugars: did not have records  Takes all her diabetes pills together @ 3pm  Wakes up ~ 1p, eats dinner ~6p, goes to bed ~ 2am  Exercise program: PT Monday & Wed, stretching  Not taking vit D  Still smoking    Interval History:     Patient Comments:  attended class #1  Patient Denies: hypoglycemia  History taken from: patient    Review of Systems:   Review of Systems   Eyes:  Negative for blurred vision.   Gastrointestinal:  Negative for nausea.   Endocrine: Negative for polyuria.   Neurological:  Negative for headache.     Lost  13 lb since last visit    Objective     Vital Signs  Heart Rate:  [99] 99  BP: (140)/(78) 140/78    Physical Exam:     General Appearance:    Alert, cooperative, in no acute distress. Obese   Head:    Normocephalic, without obvious abnormality, atraumatic   Eyes:            Lids and lashes normal, conjunctivae and sclerae normal, no   icterus, no pallor, corneas clear, PERRLA        Results Review:    I have reviewed the patient's new clinical results, labs & imaging.    Medication Review:   Prior to Admission medications    Medication Sig Start Date End Date Taking? Authorizing Provider   Accu-Chek Softclix Lancets lancets Used to check bs daily 9/7/24 9/6/25 Yes Neva Johnson MD   albuterol (ACCUNEB) 1.25 MG/3ML nebulizer solution Take 3 mL by  nebulization Every 6 (Six) Hours As Needed for Wheezing or Shortness of Air. Dispense as 1 box of unit dose 6/28/23  Yes Asad Mendoza MD   albuterol sulfate  (90 Base) MCG/ACT inhaler Inhale 2 puffs Every 4 (Four) Hours As Needed for Wheezing or Shortness of Air. 6/28/23  Yes Asad Mendoza MD   amitriptyline (ELAVIL) 150 MG tablet Take 1 tablet by mouth Every Night. 11/5/24  Yes Kitty Carlos PA-C   amLODIPine (NORVASC) 5 MG tablet Take 1 tablet by mouth once daily 12/16/24  Yes Cindy Huddleston APRN   atorvastatin (LIPITOR) 40 MG tablet Take 1 tablet by mouth once daily 1/21/25  Yes Cindy Huddleston APRN   benzonatate (TESSALON) 200 MG capsule Take 1 capsule by mouth 3 (Three) Times a Day As Needed for Cough. 12/5/24  Yes Ryan Bingham MD   Blood Glucose Monitoring Suppl (Accu-Chek Guide Me) w/Device kit Use 1 kit Daily. Used to check bs daily. 9/7/24  Yes Neva Johnson MD   clonazePAM (KlonoPIN) 0.5 MG tablet Take 1 tablet by mouth 2 (Two) Times a Day As Needed for Anxiety. for anxiety 12/9/24  Yes Kitty Carlos PA-C   divalproex (DEPAKOTE ER) 500 MG 24 hr tablet Take 2 tablets by mouth 2 (Two) Times a Day. 1/24/25  Yes Kitty Carlos PA-C   fluticasone (FLONASE) 50 MCG/ACT nasal spray Administer 2 sprays into the nostril(s) as directed by provider Daily. 12/10/24  Yes Cindy Huddleston APRN   Fluticasone-Umeclidin-Vilant (Trelegy Ellipta) 200-62.5-25 MCG/ACT inhaler Inhale 1 puff Daily. 8/12/24  Yes ProviderAustin MD   gabapentin (NEURONTIN) 300 MG capsule Take 1 capsule by mouth Every Night. 2/21/24  Yes Dian Samson APRN   glipizide (GLUCOTROL) 10 MG tablet TAKE 1 TABLET BY MOUTH TWICE DAILY BEFORE MEAL(S) 1/21/25  Yes Cindy Huddleston APRN   glucose blood (Accu-Chek Guide) test strip Use as instructed 9/7/24  Yes Neva Johnson MD   glucose blood test strip Use daily with checking blood glucose 8/19/24  Yes Neva Johnson MD   glucose blood test strip Use  one daily to blood glucose testing 8/20/24  Yes Neva Johnson MD   glucose monitor monitoring kit Use daily with checking blood glucose 8/19/24  Yes Neva Johnson MD   glucose monitor monitoring kit Use for daily Blood glucose testing 8/20/24  Yes Neva Johnson MD   Jardiance 25 MG tablet tablet Take 1 tablet by mouth once daily 1/31/25  Yes Cindy Huddleston APRN   Lancets misc Use daily with checking Blood glucose 8/19/24  Yes Neva Johnson MD   Lancets misc Use for daily  blood glucose testing 8/20/24  Yes Neva Johnson MD   losartan (COZAAR) 100 MG tablet Take 1 tablet by mouth once daily 10/2/24  Yes Cindy Huddleston APRN   metFORMIN (GLUCOPHAGE) 1000 MG tablet TAKE 1 TABLET BY MOUTH TWICE DAILY WITH MEALS 12/16/24  Yes Cindy Huddleston APRN   nicotine (Nicotine Step 1) 21 MG/24HR patch Place 1 patch on the skin as directed by provider Daily. 3/7/24  Yes Ryan Bingham MD   nystatin-triamcinolone (MYCOLOG) 108088-8.1 UNIT/GM-% ointment APPLY OINTMENT TOPICALLY SPARINGLY TO AFFECTED AREA (UNDER BREASTS) TWICE DAILY 5/29/24  Yes Austin Lam MD   omeprazole (priLOSEC) 40 MG capsule Take 1 capsule by mouth once daily 12/2/24  Yes Cindy Huddleston APRN   ondansetron ODT (ZOFRAN-ODT) 4 MG disintegrating tablet Place 1 tablet on the tongue Every 6 (Six) Hours As Needed for Nausea. 8/15/21  Yes Kolby Perez,    PEG 3350-KCl-NaBcb-NaCl-NaSulf (PEG-3350/Electrolytes) 236 g reconstituted solution TAKE 4,000ML BY MOUTH ONCE FOR 1 DOSE 2/2/24  Yes Austin Lam MD   primidone (MYSOLINE) 50 MG tablet One at hs, may increase to two at hs after two weeks if needed 4/9/24  Yes Seipel, Joseph F, MD   traMADol (ULTRAM) 50 MG tablet Take 1 tablet by mouth Every 8 (Eight) Hours As Needed. for pain 2/2/24  Yes Austin Lam MD   vilazodone (VIIBRYD) 20 MG tablet tablet Take 1 tablet by mouth Daily. 11/5/24  Yes Kitty Carlos, JUSTA   zolpidem (AMBIEN) 10 MG  tablet Take 1 tablet by mouth At Night As Needed for Sleep. 11/5/24  Yes Kitty Carlos, PA-C   ipratropium-albuterol (DUO-NEB) 0.5-2.5 mg/3 ml nebulizer Take 3 mL by nebulization 4 (Four) Times a Day As Needed for Wheezing for up to 30 days. 12/5/24 1/4/25  Ryan Bingham MD         Lab Results   Component Value Date    HGBA1C 9.40 (H) 11/04/2024    HGBA1C 8.80 (H) 04/10/2024    HGBA1C 8.70 (H) 10/10/2023      Lab Results   Component Value Date    GLUCOSE 173 (H) 11/04/2024    BUN 15 11/04/2024    CREATININE 0.65 11/04/2024    EGFRIFNONA 78 01/11/2022    EGFRIFAFRI 94 01/11/2022    BCR 23.1 11/04/2024    K 4.1 11/04/2024    CO2 26.0 11/04/2024    CALCIUM 9.0 11/04/2024    ALBUMIN 3.9 11/04/2024    AST 18 11/04/2024    ALT 18 11/04/2024    CHOL 157 11/04/2024    LDL 90 11/04/2024    HDL 36 (L) 11/04/2024    TRIG 182 (H) 11/04/2024     Lab Results   Component Value Date    TSH 2.290 04/10/2024       Assessment & Plan     Diagnoses and all orders for this visit:    1. Type 2 diabetes mellitus with diabetic polyneuropathy, without long-term current use of insulin (Primary)  -     Hemoglobin A1c; Future  -     Microalbumin / Creatinine Urine Ratio - Urine, Clean Catch; Future    2. Vitamin D deficiency  -     Vitamin D,25-Hydroxy; Future    3. Mixed hyperlipidemia  -     Comprehensive Metabolic Panel; Future  -     TSH; Future    Glucose control worse.Lipids improved. Will check vit D status next visit.    See eye doctor in May.  Attend diabetes class #2 on 2/20 as scheduled.  Bring bl sugar records when you come to class.  Drink plenty of water.  Continue exercises.  Take Jardiance, metformin & glipizide when you get up & before dinner.  Continue atorvastatin.  Call if blood sugars are running under 100 or over 200.  Decrease smoking.        Neva Johnson MD  02/04/25  18:28 EST

## 2025-02-04 NOTE — PATIENT INSTRUCTIONS
Attend diabetes class on 2/20 as scheduled.  Bring bl sugar records when you come to class.  Drink plenty of water.  Continue exercises.  Take Jardiance, metformin & glipizide when you get up & before dinner.  Continue atorvastatin.  Call if blood sugars are running under 100 or over 200.  F/u in 6 months, with labs prior.

## 2025-02-05 DIAGNOSIS — F51.04 PSYCHOPHYSIOLOGICAL INSOMNIA: ICD-10-CM

## 2025-02-06 RX ORDER — ZOLPIDEM TARTRATE 10 MG/1
10 TABLET ORAL NIGHTLY PRN
Qty: 30 TABLET | Refills: 2 | Status: SHIPPED | OUTPATIENT
Start: 2025-02-06

## 2025-02-27 DIAGNOSIS — R11.2 NAUSEA AND VOMITING, UNSPECIFIED VOMITING TYPE: ICD-10-CM

## 2025-02-27 RX ORDER — OMEPRAZOLE 40 MG/1
40 CAPSULE, DELAYED RELEASE ORAL DAILY
Qty: 90 CAPSULE | Refills: 0 | Status: SHIPPED | OUTPATIENT
Start: 2025-02-27

## 2025-03-10 ENCOUNTER — TELEMEDICINE (OUTPATIENT)
Dept: PSYCHIATRY | Facility: CLINIC | Age: 65
End: 2025-03-10
Payer: MEDICAID

## 2025-03-10 DIAGNOSIS — F31.32 BIPOLAR AFFECTIVE DISORDER, CURRENTLY DEPRESSED, MODERATE: Primary | ICD-10-CM

## 2025-03-10 DIAGNOSIS — F51.04 PSYCHOPHYSIOLOGICAL INSOMNIA: ICD-10-CM

## 2025-03-10 DIAGNOSIS — F43.10 PTSD (POST-TRAUMATIC STRESS DISORDER): ICD-10-CM
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
## 2025-03-12 DIAGNOSIS — E11.9 TYPE 2 DIABETES MELLITUS WITHOUT COMPLICATION, WITHOUT LONG-TERM CURRENT USE OF INSULIN: ICD-10-CM

## 2025-03-12 DIAGNOSIS — I10 HYPERTENSION, UNSPECIFIED TYPE: ICD-10-CM

## 2025-03-12 RX ORDER — AMLODIPINE BESYLATE 5 MG/1
5 TABLET ORAL DAILY
Qty: 90 TABLET | Refills: 0 | Status: SHIPPED | OUTPATIENT
Start: 2025-03-12

## 2025-03-20 ENCOUNTER — OFFICE VISIT (OUTPATIENT)
Dept: PULMONOLOGY | Facility: HOSPITAL | Age: 65
End: 2025-03-20
Payer: MEDICARE

## 2025-03-20 VITALS
HEART RATE: 99 BPM | OXYGEN SATURATION: 93 % | SYSTOLIC BLOOD PRESSURE: 129 MMHG | DIASTOLIC BLOOD PRESSURE: 80 MMHG | WEIGHT: 204 LBS | HEIGHT: 64 IN | RESPIRATION RATE: 16 BRPM | BODY MASS INDEX: 34.83 KG/M2

## 2025-03-20 DIAGNOSIS — J43.1 PANLOBULAR EMPHYSEMA: ICD-10-CM

## 2025-03-20 DIAGNOSIS — I10 ESSENTIAL HYPERTENSION: ICD-10-CM

## 2025-03-20 DIAGNOSIS — E66.01 MORBID OBESITY: ICD-10-CM

## 2025-03-20 DIAGNOSIS — Z72.0 TOBACCO ABUSE: Primary | Chronic | ICD-10-CM

## 2025-03-20 DIAGNOSIS — G47.33 OBSTRUCTIVE SLEEP APNEA: ICD-10-CM

## 2025-03-20 PROCEDURE — G0463 HOSPITAL OUTPT CLINIC VISIT: HCPCS

## 2025-03-20 NOTE — PROGRESS NOTES
SLEEP/PULMONARY  CLINIC NOTE      PATIENT IDENTIFICATION:  Name: Carli Dubon  Age: 64 y.o.  Sex: female  :  1960  MRN: [id number]    DATE OF CONSULTATION:  3/20/2025     Referring physician:    Cindy Huddleston APRN            CHIEF COMPLAINT: Follow-up on chronic obstructive disease    History of Present Illness:   Carli Dubon is a 64 y.o. female sent with current smoking chronic struct of airway disease still have some dyspnea exertion still coughing using her inhaled steroid and bronchodilator no hemoptysis no fever no chills she had a CAT scan was done in January for screening did not show any significant lung nodule just emphysematous changes  Patient with daytime sleepiness tiredness fatigue says she had last sleep study was done home study but was not diagnostic still significant snoring and tiredness and fatigues and waking up gasping for air    Review of Systems:   Constitutional: As above   Eyes: negative   ENT/oropharynx: negative   Cardiovascular: negative   Respiratory: As above   Gastrointestinal: negative   Genitourinary: negative   Neurological: negative   Musculoskeletal: negative   Integument/breast: negative   Endocrine: negative   Allergic/Immunologic: negative     Past Medical History:  Past Medical History:   Diagnosis Date    Allergic     Allergic rhinitis     Arthritis of back     Asthma     Asthma, extrinsic     Asthma, intrinsic     Bipolar 1 disorder     Chronic bronchitis     Chronic obstructive lung disease 2015    COVID-19 08/15/2021    CTS (carpal tunnel syndrome)     Depression     Diabetes insipidus     Diabetes mellitus     Difficulty walking 2022    Hip arthrosis     Hypertension     Knee swelling     Low back strain     Migraine     Mixed hyperlipidemia 2020    Neck strain     Pancreatitis     Peripheral neuropathy     Primary central sleep apnea     PTSD (post-traumatic stress disorder)     Pulmonary arterial hypertension     Sleep apnea,  obstructive     Tobacco abuse 06/15/2020    Vitamin D deficiency 06/19/2024       Past Surgical History:  History reviewed. No pertinent surgical history.     Family History:  Family History   Problem Relation Age of Onset    Hypertension Mother     Diabetes Mother     Asthma Mother     Anemia Mother     Anxiety disorder Mother     Asthma Daughter     COPD Daughter     Anxiety disorder Daughter     Arthritis Daughter         Social History:   Social History     Tobacco Use    Smoking status: Every Day     Current packs/day: 1.00     Average packs/day: 1.1 packs/day for 40.0 years (45.0 ttl pk-yrs)     Types: Cigarettes     Passive exposure: Current    Smokeless tobacco: Never   Substance Use Topics    Alcohol use: Never        Allergies:  Allergies   Allergen Reactions    Iodine Swelling    Adhesive Tape Rash       Home Meds:  (Not in a hospital admission)      Objective:    Vitals Ranges:   Heart Rate:  [99] 99  Resp:  [16] 16  BP: (129)/(80) 129/80  Body mass index is 35.02 kg/m².     Exam:  General Appearance:  WDWN morbidly obese  HEENT:   without obvious abnormality,  Conjunctiva/corneas clear,  Normal external ear canals, no drainage    Clear orsalmucosa,  Mallampati score 3    Neck:  Supple, symmetrical, trachea midline. No JVD.  Lungs:   Bilateral basal rhonchi bilaterally, respirations unlabored symmetrical wall movement.    Chest wall:  No tenderness or deformity.    Heart:  Regular rate and rhythm, S1 and S2 normal.  Extremities: Trace edema no clubbing or Cyanosis        Data Review:  All labs (24hrs): No results found for this or any previous visit (from the past 24 hours).     Imaging:  DEXA Bone Density Axial  Narrative: DATE OF EXAM:  11/15/2024 3:04 PM     PROCEDURE:  DEXA BONE DENSITY AXIAL-     INDICATIONS:  osteoporisis screening; Z78.0-Asymptomatic menopausal state     COMPARISON:  February 19, 2021     TECHNIQUE:  Lumbar vertebral and proximal femoral Dual-Energy X-ray Absorptiometry  (DEXA)  was performed on the [employer] W.     FINDINGS:  A full detailed summary report from the DEXA scan performed is located  in the patient's chart in [facility].     The T-score in the lumbar spine is 1.0.     The T-score in the femoral neck is -0.2.  The T-score in the total hip is 1.0.  Compared to the previous examination, there has been a significant 5.5%  increase in the bone density.        According to the World Health Organization criteria, this is classified  as normal bone mineral density.           Impression: Normal Bone Mineral Density.        Copies of the computerized summary reports can be obtained from [facility] via  the health information department of [facility].        Electronically Signed By-Sekou Queen On:11/15/2024 3:16 PM          ASSESSMENT:  Diagnoses and all orders for this visit:    Tobacco abuse    Obstructive sleep apnea  -     Polysomnography 4 or More Parameters; Future    Panlobular emphysema    Morbid obesity    Essential hypertension        PLAN:  Education patient  About tips for  smoking cessation, it was open discussion about benefit and long-term complication, all question answered.     Bronchodilator inhaled corticosteroid    Education how to use inhalers    Encouraged to use incentive spirometer    Continue to exercise slowly as tolerated    Monitor for any change in the color of the sputum    Avoid any exposure to fumes, gas or any irritant    This is patient with symptoms of obstructive sleep apnea, attended NPSG study ASAP / split night study, because his symptoms and sign sugesting also central sleep Apnea  Avoid supine avoid sedative meds in pm, weight loss, Avoid driving. Long discussion with patient about the physiology of NEVIN, and long term and short term benefit of treating NEVIN    This patient with obstructive sleep apnea, compliance is improved. Encourage to use it more frequent, I re-emphasized on pt the long and short term benefit of treating NEVIN. The  device is benefiting the patient.  The patient is also instructed to get the CPAP supplies from the [employer] and see me in 1 year for follow-up.    Treating NEVIN will improve BP control       Follow-up 3 months    Ryan Bingham MD. D, ABSM.  3/20/2025  16:11 EDT

## 2025-03-21 ENCOUNTER — TELEPHONE (OUTPATIENT)
Dept: ENDOCRINOLOGY | Facility: CLINIC | Age: 65
End: 2025-03-21

## 2025-03-21 NOTE — TELEPHONE ENCOUNTER
Hub staff attempted to follow warm transfer process and was unsuccessful     Caller: Carli Dubon    Relationship to patient: Self    Best call back number:   Telephone Information:   Mobile [phone]       Patient is needing: PT CALLED NEEDING TO RESCHEDULE DIABETIC CLASS. PLEASE ADVISE.

## 2025-04-08 NOTE — PROGRESS NOTES
"Chief Complaint  Tremors    Subjective          Carli Dubon presents to [facility] NEUROLOGY for tremors  History of Present Illness      Carli Dubon is a 64-year-old female seen today in follow-up for bilateral hand tremor.  She was last seen by Dr. Seipel 4/9/24 at which time she had reported tremors present in both hands and body starting in fall 2023.  Patient states tremor is equal in both hands and is worse at night.  Nothing makes tremor better.  States the tremor affects her ability to hold something like a spoon and a cup.  She has some trouble with handwriting, but it is not \"too bad.\"  She has been on 50 mg of primidone at night and denies side effects from the medication.  When asked if she would like to increase the medication, the patient states that she is unsure.  She takes several medications for sleep and is hesitant about stopping primidone if this helps with that.      Primidone 50 mg 1 qhs - same  Location-both hands also get numb and hurt      ================================================================================  Dr. Seipel Office Visit 4-9-2024  New patient referred by Cindy CARRANZA for tremors.     Patient states tremors (bilateral hand, body) started 7-8 months ago,   patient states when she does anything w/ her hands or hold cup her hand tremors are worse, she sates nothing makes tremors better,   patient denies family h/o tremors or parkinson's, she is currently not taking any medications nor has she been tested for cause of tremors     She is on amitriptyline and Depakote which can cause tremor     Depakote level was 54.   Assessment and Plan    Diagnoses and all orders for this visit:     1. Tremor (Primary)     Tremor may be due to depakote or the elavil or may be idiopathic,  Will check TSH,   Not parkinson  Try primidone 50mg daily may increase to 100 mg after two weeks        Current Outpatient Medications:     Accu-Chek Softclix Lancets " lancets, Used to check bs daily, Disp: 100 each, Rfl: 5    albuterol (ACCUNEB) 1.25 MG/3ML nebulizer solution, Take 3 mL by nebulization Every 6 (Six) Hours As Needed for Wheezing or Shortness of Air. Dispense as 1 box of unit dose, Disp: 1 each, Rfl: 1    albuterol sulfate  (90 Base) MCG/ACT inhaler, Inhale 2 puffs Every 4 (Four) Hours As Needed for Wheezing or Shortness of Air., Disp: 6.7 g, Rfl: 1    amitriptyline (ELAVIL) 150 MG tablet, Take 1 tablet by mouth Every Night., Disp: 90 tablet, Rfl: 1    amLODIPine (NORVASC) 5 MG tablet, Take 1 tablet by mouth once daily, Disp: 90 tablet, Rfl: 0    atorvastatin (LIPITOR) 40 MG tablet, Take 1 tablet by mouth once daily, Disp: 90 tablet, Rfl: 0    benzonatate (TESSALON) 200 MG capsule, Take 1 capsule by mouth 3 (Three) Times a Day As Needed for Cough., Disp: 90 capsule, Rfl: 2    Blood Glucose Monitoring Suppl (Accu-Chek Guide Me) w/Device kit, Use 1 kit Daily. Used to check bs daily., Disp: 1 kit, Rfl: 0    clonazePAM (KlonoPIN) 0.5 MG tablet, Take 1 tablet by mouth 2 (Two) Times a Day As Needed for Anxiety. for anxiety, Disp: 60 tablet, Rfl: 2    divalproex (DEPAKOTE ER) 500 MG 24 hr tablet, Take 2 tablets by mouth 2 (Two) Times a Day., Disp: 360 tablet, Rfl: 0    fluticasone (FLONASE) 50 MCG/ACT nasal spray, Administer 2 sprays into the nostril(s) as directed by provider Daily., Disp: 11.1 g, Rfl: 0    Fluticasone-Umeclidin-Vilant (Trelegy Ellipta) 200-62.5-25 MCG/ACT inhaler, Inhale 1 puff Daily., Disp: , Rfl:     gabapentin (NEURONTIN) 300 MG capsule, Take 1 capsule by mouth Every Night., Disp: 30 capsule, Rfl: 0    glipizide (GLUCOTROL) 10 MG tablet, TAKE 1 TABLET BY MOUTH TWICE DAILY BEFORE MEAL(S), Disp: 180 tablet, Rfl: 0    glucose blood (Accu-Chek Guide) test strip, Use as instructed, Disp: 100 each, Rfl: 5    glucose blood test strip, Use daily with checking blood glucose, Disp: 100 each, Rfl: 5    glucose blood test strip, Use one daily to blood  glucose testing, Disp: 100 each, Rfl: 5    glucose monitor monitoring kit, Use daily with checking blood glucose, Disp: 1 each, Rfl: 0    glucose monitor monitoring kit, Use for daily Blood glucose testing, Disp: 1 each, Rfl: 0    Jardiance 25 MG tablet tablet, Take 1 tablet by mouth once daily, Disp: 90 tablet, Rfl: 0    Lancets misc, Use daily with checking Blood glucose, Disp: 100 each, Rfl: 5    Lancets misc, Use for daily  blood glucose testing, Disp: 100 each, Rfl: 5    losartan (COZAAR) 100 MG tablet, Take 1 tablet by mouth once daily, Disp: 90 tablet, Rfl: 0    metFORMIN (GLUCOPHAGE) 1000 MG tablet, TAKE 1 TABLET BY MOUTH TWICE DAILY WITH MEALS, Disp: 180 tablet, Rfl: 0    nicotine (Nicotine Step 1) 21 MG/24HR patch, Place 1 patch on the skin as directed by provider Daily., Disp: 30 patch, Rfl: 11    nystatin-triamcinolone (MYCOLOG) 733247-6.1 UNIT/GM-% ointment, APPLY OINTMENT TOPICALLY SPARINGLY TO AFFECTED AREA (UNDER BREASTS) TWICE DAILY, Disp: , Rfl:     omeprazole (priLOSEC) 40 MG capsule, Take 1 capsule by mouth once daily, Disp: 90 capsule, Rfl: 0    ondansetron ODT (ZOFRAN-ODT) 4 MG disintegrating tablet, Place 1 tablet on the tongue Every 6 (Six) Hours As Needed for Nausea., Disp: 20 tablet, Rfl: 0    PEG 3350-KCl-NaBcb-NaCl-NaSulf (PEG-3350/Electrolytes) 236 g reconstituted solution, TAKE 4,000ML BY MOUTH ONCE FOR 1 DOSE, Disp: , Rfl:     primidone (MYSOLINE) 50 MG tablet, Take 1.5 tablets by mouth Every Night for 30 days, THEN 2 tablets Every Night. One at hs, may increase to two at hs after two weeks if needed, Disp: 60 tablet, Rfl: 3    traMADol (ULTRAM) 50 MG tablet, Take 1 tablet by mouth Every 8 (Eight) Hours As Needed. for pain, Disp: , Rfl:     vilazodone (VIIBRYD) 20 MG tablet tablet, Take 1 tablet by mouth Daily., Disp: 90 tablet, Rfl: 1    zolpidem (AMBIEN) 10 MG tablet, TAKE 1 TABLET BY MOUTH AT NIGHT AS NEEDED FOR SLEEP, Disp: 30 tablet, Rfl: 2    ipratropium-albuterol (DUO-NEB)  "0.5-2.5 mg/3 ml nebulizer, Take 3 mL by nebulization 4 (Four) Times a Day As Needed for Wheezing for up to 30 days., Disp: 360 mL, Rfl: 0    Review of Systems   Neurological:  Positive for tremors.   All other systems reviewed and are negative.         Objective:    Vital Signs:   /89   Pulse 111   Ht 162.6 cm (64\")   Wt 96.2 kg (212 lb)   BMI 36.39 kg/m²     Physical Exam  Vitals reviewed.   Constitutional:       Appearance: She is well-developed. She is obese.   HENT:      Head: Normocephalic and atraumatic.   Eyes:      General: Lids are normal.      Extraocular Movements: Extraocular movements intact.      Pupils: Pupils are equal, round, and reactive to light.   Neck:      Vascular: No carotid bruit.   Cardiovascular:      Rate and Rhythm: Normal rate.      Heart sounds: No murmur heard.  Pulmonary:      Effort: Pulmonary effort is normal.   Musculoskeletal:      Cervical back: Normal range of motion and neck supple.   Skin:     General: Skin is warm and dry.   Neurological:      Mental Status: She is alert and oriented to person, place, and time.      Cranial Nerves: Cranial nerves 2-12 are intact. No cranial nerve deficit.      Motor: Tremor present. No weakness or abnormal muscle tone.      Coordination: Finger-Nose-Finger Test normal. Rapid alternating movements normal.      Gait: Gait normal.   Psychiatric:         Attention and Perception: Attention normal.         Mood and Affect: Mood normal.         Speech: Speech normal.         Behavior: Behavior normal.         Cognition and Memory: Cognition and memory normal.         Judgment: Judgment normal.        Result Review :   The following data was reviewed by: DILLON Smith on 04/09/2025:  CMP          11/4/2024    11:36   CMP   Glucose 173    BUN 15    Creatinine 0.65    EGFR 100.3    Sodium 139    Potassium 4.1    Chloride 102    Calcium 9.0    Total Protein 6.7    Albumin 3.9    Globulin 2.8    Total Bilirubin 0.2    Alkaline " Phosphatase 84    AST (SGOT) 18    ALT (SGPT) 18    Albumin/Globulin Ratio 1.4    BUN/Creatinine Ratio 23.1    Anion Gap 11.0      CBC          11/4/2024    11:36 12/10/2024    15:33   CBC   WBC 9.41  10.76    RBC 4.93  4.97    Hemoglobin 16.0  15.4    Hematocrit 47.5  47.9    MCV 96.3  96.4    MCH 32.5  31.0    MCHC 33.7  32.2    RDW 13.0  12.8    Platelets 223  275        Most Recent A1C          11/4/2024    11:36   HGBA1C Most Recent   Hemoglobin A1C 9.40                     Neurological Exam  Mental Status  Alert. Oriented to person, place and time. Oriented to person, place, and time. Memory is normal. Recent and remote memory are intact. Speech is normal. Language is fluent with no aphasia. Attention and concentration are normal. Fund of knowledge is appropriate for level of education.    Cranial Nerves  CN II: Visual acuity is normal. Visual fields full to confrontation.  CN III, IV, VI: Extraocular movements intact bilaterally. Normal lids and orbits bilaterally. Pupils equal round and reactive to light bilaterally.  CN V: Facial sensation is normal.  CN VII: Full and symmetric facial movement.  CN IX, X: Palate elevates symmetrically. Normal gag reflex.  CN XI: Shoulder shrug strength is normal.  CN XII: Tongue midline without atrophy or fasciculations.    Motor   Normal muscle tone. The following abnormal movements were seen: Bilateral high-frequency, low amplitude hand tremor.  Improves at rest and worsens with intention.   No pronator drift.                                             Right                     Left   Shoulder abduction               5                          5  Elbow flexion                         5                          5  Elbow extension                    5                          5    Sensory  Light touch is normal in upper and lower extremities.     Coordination   Tremor.Right: Finger-to-nose normal. Rapid alternating movement normal.Left: Finger-to-nose normal. Rapid  alternating movement normal.    Gait   Normal gait.Casual gait is normal including stance, stride, and arm swing. Able to rise from chair without using arms.         Assessment and Plan    Diagnoses and all orders for this visit:    1. Tremor (Primary)  -     primidone (MYSOLINE) 50 MG tablet; Take 1.5 tablets by mouth Every Night for 30 days, THEN 2 tablets Every Night. One at hs, may increase to two at hs after two weeks if needed  Dispense: 60 tablet; Refill: 3    2. Medication monitoring encounter  -     Valproic Acid Level, Total; Future      Carli Dubon is seen today in follow-up for bilateral hand tremor, likely essential tremor or medication induced tremor.  Symptoms present for about 2 years and equal in both hands.  This affects her drinking and use of a spoon.  She has some trouble with handwriting.  Nothing improves her tremor, but she is taking primidone 50 mg nightly.  Is worse at night.    Will increase primidone to 75 mg nightly x 1 month, if she is not having any side effects from this medication then she can increase the dose to 100 mg p.o. nightly.  I explained to the patient that primidone can increase Depakote levels, so I would like for her to have a valproic acid level drawn in about 1 month.    She will follow-up in 3 months or sooner if needed       Follow Up   Return in about 3 months (around 7/9/2025).  Patient was given instructions and counseling regarding her condition or for health maintenance advice. Please see specific information pulled into the AVS if appropriate.     This document has been electronically signed by DILLON Og  on April 9, 2025 16:29 EDT

## 2025-04-09 ENCOUNTER — OFFICE VISIT (OUTPATIENT)
Dept: NEUROLOGY | Facility: CLINIC | Age: 65
End: 2025-04-09
Payer: MEDICARE

## 2025-04-09 VITALS
WEIGHT: 212 LBS | DIASTOLIC BLOOD PRESSURE: 89 MMHG | HEART RATE: 111 BPM | HEIGHT: 64 IN | SYSTOLIC BLOOD PRESSURE: 164 MMHG | BODY MASS INDEX: 36.19 KG/M2

## 2025-04-09 DIAGNOSIS — R25.1 TREMOR: Primary | ICD-10-CM

## 2025-04-09 DIAGNOSIS — Z51.81 MEDICATION MONITORING ENCOUNTER: ICD-10-CM

## 2025-04-09 RX ORDER — PRIMIDONE 50 MG/1
TABLET ORAL
Qty: 60 TABLET | Refills: 3 | Status: SHIPPED | OUTPATIENT
Start: 2025-04-09 | End: 2026-05-09

## 2025-04-10 ENCOUNTER — HOSPITAL ENCOUNTER (EMERGENCY)
Facility: HOSPITAL | Age: 65
Discharge: HOME | End: 2025-04-10
Payer: MEDICARE

## 2025-04-10 ENCOUNTER — APPOINTMENT (OUTPATIENT)
Dept: CT IMAGING | Facility: HOSPITAL | Age: 65
End: 2025-04-10
Payer: MEDICARE

## 2025-04-10 VITALS
DIASTOLIC BLOOD PRESSURE: 81 MMHG | HEIGHT: 65 IN | WEIGHT: 210.1 LBS | BODY MASS INDEX: 35 KG/M2 | SYSTOLIC BLOOD PRESSURE: 152 MMHG | TEMPERATURE: 97.4 F | RESPIRATION RATE: 18 BRPM | HEART RATE: 94 BPM | OXYGEN SATURATION: 95 %

## 2025-04-10 DIAGNOSIS — R10.13 EPIGASTRIC PAIN: Primary | ICD-10-CM

## 2025-04-10 DIAGNOSIS — E86.0 MILD DEHYDRATION: ICD-10-CM

## 2025-04-10 DIAGNOSIS — K59.00 CONSTIPATION, UNSPECIFIED CONSTIPATION TYPE: ICD-10-CM

## 2025-04-10 LAB
ALBUMIN SERPL-MCNC: 4.1 G/DL (ref 3.5–5.2)
ALBUMIN/GLOB SERPL: 1.4 G/DL
ALP SERPL-CCNC: 94 U/L (ref 39–117)
ALT SERPL W P-5'-P-CCNC: 16 U/L (ref 1–33)
ANION GAP SERPL CALCULATED.3IONS-SCNC: 10.2 MMOL/L (ref 5–15)
AST SERPL-CCNC: 18 U/L (ref 1–32)
BASOPHILS # BLD AUTO: 0.03 10*3/MM3 (ref 0–0.2)
BASOPHILS NFR BLD AUTO: 0.3 % (ref 0–1.5)
BILIRUB SERPL-MCNC: <0.2 MG/DL (ref 0–1.2)
BILIRUB UR QL STRIP: NEGATIVE
BUN SERPL-MCNC: 13 MG/DL (ref 8–23)
BUN/CREAT SERPL: 21.3 (ref 7–25)
CALCIUM SPEC-SCNC: 9.1 MG/DL (ref 8.6–10.5)
CHLORIDE SERPL-SCNC: 100 MMOL/L (ref 98–107)
CLARITY UR: CLEAR
CO2 SERPL-SCNC: 22.8 MMOL/L (ref 22–29)
COLOR UR: YELLOW
CREAT SERPL-MCNC: 0.61 MG/DL (ref 0.57–1)
DEPRECATED RDW RBC AUTO: 50.4 FL (ref 37–54)
EGFRCR SERPLBLD CKD-EPI 2021: 101.2 ML/MIN/1.73
EOSINOPHIL # BLD AUTO: 0.12 10*3/MM3 (ref 0–0.4)
EOSINOPHIL NFR BLD AUTO: 1.1 % (ref 0.3–6.2)
ERYTHROCYTE [DISTWIDTH] IN BLOOD BY AUTOMATED COUNT: 13.7 % (ref 12.3–15.4)
GLOBULIN UR ELPH-MCNC: 2.9 GM/DL
GLUCOSE SERPL-MCNC: 258 MG/DL (ref 65–99)
GLUCOSE UR STRIP-MCNC: ABNORMAL MG/DL
HCT VFR BLD AUTO: 46 % (ref 34–46.6)
HGB BLD-MCNC: 15 G/DL (ref 12–15.9)
HGB UR QL STRIP.AUTO: NEGATIVE
HOLD SPECIMEN: NORMAL
IMM GRANULOCYTES # BLD AUTO: 0.03 10*3/MM3 (ref 0–0.05)
IMM GRANULOCYTES NFR BLD AUTO: 0.3 % (ref 0–0.5)
KETONES UR QL STRIP: ABNORMAL
LEUKOCYTE ESTERASE UR QL STRIP.AUTO: NEGATIVE
LIPASE SERPL-CCNC: 93 U/L (ref 13–60)
LYMPHOCYTES # BLD AUTO: 2.97 10*3/MM3 (ref 0.7–3.1)
LYMPHOCYTES NFR BLD AUTO: 28.4 % (ref 19.6–45.3)
MCH RBC QN AUTO: 31.8 PG (ref 26.6–33)
MCHC RBC AUTO-ENTMCNC: 32.6 G/DL (ref 31.5–35.7)
MCV RBC AUTO: 97.7 FL (ref 79–97)
MONOCYTES # BLD AUTO: 0.65 10*3/MM3 (ref 0.1–0.9)
MONOCYTES NFR BLD AUTO: 6.2 % (ref 5–12)
NEUTROPHILS NFR BLD AUTO: 6.67 10*3/MM3 (ref 1.7–7)
NEUTROPHILS NFR BLD AUTO: 63.7 % (ref 42.7–76)
NITRITE UR QL STRIP: NEGATIVE
NRBC BLD AUTO-RTO: 0 /100 WBC (ref 0–0.2)
PH UR STRIP.AUTO: <=5 [PH] (ref 5–8)
PLATELET # BLD AUTO: 223 10*3/MM3 (ref 140–450)
PMV BLD AUTO: 10.3 FL (ref 6–12)
POTASSIUM SERPL-SCNC: 3.9 MMOL/L (ref 3.5–5.2)
PROT SERPL-MCNC: 7 G/DL (ref 6–8.5)
PROT UR QL STRIP: NEGATIVE
RBC # BLD AUTO: 4.71 10*6/MM3 (ref 3.77–5.28)
SODIUM SERPL-SCNC: 133 MMOL/L (ref 136–145)
SP GR UR STRIP: 1.04 (ref 1–1.03)
UROBILINOGEN UR QL STRIP: ABNORMAL
WBC NRBC COR # BLD AUTO: 10.47 10*3/MM3 (ref 3.4–10.8)

## 2025-04-10 PROCEDURE — 81003 URINALYSIS AUTO W/O SCOPE: CPT | Performed by: NURSE PRACTITIONER

## 2025-04-10 PROCEDURE — 74176 CT ABD & PELVIS W/O CONTRAST: CPT

## 2025-04-10 PROCEDURE — 85025 COMPLETE CBC W/AUTO DIFF WBC: CPT | Performed by: NURSE PRACTITIONER

## 2025-04-10 PROCEDURE — 99284 EMERGENCY DEPT VISIT MOD MDM: CPT

## 2025-04-10 PROCEDURE — 25010000002 MORPHINE PER 10 MG: Performed by: NURSE PRACTITIONER

## 2025-04-10 PROCEDURE — 25810000003 SODIUM CHLORIDE 0.9 % SOLUTION: Performed by: NURSE PRACTITIONER

## 2025-04-10 PROCEDURE — 25010000002 ONDANSETRON PER 1 MG: Performed by: NURSE PRACTITIONER

## 2025-04-10 PROCEDURE — 96361 HYDRATE IV INFUSION ADD-ON: CPT

## 2025-04-10 PROCEDURE — P9612 CATHETERIZE FOR URINE SPEC: HCPCS

## 2025-04-10 PROCEDURE — 25010000002 KETOROLAC TROMETHAMINE PER 15 MG: Performed by: NURSE PRACTITIONER

## 2025-04-10 PROCEDURE — 80053 COMPREHEN METABOLIC PANEL: CPT | Performed by: NURSE PRACTITIONER

## 2025-04-10 PROCEDURE — 96375 TX/PRO/DX INJ NEW DRUG ADDON: CPT

## 2025-04-10 PROCEDURE — 96374 THER/PROPH/DIAG INJ IV PUSH: CPT

## 2025-04-10 PROCEDURE — 83690 ASSAY OF LIPASE: CPT | Performed by: NURSE PRACTITIONER

## 2025-04-10 RX ORDER — ONDANSETRON 4 MG/1
4 TABLET, ORALLY DISINTEGRATING ORAL 4 TIMES DAILY PRN
Qty: 10 TABLET | Refills: 0 | Status: SHIPPED | OUTPATIENT
Start: 2025-04-10

## 2025-04-10 RX ORDER — ONDANSETRON 2 MG/ML
4 INJECTION INTRAMUSCULAR; INTRAVENOUS ONCE
Status: COMPLETED | OUTPATIENT
Start: 2025-04-10 | End: 2025-04-10

## 2025-04-10 RX ORDER — SODIUM CHLORIDE 0.9 % (FLUSH) 0.9 %
10 SYRINGE (ML) INJECTION AS NEEDED
Status: DISCONTINUED | OUTPATIENT
Start: 2025-04-10 | End: 2025-04-10 | Stop reason: HOSPADM

## 2025-04-10 RX ORDER — KETOROLAC TROMETHAMINE 30 MG/ML
15 INJECTION, SOLUTION INTRAMUSCULAR; INTRAVENOUS ONCE
Status: COMPLETED | OUTPATIENT
Start: 2025-04-10 | End: 2025-04-10

## 2025-04-10 RX ADMIN — KETOROLAC TROMETHAMINE 15 MG: 30 INJECTION, SOLUTION INTRAMUSCULAR; INTRAVENOUS at 19:28

## 2025-04-10 RX ADMIN — SODIUM CHLORIDE 500 ML: 9 INJECTION, SOLUTION INTRAVENOUS at 19:27

## 2025-04-10 RX ADMIN — MORPHINE SULFATE 4 MG: 4 INJECTION, SOLUTION INTRAMUSCULAR; INTRAVENOUS at 17:36

## 2025-04-10 RX ADMIN — ONDANSETRON 4 MG: 2 INJECTION, SOLUTION INTRAMUSCULAR; INTRAVENOUS at 17:36

## 2025-04-10 NOTE — Clinical Note
[facility] EMERGENCY DEPARTMENT  [address] [place] [address]  Phone: [phone]    Carli Dubon was seen and treated in our emergency department on 4/10/2025.  She may return to work on 04/12/2025.         Thank you for choosing [facility].    Henrietta Chiu RN

## 2025-04-10 NOTE — DISCHARGE INSTRUCTIONS
Clear liquids    Your CAT scan showed mild constipation increase the fiber in your diet increase the green leafy vegetables and follow-up with primary care for further bowel regimen if needed    You are found to have mild dehydration make sure you push clear liquids and follow-up with primary care    Return if worse

## 2025-04-10 NOTE — ED PROVIDER NOTES
Subjective   History of Present Illness  Patient is a 64-year-old  female who comes in with left upper epigastric abdominal pain-reports she has a history of pancreatitis diabetes chronic bronchitis COPD bipolar disorder hypertension hyperlipidemia PTSD    She denies fever chills nausea or vomiting      Review of Systems   Gastrointestinal:  Positive for abdominal distention.       Past Medical History:   Diagnosis Date    Allergic     Allergic rhinitis     Arthritis of back     Asthma     Asthma, extrinsic     Asthma, intrinsic     Bipolar 1 disorder     Chronic bronchitis     Chronic obstructive lung disease 01/28/2015    COVID-19 08/15/2021    CTS (carpal tunnel syndrome)     Depression     Diabetes insipidus     Diabetes mellitus     Difficulty walking 03/2022    Hip arthrosis     Hypertension     Knee swelling     Low back strain     Migraine     Mixed hyperlipidemia 03/03/2020    Neck strain     Pancreatitis     Peripheral neuropathy     Primary central sleep apnea     PTSD (post-traumatic stress disorder)     Pulmonary arterial hypertension     Sleep apnea, obstructive     Tobacco abuse 06/15/2020    Vitamin D deficiency 06/19/2024       Allergies   Allergen Reactions    Iodine Swelling    Adhesive Tape Rash       No past surgical history on file.    Family History   Problem Relation Age of Onset    Hypertension Mother     Diabetes Mother     Asthma Mother     Anemia Mother     Anxiety disorder Mother     Asthma Daughter     COPD Daughter     Anxiety disorder Daughter     Arthritis Daughter        Social History     Socioeconomic History    Marital status: Single   Tobacco Use    Smoking status: Every Day     Current packs/day: 1.00     Average packs/day: 1.1 packs/day for 40.0 years (45.0 ttl pk-yrs)     Types: Cigarettes     Passive exposure: Current    Smokeless tobacco: Never   Vaping Use    Vaping status: Never Used   Substance and Sexual Activity    Alcohol use: Never    Drug use: Never     "Sexual activity: Not Currently     Partners: Female           Objective   Physical Exam  Vitals reviewed.   Constitutional:       Appearance: She is well-developed. She is obese.   HENT:      Head: Normocephalic and atraumatic.   Eyes:      Conjunctiva/sclera: Conjunctivae normal.      Pupils: Pupils are equal, round, and reactive to light.   Cardiovascular:      Rate and Rhythm: Normal rate and regular rhythm.      Heart sounds: Normal heart sounds.   Pulmonary:      Effort: Pulmonary effort is normal. No respiratory distress.      Breath sounds: Normal breath sounds. No wheezing.   Abdominal:      General: Bowel sounds are normal.      Palpations: Abdomen is soft.      Tenderness: There is abdominal tenderness in the epigastric area and left upper quadrant.   Musculoskeletal:         General: No deformity. Normal range of motion.      Cervical back: Normal range of motion and neck supple.   Skin:     General: Skin is warm and dry.      Capillary Refill: Capillary refill takes less than 2 seconds.   Neurological:      Mental Status: She is alert and oriented to person, place, and time.      GCS: GCS eye subscore is 4. GCS verbal subscore is 5. GCS motor subscore is 6.      Motor: No weakness.   Psychiatric:         Mood and Affect: Mood normal.         Behavior: Behavior normal.         Procedures           ED Course  ED Course as of 04/10/25 1927   Thu Apr 10, 2025   1736 Marked ready for CT [KW]   1922 Patient reports pain 5/10 will be given some toradol at this time.  [KW]      ED Course User Index  [KW] Mariola Ward D, APRN        /85   Pulse 97   Temp 97.4 °F (36.3 °C) (Oral)   Resp 17   Ht 163.8 cm (64.5\")   Wt 95.3 kg (210 lb 1.6 oz)   SpO2 94%   BMI 35.51 kg/m²   Labs Reviewed   COMPREHENSIVE METABOLIC PANEL - Abnormal; Notable for the following components:       Result Value    Glucose 258 (*)     Sodium 133 (*)     All other components within normal limits    Narrative:     GFR " Categories in Chronic Kidney Disease (CKD)      GFR Category          GFR (mL/min/1.73)    Interpretation  G1                     90 or greater         Normal or high (1)  G2                      60-89                Mild decrease (1)  G3a                   45-59                Mild to moderate decrease  G3b                   30-44                Moderate to severe decrease  G4                    15-29                Severe decrease  G5                    14 or less           Kidney failure          (1)In the absence of evidence of kidney disease, neither GFR category G1 or G2 fulfill the criteria for CKD.    eGFR calculation 2021 CKD-EPI creatinine equation, which does not include race as a factor   LIPASE - Abnormal; Notable for the following components:    Lipase 93 (*)     All other components within normal limits   CBC WITH AUTO DIFFERENTIAL - Abnormal; Notable for the following components:    MCV 97.7 (*)     All other components within normal limits   URINALYSIS W/ CULTURE IF INDICATED - Abnormal; Notable for the following components:    Specific Gravity, UA 1.042 (*)     Glucose, UA >=1000 mg/dL (3+) (*)     Ketones, UA 15 mg/dL (1+) (*)     All other components within normal limits    Narrative:     In absence of clinical symptoms, the presence of pyuria, bacteria, and/or nitrites on the urinalysis result does not correlate with infection.  Urine microscopic not indicated.   CBC AND DIFFERENTIAL    Narrative:     The following orders were created for panel order CBC & Differential.  Procedure                               Abnormality         Status                     ---------                               -----------         ------                     CBC Auto Differential[[id number]]        Abnormal            Final result                 Please view results for these tests on the individual orders.   EXTRA TUBES    Narrative:     The following orders were created for panel order Extra Tubes.  Procedure                                Abnormality         Status                     ---------                               -----------         ------                     Gold Top - SST[[id number]]                                   Final result                 Please view results for these tests on the individual orders.   GOLD TOP - SST     Medications   sodium chloride 0.9 % flush 10 mL (has no administration in time range)   sodium chloride 0.9 % bolus 500 mL (has no administration in time range)   ketorolac (TORADOL) injection 15 mg (has no administration in time range)   ondansetron (ZOFRAN) injection 4 mg (4 mg Intravenous Given 4/10/25 1736)   morphine injection 4 mg (4 mg Intravenous Given 4/10/25 1736)     CT Abdomen Pelvis Without Contrast  Result Date: 4/10/2025  Impression: 1.Mild right perinephric fat stranding. Correlate for pyelonephritis. 2.Mild hepatomegaly and mild hepatic steatosis. 3.Mild constipation. 4.Fibroid uterus. Electronically Signed: Praveen Camejo MD  4/10/2025 6:22 PM EDT  Workstation ID: [id number]                                                   Medical Decision Making  Patient is a 64-year-old female comes in with a 3-day history of upper epigastric abdominal pain she does have a history of pancreatitis she reports.  She had IV established and blood work was obtained and reviewed the patient was found to have a lipase of 93 chart review shows this is mildly elevated from previous but not an acute pancreatitis it also shows on chart review that she has a history of H. pylori gastritis she did not show any signs of acute infection today she had no  vomiting while here in the emergency room.  She had a CT of the abdomen pelvis without IV contrast that was read by myself as well as Dr. Mendoza due to allergy and she was found to have mild prior right sided perinephric fat stranding but correlating urinalysis shows no infection on reevaluation the patient has mild pain-and was given some IV Toradol  and 500 cc normal saline bolus due to the ketones in the urine the patient will be discharged home she states she feels much better on reevaluation and will follow-up with primary care and/or GI for further evaluation if not improving and return to the ER if worse she verbalized understood discharge instructions    Problems Addressed:  Constipation, unspecified constipation type: complicated acute illness or injury  Epigastric pain: complicated acute illness or injury  Mild dehydration: complicated acute illness or injury    Amount and/or Complexity of Data Reviewed  Labs: ordered. Decision-making details documented in ED Course.  Radiology: ordered and independent interpretation performed. Decision-making details documented in ED Course.  ECG/medicine tests: ordered and independent interpretation performed. Decision-making details documented in ED Course.    Risk  OTC drugs.  Prescription drug management.  Parenteral controlled substances.        Final diagnoses:   Epigastric pain   Mild dehydration   Constipation, unspecified constipation type       ED Disposition  ED Disposition       ED Disposition   Discharge    Condition   Stable    Comment   --               Cindy Huddleston, APRN  [address]  [place] [address] [phone]    In 3 days  If symptoms worsen, As needed         Medication List        Changed      ondansetron ODT 4 MG disintegrating tablet  Commonly known as: ZOFRAN-ODT  Take 1 tablet by mouth 4 (Four) Times a Day As Needed for Vomiting or Nausea.  What changed:   how to take this  when to take this  reasons to take this               Where to Get Your Medications        These medications were sent to [id number] - [place], [place] - [address] - [phone]  - [phone] FX  [address] [place] [address]      Phone: [phone]   ondansetron ODT 4 MG disintegrating tablet            Mariola Ward, APRN  04/10/25 2269

## 2025-04-11 ENCOUNTER — TELEPHONE (OUTPATIENT)
Dept: FAMILY MEDICINE CLINIC | Facility: CLINIC | Age: 65
End: 2025-04-11
Payer: MEDICARE

## 2025-04-11 NOTE — TELEPHONE ENCOUNTER
Left message to return call to doctor's office at [facility]. To get one of the following: test results, a message from the nurse or a message from the provider.     See message below:    We need to know if patient is still taking Trelegy Ellipta inhaler?

## 2025-04-11 NOTE — TELEPHONE ENCOUNTER
Prior Auth completed for Trelejoslyn Ellipta 200-62.5-25MCG/ACT aerosol powder via covermymeds. Pending determination within 72 hours minium.     (Key: [id number])      [employer] Electronic Prior Authorization Form

## 2025-04-16 ENCOUNTER — OFFICE VISIT (OUTPATIENT)
Dept: ENDOCRINOLOGY | Facility: CLINIC | Age: 65
End: 2025-04-16
Payer: MEDICARE

## 2025-04-16 ENCOUNTER — TELEPHONE (OUTPATIENT)
Dept: ENDOCRINOLOGY | Facility: CLINIC | Age: 65
End: 2025-04-16
Payer: MEDICARE

## 2025-04-16 DIAGNOSIS — E11.42 TYPE 2 DIABETES MELLITUS WITH DIABETIC POLYNEUROPATHY, WITHOUT LONG-TERM CURRENT USE OF INSULIN: Primary | ICD-10-CM

## 2025-04-16 PROCEDURE — G0109 DIAB MANAGE TRN IND/GROUP: HCPCS | Performed by: INTERNAL MEDICINE

## 2025-04-16 NOTE — PROGRESS NOTES
Patient attended group class 2 from 0830 To 1100 For a total of 2.5 Hours.    Discussed one plate method, carb counting, portion sizes and food label reading.  Patient given an individualized meal plan.  Exercise and movement importance stressed and encouraged.  Discussed stress management and community resources available.  Evaluate goals set.  Encouraged patient to return in 13 months for refresher course.This is notification that your patient has completed diabetes education.  See the MD letter below and goals scanned into phone note.  ADA requires MD to electronically sign acknowledgement of letter of DSME. E-signature or replying back 'I acknowledge' will be sufficient.  Thank you!       Thank you for referring your patient to [facility] for      Diabetes Self-management Education (DSME)    or   Medical Nutrition Therapy (MNT)           Date Discussed Topic Discussed        8/19/24 9/17/24    Diabetes disease process and treatment options        8/19/24 9/17/24 4/16/25    Incorporating nutritional management into one's lifestyle        8/19/24 9/17/24  Using medications safely and for maximum therapeutic effects   Medication: Glipizide Jardiance metformin    8/19/24 9/17/24 4/16/25       Incorporating physical activity into one's lifestyle and using results     8/19/24 9/17/24       Monitoring blood glucose/other parameters and using results        8/19/24 9/17/24    Preventing, detecting and treating acute complications        9/17/24    Preventing, detecting and treating chronic complications     4/16/25       Developing personal strategies to address psychosocial issues/concerns     8/19/24 4/16/25    Developing personal strategies to promote health and behavior change         Additional Comments:                    Patient outcome:   Patient demonstrated understanding of all subjects covered   Patient reports importance of checking feet daily   Patient knows A1C    Patient knowledge of need for Diabetes eye exam      Please see the attached page for evaluation of goals. Please refer back to us if we can be helpful for additional education or assistance. If you have any questions about the education program or would like further information, please call us at [phone].

## 2025-04-16 NOTE — TELEPHONE ENCOUNTER
Encouraged patient to return in 13 months for refresher course.This is notification that your patient has completed diabetes education.  See the MD letter below and goals scanned into phone note.  ADA requires MD to electronically sign acknowledgement of letter of DSME. E-signature or replying back 'I acknowledge' will be sufficient.  Thank you!       Thank you for referring your patient to [facility] for      Diabetes Self-management Education (DSME)    or   Medical Nutrition Therapy (MNT)           Date Discussed Topic Discussed        8/19/24 9/17/24    Diabetes disease process and treatment options        8/19/24 9/17/24 4/16/25    Incorporating nutritional management into one's lifestyle        8/19/24 9/17/24  Using medications safely and for maximum therapeutic effects   Medication: Glipizide Jardiance metformin    8/19/24 9/17/24 4/16/25       Incorporating physical activity into one's lifestyle and using results     8/19/24 9/17/24       Monitoring blood glucose/other parameters and using results        8/19/24 9/17/24    Preventing, detecting and treating acute complications        9/17/24    Preventing, detecting and treating chronic complications     4/16/25       Developing personal strategies to address psychosocial issues/concerns     8/19/24 4/16/25    Developing personal strategies to promote health and behavior change         Additional Comments:                    Patient outcome:   Patient demonstrated understanding of all subjects covered   Patient reports importance of checking feet daily   Patient knows A1C   Patient knowledge of need for Diabetes eye exam      Please see the attached page for evaluation of goals. Please refer back to us if we can be helpful for additional education or assistance. If you have any questions about the education program or would like further information, please call us at [phone].

## 2025-04-17 DIAGNOSIS — E11.9 TYPE 2 DIABETES MELLITUS WITHOUT COMPLICATION, WITHOUT LONG-TERM CURRENT USE OF INSULIN: ICD-10-CM

## 2025-04-17 RX ORDER — GLIPIZIDE 10 MG/1
10 TABLET ORAL
Qty: 180 TABLET | Refills: 0 | Status: SHIPPED | OUTPATIENT
Start: 2025-04-17

## 2025-04-17 RX ORDER — ATORVASTATIN CALCIUM 40 MG/1
40 TABLET, FILM COATED ORAL DAILY
Qty: 90 TABLET | Refills: 0 | Status: SHIPPED | OUTPATIENT
Start: 2025-04-17

## 2025-04-17 NOTE — TELEPHONE ENCOUNTER
1   Janine Alvarado    scheduled 5/11-Auth Request  Received: 3 days ago  Message Contents   Sade REDDY  Preauth Pool; Sade Martinez   Hi,   Patient left a voicemail stating that Dr Pacheco is not a Tier 1 Provider . So she is requesting that an Auth be done  .     She is requesting the Auth be sent Attn : Medical Management Team at [phone]     She is scheduled 5/11 with Dr Pacheco at [address] .     Thanks,   Sade      ! Acknowledge. Thank you.

## 2025-04-18 NOTE — PROGRESS NOTES
Physical Therapy Initial Evaluation and Plan of Care    Patient: Carli Dubon   : 1960  Diagnosis/ICD-10 Code:  Bilateral hip pain [M25.551, M25.552]  Referring practitioner: Ace Hlae MD  Date of Initial Visit: 2022  Today's Date: 2022  Patient seen for 1 sessions           Subjective Questionnaire: LEFS: 26/80 ( 67%)      Subjective Evaluation    History of Present Illness  Mechanism of injury: Pt is referred to therapy due to pain in both hips and knees for at least 2 years. Reports no injury just wear and tear and arthritis.     Onset of symptoms: at least 2 years    Aggravating factors: activity, walking, standing, going up/down stairs, getting in/ out of the car and bath tub. Change of weather. Walking through the stores.     Relieving factors: rest, getting off her feet    Functional limitation: going shopping, going for walks, doing her house work, any type of physical activities that requires standing/ walking/ bending.     PMH: DM, arthritis, asthma        Quality of life: fair    Pain  Current pain ratin  At best pain ratin  At worst pain ratin  Quality: dull ache    Patient Goals  Patient goals for therapy: decreased pain, increased motion and increased strength           Precautions: DM    Objective          Tenderness     Right Knee   Tenderness in the inferior patella, lateral joint line and medial joint line.     Additional Tenderness Details  Lateral aspects of both hips and lumbar paraspinals    Lumbar Screen  Lumbar range of motion within normal limits with the following exceptions:Lumbar flex and ext with mod limitation and co inc pain in both hips    Active Range of Motion   Left Hip   Flexion: 90 degrees with pain    Right Hip   Flexion: 90 degrees with pain  Left Knee   Flexion: 110 degrees with pain  Extension: 0 degrees     Right Knee   Flexion: 115 degrees with pain  Extension: 0 degrees     Additional Active Range of Motion Details  IR/ ER with mod  limitation and painful    Strength/Myotome Testing     Left Hip   Planes of Motion   Flexion: 4  Abduction: 4  External rotation: 4  Internal rotation: 4    Right Hip   Planes of Motion   Flexion: 4  Abduction: 4  External rotation: 4  Internal rotation: 4    Left Knee   Flexion: 4  Extension: 4  Quadriceps contraction: fair    Right Knee   Flexion: 4  Extension: 4  Quadriceps contraction: fair    Tests     Left Hip   Positive GARY.     Right Hip   Positive GARY.     Swelling     Left Knee Girth Measurement (cm)   Joint line: 35 cm    Right Knee Girth Measurement (cm)   Joint line: 36 cm          Assessment & Plan     Assessment  Impairments: abnormal or restricted ROM, activity intolerance, lacks appropriate home exercise program, pain with function and weight-bearing intolerance  Functional Limitations: walking, uncomfortable because of pain, standing and stooping  Assessment details: Pt is a 61 y.o. female referred to therapy due to pain in both hips and knees.  recent x-rays revealed arthritic changes in both hips and knees.  Pt presents with impaired ROM/ strength/ dec tolerance to standing/ walking/ negotiating stairs. Upon initial evaluation pt exhibits the above impairments and functional limitations. Impairments affect performing normal / daily activities, house work, shopping and taking walks. Pt is a good candidate for rehab and will benefit from skilled physical therapy to address impairments, reduce pain and maximize function.    Prognosis: good    Goals  Plan Goals: STGs: in 4 weeks    1- Pt will tolerate  initial HEP and progression of her exercise program  2- Pt will be I with initial HEP  3- Pt will report at least 35 % improvement and pain reduction  4-  Pt will have improved ROM B hips/ and knee to be able to get in/ out of the car and bath tub with less pain and difficulty  5- Pt will be able to walk for 15-20 min without inc pain     LTGs: by DC     1- Pt will report at least 60% improvement  and pain reduction  2- Pt will be I with final HEP and self management of her condition  3- LEFS score will be 40 % or less  indicating improved function, pain and symptom reduction  4- Pt will voice readiness for DC with I program   5- Pt will be able to walk through grocery store with min pain   6- Pt will be able to negotiate stairs with min pain      Plan  Therapy options: will be seen for skilled therapy services  Planned modality interventions: high voltage pulsed current (pain management) and ultrasound  Planned therapy interventions: functional ROM exercises, home exercise program, manual therapy, neuromuscular re-education, strengthening, therapeutic activities, postural training and balance/weight-bearing training  Frequency: 2x week  Duration in weeks: 12  Treatment plan discussed with: patient        See flow sheet for treatment detail    History # of Personal Factors and/or Comorbidities: MODERATE (1-2)  Examination of Body System(s): # of elements: MODERATE (3)  Clinical Presentation: EVOLVING  Clinical Decision Making: MODERATE          Timed:         Manual Therapy:         mins  27983;     Therapeutic Exercise:         mins  10660;     Neuromuscular Shelbi:        mins  72182;    Therapeutic Activity:          mins  81681;     Gait Training:           mins  72195;     Ultrasound:          mins  53886;    Ionto                                   mins   00375  Self Care                            mins   62567  Canal repositioning           mins    55443      Un-Timed:  Electrical Stimulation:         mins  69020 ( );  Dry Needling          mins self-pay  Traction          mins 28282  Low Eval          Mins  60211  Mod Eval    45      Mins  54345  High Eval                            Mins  79632  Re-Eval                               mins  94773        Timed Treatment:      mins   Total Treatment:    45    mins    PT SIGNATURE: Ayo Gilmore PT, CLT  [place] License: # [id number]     DATE TREATMENT  INITIATED: 2/16/2022    Initial Certification  Certification Period: 2/16/2022 thru 5/16/2022  I certify that the therapy services are furnished while this patient is under my care.  The services outlined above are required by this patient, and will be reviewed every 90 days.     PHYSICIAN: Ace Hale MD   NPI: [id number]                                      DATE:     Please sign and return via fax to [phone].. Thank you, [facility].     0 (no pain/absence of nonverbal indicators of pain)

## 2025-04-28 DIAGNOSIS — E11.9 TYPE 2 DIABETES MELLITUS WITHOUT COMPLICATION, WITHOUT LONG-TERM CURRENT USE OF INSULIN: ICD-10-CM

## 2025-04-28 RX ORDER — EMPAGLIFLOZIN 25 MG/1
25 TABLET, FILM COATED ORAL DAILY
Qty: 90 TABLET | Refills: 0 | Status: SHIPPED | OUTPATIENT
Start: 2025-04-28

## 2025-05-05 DIAGNOSIS — R11.2 NAUSEA AND VOMITING, UNSPECIFIED VOMITING TYPE: ICD-10-CM

## 2025-05-05 RX ORDER — OMEPRAZOLE 40 MG/1
40 CAPSULE, DELAYED RELEASE ORAL DAILY
Qty: 90 CAPSULE | Refills: 0 | Status: SHIPPED | OUTPATIENT
Start: 2025-05-05

## 2025-05-10 DIAGNOSIS — F51.04 PSYCHOPHYSIOLOGICAL INSOMNIA: ICD-10-CM

## 2025-05-12 RX ORDER — AMITRIPTYLINE HYDROCHLORIDE 150 MG/1
150 TABLET ORAL NIGHTLY
Qty: 90 TABLET | Refills: 1 | Status: SHIPPED | OUTPATIENT
Start: 2025-05-12

## 2025-05-12 RX ORDER — ZOLPIDEM TARTRATE 10 MG/1
10 TABLET ORAL NIGHTLY PRN
Qty: 30 TABLET | Refills: 2 | Status: SHIPPED | OUTPATIENT
Start: 2025-05-12

## 2025-05-20 ENCOUNTER — TELEMEDICINE (OUTPATIENT)
Dept: PSYCHIATRY | Facility: CLINIC | Age: 65
End: 2025-05-20
Payer: MEDICARE

## 2025-05-20 DIAGNOSIS — F51.04 PSYCHOPHYSIOLOGICAL INSOMNIA: Chronic | ICD-10-CM

## 2025-05-20 DIAGNOSIS — F43.10 PTSD (POST-TRAUMATIC STRESS DISORDER): ICD-10-CM

## 2025-05-20 DIAGNOSIS — F31.32 BIPOLAR AFFECTIVE DISORDER, CURRENTLY DEPRESSED, MODERATE: Chronic | ICD-10-CM

## 2025-05-20 DIAGNOSIS — F43.10 PTSD (POST-TRAUMATIC STRESS DISORDER): Primary | Chronic | ICD-10-CM

## 2025-05-20 RX ORDER — DIVALPROEX SODIUM 500 MG/1
1000 TABLET, FILM COATED, EXTENDED RELEASE ORAL 2 TIMES DAILY
Qty: 360 TABLET | Refills: 1 | Status: SHIPPED | OUTPATIENT
Start: 2025-05-20 | End: 2025-06-04

## 2025-05-20 RX ORDER — VILAZODONE HYDROCHLORIDE 20 MG/1
20 TABLET ORAL DAILY
Qty: 90 TABLET | Refills: 3 | Status: SHIPPED | OUTPATIENT
Start: 2025-05-20

## 2025-05-20 RX ORDER — CLONAZEPAM 0.5 MG/1
0.5 TABLET ORAL 2 TIMES DAILY PRN
Qty: 60 TABLET | Refills: 2 | Status: SHIPPED | OUTPATIENT
Start: 2025-05-20
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
## 2025-05-28 ENCOUNTER — LAB (OUTPATIENT)
Dept: FAMILY MEDICINE CLINIC | Facility: CLINIC | Age: 65
End: 2025-05-28
Payer: MEDICARE

## 2025-05-28 ENCOUNTER — OFFICE VISIT (OUTPATIENT)
Dept: FAMILY MEDICINE CLINIC | Facility: CLINIC | Age: 65
End: 2025-05-28
Payer: MEDICARE

## 2025-05-28 VITALS
DIASTOLIC BLOOD PRESSURE: 81 MMHG | BODY MASS INDEX: 41.21 KG/M2 | SYSTOLIC BLOOD PRESSURE: 157 MMHG | WEIGHT: 241.4 LBS | HEIGHT: 64 IN | OXYGEN SATURATION: 94 % | TEMPERATURE: 97.7 F | HEART RATE: 100 BPM | RESPIRATION RATE: 18 BRPM

## 2025-05-28 DIAGNOSIS — E11.9 TYPE 2 DIABETES MELLITUS WITHOUT COMPLICATION, WITHOUT LONG-TERM CURRENT USE OF INSULIN: ICD-10-CM

## 2025-05-28 DIAGNOSIS — F31.32 BIPOLAR AFFECTIVE DISORDER, CURRENTLY DEPRESSED, MODERATE: ICD-10-CM

## 2025-05-28 DIAGNOSIS — Z12.31 ENCOUNTER FOR SCREENING MAMMOGRAM FOR MALIGNANT NEOPLASM OF BREAST: ICD-10-CM

## 2025-05-28 DIAGNOSIS — R10.13 EPIGASTRIC PAIN: ICD-10-CM

## 2025-05-28 DIAGNOSIS — J44.9 CHRONIC OBSTRUCTIVE PULMONARY DISEASE, UNSPECIFIED COPD TYPE: ICD-10-CM

## 2025-05-28 DIAGNOSIS — I10 HYPERTENSION, UNSPECIFIED TYPE: ICD-10-CM

## 2025-05-28 DIAGNOSIS — R26.89 BALANCE PROBLEM: ICD-10-CM

## 2025-05-28 DIAGNOSIS — E11.9 TYPE 2 DIABETES MELLITUS WITHOUT COMPLICATION, WITHOUT LONG-TERM CURRENT USE OF INSULIN: Primary | ICD-10-CM

## 2025-05-28 DIAGNOSIS — E78.2 MIXED HYPERLIPIDEMIA: ICD-10-CM

## 2025-05-28 RX ORDER — HYDROCHLOROTHIAZIDE 25 MG/1
25 TABLET ORAL DAILY
Qty: 30 TABLET | Refills: 0 | Status: SHIPPED | OUTPATIENT
Start: 2025-05-28

## 2025-05-28 NOTE — PROGRESS NOTES
Subjective     Carli Dubon is a 64 y.o. female.     History of Present Illness  Pt is here today for a 6 mo follow up on hypertension, diabetes, hyperlipidemia, COPD, bipolar disorder, and insomnia.  Has a history of pancreatitis.  She hasnt gotten to see GI yet.  Does have some epigastric pain at times  She is seeing [facility] for PT for balance issues  It has been helping- needs referral again     Hypertension-patient is currently on amlodipine 5 mg daily, losartan 100 mg daily. She is doing well on the medication. Denies CP, SOA. She gets some HA and dizziness. She has some mild swelling of the legs.      Tremor- pt saw neuro.  Believes it is an essential tremor- possibly from meds. Was started on primidone which helps some     Diabetes-patient is currently on Jardiance 25 mg daily, glipizide 10 mg twice daily, Metformin 1000 mg twice daily. She does not monitor her blood sugars regularly.  Sees endo. She did diabetes classes. States A1C was previously high     Hyperlipidemia- she is currently on lipitor 40mg daily.      COPD- Pt is seeing pulmonology.  Pt is currently on Trelegy which helps. She continues to smoke     Bipolar disorder-patient is seeing psychiatry for this.  She is currently on Depakote at 1000 mg twice daily and Viibryd 20 mg daily.  She also has Klonopin as needed. Denies SI or HI     Insomnia-patient is seeing psychiatry for this.  She is on ambien 10mg prn.  She is also on Elavil 150 mg nightly. She is doing well on it.     GERD-patient is currently on omeprazole 40 mg daily.     Hot flashes- pt states GYN placed her on gabapentin 300mg nightly. It is helping     Back pain- pt sees neurosurgeon. She is on gabapentin 300mg nightly. She did PT.     NEVIN- wears a CPAP. Sees sleep med     Labs- due  Pap smear- 08/2019- normal  Mammogram- 8/30/24  DEXA- post menopausal for 4 years.  Colonoscopy- has colonoscopy at [facility] 8/25/23     Vaccines:  Flu- N/A  PNA- UTD  Shingles-due- needs  "to get at the pharmacy  Tdap- UTD  covid- UTD     Dental exam- due  Eye exam- due             Primary Care Follow-Up  Conditions present:  Other chronic condition  Associated symptoms include: dizziness, fatigue, myalgias, abdominal pain and cough. Pertinent negatives include no chest pain, no nausea, no palpitations, no shortness of breath, no neck pain, no sore throat, is not nervous/anxious, no depressed mood and no diaphoresis.        The following portions of the patient's history were reviewed and updated as appropriate: allergies, current medications, past family history, past medical history, past social history, past surgical history, and problem list.    Review of Systems   Constitutional:  Positive for fatigue. Negative for chills, diaphoresis and fever.   HENT:  Positive for congestion. Negative for sore throat and swollen glands.    Respiratory:  Positive for cough. Negative for chest tightness and shortness of breath.    Cardiovascular:  Positive for leg swelling. Negative for chest pain and palpitations.   Gastrointestinal:  Positive for abdominal pain. Negative for nausea and vomiting.   Genitourinary:  Negative for dysuria.   Musculoskeletal:  Positive for myalgias. Negative for neck pain.   Skin:  Negative for rash.   Neurological:  Positive for dizziness, numbness and headache.   Psychiatric/Behavioral:  Negative for self-injury, suicidal ideas, depressed mood and stress. The patient is not nervous/anxious.        Objective     /81   Pulse 100   Temp 97.7 °F (36.5 °C) (Temporal)   Resp 18   Ht 163.8 cm (64.49\")   Wt 109 kg (241 lb 6.4 oz)   SpO2 94%   BMI 40.81 kg/m²     Current Outpatient Medications on File Prior to Visit   Medication Sig Dispense Refill    Accu-Chek Softclix Lancets lancets Used to check bs daily 100 each 5    albuterol (ACCUNEB) 1.25 MG/3ML nebulizer solution Take 3 mL by nebulization Every 6 (Six) Hours As Needed for Wheezing or Shortness of Air. Dispense as 1 " box of unit dose 1 each 1    albuterol sulfate  (90 Base) MCG/ACT inhaler Inhale 2 puffs Every 4 (Four) Hours As Needed for Wheezing or Shortness of Air. 6.7 g 1    amitriptyline (ELAVIL) 150 MG tablet TAKE 1 TABLET BY MOUTH ONCE DAILY AT NIGHT 90 tablet 1    amLODIPine (NORVASC) 5 MG tablet Take 1 tablet by mouth once daily 90 tablet 0    atorvastatin (LIPITOR) 40 MG tablet Take 1 tablet by mouth once daily 90 tablet 0    benzonatate (TESSALON) 200 MG capsule Take 1 capsule by mouth 3 (Three) Times a Day As Needed for Cough. 90 capsule 2    Blood Glucose Monitoring Suppl (Accu-Chek Guide Me) w/Device kit Use 1 kit Daily. Used to check bs daily. 1 kit 0    clonazePAM (KlonoPIN) 0.5 MG tablet Take 1 tablet by mouth 2 (Two) Times a Day As Needed for Anxiety. for anxiety 60 tablet 2    divalproex (DEPAKOTE ER) 500 MG 24 hr tablet Take 2 tablets by mouth 2 (Two) Times a Day. 360 tablet 1    fluticasone (FLONASE) 50 MCG/ACT nasal spray Administer 2 sprays into the nostril(s) as directed by provider Daily. 11.1 g 0    Fluticasone-Umeclidin-Vilant (Trelegy Ellipta) 200-62.5-25 MCG/ACT inhaler Inhale 1 puff Daily.      gabapentin (NEURONTIN) 300 MG capsule Take 1 capsule by mouth Every Night. 30 capsule 0    glipizide (GLUCOTROL) 10 MG tablet TAKE 1 TABLET BY MOUTH TWICE DAILY BEFORE MEAL(S) 180 tablet 0    glucose blood (Accu-Chek Guide) test strip Use as instructed 100 each 5    glucose blood test strip Use daily with checking blood glucose 100 each 5    glucose blood test strip Use one daily to blood glucose testing 100 each 5    glucose monitor monitoring kit Use daily with checking blood glucose 1 each 0    glucose monitor monitoring kit Use for daily Blood glucose testing 1 each 0    Jardiance 25 MG tablet tablet Take 1 tablet by mouth once daily 90 tablet 0    Lancets misc Use daily with checking Blood glucose 100 each 5    Lancets misc Use for daily  blood glucose testing 100 each 5    losartan (COZAAR) 100  MG tablet Take 1 tablet by mouth once daily 90 tablet 0    metFORMIN (GLUCOPHAGE) 1000 MG tablet TAKE 1 TABLET BY MOUTH TWICE DAILY WITH MEALS 180 tablet 0    nicotine (Nicotine Step 1) 21 MG/24HR patch Place 1 patch on the skin as directed by provider Daily. 30 patch 11    nystatin-triamcinolone (MYCOLOG) 951508-5.1 UNIT/GM-% ointment APPLY OINTMENT TOPICALLY SPARINGLY TO AFFECTED AREA (UNDER BREASTS) TWICE DAILY      omeprazole (priLOSEC) 40 MG capsule Take 1 capsule by mouth Daily. 90 capsule 0    ondansetron ODT (ZOFRAN-ODT) 4 MG disintegrating tablet Take 1 tablet by mouth 4 (Four) Times a Day As Needed for Vomiting or Nausea. 10 tablet 0    PEG 3350-KCl-NaBcb-NaCl-NaSulf (PEG-3350/Electrolytes) 236 g reconstituted solution TAKE 4,000ML BY MOUTH ONCE FOR 1 DOSE      primidone (MYSOLINE) 50 MG tablet Take 1.5 tablets by mouth Every Night for 30 days, THEN 2 tablets Every Night. One at hs, may increase to two at hs after two weeks if needed 60 tablet 3    traMADol (ULTRAM) 50 MG tablet Take 1 tablet by mouth Every 8 (Eight) Hours As Needed. for pain      vilazodone (VIIBRYD) 20 MG tablet tablet Take 1 tablet by mouth Daily. 90 tablet 3    zolpidem (AMBIEN) 10 MG tablet TAKE 1 TABLET BY MOUTH AT NIGHT AS NEEDED FOR SLEEP 30 tablet 2    ipratropium-albuterol (DUO-NEB) 0.5-2.5 mg/3 ml nebulizer Take 3 mL by nebulization 4 (Four) Times a Day As Needed for Wheezing for up to 30 days. 360 mL 0     No current facility-administered medications on file prior to visit.                 Physical Exam  Vitals reviewed.   Constitutional:       General: She is not in acute distress.     Appearance: Normal appearance. She is well-developed. She is not diaphoretic.   HENT:      Head: Normocephalic and atraumatic.      Right Ear: Tympanic membrane and ear canal normal.      Left Ear: Tympanic membrane and ear canal normal.      Nose: No congestion or rhinorrhea.      Mouth/Throat:      Pharynx: No oropharyngeal exudate or  posterior oropharyngeal erythema.   Eyes:      General:         Right eye: No discharge.         Left eye: No discharge.      Extraocular Movements: Extraocular movements intact.      Conjunctiva/sclera: Conjunctivae normal.   Cardiovascular:      Rate and Rhythm: Normal rate and regular rhythm.      Heart sounds: No murmur heard.  Pulmonary:      Effort: Pulmonary effort is normal. No respiratory distress.      Breath sounds: Normal breath sounds. No wheezing or rales.   Abdominal:      General: Bowel sounds are normal.      Palpations: Abdomen is soft.   Musculoskeletal:         General: Swelling (trace BLE) present. Normal range of motion.      Cervical back: Normal range of motion.   Skin:     General: Skin is warm and dry.   Neurological:      General: No focal deficit present.      Mental Status: She is alert and oriented to person, place, and time.   Psychiatric:         Mood and Affect: Mood normal.         Behavior: Behavior normal.         Thought Content: Thought content normal.         Judgment: Judgment normal.           Assessment & Plan     Diagnoses and all orders for this visit:    1. Type 2 diabetes mellitus without complication, without long-term current use of insulin (Primary)  Comments:  stable  cont meds  may need to add lantus  cant take GLP1 d/t pancreatitis Hx  Orders:  -     ORDER: Hemoglobin A1c; Future  -     Microalbumin / Creatinine Urine Ratio - Urine, Clean Catch    2. Epigastric pain  Comments:  history of pancreatitis  referral to GI  cont omeprazole  Orders:  -     Ambulatory Referral to Gastroenterology    3. Balance problem  Comments:  improving  cont PT  Orders:  -     Ambulatory Referral to Physical Therapy for Evaluation & Treatment    4. Hypertension, unspecified type  Comments:  elevated  cont norvasc and losartan  add Hctz   monitor renal Fx  f/u1  mo  Orders:  -     hydroCHLOROthiazide 25 MG tablet; Take 1 tablet by mouth Daily.  Dispense: 30 tablet; Refill: 0    5.  Encounter for screening mammogram for malignant neoplasm of breast  -     Mammo Screening Digital Tomosynthesis Bilateral With CAD; Future    6. Mixed hyperlipidemia  Comments:  stable  cont statin  work on diet and exercise    7. Bipolar affective disorder, currently depressed, moderate  Comments:  stable  sees psych  cont meds  denies SI or HI    8. Chronic obstructive pulmonary disease, unspecified COPD type  Comments:  stable  sees pulm  cont trelegy  smoking cessation

## 2025-05-29 ENCOUNTER — LAB (OUTPATIENT)
Dept: LAB | Facility: HOSPITAL | Age: 65
End: 2025-05-29
Payer: MEDICARE

## 2025-05-29 DIAGNOSIS — F31.32 BIPOLAR AFFECTIVE DISORDER, CURRENTLY DEPRESSED, MODERATE: ICD-10-CM

## 2025-05-29 DIAGNOSIS — F31.32 BIPOLAR AFFECTIVE DISORDER, CURRENTLY DEPRESSED, MODERATE: Primary | ICD-10-CM

## 2025-05-29 LAB
HBA1C MFR BLD: 8.3 % (ref 4.8–5.6)
VALPROATE SERPL-MCNC: 73 MCG/ML (ref 50–125)

## 2025-05-29 PROCEDURE — 83036 HEMOGLOBIN GLYCOSYLATED A1C: CPT | Performed by: NURSE PRACTITIONER

## 2025-05-29 PROCEDURE — 82043 UR ALBUMIN QUANTITATIVE: CPT | Performed by: NURSE PRACTITIONER

## 2025-05-29 PROCEDURE — 82570 ASSAY OF URINE CREATININE: CPT | Performed by: NURSE PRACTITIONER

## 2025-05-29 PROCEDURE — 80164 ASSAY DIPROPYLACETIC ACD TOT: CPT

## 2025-05-29 PROCEDURE — 36415 COLL VENOUS BLD VENIPUNCTURE: CPT

## 2025-05-30 ENCOUNTER — RESULTS FOLLOW-UP (OUTPATIENT)
Dept: PSYCHIATRY | Facility: CLINIC | Age: 65
End: 2025-05-30
Payer: MEDICARE

## 2025-05-30 LAB
ALBUMIN UR-MCNC: <1.2 MG/DL
CREAT UR-MCNC: 29.2 MG/DL
MICROALBUMIN/CREAT UR: NORMAL MG/G{CREAT}

## 2025-05-30 NOTE — PROGRESS NOTES
Please call patient to let her know that her Depakote level was therapeutic with no need to adjust her dosage.

## 2025-06-04 ENCOUNTER — APPOINTMENT (OUTPATIENT)
Dept: GENERAL RADIOLOGY | Facility: HOSPITAL | Age: 65
End: 2025-06-04
Payer: MEDICARE

## 2025-06-04 ENCOUNTER — APPOINTMENT (OUTPATIENT)
Dept: CT IMAGING | Facility: HOSPITAL | Age: 65
End: 2025-06-04
Payer: MEDICARE

## 2025-06-04 ENCOUNTER — APPOINTMENT (OUTPATIENT)
Dept: CARDIOLOGY | Facility: HOSPITAL | Age: 65
End: 2025-06-04
Payer: MEDICARE

## 2025-06-04 ENCOUNTER — HOSPITAL ENCOUNTER (OUTPATIENT)
Facility: HOSPITAL | Age: 65
Setting detail: OBSERVATION
LOS: 1 days | Discharge: HOME | End: 2025-06-06
Attending: HOSPITALIST | Admitting: HOSPITALIST
Payer: MEDICARE

## 2025-06-04 DIAGNOSIS — I26.99 BILATERAL PULMONARY EMBOLISM: Primary | ICD-10-CM

## 2025-06-04 DIAGNOSIS — M25.511 ACUTE PAIN OF RIGHT SHOULDER: ICD-10-CM

## 2025-06-04 DIAGNOSIS — E11.9 TYPE 2 DIABETES MELLITUS WITHOUT COMPLICATION, WITHOUT LONG-TERM CURRENT USE OF INSULIN: ICD-10-CM

## 2025-06-04 DIAGNOSIS — R10.31 RIGHT LOWER QUADRANT ABDOMINAL PAIN: ICD-10-CM

## 2025-06-04 LAB
ALBUMIN SERPL-MCNC: 4.1 G/DL (ref 3.5–5.2)
ALBUMIN/GLOB SERPL: 1.3 G/DL
ALP SERPL-CCNC: 95 U/L (ref 39–117)
ALT SERPL W P-5'-P-CCNC: 18 U/L (ref 1–33)
ANION GAP SERPL CALCULATED.3IONS-SCNC: 12.8 MMOL/L (ref 5–15)
ANION GAP SERPL CALCULATED.3IONS-SCNC: 17.8 MMOL/L (ref 5–15)
AORTIC DIMENSIONLESS INDEX: 0.93 (DI)
AST SERPL-CCNC: 24 U/L (ref 1–32)
AV MEAN PRESS GRAD SYS DOP V1V2: 3 MMHG
AV VMAX SYS DOP: 113 CM/SEC
BH CV ECHO MEAS - ACS: 1.7 CM
BH CV ECHO MEAS - AO MAX PG: 5.1 MMHG
BH CV ECHO MEAS - AO V2 VTI: 20.3 CM
BH CV ECHO MEAS - AVA(I,D): 2.9 CM2
BH CV ECHO MEAS - EDV(CUBED): 125 ML
BH CV ECHO MEAS - EDV(MOD-SP2): 78.8 ML
BH CV ECHO MEAS - EDV(MOD-SP4): 93.3 ML
BH CV ECHO MEAS - EF(MOD-SP2): 50.8 %
BH CV ECHO MEAS - EF(MOD-SP4): 50.2 %
BH CV ECHO MEAS - ESV(CUBED): 39.3 ML
BH CV ECHO MEAS - ESV(MOD-SP2): 38.8 ML
BH CV ECHO MEAS - ESV(MOD-SP4): 46.5 ML
BH CV ECHO MEAS - FS: 32 %
BH CV ECHO MEAS - IVS/LVPW: 0.91 CM
BH CV ECHO MEAS - IVSD: 1 CM
BH CV ECHO MEAS - LA DIMENSION: 4.2 CM
BH CV ECHO MEAS - LAT PEAK E' VEL: 8.7 CM/SEC
BH CV ECHO MEAS - LV DIASTOLIC VOL/BSA (35-75): 46.5 CM2
BH CV ECHO MEAS - LV MASS(C)D: 194.4 GRAMS
BH CV ECHO MEAS - LV MAX PG: 4.6 MMHG
BH CV ECHO MEAS - LV MEAN PG: 2 MMHG
BH CV ECHO MEAS - LV SYSTOLIC VOL/BSA (12-30): 23.2 CM2
BH CV ECHO MEAS - LV V1 MAX: 107 CM/SEC
BH CV ECHO MEAS - LV V1 VTI: 18.9 CM
BH CV ECHO MEAS - LVIDD: 5 CM
BH CV ECHO MEAS - LVIDS: 3.4 CM
BH CV ECHO MEAS - LVOT AREA: 3.1 CM2
BH CV ECHO MEAS - LVOT DIAM: 2 CM
BH CV ECHO MEAS - LVPWD: 1.1 CM
BH CV ECHO MEAS - MED PEAK E' VEL: 11.5 CM/SEC
BH CV ECHO MEAS - MV E MAX VEL: 106 CM/SEC
BH CV ECHO MEAS - MV MAX PG: 7.6 MMHG
BH CV ECHO MEAS - MV MEAN PG: 3 MMHG
BH CV ECHO MEAS - MV V2 VTI: 27.8 CM
BH CV ECHO MEAS - MVA(VTI): 2.14 CM2
BH CV ECHO MEAS - PA ACC TIME: 0.1 SEC
BH CV ECHO MEAS - PA V2 MAX: 99.9 CM/SEC
BH CV ECHO MEAS - RAP SYSTOLE: 3 MMHG
BH CV ECHO MEAS - RV MAX PG: 1.11 MMHG
BH CV ECHO MEAS - RV V1 MAX: 52.7 CM/SEC
BH CV ECHO MEAS - RV V1 VTI: 11 CM
BH CV ECHO MEAS - RVDD: 3.2 CM
BH CV ECHO MEAS - SV(LVOT): 59.4 ML
BH CV ECHO MEAS - SV(MOD-SP2): 40 ML
BH CV ECHO MEAS - SV(MOD-SP4): 46.8 ML
BH CV ECHO MEAS - SVI(LVOT): 29.6 ML/M2
BH CV ECHO MEAS - SVI(MOD-SP2): 19.9 ML/M2
BH CV ECHO MEAS - SVI(MOD-SP4): 23.3 ML/M2
BH CV ECHO MEAS - TAPSE (>1.6): 1.94 CM
BH CV ECHO MEASUREMENTS AVERAGE E/E' RATIO: 10.5
BH CV ECHO SHUNT ASSESSMENT PERFORMED (HIDDEN SCRIPTING): 1
BH CV LOWER VASCULAR LEFT COMMON FEMORAL AUGMENT: NORMAL
BH CV LOWER VASCULAR LEFT COMMON FEMORAL COMPETENT: NORMAL
BH CV LOWER VASCULAR LEFT COMMON FEMORAL COMPRESS: NORMAL
BH CV LOWER VASCULAR LEFT COMMON FEMORAL PHASIC: NORMAL
BH CV LOWER VASCULAR LEFT COMMON FEMORAL SPONT: NORMAL
BH CV LOWER VASCULAR LEFT DISTAL FEMORAL COMPRESS: NORMAL
BH CV LOWER VASCULAR LEFT GASTRONEMIUS COMPRESS: NORMAL
BH CV LOWER VASCULAR LEFT GREATER SAPH AK COMPRESS: NORMAL
BH CV LOWER VASCULAR LEFT GREATER SAPH BK COMPRESS: NORMAL
BH CV LOWER VASCULAR LEFT LESSER SAPH COMPRESS: NORMAL
BH CV LOWER VASCULAR LEFT MID FEMORAL AUGMENT: NORMAL
BH CV LOWER VASCULAR LEFT MID FEMORAL COMPETENT: NORMAL
BH CV LOWER VASCULAR LEFT MID FEMORAL COMPRESS: NORMAL
BH CV LOWER VASCULAR LEFT MID FEMORAL PHASIC: NORMAL
BH CV LOWER VASCULAR LEFT MID FEMORAL SPONT: NORMAL
BH CV LOWER VASCULAR LEFT PERONEAL COMPRESS: NORMAL
BH CV LOWER VASCULAR LEFT POPLITEAL AUGMENT: NORMAL
BH CV LOWER VASCULAR LEFT POPLITEAL COMPETENT: NORMAL
BH CV LOWER VASCULAR LEFT POPLITEAL COMPRESS: NORMAL
BH CV LOWER VASCULAR LEFT POPLITEAL PHASIC: NORMAL
BH CV LOWER VASCULAR LEFT POPLITEAL SPONT: NORMAL
BH CV LOWER VASCULAR LEFT POSTERIOR TIBIAL COMPRESS: NORMAL
BH CV LOWER VASCULAR LEFT PROXIMAL FEMORAL COMPRESS: NORMAL
BH CV LOWER VASCULAR LEFT SAPHENOFEMORAL JUNCTION COMPRESS: NORMAL
BH CV LOWER VASCULAR RIGHT COMMON FEMORAL AUGMENT: NORMAL
BH CV LOWER VASCULAR RIGHT COMMON FEMORAL COMPETENT: NORMAL
BH CV LOWER VASCULAR RIGHT COMMON FEMORAL COMPRESS: NORMAL
BH CV LOWER VASCULAR RIGHT COMMON FEMORAL PHASIC: NORMAL
BH CV LOWER VASCULAR RIGHT COMMON FEMORAL SPONT: NORMAL
BH CV LOWER VASCULAR RIGHT DISTAL FEMORAL COMPRESS: NORMAL
BH CV LOWER VASCULAR RIGHT GASTRONEMIUS COMPRESS: NORMAL
BH CV LOWER VASCULAR RIGHT GREATER SAPH AK COMPRESS: NORMAL
BH CV LOWER VASCULAR RIGHT GREATER SAPH BK COMPRESS: NORMAL
BH CV LOWER VASCULAR RIGHT LESSER SAPH COMPRESS: NORMAL
BH CV LOWER VASCULAR RIGHT MID FEMORAL AUGMENT: NORMAL
BH CV LOWER VASCULAR RIGHT MID FEMORAL COMPETENT: NORMAL
BH CV LOWER VASCULAR RIGHT MID FEMORAL COMPRESS: NORMAL
BH CV LOWER VASCULAR RIGHT MID FEMORAL PHASIC: NORMAL
BH CV LOWER VASCULAR RIGHT MID FEMORAL SPONT: NORMAL
BH CV LOWER VASCULAR RIGHT PERONEAL COMPRESS: NORMAL
BH CV LOWER VASCULAR RIGHT POPLITEAL AUGMENT: NORMAL
BH CV LOWER VASCULAR RIGHT POPLITEAL COMPETENT: NORMAL
BH CV LOWER VASCULAR RIGHT POPLITEAL COMPRESS: NORMAL
BH CV LOWER VASCULAR RIGHT POPLITEAL PHASIC: NORMAL
BH CV LOWER VASCULAR RIGHT POPLITEAL SPONT: NORMAL
BH CV LOWER VASCULAR RIGHT POSTERIOR TIBIAL COMPRESS: NORMAL
BH CV LOWER VASCULAR RIGHT PROXIMAL FEMORAL COMPRESS: NORMAL
BH CV LOWER VASCULAR RIGHT SAPHENOFEMORAL JUNCTION COMPRESS: NORMAL
BH CV XLRA - TDI S': 9.1 CM/SEC
BILIRUB SERPL-MCNC: <0.2 MG/DL (ref 0–1.2)
BILIRUB UR QL STRIP: NEGATIVE
BUN SERPL-MCNC: 16.6 MG/DL (ref 8–23)
BUN SERPL-MCNC: 17.2 MG/DL (ref 8–23)
BUN/CREAT SERPL: 23.2 (ref 7–25)
BUN/CREAT SERPL: 23.4 (ref 7–25)
CALCIUM SPEC-SCNC: 9 MG/DL (ref 8.6–10.5)
CALCIUM SPEC-SCNC: 9.3 MG/DL (ref 8.6–10.5)
CHLORIDE SERPL-SCNC: 100 MMOL/L (ref 98–107)
CHLORIDE SERPL-SCNC: 101 MMOL/L (ref 98–107)
CLARITY UR: CLEAR
CO2 SERPL-SCNC: 21.2 MMOL/L (ref 22–29)
CO2 SERPL-SCNC: 23.2 MMOL/L (ref 22–29)
COLOR UR: YELLOW
CREAT SERPL-MCNC: 0.71 MG/DL (ref 0.57–1)
CREAT SERPL-MCNC: 0.74 MG/DL (ref 0.57–1)
D DIMER PPP FEU-MCNC: 1.16 MCGFEU/ML (ref 0–0.62)
D-LACTATE SERPL-SCNC: 2.5 MMOL/L (ref 0.5–2)
D-LACTATE SERPL-SCNC: 2.6 MMOL/L (ref 0.5–2)
D-LACTATE SERPL-SCNC: 2.6 MMOL/L (ref 0.5–2)
D-LACTATE SERPL-SCNC: 3.4 MMOL/L (ref 0.3–2)
DEPRECATED RDW RBC AUTO: 51.8 FL (ref 37–54)
EGFRCR SERPLBLD CKD-EPI 2021: 91.6 ML/MIN/1.73
EGFRCR SERPLBLD CKD-EPI 2021: 96.3 ML/MIN/1.73
EOSINOPHIL # BLD MANUAL: 0.13 10*3/MM3 (ref 0–0.4)
EOSINOPHIL NFR BLD MANUAL: 1 % (ref 0.3–6.2)
ERYTHROCYTE [DISTWIDTH] IN BLOOD BY AUTOMATED COUNT: 14 % (ref 12.3–15.4)
GEN 5 1HR TROPONIN T REFLEX: 7 NG/L
GLOBULIN UR ELPH-MCNC: 3.2 GM/DL
GLUCOSE BLDC GLUCOMTR-MCNC: 227 MG/DL (ref 70–105)
GLUCOSE BLDC GLUCOMTR-MCNC: 246 MG/DL (ref 70–105)
GLUCOSE BLDC GLUCOMTR-MCNC: 272 MG/DL (ref 70–105)
GLUCOSE BLDC GLUCOMTR-MCNC: 281 MG/DL (ref 70–105)
GLUCOSE SERPL-MCNC: 253 MG/DL (ref 65–99)
GLUCOSE SERPL-MCNC: 98 MG/DL (ref 65–99)
GLUCOSE UR STRIP-MCNC: ABNORMAL MG/DL
HCT VFR BLD AUTO: 46.9 % (ref 34–46.6)
HGB BLD-MCNC: 15 G/DL (ref 12–15.9)
HGB UR QL STRIP.AUTO: NEGATIVE
HOLD SPECIMEN: NORMAL
INR PPP: 0.96 (ref 0.9–1.1)
KETONES UR QL STRIP: ABNORMAL
LEFT ATRIUM VOLUME INDEX: 20.3 ML/M2
LEUKOCYTE ESTERASE UR QL STRIP.AUTO: NEGATIVE
LIPASE SERPL-CCNC: 46 U/L (ref 13–60)
LV EF BIPLANE MOD: 50 %
LYMPHOCYTES # BLD MANUAL: 3.54 10*3/MM3 (ref 0.7–3.1)
LYMPHOCYTES NFR BLD MANUAL: 6 % (ref 5–12)
MCH RBC QN AUTO: 31.6 PG (ref 26.6–33)
MCHC RBC AUTO-ENTMCNC: 32 G/DL (ref 31.5–35.7)
MCV RBC AUTO: 98.9 FL (ref 79–97)
MONOCYTES # BLD: 0.79 10*3/MM3 (ref 0.1–0.9)
NEUTROPHILS # BLD AUTO: 8.66 10*3/MM3 (ref 1.7–7)
NEUTROPHILS NFR BLD MANUAL: 66 % (ref 42.7–76)
NITRITE UR QL STRIP: NEGATIVE
NT-PROBNP SERPL-MCNC: 205 PG/ML (ref 0–900)
PH UR STRIP.AUTO: <=5 [PH] (ref 5–8)
PLAT MORPH BLD: NORMAL
PLATELET # BLD AUTO: 218 10*3/MM3 (ref 140–450)
PMV BLD AUTO: 10.4 FL (ref 6–12)
POTASSIUM SERPL-SCNC: 3.9 MMOL/L (ref 3.5–5.2)
POTASSIUM SERPL-SCNC: 4.8 MMOL/L (ref 3.5–5.2)
PROT SERPL-MCNC: 7.3 G/DL (ref 6–8.5)
PROT UR QL STRIP: NEGATIVE
PROTHROMBIN TIME: 12.7 SECONDS (ref 11.7–14.2)
RBC # BLD AUTO: 4.74 10*6/MM3 (ref 3.77–5.28)
RBC MORPH BLD: NORMAL
SCAN SLIDE: NORMAL
SINUS: 3 CM
SODIUM SERPL-SCNC: 137 MMOL/L (ref 136–145)
SODIUM SERPL-SCNC: 139 MMOL/L (ref 136–145)
SP GR UR STRIP: 1.05 (ref 1–1.03)
TOXIC GRANULATION: ABNORMAL
TROPONIN T NUMERIC DELTA: -1 NG/L
TROPONIN T SERPL HS-MCNC: 8 NG/L
UFH PPP CHRO-ACNC: 0.19 IU/ML
UFH PPP CHRO-ACNC: 0.2 IU/ML
UFH PPP CHRO-ACNC: 1.09 IU/ML
UFH PPP CHRO-ACNC: <0.1 IU/ML
UROBILINOGEN UR QL STRIP: ABNORMAL
VARIANT LYMPHS NFR BLD MANUAL: 2 % (ref 0–5)
VARIANT LYMPHS NFR BLD MANUAL: 25 % (ref 19.6–45.3)
WBC NRBC COR # BLD AUTO: 13.12 10*3/MM3 (ref 3.4–10.8)
WHOLE BLOOD HOLD SPECIMEN: NORMAL

## 2025-06-04 PROCEDURE — 25010000002 METHYLPREDNISOLONE PER 40 MG: Performed by: HOSPITALIST

## 2025-06-04 PROCEDURE — 83690 ASSAY OF LIPASE: CPT

## 2025-06-04 PROCEDURE — 93970 EXTREMITY STUDY: CPT | Performed by: SURGERY

## 2025-06-04 PROCEDURE — 25010000002 HEPARIN (PORCINE) 25000-0.45 UT/250ML-% SOLUTION

## 2025-06-04 PROCEDURE — 85025 COMPLETE CBC W/AUTO DIFF WBC: CPT

## 2025-06-04 PROCEDURE — G0378 HOSPITAL OBSERVATION PER HR: HCPCS

## 2025-06-04 PROCEDURE — 80053 COMPREHEN METABOLIC PANEL: CPT

## 2025-06-04 PROCEDURE — 99285 EMERGENCY DEPT VISIT HI MDM: CPT

## 2025-06-04 PROCEDURE — 25010000002 KETOROLAC TROMETHAMINE PER 15 MG

## 2025-06-04 PROCEDURE — 85007 BL SMEAR W/DIFF WBC COUNT: CPT

## 2025-06-04 PROCEDURE — 25510000001 IOPAMIDOL PER 1 ML

## 2025-06-04 PROCEDURE — 85520 HEPARIN ASSAY: CPT

## 2025-06-04 PROCEDURE — 71046 X-RAY EXAM CHEST 2 VIEWS: CPT

## 2025-06-04 PROCEDURE — 96375 TX/PRO/DX INJ NEW DRUG ADDON: CPT

## 2025-06-04 PROCEDURE — 81003 URINALYSIS AUTO W/O SCOPE: CPT

## 2025-06-04 PROCEDURE — 83880 ASSAY OF NATRIURETIC PEPTIDE: CPT | Performed by: INTERNAL MEDICINE

## 2025-06-04 PROCEDURE — 93306 TTE W/DOPPLER COMPLETE: CPT | Performed by: INTERNAL MEDICINE

## 2025-06-04 PROCEDURE — 87040 BLOOD CULTURE FOR BACTERIA: CPT

## 2025-06-04 PROCEDURE — 25810000003 SODIUM CHLORIDE 0.9 % SOLUTION

## 2025-06-04 PROCEDURE — 93970 EXTREMITY STUDY: CPT

## 2025-06-04 PROCEDURE — 96365 THER/PROPH/DIAG IV INF INIT: CPT

## 2025-06-04 PROCEDURE — 25010000002 MORPHINE PER 10 MG

## 2025-06-04 PROCEDURE — 71275 CT ANGIOGRAPHY CHEST: CPT

## 2025-06-04 PROCEDURE — 96366 THER/PROPH/DIAG IV INF ADDON: CPT

## 2025-06-04 PROCEDURE — 25010000002 ONDANSETRON PER 1 MG

## 2025-06-04 PROCEDURE — 25010000002 METHYLPREDNISOLONE PER 40 MG

## 2025-06-04 PROCEDURE — 63710000001 INSULIN LISPRO (HUMAN) PER 5 UNITS

## 2025-06-04 PROCEDURE — 94799 UNLISTED PULMONARY SVC/PX: CPT

## 2025-06-04 PROCEDURE — 25010000002 HYDROMORPHONE 1 MG/ML SOLUTION

## 2025-06-04 PROCEDURE — 85610 PROTHROMBIN TIME: CPT

## 2025-06-04 PROCEDURE — 96376 TX/PRO/DX INJ SAME DRUG ADON: CPT

## 2025-06-04 PROCEDURE — 84484 ASSAY OF TROPONIN QUANT: CPT | Performed by: INTERNAL MEDICINE

## 2025-06-04 PROCEDURE — 36415 COLL VENOUS BLD VENIPUNCTURE: CPT

## 2025-06-04 PROCEDURE — 94640 AIRWAY INHALATION TREATMENT: CPT

## 2025-06-04 PROCEDURE — 93306 TTE W/DOPPLER COMPLETE: CPT

## 2025-06-04 PROCEDURE — 74177 CT ABD & PELVIS W/CONTRAST: CPT

## 2025-06-04 PROCEDURE — 83605 ASSAY OF LACTIC ACID: CPT

## 2025-06-04 PROCEDURE — 25010000002 DIPHENHYDRAMINE PER 50 MG

## 2025-06-04 PROCEDURE — 82948 REAGENT STRIP/BLOOD GLUCOSE: CPT

## 2025-06-04 PROCEDURE — 85520 HEPARIN ASSAY: CPT | Performed by: HOSPITALIST

## 2025-06-04 PROCEDURE — 85379 FIBRIN DEGRADATION QUANT: CPT

## 2025-06-04 RX ORDER — DIVALPROEX SODIUM 500 MG/1
2000 TABLET, FILM COATED, EXTENDED RELEASE ORAL NIGHTLY
COMMUNITY

## 2025-06-04 RX ORDER — DIVALPROEX SODIUM 500 MG/1
2000 TABLET, FILM COATED, EXTENDED RELEASE ORAL NIGHTLY
Status: DISCONTINUED | OUTPATIENT
Start: 2025-06-04 | End: 2025-06-06 | Stop reason: HOSPADM

## 2025-06-04 RX ORDER — BISACODYL 5 MG/1
5 TABLET, DELAYED RELEASE ORAL DAILY PRN
Status: DISCONTINUED | OUTPATIENT
Start: 2025-06-04 | End: 2025-06-06 | Stop reason: HOSPADM

## 2025-06-04 RX ORDER — ONDANSETRON 2 MG/ML
4 INJECTION INTRAMUSCULAR; INTRAVENOUS EVERY 6 HOURS PRN
Status: DISCONTINUED | OUTPATIENT
Start: 2025-06-04 | End: 2025-06-06 | Stop reason: HOSPADM

## 2025-06-04 RX ORDER — BISACODYL 10 MG
10 SUPPOSITORY, RECTAL RECTAL DAILY PRN
Status: DISCONTINUED | OUTPATIENT
Start: 2025-06-04 | End: 2025-06-06 | Stop reason: HOSPADM

## 2025-06-04 RX ORDER — ATORVASTATIN CALCIUM 40 MG/1
40 TABLET, FILM COATED ORAL DAILY
Status: DISCONTINUED | OUTPATIENT
Start: 2025-06-04 | End: 2025-06-06 | Stop reason: HOSPADM

## 2025-06-04 RX ORDER — TRAMADOL HYDROCHLORIDE 50 MG/1
50 TABLET ORAL EVERY 6 HOURS PRN
Status: DISCONTINUED | OUTPATIENT
Start: 2025-06-04 | End: 2025-06-06 | Stop reason: HOSPADM

## 2025-06-04 RX ORDER — ACETAMINOPHEN 160 MG/5ML
650 SOLUTION ORAL EVERY 4 HOURS PRN
Status: DISCONTINUED | OUTPATIENT
Start: 2025-06-04 | End: 2025-06-06 | Stop reason: HOSPADM

## 2025-06-04 RX ORDER — LOSARTAN POTASSIUM 50 MG/1
100 TABLET ORAL DAILY
Status: DISCONTINUED | OUTPATIENT
Start: 2025-06-04 | End: 2025-06-06 | Stop reason: HOSPADM

## 2025-06-04 RX ORDER — ZOLPIDEM TARTRATE 5 MG/1
10 TABLET ORAL NIGHTLY PRN
Status: DISCONTINUED | OUTPATIENT
Start: 2025-06-04 | End: 2025-06-06 | Stop reason: HOSPADM

## 2025-06-04 RX ORDER — HYDROCHLOROTHIAZIDE 25 MG/1
25 TABLET ORAL DAILY
Status: DISCONTINUED | OUTPATIENT
Start: 2025-06-04 | End: 2025-06-06 | Stop reason: HOSPADM

## 2025-06-04 RX ORDER — AMOXICILLIN 250 MG
2 CAPSULE ORAL 2 TIMES DAILY PRN
Status: DISCONTINUED | OUTPATIENT
Start: 2025-06-04 | End: 2025-06-06 | Stop reason: HOSPADM

## 2025-06-04 RX ORDER — NICOTINE 21 MG/24HR
1 PATCH, TRANSDERMAL 24 HOURS TRANSDERMAL
Status: DISCONTINUED | OUTPATIENT
Start: 2025-06-04 | End: 2025-06-06 | Stop reason: HOSPADM

## 2025-06-04 RX ORDER — ONDANSETRON 2 MG/ML
4 INJECTION INTRAMUSCULAR; INTRAVENOUS ONCE
Status: COMPLETED | OUTPATIENT
Start: 2025-06-04 | End: 2025-06-04

## 2025-06-04 RX ORDER — IBUPROFEN 600 MG/1
1 TABLET ORAL
Status: DISCONTINUED | OUTPATIENT
Start: 2025-06-04 | End: 2025-06-06 | Stop reason: HOSPADM

## 2025-06-04 RX ORDER — ACETAMINOPHEN 650 MG/1
650 SUPPOSITORY RECTAL EVERY 4 HOURS PRN
Status: DISCONTINUED | OUTPATIENT
Start: 2025-06-04 | End: 2025-06-06 | Stop reason: HOSPADM

## 2025-06-04 RX ORDER — METHYLPREDNISOLONE SODIUM SUCCINATE 40 MG/ML
40 INJECTION, POWDER, LYOPHILIZED, FOR SOLUTION INTRAMUSCULAR; INTRAVENOUS EVERY 6 HOURS
Status: COMPLETED | OUTPATIENT
Start: 2025-06-04 | End: 2025-06-05

## 2025-06-04 RX ORDER — ALBUTEROL SULFATE 1.25 MG/3ML
1 SOLUTION RESPIRATORY (INHALATION) EVERY 6 HOURS PRN
Status: DISCONTINUED | OUTPATIENT
Start: 2025-06-04 | End: 2025-06-06 | Stop reason: HOSPADM

## 2025-06-04 RX ORDER — SODIUM CHLORIDE 0.9 % (FLUSH) 0.9 %
10 SYRINGE (ML) INJECTION AS NEEDED
Status: DISCONTINUED | OUTPATIENT
Start: 2025-06-04 | End: 2025-06-06 | Stop reason: HOSPADM

## 2025-06-04 RX ORDER — PRIMIDONE 50 MG/1
100 TABLET ORAL NIGHTLY
COMMUNITY

## 2025-06-04 RX ORDER — IOPAMIDOL 755 MG/ML
100 INJECTION, SOLUTION INTRAVASCULAR
Status: COMPLETED | OUTPATIENT
Start: 2025-06-04 | End: 2025-06-04

## 2025-06-04 RX ORDER — SODIUM CHLORIDE 9 MG/ML
40 INJECTION, SOLUTION INTRAVENOUS AS NEEDED
Status: DISCONTINUED | OUTPATIENT
Start: 2025-06-04 | End: 2025-06-06 | Stop reason: HOSPADM

## 2025-06-04 RX ORDER — SODIUM CHLORIDE 0.9 % (FLUSH) 0.9 %
10 SYRINGE (ML) INJECTION EVERY 12 HOURS SCHEDULED
Status: DISCONTINUED | OUTPATIENT
Start: 2025-06-04 | End: 2025-06-06 | Stop reason: HOSPADM

## 2025-06-04 RX ORDER — DIPHENHYDRAMINE HYDROCHLORIDE 50 MG/ML
50 INJECTION, SOLUTION INTRAMUSCULAR; INTRAVENOUS
Status: COMPLETED | OUTPATIENT
Start: 2025-06-04 | End: 2025-06-04

## 2025-06-04 RX ORDER — HEPARIN SODIUM 10000 [USP'U]/100ML
15.6 INJECTION, SOLUTION INTRAVENOUS
Status: DISCONTINUED | OUTPATIENT
Start: 2025-06-04 | End: 2025-06-05

## 2025-06-04 RX ORDER — POLYETHYLENE GLYCOL 3350 17 G/17G
17 POWDER, FOR SOLUTION ORAL DAILY PRN
Status: DISCONTINUED | OUTPATIENT
Start: 2025-06-04 | End: 2025-06-06 | Stop reason: HOSPADM

## 2025-06-04 RX ORDER — AMITRIPTYLINE HYDROCHLORIDE 50 MG/1
150 TABLET ORAL NIGHTLY
Status: DISCONTINUED | OUTPATIENT
Start: 2025-06-04 | End: 2025-06-06 | Stop reason: HOSPADM

## 2025-06-04 RX ORDER — NITROGLYCERIN 0.4 MG/1
0.4 TABLET SUBLINGUAL
Status: DISCONTINUED | OUTPATIENT
Start: 2025-06-04 | End: 2025-06-06 | Stop reason: HOSPADM

## 2025-06-04 RX ORDER — PRIMIDONE 50 MG/1
100 TABLET ORAL NIGHTLY
Status: DISCONTINUED | OUTPATIENT
Start: 2025-06-04 | End: 2025-06-06 | Stop reason: HOSPADM

## 2025-06-04 RX ORDER — KETOROLAC TROMETHAMINE 30 MG/ML
15 INJECTION, SOLUTION INTRAMUSCULAR; INTRAVENOUS ONCE
Status: COMPLETED | OUTPATIENT
Start: 2025-06-04 | End: 2025-06-04

## 2025-06-04 RX ORDER — INSULIN LISPRO 100 [IU]/ML
2-9 INJECTION, SOLUTION INTRAVENOUS; SUBCUTANEOUS
Status: DISCONTINUED | OUTPATIENT
Start: 2025-06-04 | End: 2025-06-06 | Stop reason: HOSPADM

## 2025-06-04 RX ORDER — LANCETS
1 EACH MISCELLANEOUS DAILY
Qty: 100 EACH | Refills: 2 | Status: CANCELLED | OUTPATIENT
Start: 2025-06-04

## 2025-06-04 RX ORDER — BLOOD-GLUCOSE METER
1 EACH MISCELLANEOUS DAILY
Qty: 1 KIT | Refills: 0 | Status: CANCELLED | OUTPATIENT
Start: 2025-06-04

## 2025-06-04 RX ORDER — METHYLPREDNISOLONE SODIUM SUCCINATE 40 MG/ML
40 INJECTION, POWDER, LYOPHILIZED, FOR SOLUTION INTRAMUSCULAR; INTRAVENOUS EVERY 4 HOURS
Status: DISCONTINUED | OUTPATIENT
Start: 2025-06-04 | End: 2025-06-04

## 2025-06-04 RX ORDER — BUDESONIDE AND FORMOTEROL FUMARATE DIHYDRATE 160; 4.5 UG/1; UG/1
2 AEROSOL RESPIRATORY (INHALATION)
Status: DISCONTINUED | OUTPATIENT
Start: 2025-06-04 | End: 2025-06-06 | Stop reason: HOSPADM

## 2025-06-04 RX ORDER — NICOTINE POLACRILEX 4 MG
15 LOZENGE BUCCAL
Status: DISCONTINUED | OUTPATIENT
Start: 2025-06-04 | End: 2025-06-06 | Stop reason: HOSPADM

## 2025-06-04 RX ORDER — AMLODIPINE BESYLATE 5 MG/1
5 TABLET ORAL DAILY
Status: DISCONTINUED | OUTPATIENT
Start: 2025-06-04 | End: 2025-06-06 | Stop reason: HOSPADM

## 2025-06-04 RX ORDER — PANTOPRAZOLE SODIUM 40 MG/1
40 TABLET, DELAYED RELEASE ORAL
Status: DISCONTINUED | OUTPATIENT
Start: 2025-06-04 | End: 2025-06-06 | Stop reason: HOSPADM

## 2025-06-04 RX ORDER — ACETAMINOPHEN 325 MG/1
650 TABLET ORAL EVERY 4 HOURS PRN
Status: DISCONTINUED | OUTPATIENT
Start: 2025-06-04 | End: 2025-06-06 | Stop reason: HOSPADM

## 2025-06-04 RX ORDER — VILAZODONE HYDROCHLORIDE 10 MG/1
20 TABLET ORAL DAILY
Status: DISCONTINUED | OUTPATIENT
Start: 2025-06-04 | End: 2025-06-06 | Stop reason: HOSPADM

## 2025-06-04 RX ORDER — DEXTROSE MONOHYDRATE 25 G/50ML
25 INJECTION, SOLUTION INTRAVENOUS
Status: DISCONTINUED | OUTPATIENT
Start: 2025-06-04 | End: 2025-06-06 | Stop reason: HOSPADM

## 2025-06-04 RX ORDER — BLOOD SUGAR DIAGNOSTIC
1 STRIP MISCELLANEOUS DAILY
Qty: 100 EACH | Refills: 2 | Status: CANCELLED | OUTPATIENT
Start: 2025-06-04

## 2025-06-04 RX ADMIN — LOSARTAN POTASSIUM 100 MG: 50 TABLET, FILM COATED ORAL at 08:07

## 2025-06-04 RX ADMIN — HYDROMORPHONE HYDROCHLORIDE 1 MG: 1 INJECTION, SOLUTION INTRAMUSCULAR; INTRAVENOUS; SUBCUTANEOUS at 06:42

## 2025-06-04 RX ADMIN — HEPARIN SODIUM 11.6 UNITS/KG/HR: 10000 INJECTION, SOLUTION INTRAVENOUS at 21:35

## 2025-06-04 RX ADMIN — Medication 10 ML: at 08:07

## 2025-06-04 RX ADMIN — Medication 10 ML: at 21:09

## 2025-06-04 RX ADMIN — BUDESONIDE AND FORMOTEROL FUMARATE DIHYDRATE 2 PUFF: 160; 4.5 AEROSOL RESPIRATORY (INHALATION) at 19:56

## 2025-06-04 RX ADMIN — NICOTINE 1 PATCH: 21 PATCH TRANSDERMAL at 06:20

## 2025-06-04 RX ADMIN — INSULIN LISPRO 6 UNITS: 100 INJECTION, SOLUTION INTRAVENOUS; SUBCUTANEOUS at 17:26

## 2025-06-04 RX ADMIN — METHYLPREDNISOLONE SODIUM SUCCINATE 40 MG: 40 INJECTION, POWDER, FOR SOLUTION INTRAMUSCULAR; INTRAVENOUS at 06:20

## 2025-06-04 RX ADMIN — DIPHENHYDRAMINE HYDROCHLORIDE 50 MG: 50 INJECTION, SOLUTION INTRAMUSCULAR; INTRAVENOUS at 02:08

## 2025-06-04 RX ADMIN — METHYLPREDNISOLONE SODIUM SUCCINATE 40 MG: 40 INJECTION, POWDER, FOR SOLUTION INTRAMUSCULAR; INTRAVENOUS at 12:01

## 2025-06-04 RX ADMIN — ONDANSETRON 4 MG: 2 INJECTION, SOLUTION INTRAMUSCULAR; INTRAVENOUS at 02:38

## 2025-06-04 RX ADMIN — METHYLPREDNISOLONE SODIUM SUCCINATE 40 MG: 40 INJECTION, POWDER, FOR SOLUTION INTRAMUSCULAR; INTRAVENOUS at 17:26

## 2025-06-04 RX ADMIN — PRIMIDONE 100 MG: 50 TABLET ORAL at 20:37

## 2025-06-04 RX ADMIN — INSULIN LISPRO 4 UNITS: 100 INJECTION, SOLUTION INTRAVENOUS; SUBCUTANEOUS at 08:06

## 2025-06-04 RX ADMIN — SODIUM CHLORIDE 500 ML: 9 INJECTION, SOLUTION INTRAVENOUS at 05:38

## 2025-06-04 RX ADMIN — VILAZODONE HYDROCHLORIDE 20 MG: 10 TABLET, FILM COATED ORAL at 08:15

## 2025-06-04 RX ADMIN — INSULIN LISPRO 4 UNITS: 100 INJECTION, SOLUTION INTRAVENOUS; SUBCUTANEOUS at 12:14

## 2025-06-04 RX ADMIN — ATORVASTATIN CALCIUM 40 MG: 40 TABLET, FILM COATED ORAL at 08:07

## 2025-06-04 RX ADMIN — PANTOPRAZOLE SODIUM 40 MG: 40 TABLET, DELAYED RELEASE ORAL at 08:07

## 2025-06-04 RX ADMIN — KETOROLAC TROMETHAMINE 15 MG: 30 INJECTION INTRAMUSCULAR; INTRAVENOUS at 01:26

## 2025-06-04 RX ADMIN — HYDROMORPHONE HYDROCHLORIDE 1 MG: 1 INJECTION, SOLUTION INTRAMUSCULAR; INTRAVENOUS; SUBCUTANEOUS at 12:14

## 2025-06-04 RX ADMIN — HYDROCHLOROTHIAZIDE 25 MG: 25 TABLET ORAL at 08:07

## 2025-06-04 RX ADMIN — AMITRIPTYLINE HYDROCHLORIDE 150 MG: 50 TABLET, FILM COATED ORAL at 20:37

## 2025-06-04 RX ADMIN — HEPARIN SODIUM 15.6 UNITS/KG/HR: 10000 INJECTION, SOLUTION INTRAVENOUS at 06:26

## 2025-06-04 RX ADMIN — IOPAMIDOL 100 ML: 755 INJECTION, SOLUTION INTRAVENOUS at 02:38

## 2025-06-04 RX ADMIN — MORPHINE SULFATE 4 MG: 4 INJECTION, SOLUTION INTRAMUSCULAR; INTRAVENOUS at 02:37

## 2025-06-04 RX ADMIN — METHYLPREDNISOLONE SODIUM SUCCINATE 40 MG: 40 INJECTION, POWDER, FOR SOLUTION INTRAMUSCULAR; INTRAVENOUS at 02:08

## 2025-06-04 RX ADMIN — INSULIN LISPRO 6 UNITS: 100 INJECTION, SOLUTION INTRAVENOUS; SUBCUTANEOUS at 20:39

## 2025-06-04 RX ADMIN — HYDROMORPHONE HYDROCHLORIDE 1 MG: 1 INJECTION, SOLUTION INTRAMUSCULAR; INTRAVENOUS; SUBCUTANEOUS at 20:38

## 2025-06-04 RX ADMIN — AMLODIPINE BESYLATE 5 MG: 5 TABLET ORAL at 08:07

## 2025-06-04 RX ADMIN — DIVALPROEX SODIUM 2000 MG: 500 TABLET, EXTENDED RELEASE ORAL at 20:39

## 2025-06-04 NOTE — PLAN OF CARE
Problem: Adult Inpatient Plan of Care  Goal: Plan of Care Review  Outcome: Progressing  Flowsheets (Taken 6/4/2025 1730)  Plan of Care Reviewed With: patient  Goal: Patient-Specific Goal (Individualized)  Outcome: Progressing  Goal: Absence of Hospital-Acquired Illness or Injury  Outcome: Progressing  Intervention: Identify and Manage Fall Risk  Recent Flowsheet Documentation  Taken 6/4/2025 1600 by Kenia Pérez RN  Safety Promotion/Fall Prevention: safety round/check completed  Taken 6/4/2025 1400 by Kenia Pérez RN  Safety Promotion/Fall Prevention: safety round/check completed  Taken 6/4/2025 1157 by Kenia Pérez RN  Safety Promotion/Fall Prevention: safety round/check completed  Taken 6/4/2025 1000 by Kenia Pérez RN  Safety Promotion/Fall Prevention: safety round/check completed  Taken 6/4/2025 0800 by Kenia Pérez RN  Safety Promotion/Fall Prevention: safety round/check completed  Intervention: Prevent Skin Injury  Recent Flowsheet Documentation  Taken 6/4/2025 1600 by Kenia Pérez RN  Body Position: position changed independently  Taken 6/4/2025 1400 by Kenia Pérez RN  Body Position: position changed independently  Taken 6/4/2025 1157 by Kenia Pérez RN  Body Position: position changed independently  Taken 6/4/2025 1000 by Kenia Pérez RN  Body Position: position changed independently  Taken 6/4/2025 0800 by Kenia Pérez RN  Body Position: position changed independently  Skin Protection: transparent dressing maintained  Goal: Optimal Comfort and Wellbeing  Outcome: Progressing  Intervention: Provide Person-Centered Care  Recent Flowsheet Documentation  Taken 6/4/2025 0800 by Kenia Pérez RN  Trust Relationship/Rapport: care explained  Goal: Readiness for Transition of Care  Outcome: Progressing     Problem: Comorbidity Management  Goal: Maintenance of COPD Symptom Control  Outcome: Progressing  Goal: Blood Pressure in Desired Range  Outcome: Progressing   Goal Outcome  Evaluation:  Plan of Care Reviewed With: patient   Patient remains on heparin gtt, next heparin anti-xa lab at 2118. Duplex negative. Heparin gtt decreased from 15.6 to 11.6. Patient is on 2L nc.

## 2025-06-04 NOTE — ACP (ADVANCE CARE PLANNING)
Patient reports to have no previous living will, POA, AD completed. Patient reports that NOK would be her two children, but her son is current incarcerated. Education over HCR and POST form. Patient wants to further review HCR form, will contact if needing further assistance.    Chaplain Sonny Zelaya

## 2025-06-04 NOTE — CASE MANAGEMENT/SOCIAL WORK
Continued Stay Note  [facility]     Patient Name: Carli Dubon  MRN: [id number]  Today's Date: 6/4/2025    Admit Date: 6/4/2025        Discharge Plan       Row Name 06/04/25 1618       Plan    Plan Comments CM attempted assessment x 2, patient sleeping, no family at bedside. CM will f/u with patient tomorrow. DC Barriers: 2L O2, WBC 13.12, heparin gtt             Marlen Singh RN      [facility]  Office: [phone]  Cell: [phone]  Fax # [phone]

## 2025-06-04 NOTE — H&P
[facility] Medicine Services  History & Physical    Patient Name: Carli Dubon  : 1960  MRN: [id number]  Primary Care Physician:  Cindy Huddleston APRN  Date of admission: 2025  Date and Time of Service: 2025 at 0400      Assessment & Plan      Chief Complaint: right back pain    Plan:    Pulmonary Emboli  -CTA showed RLL segmental and subsegmental pulmonary arterial branch emboli with nonocclusive LLL subsegmental embolus  -CTA notes that cardiac chambers are within normal limits, pericardium, aorta and arch branch vessels unremarkable  -D-dimer 1.16, Lactate 3.4  -Heparin drip per weight based protocol ordered  -ECHO ordered   -Bilateral venous duplex ordered to rule out DVT  -Continuous cardiac monitoring, will place patient in PCU for close monitoring due to elevated Lactate in setting of multiple PE's  -Cardiology consult    COPD  Tobacco Abuse  -No Hypoxia on exam, 93% on RA  -Continue home inhalers  -Nicotine patch ordered    Diabetes Mellitus   -Accu-Cheks AC at bedtime  -SSI ordered  -Hold home PO medications while inpatient    Home medications for chronic conditions will be restarted as appropriate when verified by pharmacy      History of Present Illness     History of Present Illness: Carli Dubon is a 64 y.o. female with a previous medical history of COPD, Tobacco Abuse, DM, HTN, and Bipolar Disorder who presented to [facility] on 2025 with complaints of right shoulder and back pain that radiated to her right upper abdomen for the past 4 days. She treated her pain at home with Tylenol but it has continued to worsen.  She denies chest pain, shortness of breath, dizziness, or palpitations.  She denies any recent travel.  She did report some BLE edema a few weeks ago that has resolved. She denies trauma, weight loss.      In the ED, D-dimer 1.16, Lactate 3.4, WBC 13.  CTA showed RLL segmental and subsegmental pulmonary arterial branch emboli with nonocclusive LLL  subsegmental embolus, cardiac chambers were WNL.  She was slightly tachycardic on arrival at 109, this had improved to 98 on my exam.  She is afebrile, not hypoxic, SPO2 93% on RA, patient is able to complete sentences, has no complaints of shortness of breath.  BP is stable.  Hospitalist was consulted for further management.    12 point ROS reviewed and negative except as mentioned above    Objective      Vitals:   Temp:  [98.4 °F (36.9 °C)] 98.4 °F (36.9 °C)  Heart Rate:  [] 98  Resp:  [18] 18  BP: (130-156)/(74-97) 140/81  Body mass index is 35.84 kg/m².    Physical Exam  Vitals and nursing note reviewed.   Constitutional:       Appearance: Normal appearance.   Cardiovascular:      Rate and Rhythm: Normal rate and regular rhythm.   Pulmonary:      Effort: Pulmonary effort is normal.   Skin:     General: Skin is warm and dry.   Neurological:      General: No focal deficit present.      Mental Status: She is alert and oriented to person, place, and time. Mental status is at baseline.   Psychiatric:         Mood and Affect: Mood normal.         Behavior: Behavior normal.           Personal History     This is a 64 y.o. female with:    Past Medical History:   Diagnosis Date    Allergic     Allergic rhinitis     Arthritis of back     Asthma     Asthma, extrinsic     Asthma, intrinsic     Bipolar 1 disorder     Chronic bronchitis     Chronic obstructive lung disease 01/28/2015    COVID-19 08/15/2021    CTS (carpal tunnel syndrome)     Depression     Diabetes insipidus     Diabetes mellitus     Difficulty walking 03/2022    Hip arthrosis     Hypertension     Knee swelling     Low back strain     Migraine     Mixed hyperlipidemia 03/03/2020    Multiple pulmonary emboli 6/4/2025    Neck strain     Pancreatitis     Peripheral neuropathy     Primary central sleep apnea     PTSD (post-traumatic stress disorder)     Pulmonary arterial hypertension     Sleep apnea, obstructive     Tobacco abuse 06/15/2020    Vitamin D  deficiency 06/19/2024       No past surgical history on file.    Active and Resolved Problems  Active Hospital Problems    Diagnosis  POA    **Multiple pulmonary emboli [I26.99]  Yes      Resolved Hospital Problems   No resolved problems to display.       Family History: family history includes Anemia in her mother; Anxiety disorder in her daughter and mother; Arthritis in her daughter; Asthma in her daughter and mother; COPD in her daughter; Diabetes in her mother; Hypertension in her mother. Otherwise pertinent FHx was reviewed and not pertinent to current issue.    Social History:  reports that she has been smoking cigarettes. She has a 45 pack-year smoking history. She has been exposed to tobacco smoke. She has never used smokeless tobacco. She reports that she does not drink alcohol and does not use drugs.    Home Medications:  Prior to Admission Medications       Prescriptions Last Dose Informant Patient Reported? Taking?    albuterol (ACCUNEB) 1.25 MG/3ML nebulizer solution   No Yes    Take 3 mL by nebulization Every 6 (Six) Hours As Needed for Wheezing or Shortness of Air. Dispense as 1 box of unit dose    albuterol sulfate  (90 Base) MCG/ACT inhaler   No Yes    Inhale 2 puffs Every 4 (Four) Hours As Needed for Wheezing or Shortness of Air.    amitriptyline (ELAVIL) 150 MG tablet   No Yes    TAKE 1 TABLET BY MOUTH ONCE DAILY AT NIGHT    amLODIPine (NORVASC) 5 MG tablet   No Yes    Take 1 tablet by mouth once daily    atorvastatin (LIPITOR) 40 MG tablet   No Yes    Take 1 tablet by mouth once daily    benzonatate (TESSALON) 200 MG capsule   No Yes    Take 1 capsule by mouth 3 (Three) Times a Day As Needed for Cough.    clonazePAM (KlonoPIN) 0.5 MG tablet   No Yes    Take 1 tablet by mouth 2 (Two) Times a Day As Needed for Anxiety. for anxiety    divalproex (DEPAKOTE ER) 500 MG 24 hr tablet   Yes Yes    Take 4 tablets by mouth Every Night.    fluticasone (FLONASE) 50 MCG/ACT nasal spray   No Yes     Administer 2 sprays into the nostril(s) as directed by provider Daily.    Fluticasone-Umeclidin-Vilant (Trelegy Ellipta) 200-62.5-25 MCG/ACT inhaler   Yes Yes    Inhale 1 puff Daily.    glipizide (GLUCOTROL) 10 MG tablet   No Yes    TAKE 1 TABLET BY MOUTH TWICE DAILY BEFORE MEAL(S)    hydroCHLOROthiazide 25 MG tablet   No Yes    Take 1 tablet by mouth Daily.    Jardiance 25 MG tablet tablet   No Yes    Take 1 tablet by mouth once daily    losartan (COZAAR) 100 MG tablet   No Yes    Take 1 tablet by mouth once daily    metFORMIN (GLUCOPHAGE) 1000 MG tablet   No Yes    TAKE 1 TABLET BY MOUTH TWICE DAILY WITH MEALS    omeprazole (priLOSEC) 40 MG capsule   No Yes    Take 1 capsule by mouth Daily.    ondansetron ODT (ZOFRAN-ODT) 4 MG disintegrating tablet   No Yes    Take 1 tablet by mouth 4 (Four) Times a Day As Needed for Vomiting or Nausea.    primidone (MYSOLINE) 50 MG tablet   Yes Yes    Take 2 tablets by mouth Every Night.    vilazodone (VIIBRYD) 20 MG tablet tablet   No Yes    Take 1 tablet by mouth Daily.    zolpidem (AMBIEN) 10 MG tablet   No Yes    TAKE 1 TABLET BY MOUTH AT NIGHT AS NEEDED FOR SLEEP              Allergies:  Allergies   Allergen Reactions    Iodine Swelling    Adhesive Tape Rash           VTE Prophylaxis:  Pharmacologic VTE prophylaxis orders are present.        CODE STATUS:    Code Status (Patient has no pulse and is not breathing): CPR (Attempt to Resuscitate)  Medical Interventions (Patient has pulse or is breathing): Full Support        Admission Status:  I believe this patient meets inpatient status.    I discussed the patient's findings and my recommendations with patient and nursing staff.    Signature:     This document has been electronically signed by Sofia Calzada, GALILEO, APRN, AGACNP-BC on June 4, 2025 04:23 EDT   [facility]ist Team

## 2025-06-04 NOTE — CONSULTS
"Diabetes Education  Assessment/Teaching    Patient Name:  Carli Dubon  YOB: 1960  MRN: [id number]  Admit Date:  6/4/2025      Assessment Date:  6/4/2025  Flowsheet Row Most Recent Value   General Information     Referral From: MD [facility] order  [MD consult to teach blood glucose monitoring and on 5/29/2025 A1c was 8.3%.]   Height 163.8 cm (64.49\")   Height Method Stated   Weight 95 kg (209 lb 7 oz)   Weight Method Bed scale   Pregnancy Assessment    Diabetes History    What type of diabetes do you have? Type 2   Length of Diabetes Diagnosis 6 - 10 years  [Patient stated she was diagnosed 7-8 years ago.]   Current DM knowledge fair   Do you test your blood sugar at home? no   Education Preferences    What areas of diabetes would you like to learn about? testing my blood sugar at home, diabetes complications   Nutrition Information    Assessment Topics    Reducing Risk - Assessment Needs education   Monitoring - Assessment Needs education   DM Goals    Reducing Risk - Goal Today   Monitoring - Goal Today            Flowsheet Row Most Recent Value   DM Education Needs    Meter Needs meter   Meter Type Accuchek   Frequency of Testing Daily  [Discussed with patient that it is recommended to check blood sugar daily varying the times before meals and at bedtime.]   Medication Oral  [At home patient stated that she takes Metformin 1000 mg BID, Glipizide 10 mg BID, and Jardiance 25 mg daily.]   Reducing Risks A1C testing  [On 5/29/2025 A1c was 8.3%.]   Discharge Plan Home   Motivation Moderate   Teaching Method Discussion, Handouts, Explanation, Demonstration, Teach back   Patient Response Verbalized understanding, Demonstrates adequately              Other Comments:  A1c info sheet given with discussion on A1c target and healthy blood sugar range. Patient stated she has a glucometer at home that is about 5 years old but doesn't check her blood sugar. Demonstrated to patient how to use the Accu-chek Guide Me " glucometer with patient doing return demonstration of inserting the lancet into the lancing device, cocking the lancet, ejecting the lancet, and inserting the test strip into the meter.    Prescriptions started in discharge orders for lancets, test strips, and Accu-chek Guide Me glucometer.   Patient has no further questions or concerns related to diabetes at this time.        Electronically signed by:  Nichelle Anderson RN  06/04/25 11:39 EDT

## 2025-06-04 NOTE — CONSULTS
[facility] CARDIOLOGY CONSULT  [facility]      Cardiology assessment and plan      Bilateral pulmonary:  Segmental and subsegmental pulmonary emboli  Normal blood pressure  No tachycardia  No significant RV strain on CT  Echocardiogram with no significant RV strain  Venous Doppler is pending at this time  Elevated lactate level  Hypertension  Hyperlipidemia  Diabetes mellitus  COPD  Tmax is 97.9 pulse is 88 respirations are 15 blood pressure is 137/80 sats are 94%  Normal troponin  Normal proBNP  Sodium is 137 potassium is 4.8 creatinine is 0.74  Echocardiogram with normal LV systolic function  There is a small pericardial effusion  Normal right ventricular size and systolic function  No clinical indication for thrombectomy  Continue current medical management continue aggressive risk factor modification  Recommend anticoagulation therapy with heparin and eventually transition to oral anticoagulation therapy  Diagnosis and treatment options reviewed and discussed patient        Subjective:     Encounter Date:06/04/2025      Patient ID: Carli Dubon is a 64 y.o. female.    Chief Complaint: right shoulder pain      HPI:  Carli Dubon is a 64 y.o. female unknown to us without prior cardiac history.  PMH includes HTN, HLD, DM, COPD, and NEVIN (compliant with device).  Patient presents to the ER with complaints of right shoulder pain and found with bilateral PE.  Cardiology has been consulted for consideration for intervention.    Patient reports right shoulder pain over the past 4 to 5 days with cough but denies any change in chronic exertional dyspnea.  She denies any chest pain or dizziness.  She is typically able to perform activities such as moderate housework without unusual difficulty.  She denies palpitations.  She reports some bilateral ankle swelling last week that has gone down.  She denies any recent long car trips or flights.  There is no family history of  hypercoagulopathy to her knowledge.      Past Medical History:   Diagnosis Date    Allergic     Allergic rhinitis     Arthritis of back     Asthma     Asthma, extrinsic     Asthma, intrinsic     Bipolar 1 disorder     Chronic bronchitis     Chronic obstructive lung disease 01/28/2015    COVID-19 08/15/2021    CTS (carpal tunnel syndrome)     Depression     Diabetes insipidus     Diabetes mellitus     Difficulty walking 03/2022    Hip arthrosis     Hypertension     Knee swelling     Low back strain     Migraine     Mixed hyperlipidemia 03/03/2020    Multiple pulmonary emboli 6/4/2025    Neck strain     Pancreatitis     Peripheral neuropathy     Primary central sleep apnea     PTSD (post-traumatic stress disorder)     Pulmonary arterial hypertension     Sleep apnea, obstructive     Tobacco abuse 06/15/2020    Vitamin D deficiency 06/19/2024         History reviewed. No pertinent surgical history.      Social History     Socioeconomic History    Marital status: Single   Tobacco Use    Smoking status: Every Day     Current packs/day: 1.00     Average packs/day: 1.1 packs/day for 40.0 years (45.0 ttl pk-yrs)     Types: Cigarettes     Passive exposure: Current    Smokeless tobacco: Never   Vaping Use    Vaping status: Never Used   Substance and Sexual Activity    Alcohol use: Never    Drug use: Never    Sexual activity: Not Currently     Partners: Female     Smokes 1 pack/day.    Family History   Problem Relation Age of Onset    Hypertension Mother     Diabetes Mother     Asthma Mother     Anemia Mother     Anxiety disorder Mother     Asthma Daughter     COPD Daughter     Anxiety disorder Daughter     Arthritis Daughter      Denies premature CAD for first-degree relative.    Allergies   Allergen Reactions    Iodine Swelling    Adhesive Tape Rash       Current Medications:   Scheduled Meds:amitriptyline, 150 mg, Oral, Nightly  amLODIPine, 5 mg, Oral, Daily  atorvastatin, 40 mg, Oral, Daily  budesonide-formoterol, 2 puff,  "Inhalation, BID - RT   And  revefenacin, 175 mcg, Nebulization, Daily - RT  divalproex, 2,000 mg, Oral, Nightly  hydroCHLOROthiazide, 25 mg, Oral, Daily  insulin lispro, 2-9 Units, Subcutaneous, 4x Daily AC & at Bedtime  losartan, 100 mg, Oral, Daily  methylPREDNISolone sodium succinate, 40 mg, Intravenous, Q6H  nicotine, 1 patch, Transdermal, Q24H  pantoprazole, 40 mg, Oral, QAM AC  primidone, 100 mg, Oral, Nightly  sodium chloride, 10 mL, Intravenous, Q12H  vilazodone, 20 mg, Oral, Daily      Continuous Infusions:heparin, 15.6 Units/kg/hr, Last Rate: 15.6 Units/kg/hr (06/04/25 0626)        Review of Systems   Constitutional: Negative for chills, decreased appetite and malaise/fatigue.   HENT:  Negative for congestion and nosebleeds.    Eyes:  Negative for blurred vision and double vision.   Cardiovascular:  Negative for chest pain, dyspnea on exertion, irregular heartbeat, leg swelling, near-syncope, orthopnea, palpitations and paroxysmal nocturnal dyspnea.   Respiratory:  Positive for shortness of breath. Negative for cough.    Hematologic/Lymphatic: Negative for adenopathy. Does not bruise/bleed easily.   Skin:  Negative for color change and rash.   Musculoskeletal:  Negative for back pain and joint pain.   Gastrointestinal:  Negative for bloating, abdominal pain, hematemesis and hematochezia.   Genitourinary:  Negative for flank pain and hematuria.   Neurological:  Negative for dizziness and focal weakness.   Psychiatric/Behavioral:  Negative for altered mental status. The patient does not have insomnia.      All other systems reviewed and are negative.       Objective:         /80 (BP Location: Right arm, Patient Position: Lying)   Pulse 88   Temp 97.9 °F (36.6 °C) (Oral)   Resp 15   Ht 163.8 cm (64.49\")   Wt 95 kg (209 lb 7 oz)   SpO2 94%   BMI 35.41 kg/m²       General: Well-developed in NAD.  Neuro: AAOx3. No gross deficits.  HEENT: Sclerae clear, no xanthelasmas.  CV: S1S2, RRR. No murmurs or " "gallops.  Resp: Breathing is unlabored. Lungs coarse throughout.  GI: BS+. Abdomen soft and NTTP.  Ext: Pedal pulses are palpable. Extremities are nonedematous.  MS: moves all extremities, no weakness.  Skin: warm, dry.  Psych: calm and cooperative.            Lab Review:     Results from last 7 days   Lab Units 06/04/25  0542 06/04/25  0126   SODIUM mmol/L 137 139   POTASSIUM mmol/L 4.8 3.9   CHLORIDE mmol/L 101 100   CO2 mmol/L 23.2 21.2*   BUN mg/dL 17.2 16.6   CREATININE mg/dL 0.74 0.71   GLUCOSE mg/dL 253* 98   CALCIUM mg/dL 9.0 9.3   AST (SGOT) U/L  --  24   ALT (SGPT) U/L  --  18     Results from last 7 days   Lab Units 06/04/25  0811 06/04/25  0542   HSTROP T ng/L 7 8     Results from last 7 days   Lab Units 06/04/25  0126   WBC 10*3/mm3 13.12*   HEMOGLOBIN g/dL 15.0   HEMATOCRIT % 46.9*   PLATELETS 10*3/mm3 218     Results from last 7 days   Lab Units 06/04/25  0126   INR  0.96               Invalid input(s): \"LDLCALC\"  Results from last 7 days   Lab Units 06/04/25  0542   PROBNP pg/mL 205.0           Recent Radiology:  Imaging Results (Most Recent)       Procedure Component Value Units Date/Time    CT Abdomen Pelvis With Contrast [[id number]] Collected: 06/04/25 0301     Updated: 06/04/25 0305    Narrative:      CT ABDOMEN PELVIS W CONTRAST    Date of Exam: 6/4/2025 2:06 AM EDT    Indication: RLQ abdominal pain.    Comparison: CT abdomen pelvis 4/10/2025    Technique: Axial CT images were obtained of the abdomen and pelvis following the uneventful intravenous administration of iodinated contrast. Sagittal and coronal reconstructions were performed.  Automated exposure control and iterative reconstruction   methods were used.        Findings:  Lung Bases:     The visualized lung bases and lower mediastinal structures are unremarkable.    Liver:  There is diffuse fatty infiltration of the liver. There are no focal liver lesions.    Biliary/Gallbladder:    The gallbladder is normal without evidence of " radiopaque stones. The biliary tree is nondilated.    Spleen:  Spleen is normal in size and CT density.    Pancreas:    Pancreas is normal. There is no evidence of pancreatic mass or peripancreatic fluid.    Kidneys:    Kidneys are normal in size. There are no stones or hydronephrosis.    Adrenals:    Adrenal glands are unremarkable.    Retroperitoneal/Lymph Nodes/Vasculature:    No retroperitoneal adenopathy is identified.    Gastrointestinal/Mesentery:    The bowel loops are non-dilated without wall thickening or mass. The appendix appears within normal limits. No evidence of obstruction. No free air. No mesenteric fluid collections identified.    Bladder:    The bladder is normal.    Genital:     There are multiple exophytic partially calcified uterine fibroids.          Bony Structures:     Visualized bony structures are consistent with the patient's age.        Impression:      Impression:  1.Fatty infiltration of the liver.  2.Multiple uterine fibroids.  3.No acute abdominal or pelvic abnormality.                Electronically Signed: Oswaldo Cook MD    6/4/2025 3:03 AM EDT    Workstation ID: [id number]    CT Angiogram Chest Pulmonary Embolism [[id number]] Collected: 06/04/25 0258     Updated: 06/04/25 0303    Narrative:      CT ANGIOGRAM CHEST PULMONARY EMBOLISM    Date of Exam: 6/4/2025 2:06 AM EDT    Indication: Elevated dimer, R sided thoracic pain.    Comparison: None available.    Technique: Axial CT images were obtained of the chest after the uneventful intravenous administration of iodinated contrast utilizing pulmonary embolism protocol.  In addition, a 3-D volume rendered image was created for interpretation.  Sagittal and   coronal reconstructions were performed.  Automated exposure control and iterative reconstruction methods were used.      Findings:    Pulmonary arteries: Adequate opacification of the pulmonary arteries. There are filling defects seen in the right lower lobe segmental and  subsegmental pulmonary artery branches. There is a nonocclusive subsegmental left lower lobe pulmonary arterial   branch embolus.    Lungs and Pleura: There is diffuse emphysema. There is mild interlobular septal thickening and groundglass attenuation changes in the lungs suggestive of mild edema.    Mediastinum/My: No mediastinal or hilar lymphadenopathy.    Lymph nodes: No axillary or supraclavicular adenopathy.    Cardiovascular: The cardiac chambers are within normal limits. The pericardium is normal. The aorta and its arch branch vessels are unremarkable.       Upper Abdomen: There is mild fatty infiltration of the liver. The upper abdominal contents are unremarkable.          Bones and Soft Tissue: No suspicious osseous lesion.        Impression:      Impression:  1.Right lower lobe segmental and subsegmental pulmonary arterial branch emboli. Nonocclusive left lower lobe subsegmental pulmonary arterial branch embolus.  2.Mild interstitial edema.            Electronically Signed: Oswaldo Cook MD    6/4/2025 3:01 AM EDT    Workstation ID: [id number]    XR Chest 2 View [[id number]] Collected: 06/04/25 0211     Updated: 06/04/25 0214    Narrative:      XR CHEST 2 VW    Date of Exam: 6/4/2025 1:39 AM EDT    Indication: R sided thoracic back pain    Comparison: Chest radiograph 9/6/2024    Findings:  There are no airspace consolidations. No pleural fluid. No pneumothorax. The pulmonary vasculature appears within normal limits. The cardiac and mediastinal silhouette appear unremarkable. No acute osseous abnormality identified.      Impression:      Impression:  No active disease.          Electronically Signed: Oswaldo Cook MD    6/4/2025 2:12 AM EDT    Workstation ID: [id number]              ECHOCARDIOGRAM:              Assessment:         Active Hospital Problems    Diagnosis  POA    **Multiple pulmonary emboli [I26.99]  Yes     1) bilateral PE  - High-sensitivity troponin negative x 2  - On room air.  - Lactate 3.4  --> 2.6  - on heparin drip  - 2D echo pending  - bilateral venous duplex pending    2) HTN    3) HLD    4) DM    5) COPD with tobacco dependence    6) NEVIN (compliant with device).            Plan:   2D echo is pending for eval RV strain.  Bilateral venous duplex pending.  On heparin gtt.  As discussed with Dr. Jordan, no intervention is planned.         Electronically signed by DILLON Hernandez, 06/04/25, 10:28 AM EDT.

## 2025-06-04 NOTE — SIGNIFICANT NOTE
06/04/25 0800   OTHER   Discipline physical therapist   Rehab Time/Intention   Session Not Performed unable to evaluate, medical status change;other (see comments)  (pt dx w/ B PEs earlier today; just started heparin drip; hold PT today)   Recommendation   PT - Next Appointment 06/05/25

## 2025-06-04 NOTE — PHARMACY PATIENT ASSISTANCE
Transitions of Care (test claim):    Drug Apixaban:  10 mg PO BID x 7 days, then 5 mg BID   Covered/PA Required: Covered without PA  Copay Per Month: $0  Is the medication eligible for a trial card/copay card? Free trial available from         Please note that when it states medication is eligible for a trial card that this only means that the medication has a free trial available. This does not assess if the patient has already utilized the once in a lifetime free trial.     This test claim coverage is only valid on the date the note is published. Copay/coverage could vary depending on patient coverage at a later date.  Additionally this test claim is for the sole purpose of a price check not a clinical recommendation.     For billing questions please call [facility] at ext: [phone]  For M2B questions please call [facility] at ext: [phone]      Dajuan Laird PharmD, BCPS

## 2025-06-04 NOTE — ED PROVIDER NOTES
Subjective   History of Present Illness  Patient is a 64-year-old female with PMH of COPD, DM 2, HTN, HLD presenting to the ED for right shoulder pain that radiates down her back and around into her right lower quadrant.  Patient states pain started 4 days ago and has been constant, worse with movement.,  Rates it at 9 out of 10.  Patient tried Tylenol with no improvement in symptoms.  She does report dyspnea on exertion.  She denies any injuries, fever, chills, nausea, vomiting, diarrhea, constipation, chest pain, dyspnea at rest, history of abdominal surgeries, recent travel.        Review of Systems   Constitutional:  Negative for chills and fever.   Respiratory:  Negative for shortness of breath.    Cardiovascular:  Negative for chest pain.   Gastrointestinal:  Positive for abdominal pain. Negative for constipation, diarrhea, nausea and vomiting.   Musculoskeletal:  Positive for back pain.       Past Medical History:   Diagnosis Date    Allergic     Allergic rhinitis     Arthritis of back     Asthma     Asthma, extrinsic     Asthma, intrinsic     Bipolar 1 disorder     Chronic bronchitis     Chronic obstructive lung disease 01/28/2015    COVID-19 08/15/2021    CTS (carpal tunnel syndrome)     Depression     Diabetes insipidus     Diabetes mellitus     Difficulty walking 03/2022    Hip arthrosis     Hypertension     Knee swelling     Low back strain     Migraine     Mixed hyperlipidemia 03/03/2020    Multiple pulmonary emboli 6/4/2025    Neck strain     Pancreatitis     Peripheral neuropathy     Primary central sleep apnea     PTSD (post-traumatic stress disorder)     Pulmonary arterial hypertension     Sleep apnea, obstructive     Tobacco abuse 06/15/2020    Vitamin D deficiency 06/19/2024       Allergies   Allergen Reactions    Iodine Swelling    Adhesive Tape Rash       No past surgical history on file.    Family History   Problem Relation Age of Onset    Hypertension Mother     Diabetes Mother     Asthma  Mother     Anemia Mother     Anxiety disorder Mother     Asthma Daughter     COPD Daughter     Anxiety disorder Daughter     Arthritis Daughter        Social History     Socioeconomic History    Marital status: Single   Tobacco Use    Smoking status: Every Day     Current packs/day: 1.00     Average packs/day: 1.1 packs/day for 40.0 years (45.0 ttl pk-yrs)     Types: Cigarettes     Passive exposure: Current    Smokeless tobacco: Never   Vaping Use    Vaping status: Never Used   Substance and Sexual Activity    Alcohol use: Never    Drug use: Never    Sexual activity: Not Currently     Partners: Female           Objective   Physical Exam  Constitutional:       Appearance: Normal appearance. She is well-developed.   HENT:      Head: Normocephalic and atraumatic.      Mouth/Throat:      Mouth: Mucous membranes are moist.   Eyes:      Extraocular Movements: Extraocular movements intact.   Cardiovascular:      Rate and Rhythm: Normal rate and regular rhythm.      Pulses: Normal pulses.      Heart sounds: Normal heart sounds.   Pulmonary:      Effort: Pulmonary effort is normal.      Breath sounds: Normal breath sounds.   Abdominal:      General: Abdomen is flat. Bowel sounds are normal.      Palpations: Abdomen is soft.      Tenderness: There is abdominal tenderness in the right lower quadrant.   Musculoskeletal:         General: Normal range of motion.      Cervical back: Normal range of motion.        Back:       Right lower leg: No edema.      Left lower leg: No edema.      Comments: Tenderness to palpation areas above.  No surrounding erythema, edema, ecchymosis, deformities, or rashes.  No decreased range of motion of right upper extremity.  No loss of sensation or weakness.  Capillary refill normal.  Pulses palpated bilaterally.   Skin:     General: Skin is warm and dry.      Capillary Refill: Capillary refill takes less than 2 seconds.   Neurological:      General: No focal deficit present.      Mental Status: She  "is alert and oriented to person, place, and time.   Psychiatric:         Mood and Affect: Mood normal.         Behavior: Behavior normal.         Procedures           ED Course  ED Course as of 06/04/25 0410 Wed Jun 04, 2025 0344 Spoke with Sofia CARRANZA with hospitalist group who agreed to admit patient. [EC]      ED Course User Index  [EC] Valdo Sydney JUSTA ROBISON      /81   Pulse 98   Temp 98.4 °F (36.9 °C) (Oral)   Resp 18   Ht 163.8 cm (64.5\")   Wt 96.2 kg (212 lb 1.3 oz)   SpO2 93%   BMI 35.84 kg/m²   Labs Reviewed   COMPREHENSIVE METABOLIC PANEL - Abnormal; Notable for the following components:       Result Value    CO2 21.2 (*)     Anion Gap 17.8 (*)     All other components within normal limits    Narrative:     GFR Categories in Chronic Kidney Disease (CKD)              GFR Category          GFR (mL/min/1.73)    Interpretation  G1                    90 or greater        Normal or high (1)  G2                    60-89                Mild decrease (1)  G3a                   45-59                Mild to moderate decrease  G3b                   30-44                Moderate to severe decrease  G4                    15-29                Severe decrease  G5                    14 or less           Kidney failure    (1)In the absence of evidence of kidney disease, neither GFR category G1 or G2 fulfill the criteria for CKD.    eGFR calculation 2021 CKD-EPI creatinine equation, which does not include race as a factor   URINALYSIS W/ CULTURE IF INDICATED - Abnormal; Notable for the following components:    Specific Gravity, UA 1.046 (*)     Glucose, UA >=1000 mg/dL (3+) (*)     Ketones, UA 15 mg/dL (1+) (*)     All other components within normal limits    Narrative:     In absence of clinical symptoms, the presence of pyuria, bacteria, and/or nitrites on the urinalysis result does not correlate with infection.  Urine microscopic not indicated.   D-DIMER, QUANTITATIVE - Abnormal; Notable for the following " "components:    D-Dimer, Quantitative 1.16 (*)     All other components within normal limits    Narrative:     According to the assay 's published package insert, a normal (<0.50 MCGFEU/mL) D-dimer result in conjunction with a non-high clinical probability assessment, excludes deep vein thrombosis (DVT) and pulmonary embolism (PE) with high sensitivity.    D-dimer values increase with age and this can make VTE exclusion of an older population difficult. To address this, the [employer], based on best available evidence and recent guidelines, recommends that clinicians use age-adjusted D-dimer thresholds in patients greater than 50 years of age with: a) a low probability of PE who do not meet all Pulmonary Embolism Rule Out Criteria, or b) in those with intermediate probability of PE.   The formula for an age-adjusted D-dimer cut-off is \"age/100\".  For example, a 60 year old patient would have an age-adjusted cut-off of 0.60 MCGFEU/mL and an 80 year old 0.80 MCGFEU/mL.   CBC WITH AUTO DIFFERENTIAL - Abnormal; Notable for the following components:    WBC 13.12 (*)     Hematocrit 46.9 (*)     MCV 98.9 (*)     All other components within normal limits    Narrative:     The previously reported component NRBC is no longer being reported. Previous result was 0.0 /100 WBC (Reference Range: 0.0-0.2 /100 WBC) on 6/4/2025 at 0202 EDT.   MANUAL DIFFERENTIAL - Abnormal; Notable for the following components:    Neutrophils Absolute 8.66 (*)     Lymphocytes Absolute 3.54 (*)     All other components within normal limits   POC LACTATE - Abnormal; Notable for the following components:    Lactate 3.4 (*)     All other components within normal limits   LIPASE - Normal   PROTIME-INR - Normal   BLOOD CULTURE   BLOOD CULTURE   SCAN SLIDE   LACTIC ACID, REFLEX   BASIC METABOLIC PANEL   HEPARIN ANTI XA   HEPARIN ANTI XA   HEPARIN ANTI XA   POC LACTATE   CBC AND DIFFERENTIAL    Narrative:     The following " orders were created for panel order CBC & Differential.  Procedure                               Abnormality         Status                     ---------                               -----------         ------                     CBC Auto Differential[[id number]]        Abnormal            Final result               Scan Slide[[id number]]                                       Final result                 Please view results for these tests on the individual orders.     Medications   methylPREDNISolone sodium succinate (SOLU-Medrol) injection 40 mg (40 mg Intravenous Given 6/4/25 0208)   sodium chloride 0.9 % bolus 500 mL (has no administration in time range)   nitroglycerin (NITROSTAT) SL tablet 0.4 mg (has no administration in time range)   sodium chloride 0.9 % flush 10 mL (has no administration in time range)   sodium chloride 0.9 % flush 10 mL (has no administration in time range)   sodium chloride 0.9 % infusion 40 mL (has no administration in time range)   acetaminophen (TYLENOL) tablet 650 mg (has no administration in time range)     Or   acetaminophen (TYLENOL) 160 MG/5ML oral solution 650 mg (has no administration in time range)     Or   acetaminophen (TYLENOL) suppository 650 mg (has no administration in time range)   ondansetron (ZOFRAN) injection 4 mg (has no administration in time range)   melatonin tablet 5 mg (has no administration in time range)   sennosides-docusate (PERICOLACE) 8.6-50 MG per tablet 2 tablet (has no administration in time range)     And   polyethylene glycol (MIRALAX) packet 17 g (has no administration in time range)     And   bisacodyl (DULCOLAX) EC tablet 5 mg (has no administration in time range)     And   bisacodyl (DULCOLAX) suppository 10 mg (has no administration in time range)   heparin bolus from bag solution 7,700 Units (has no administration in time range)   heparin 46375 units/250 mL (100 units/mL) in 0.45 % NaCl infusion (has no administration in time range)   heparin  bolus from bag solution 4,000 Units (has no administration in time range)   heparin bolus from bag solution 2,000 Units (has no administration in time range)   ketorolac (TORADOL) injection 15 mg (15 mg Intravenous Given 6/4/25 0126)   diphenhydrAMINE (BENADRYL) injection 50 mg (50 mg Intravenous Given 6/4/25 0208)   morphine injection 4 mg (4 mg Intravenous Given 6/4/25 0237)   ondansetron (ZOFRAN) injection 4 mg (4 mg Intravenous Given 6/4/25 0238)   iopamidol (ISOVUE-370) 76 % injection 100 mL (100 mL Intravenous Given 6/4/25 0238)     CT Abdomen Pelvis With Contrast  Result Date: 6/4/2025  Impression: 1.Fatty infiltration of the liver. 2.Multiple uterine fibroids. 3.No acute abdominal or pelvic abnormality. Electronically Signed: Oswaldo Cook MD  6/4/2025 3:03 AM EDT  Workstation ID: [id number]    CT Angiogram Chest Pulmonary Embolism  Result Date: 6/4/2025  Impression: 1.Right lower lobe segmental and subsegmental pulmonary arterial branch emboli. Nonocclusive left lower lobe subsegmental pulmonary arterial branch embolus. 2.Mild interstitial edema. Electronically Signed: Oswaldo Cook MD  6/4/2025 3:01 AM EDT  Workstation ID: [id number]    XR Chest 2 View  Result Date: 6/4/2025  Impression: No active disease. Electronically Signed: Oswaldo Cook MD  6/4/2025 2:12 AM EDT  Workstation ID: [id number]                                                     Medical Decision Making  Patient presented to the ED for the above complaint.    Patient underwent the above exam and evaluation.    While in the ED patient was placed in a gown and IV was established.  Chest x-ray, blood work was obtained to assess for fractures, infection, electrolyte abnormality, dehydration, blood clots.  Patient was positive for simple sepsis, POC lactate and blood cultures were obtained, patient had elevated lactate, no signs of infection at this time, will hold off on antibiotics.  Patient was noted to be slightly tachycardic, was given IV fluids,  pain medication and Zofran.  Patient was premedicated for CT scan.  Upon reevaluation patient resting comfortably, vital stable on room air.  Discussed findings with patient.  Educated patient we will be admitting her to the hospital for further treatment and evaluation.  Patient voiced understanding, agreeable dispo plan.  Spoke with Sofia CARRANZA with hospitalist group, heparin orders placed at this time, agree to admit patient.    Labs were independently interpreted by myself and deemed remarkable for the following: Leukocytosis of 13.12 without bandemia, hemoglobin stable at 15.0, no electrolyte abnormalities, pancreatitis with a lipase of 46, urinalysis negative for bacteria, hematuria, lactate 3.4, blood cultures pending on admission, elevated dimer 1.16.  Chest x-ray, CTA of chest, CT abdomen pelvis independently interpreted by Dr. Toro and reviewed by myself showing: Chest x-ray showed no cardiomegaly, pneumothorax, pleural effusions, or consolidations.  CTA of chest showed right lower lobe segmental and subsegmental pulmonary artery branch emboli, left lower lobe subsegmental pulmonary arterial branch emboli, bilateral interstitial edema, no right heart strain.  CT abdomen pelvis showed fatty infiltrate of bladder, multiple uterine fibroids, no appendicitis or intra-abdominal infection.    Appropriate PPE was worn during each patient encounter.    Note Disclaimer: At [facility], we believe that sharing information builds trust and better relationships. You are receiving this note because you are receiving care at [facility] or recently visited. It is possible you will see health information before a provider has talked with you about it. This kind of information can be easy to misunderstand. To help you fully understand what it means for your health, we urge you to discuss this note with your provider.    Based on clinical findings I anticipate this patient will require a 2 midnight  stay.    Discussed this patient with Dr. Toro who agrees with plan.      Problems Addressed:  Acute pain of right shoulder: complicated acute illness or injury  Bilateral pulmonary embolism: complicated acute illness or injury  Right lower quadrant abdominal pain: complicated acute illness or injury    Amount and/or Complexity of Data Reviewed  Labs: ordered.  Radiology: ordered.    Risk  Prescription drug management.  Decision regarding hospitalization.        Final diagnoses:   Bilateral pulmonary embolism   Acute pain of right shoulder   Right lower quadrant abdominal pain       ED Disposition  ED Disposition       ED Disposition   Decision to Admit    Condition   --    Comment   Level of Care: Progressive Care [20]   Diagnosis: Multiple pulmonary emboli [547110]   Certification: I Certify That Inpatient Hospital Services Are Medically Necessary For Greater Than 2 Midnights                 No follow-up provider specified.       Medication List        ASK your doctor about these medications      divalproex 500 MG 24 hr tablet  Commonly known as: DEPAKOTE ER  Ask about: Which instructions should I use?     primidone 50 MG tablet  Commonly known as: MYSOLINE  Ask about: Which instructions should I use?                 Sydney Lyle PA-C  06/04/25 0416

## 2025-06-04 NOTE — PROGRESS NOTES
[facility] MEDICINE SERVICE  DAILY PROGRESS NOTE    NAME: Carli Dubon  : 1960  MRN: [id number]      LOS: 0 days     PROVIDER OF SERVICE: Timothy Duane Brammell, MD    Chief Complaint: Multiple pulmonary emboli    Subjective:     Interval History:  History taken from: patient    Patient denies shortness of breath.  She denies any chest pain.  Denies any gastric bowel or leg issues.  No family medical history of clots.  Denies any other additional acute issues.        Review of Systems:   Review of Systems   All other systems reviewed and are negative.      Objective:     Vital Signs  Temp:  [97.7 °F (36.5 °C)-98.4 °F (36.9 °C)] 97.9 °F (36.6 °C)  Heart Rate:  [] 88  Resp:  [15-20] 15  BP: (130-160)/(62-97) 137/80  Flow (L/min) (Oxygen Therapy):  [2] 2   Body mass index is 35.41 kg/m².    Physical Exam  Physical Exam  Vitals reviewed.   Constitutional:       General: She is not in acute distress.     Appearance: She is obese.   HENT:      Head: Normocephalic.   Cardiovascular:      Rate and Rhythm: Normal rate and regular rhythm.   Pulmonary:      Effort: Pulmonary effort is normal.      Breath sounds: Normal breath sounds.   Abdominal:      General: Bowel sounds are normal.      Palpations: Abdomen is soft.      Tenderness: There is no abdominal tenderness.   Musculoskeletal:         General: No swelling.   Neurological:      Mental Status: She is alert.            Diagnostic Data    Results from last 7 days   Lab Units 25  0542 25  0126   WBC 10*3/mm3  --  13.12*   HEMOGLOBIN g/dL  --  15.0   HEMATOCRIT %  --  46.9*   PLATELETS 10*3/mm3  --  218   GLUCOSE mg/dL 253* 98   CREATININE mg/dL 0.74 0.71   BUN mg/dL 17.2 16.6   SODIUM mmol/L 137 139   POTASSIUM mmol/L 4.8 3.9   AST (SGOT) U/L  --  24   ALT (SGPT) U/L  --  18   ALK PHOS U/L  --  95   BILIRUBIN mg/dL  --  <0.2   ANION GAP mmol/L 12.8 17.8*       CT Abdomen Pelvis With Contrast  Result Date: 2025  Impression: 1.Fatty  infiltration of the liver. 2.Multiple uterine fibroids. 3.No acute abdominal or pelvic abnormality. Electronically Signed: Oswaldo Cook MD  6/4/2025 3:03 AM EDT  Workstation ID: [id number]    CT Angiogram Chest Pulmonary Embolism  Result Date: 6/4/2025  Impression: 1.Right lower lobe segmental and subsegmental pulmonary arterial branch emboli. Nonocclusive left lower lobe subsegmental pulmonary arterial branch embolus. 2.Mild interstitial edema. Electronically Signed: Oswaldo Cook MD  6/4/2025 3:01 AM EDT  Workstation ID: [id number]    XR Chest 2 View  Result Date: 6/4/2025  Impression: No active disease. Electronically Signed: Oswaldo Cook MD  6/4/2025 2:12 AM EDT  Workstation ID: [id number]            Assessment:    Pulmonary emboli  History of tobacco abuse  Type 2 diabetes  Obesity  Fatty liver  Uterine fibroids  bipolar disorder     Plan: Ongoing IV heparin as per cardiology recommendations.  Will need transition to oral anticoagulant prior to discharge.      Active and Resolved Problems  Active Hospital Problems    Diagnosis  POA    **Multiple pulmonary emboli [I26.99]  Yes      Resolved Hospital Problems   No resolved problems to display.           VTE Prophylaxis:  Pharmacologic VTE prophylaxis orders are present.             Disposition Planning:     Barriers to Discharge:oral anticoagulate  Anticipated Date of Discharge: 6/5  Place of Discharge: home      Time: 30 minutes     Code Status and Medical Interventions: CPR (Attempt to Resuscitate); Full Support   Ordered at: 06/04/25 0346     Code Status (Patient has no pulse and is not breathing):    CPR (Attempt to Resuscitate)     Medical Interventions (Patient has pulse or is breathing):    Full Support       Signature: Electronically signed by Timothy Duane Brammell, MD, 06/04/25, 12:41 EDT.  [facility] Hospitalist Team

## 2025-06-04 NOTE — Clinical Note
Level of Care: Telemetry [5]   Admitting Physician: LIANA REDDY [[id number]]   Attending Physician: LIANA REDDY [[id number]]   Bed Request Comments: PCU

## 2025-06-05 DIAGNOSIS — E11.9 TYPE 2 DIABETES MELLITUS WITHOUT COMPLICATION, WITHOUT LONG-TERM CURRENT USE OF INSULIN: ICD-10-CM

## 2025-06-05 DIAGNOSIS — I10 HYPERTENSION, UNSPECIFIED TYPE: ICD-10-CM

## 2025-06-05 LAB
ANION GAP SERPL CALCULATED.3IONS-SCNC: 11.3 MMOL/L (ref 5–15)
BUN SERPL-MCNC: 22.3 MG/DL (ref 8–23)
BUN/CREAT SERPL: 36 (ref 7–25)
CALCIUM SPEC-SCNC: 8.8 MG/DL (ref 8.6–10.5)
CHLORIDE SERPL-SCNC: 101 MMOL/L (ref 98–107)
CO2 SERPL-SCNC: 25.7 MMOL/L (ref 22–29)
CREAT SERPL-MCNC: 0.62 MG/DL (ref 0.57–1)
DEPRECATED RDW RBC AUTO: 50.5 FL (ref 37–54)
EGFRCR SERPLBLD CKD-EPI 2021: 100.8 ML/MIN/1.73
ERYTHROCYTE [DISTWIDTH] IN BLOOD BY AUTOMATED COUNT: 13.9 % (ref 12.3–15.4)
GLUCOSE BLDC GLUCOMTR-MCNC: 231 MG/DL (ref 70–105)
GLUCOSE BLDC GLUCOMTR-MCNC: 237 MG/DL (ref 70–105)
GLUCOSE BLDC GLUCOMTR-MCNC: 248 MG/DL (ref 70–105)
GLUCOSE BLDC GLUCOMTR-MCNC: 282 MG/DL (ref 70–105)
GLUCOSE SERPL-MCNC: 226 MG/DL (ref 65–99)
HCT VFR BLD AUTO: 42.3 % (ref 34–46.6)
HGB BLD-MCNC: 13.6 G/DL (ref 12–15.9)
MCH RBC QN AUTO: 31.4 PG (ref 26.6–33)
MCHC RBC AUTO-ENTMCNC: 32.2 G/DL (ref 31.5–35.7)
MCV RBC AUTO: 97.7 FL (ref 79–97)
PLATELET # BLD AUTO: 213 10*3/MM3 (ref 140–450)
PMV BLD AUTO: 11.2 FL (ref 6–12)
POTASSIUM SERPL-SCNC: 4.3 MMOL/L (ref 3.5–5.2)
RBC # BLD AUTO: 4.33 10*6/MM3 (ref 3.77–5.28)
SODIUM SERPL-SCNC: 138 MMOL/L (ref 136–145)
UFH PPP CHRO-ACNC: 0.61 IU/ML
UFH PPP CHRO-ACNC: 0.73 IU/ML
WBC NRBC COR # BLD AUTO: 12.47 10*3/MM3 (ref 3.4–10.8)

## 2025-06-05 PROCEDURE — 85520 HEPARIN ASSAY: CPT | Performed by: HOSPITALIST

## 2025-06-05 PROCEDURE — 85670 THROMBIN TIME PLASMA: CPT | Performed by: PHYSICIAN ASSISTANT

## 2025-06-05 PROCEDURE — 94799 UNLISTED PULMONARY SVC/PX: CPT

## 2025-06-05 PROCEDURE — 96366 THER/PROPH/DIAG IV INF ADDON: CPT

## 2025-06-05 PROCEDURE — 94664 DEMO&/EVAL PT USE INHALER: CPT

## 2025-06-05 PROCEDURE — 96376 TX/PRO/DX INJ SAME DRUG ADON: CPT

## 2025-06-05 PROCEDURE — 85613 RUSSELL VIPER VENOM DILUTED: CPT | Performed by: PHYSICIAN ASSISTANT

## 2025-06-05 PROCEDURE — 86147 CARDIOLIPIN ANTIBODY EA IG: CPT | Performed by: PHYSICIAN ASSISTANT

## 2025-06-05 PROCEDURE — 97530 THERAPEUTIC ACTIVITIES: CPT

## 2025-06-05 PROCEDURE — 94761 N-INVAS EAR/PLS OXIMETRY MLT: CPT

## 2025-06-05 PROCEDURE — 99204 OFFICE O/P NEW MOD 45 MIN: CPT | Performed by: INTERNAL MEDICINE

## 2025-06-05 PROCEDURE — 63710000001 INSULIN LISPRO (HUMAN) PER 5 UNITS

## 2025-06-05 PROCEDURE — G0378 HOSPITAL OBSERVATION PER HR: HCPCS

## 2025-06-05 PROCEDURE — 85732 THROMBOPLASTIN TIME PARTIAL: CPT | Performed by: PHYSICIAN ASSISTANT

## 2025-06-05 PROCEDURE — 86146 BETA-2 GLYCOPROTEIN ANTIBODY: CPT | Performed by: PHYSICIAN ASSISTANT

## 2025-06-05 PROCEDURE — 82948 REAGENT STRIP/BLOOD GLUCOSE: CPT

## 2025-06-05 PROCEDURE — 80048 BASIC METABOLIC PNL TOTAL CA: CPT

## 2025-06-05 PROCEDURE — 85705 THROMBOPLASTIN INHIBITION: CPT | Performed by: PHYSICIAN ASSISTANT

## 2025-06-05 PROCEDURE — 97162 PT EVAL MOD COMPLEX 30 MIN: CPT

## 2025-06-05 PROCEDURE — 85027 COMPLETE CBC AUTOMATED: CPT

## 2025-06-05 PROCEDURE — 63710000001 REVEFENACIN 175 MCG/3ML SOLUTION

## 2025-06-05 PROCEDURE — 25010000002 METHYLPREDNISOLONE PER 40 MG: Performed by: HOSPITALIST

## 2025-06-05 RX ORDER — AMLODIPINE BESYLATE 5 MG/1
5 TABLET ORAL DAILY
Qty: 90 TABLET | Refills: 0 | Status: SHIPPED | OUTPATIENT
Start: 2025-06-05

## 2025-06-05 RX ADMIN — INSULIN LISPRO 4 UNITS: 100 INJECTION, SOLUTION INTRAVENOUS; SUBCUTANEOUS at 17:28

## 2025-06-05 RX ADMIN — BUDESONIDE AND FORMOTEROL FUMARATE DIHYDRATE 2 PUFF: 160; 4.5 AEROSOL RESPIRATORY (INHALATION) at 18:20

## 2025-06-05 RX ADMIN — REVEFENACIN 175 MCG: 175 SOLUTION RESPIRATORY (INHALATION) at 07:00

## 2025-06-05 RX ADMIN — DIVALPROEX SODIUM 2000 MG: 500 TABLET, EXTENDED RELEASE ORAL at 20:54

## 2025-06-05 RX ADMIN — AMITRIPTYLINE HYDROCHLORIDE 150 MG: 50 TABLET, FILM COATED ORAL at 20:53

## 2025-06-05 RX ADMIN — AMLODIPINE BESYLATE 5 MG: 5 TABLET ORAL at 08:15

## 2025-06-05 RX ADMIN — METHYLPREDNISOLONE SODIUM SUCCINATE 40 MG: 40 INJECTION, POWDER, FOR SOLUTION INTRAMUSCULAR; INTRAVENOUS at 05:58

## 2025-06-05 RX ADMIN — INSULIN LISPRO 4 UNITS: 100 INJECTION, SOLUTION INTRAVENOUS; SUBCUTANEOUS at 11:27

## 2025-06-05 RX ADMIN — VILAZODONE HYDROCHLORIDE 20 MG: 10 TABLET, FILM COATED ORAL at 08:14

## 2025-06-05 RX ADMIN — NICOTINE 1 PATCH: 21 PATCH TRANSDERMAL at 08:22

## 2025-06-05 RX ADMIN — Medication 10 ML: at 08:16

## 2025-06-05 RX ADMIN — PRIMIDONE 100 MG: 50 TABLET ORAL at 20:54

## 2025-06-05 RX ADMIN — APIXABAN 10 MG: 5 TABLET, FILM COATED ORAL at 20:53

## 2025-06-05 RX ADMIN — METHYLPREDNISOLONE SODIUM SUCCINATE 40 MG: 40 INJECTION, POWDER, FOR SOLUTION INTRAMUSCULAR; INTRAVENOUS at 00:46

## 2025-06-05 RX ADMIN — INSULIN LISPRO 6 UNITS: 100 INJECTION, SOLUTION INTRAVENOUS; SUBCUTANEOUS at 20:54

## 2025-06-05 RX ADMIN — ATORVASTATIN CALCIUM 40 MG: 40 TABLET, FILM COATED ORAL at 08:15

## 2025-06-05 RX ADMIN — BUDESONIDE AND FORMOTEROL FUMARATE DIHYDRATE 2 PUFF: 160; 4.5 AEROSOL RESPIRATORY (INHALATION) at 07:05

## 2025-06-05 RX ADMIN — TRAMADOL HYDROCHLORIDE 50 MG: 50 TABLET, COATED ORAL at 13:50

## 2025-06-05 RX ADMIN — Medication 10 ML: at 20:54

## 2025-06-05 RX ADMIN — TRAMADOL HYDROCHLORIDE 50 MG: 50 TABLET, COATED ORAL at 20:52

## 2025-06-05 RX ADMIN — LOSARTAN POTASSIUM 100 MG: 50 TABLET, FILM COATED ORAL at 08:15

## 2025-06-05 RX ADMIN — PANTOPRAZOLE SODIUM 40 MG: 40 TABLET, DELAYED RELEASE ORAL at 08:15

## 2025-06-05 RX ADMIN — INSULIN LISPRO 4 UNITS: 100 INJECTION, SOLUTION INTRAVENOUS; SUBCUTANEOUS at 08:14

## 2025-06-05 RX ADMIN — HYDROCHLOROTHIAZIDE 25 MG: 25 TABLET ORAL at 08:15

## 2025-06-05 RX ADMIN — APIXABAN 10 MG: 5 TABLET, FILM COATED ORAL at 11:26

## 2025-06-05 NOTE — THERAPY EVALUATION
Patient Name: Carli Dubon  : 1960    MRN: [id number]                              Today's Date: 2025       Admit Date: 2025    Visit Dx:     ICD-10-CM ICD-9-CM   1. Bilateral pulmonary embolism  I26.99 415.19   2. Acute pain of right shoulder  M25.511 719.41   3. Right lower quadrant abdominal pain  R10.31 789.03     Patient Active Problem List   Diagnosis    Type 2 diabetes mellitus with diabetic polyneuropathy    Mixed hyperlipidemia    Essential hypertension    PTSD (post-traumatic stress disorder)    Tobacco abuse    Obesity (BMI 30-39.9)    Insomnia    Helicobacter pylori gastritis    Bipolar affective disorder, currently depressed, moderate    COVID-19    Chronic obstructive lung disease    Tobacco dependence syndrome    Hyperglycemia due to type 2 diabetes mellitus    Obstructive sleep apnea    Chronic pain of right knee    Bilateral hip pain    Morbid obesity    History of colonic polyps    Vitamin D deficiency    Multiple pulmonary emboli     Past Medical History:   Diagnosis Date    Allergic     Allergic rhinitis     Arthritis of back     Asthma     Asthma, extrinsic     Asthma, intrinsic     Bipolar 1 disorder     Chronic bronchitis     Chronic obstructive lung disease 2015    COVID-19 08/15/2021    CTS (carpal tunnel syndrome)     Depression     Diabetes insipidus     Diabetes mellitus     Difficulty walking 2022    Hip arthrosis     Hypertension     Knee swelling     Low back strain     Migraine     Mixed hyperlipidemia 2020    Multiple pulmonary emboli 2025    Neck strain     Pancreatitis     Peripheral neuropathy     Primary central sleep apnea     PTSD (post-traumatic stress disorder)     Pulmonary arterial hypertension     Sleep apnea, obstructive     Tobacco abuse 06/15/2020    Vitamin D deficiency 2024     History reviewed. No pertinent surgical history.   General Information       Row Name 25 1342          Physical Therapy Time and Intention     Document Type evaluation (P)   -TS     Mode of Treatment physical therapy (P)   -TS       Row Name 06/05/25 1444 06/05/25 1342       General Information    Patient Profile Reviewed -- yes (P)   -TS    Prior Level of Function -- (P)   pt uses RW for community ambulation. 3 falls in past year without injury . Pt was current with outpatient PT for balance.  -TS independent:;ADL's;all household mobility (P)   -TS    Existing Precautions/Restrictions -- oxygen therapy device and L/min;orthostatic hypotension (P)   BP dropped 154/81 sitting to 125/67 standing. pt did not feel nausea or dizziness  -TS    Barriers to Rehab -- medically complex (P)   -TS      Row Name 06/05/25 1342          Living Environment    Current Living Arrangements home (P)   -TS     People in Home child(jadon), adult;grandchild(jadon);child(jadon), dependent (P)   -TS       Row Name 06/05/25 1342          Home Main Entrance    Number of Stairs, Main Entrance none (P)   -TS       Row Name 06/05/25 1342          Cognition    Orientation Status (Cognition) oriented x 4 (P)   -TS       Row Name 06/05/25 1342          Safety Issues/Impairments Affecting Functional Mobility    Impairments Affecting Function (Mobility) balance;endurance/activity tolerance;strength (P)   -TS               User Key  (r) = Recorded By, (t) = Taken By, (c) = Cosigned By      Initials Name Provider Type    TS Surjit Ace, PT Student PT Student                   Mobility       Row Name 06/05/25 1348          Bed Mobility    Bed Mobility bed mobility (all) activities (P)   -TS     All Activities, [place] (Bed Mobility) independent (P)   -TS       Row Name 06/05/25 1348          Bed-Chair Transfer    Bed-Chair [place] (Transfers) contact guard (P)   -TS       Row Name 06/05/25 1348          Sit-Stand Transfer    Sit-Stand [place] (Transfers) standby assist (P)   -TS       Row Name 06/05/25 1445 06/05/25 1348       Gait/Stairs (Locomotion)    [place] Level (Gait)  contact guard (P)   -TS not tested (P)   -TS    Patient was able to Ambulate -- yes (P)   -TS    Distance in Feet (Gait) -- 15 (P)   reccomend using RW upon D/C  -TS    Deviations/Abnormal Patterns (Gait) -- weight shifting decreased;maryam decreased;steppage;stride length decreased (P)   -TS    [place] Level (Stairs) -- not tested (P)   -TS              User Key  (r) = Recorded By, (t) = Taken By, (c) = Cosigned By      Initials Name Provider Type    TS Surjit Ace, PT Student PT Student                   Obj/Interventions       Row Name 06/05/25 1353          Range of Motion Comprehensive    General Range of Motion no range of motion deficits identified (P)   -TS       Row Name 06/05/25 1353          Strength Comprehensive (MMT)    General Manual Muscle Testing (MMT) Assessment no strength deficits identified (P)   -TS       Row Name 06/05/25 1353          Motor Skills    Motor Skills functional endurance (P)   -TS     Functional Endurance low (P)   -TS       Row Name 06/05/25 1446 06/05/25 1353       Balance    Balance Assessment -- sitting static balance;standing static balance;standing dynamic balance (P)   -TS    Static Sitting Balance -- independent (P)   -TS    Position, Sitting Balance -- sitting edge of bed (P)   -TS    Static Standing Balance -- supervision (P)   -TS    Dynamic Standing Balance -- contact guard (P)   -TS    Position/Device Used, Standing Balance -- unsupported (P)   -TS    Balance Interventions -- tandem standing;highly challenging;narrowed base of support;moderate challenge (P)   -TS    Comment, Balance 4 stage balance test pt was able to complete narrow base stance. but requires minimal assist to maintain semi-tandem or tandem stance for 10 seconds with moderate difficulty. (P)   -TS pt tends to lose balance posteriorly (P)   -TS      Row Name 06/05/25 1353          Sensory Assessment (Somatosensory)    Sensory Assessment (Somatosensory) sensation intact (P)   -TS                User Key  (r) = Recorded By, (t) = Taken By, (c) = Cosigned By      Initials Name Provider Type    TS Surjit Ace, PT Student PT Student                   Goals/Plan       [facility] Name 06/05/25 1403          Transfer Goal 1 (PT)    Activity/Assistive Device (Transfer Goal 1, PT) transfers, all (P)   -TS     [place] Level/Cues Needed (Transfer Goal 1, PT) independent (P)   -TS     Time Frame (Transfer Goal 1, PT) long term goal (LTG);2 weeks (P)   -TS       [facility] Name 06/05/25 1451 06/05/25 1403       Gait Training Goal 1 (PT)    Activity/Assistive Device (Gait Training Goal 1, PT) -- gait (walking locomotion) (P)   -TS    [place] Level (Gait Training Goal 1, PT) -- independent (P)   -TS    Distance (Gait Training Goal 1, PT) 50' (P)   -TS --    Time Frame (Gait Training Goal 1, PT) -- 2 weeks;long term goal (LTG) (P)   -TS      [facility] Name 06/05/25 1403          Balance Goal 1 (PT)    Activity/Assistive Device (Balance Goal) standing dynamic balance;unsupported (P)   -TS     [place] Level/Cues Needed (Balance Goal 1, PT) independent (P)   -TS     Time Frame (Balance Goal 1, PT) long-term goal (LTG);2 weeks (P)   -TS       Row Name 06/05/25 1451          Therapy Assessment/Plan (PT)    Planned Therapy Interventions (PT) balance training;gait training;home exercise program;neuromuscular re-education;strengthening;ROM (range of motion);postural re-education;transfer training;stair training;patient/family education;stretching (P)   -TS               User Key  (r) = Recorded By, (t) = Taken By, (c) = Cosigned By      Initials Name Provider Type    TS Surjit Ace, PT Student PT Student                   Clinical Impression       [facility] Name 06/05/25 1359          Pain    Pretreatment Pain Rating 0/10 - no pain (P)   -TS     Posttreatment Pain Rating 0/10 - no pain (P)   -TS       [facility] Name 06/05/25 1505 06/05/25 1458       Plan of Care Review    Progress -- improving (P)   -TS    Outcome Evaluation Carli  Jagdish is a 64 y.o. female with a previous medical history of COPD, Tobacco Abuse, DM, HTN, and Bipolar Disorder who presented to [facility] on 6/4/2025 with complaints of right shoulder and back pain, with a chief diagnosis of multiple PE. Pt lives in a [facility] with family. Pt is a high fall risk(3 recent),and is on 3L O2. At baseline, pt was independent with all household mobility, ADLs and transfers, but uses RW for community ambulation. This date pt requires SBA for transfers and CGA for ambulation. Due to balance and functional endurance deficits pt recommended use of RW for ambulation upon d/c. Pt blood pressure decreased from 154/81 in sitting, 125/67 standing, but pt didn't experience any OH symptoms. Due to decline in PLOF, recommend home health or outpatient PT at D/C. Will follow plan of care. (P)   -TS Carli Dubon is a 64 y.o. female with a previous medical history of COPD, Tobacco Abuse, DM, HTN, and Bipolar Disorder who presented to [facility] on 6/4/2025 with complaints of right shoulder and back pain, with a chief diagnosis of multiple PE. Pt lives in a [facility] with family. Pt is a high fall risk(3 recent),and is on 3L O2. At baseline, pt was independent with all household mobility, ADLs and transfers, but uses RW for community ambulation. This date pt requires SBA for transfers and CGA for ambulation. Due to balance and functional endurance deficits pt recommended use of RW for ambulation upon d/c. Pt blood pressure decreased from 154/81 in sitting, 125/67 standing, but pt didn't experience any OH symptoms. Due to decline in PLOF, recommend home health or outpatient PT at D/C. Will follow plan of care. (P)   -TS      Row Name 06/05/25 1444 06/05/25 4054       Plan of Care Review    Plan of Care Reviewed With -- patient (P)   -TS    Progress -- improving (P)   -TS    Outcome Evaluation Carli Dubon is a 64 y.o. female with a previous medical history of COPD, Tobacco Abuse, DM, HTN, and  Bipolar Disorder who presented to [facility] on 6/4/2025 with complaints of right shoulder and back pain, with a chief diagnosis of PE and COPD exacerbation. Pt is a high fall risk,and is on 3L O2. At baseline, pt was independent with all household mobility, ADLs and transfers without AD. This date pt requires SBA for transfers and CGA for ambulation. Due to balance and functional endurance deficits pt recommended use of RW for ambulation of 20ft or more. Pt blood pressure decreased from 154/81 in sitting, 125/67 standing, but pt didn't experience any OH symptoms. Due to decline in PLOF, recommend home health or outpatient PT at D/C. Will follow plan of care. (P)   -TS --      Row Name 06/05/25 0038          Therapy Assessment/Plan (PT)    Rehab Potential (PT) good (P)   -TS     Criteria for Skilled Interventions Met (PT) yes (P)   -TS     Therapy Frequency (PT) 3 times/wk (P)   -TS     Predicted Duration of Therapy Intervention (PT) until D/C (P)   -TS       Row Name 06/05/25 6757          Vital Signs    Pre Systolic BP Rehab 154 (P)   sitting  -TS     Pre Treatment Diastolic BP 81 (P)   -TS     Intra Systolic BP Rehab 124 (P)   standing  -TS     Intra Treatment Diastolic BP 67 (P)   -TS     Pretreatment Heart Rate (beats/min) 95 (P)   -TS     Intratreatment Heart Rate (beats/min) 102 (P)   -TS     Posttreatment Heart Rate (beats/min) 100 (P)   -TS     Pre SpO2 (%) 86 (P)   -TS     O2 Delivery Pre Treatment nasal cannula (P)   -TS     Intra SpO2 (%) 86 (P)   -TS     O2 Delivery Intra Treatment nasal cannula (P)   -TS     Post SpO2 (%) 89 (P)   -TS     O2 Delivery Post Treatment nasal cannula (P)   -TS     Pre Patient Position Supine (P)   -TS     Intra Patient Position Standing (P)   -TS     Post Patient Position Sitting (P)   -TS       Row Name 06/05/25 5380          Positioning and Restraints    Pre-Treatment Position in bed (P)   -TS     Post Treatment Position chair (P)   -TS     In Chair notified  nsg;reclined;call light within reach;encouraged to call for assist;exit alarm on (P)   -TS               User Key  (r) = Recorded By, (t) = Taken By, (c) = Cosigned By      Initials Name Provider Type    Surjit Quinones, PT Student PT Student                   Outcome Measures       Row Name 06/05/25 1405 06/05/25 0815       How much help from another person do you currently need...    Turning from your back to your side while in flat bed without using bedrails? 4 (P)   -TS 4  -KT    Moving from lying on back to sitting on the side of a flat bed without bedrails? 4 (P)   -TS 4  -KT    Moving to and from a bed to a chair (including a wheelchair)? 4 (P)   -TS 4  -KT    Standing up from a chair using your arms (e.g., wheelchair, bedside chair)? 4 (P)   -TS 4  -KT    Climbing 3-5 steps with a railing? 3 (P)   -TS 3  -KT    To walk in hospital room? 4 (P)   -TS 3  -KT    AM-PAC 6 Clicks Score (PT) 23 (P)   -TS 22  -KT    Highest Level of Mobility Goal Walk 25 Feet or More-7 (P)   -TS Walk 25 Feet or More-7  -KT      Row Name 06/05/25 1405          Functional Assessment    Outcome Measure Options AM-PAC 6 Clicks Basic Mobility (PT) (P)   -TS               User Key  (r) = Recorded By, (t) = Taken By, (c) = Cosigned By      Initials Name Provider Type    Rianna Charles, RN Registered Nurse    Surjit Quinones, PT Student PT Student                                 Physical Therapy Education       Title: PT OT SLP Therapies (Done)       Topic: Physical Therapy (Done)       Point: Mobility training (Done)       Learning Progress Summary            Patient Acceptance, E, VU by TS at 6/5/2025 1406                      Point: Home exercise program (Done)       Learning Progress Summary            Patient Acceptance, E, VU by TS at 6/5/2025 1406                      Point: Body mechanics (Done)       Learning Progress Summary            Patient Acceptance, E, VU by TS at 6/5/2025 1406                      Point:  Precautions (Done)       Learning Progress Summary            Patient Acceptance, E, VU by TS at 6/5/2025 7801                                      User Key       Initials Effective Dates Name Provider Type Discipline    [facility] 05/08/25 -  Surjit Ace, PT Student PT Student PT                  PT Recommendation and Plan  Planned Therapy Interventions (PT): (P) balance training, gait training, home exercise program, neuromuscular re-education, strengthening, ROM (range of motion), postural re-education, transfer training, stair training, patient/family education, stretching  Progress: (P) improving  Outcome Evaluation: (P) Carli Dubon is a 64 y.o. female with a previous medical history of COPD, Tobacco Abuse, DM, HTN, and Bipolar Disorder who presented to [facility] on 6/4/2025 with complaints of right shoulder and back pain, with a chief diagnosis of multiple PE. Pt lives in a [facility] with family. Pt is a high fall risk(3 recent),and is on 3L O2. At baseline, pt was independent with all household mobility, ADLs and transfers, but uses RW for community ambulation. This date pt requires SBA for transfers and CGA for ambulation. Due to balance and functional endurance deficits pt recommended use of RW for ambulation upon d/c. Pt blood pressure decreased from 154/81 in sitting, 125/67 standing, but pt didn't experience any OH symptoms. Due to decline in PLOF, recommend home health or outpatient PT at D/C. Will follow plan of care.     Time Calculation:   PT Evaluation Complexity  History, PT Evaluation Complexity: (P) 3 or more personal factors and/or comorbidities  Examination of Body Systems (PT Eval Complexity): (P) total of 3 or more elements  Clinical Presentation (PT Evaluation Complexity): (P) evolving  Clinical Decision Making (PT Evaluation Complexity): (P) moderate complexity  Overall Complexity (PT Evaluation Complexity): (P) moderate complexity     PT Charges       Row Name 06/05/25 7116              Time Calculation    Start Time 1142 (P)   -TS      Stop Time 1208 (P)   -TS      Time Calculation (min) 26 min (P)   -TS      PT Received On 06/05/25 (P)   -TS      PT - Next Appointment 06/07/25 (P)   -TS      PT Goal Re-Cert Due Date 06/19/25 (P)   -TS         Time Calculation- PT    Total Timed Code Minutes- PT 0 minute(s) (P)   -TS                User Key  (r) = Recorded By, (t) = Taken By, (c) = Cosigned By      Initials Name Provider Type    Surjit Quinones, PT Student PT Student                  Therapy Charges for Today       Code Description Service Date Service Provider Modifiers Qty    [id number] HC PT EVAL MOD COMPLEXITY 4 6/5/2025 Surjit Ace PT Student GP 1            PT G-Codes  Outcome Measure Options: (P) AM-PAC 6 Clicks Basic Mobility (PT)  AM-PAC 6 Clicks Score (PT): (P) 23  PT Discharge Summary  Anticipated Discharge Disposition (PT): (P) home with home health, home with outpatient therapy services    FLORIDALMA Love  6/5/2025

## 2025-06-05 NOTE — CASE MANAGEMENT/SOCIAL WORK
Discharge Planning Assessment   Reuben     Patient Name: Carli Dubon  MRN: [id number]  Today's Date: 6/5/2025    Admit Date: 6/4/2025    Plan: Return home with family   Discharge Needs Assessment       Row Name 06/05/25 1505       Living Environment    People in Home child(jadon), adult;grandchild(jadon)    Name(s) of People in Home daughter Ellen    Current Living Arrangements home    Potentially Unsafe Housing Conditions none    In the past 12 months has the electric, gas, oil, or water company threatened to shut off services in your home? No    Primary Care Provided by self    Provides Primary Care For no one    Family Caregiver if Needed child(jadon), adult    Family Caregiver Names daughter Iris    Quality of Family Relationships helpful;involved;supportive    Able to Return to Prior Arrangements yes       Resource/Environmental Concerns    Resource/Environmental Concerns none    Transportation Concerns none       Transportation Needs    In the past 12 months, has lack of transportation kept you from medical appointments or from getting medications? no    In the past 12 months, has lack of transportation kept you from meetings, work, or from getting things needed for daily living? No       Food Insecurity    Within the past 12 months, you worried that your food would run out before you got the money to buy more. Never true    Within the past 12 months, the food you bought just didn't last and you didn't have money to get more. Never true       Transition Planning    Patient/Family Anticipates Transition to home with family    Patient/Family Anticipated Services at Transition none    Transportation Anticipated family or friend will provide       Discharge Needs Assessment    Readmission Within the Last 30 Days no previous admission in last 30 days    Equipment Currently Used at Home cpap    Concerns to be Addressed no discharge needs identified;denies needs/concerns at this time    Do you want help finding or  keeping work or a job? Patient declined    Do you want help with school or training? For example, starting or completing job training or getting a high school diploma, GED or equivalent Patient declined    Anticipated Changes Related to Illness none    Equipment Needed After Discharge none                   Discharge Plan       Row Name 06/05/25 1508       Plan    Plan Return home with family    Plan Comments CM met with patient at bedside to discuss discharge planning. Patient lives at home with daughter. She sometimes drives and is independent with ADLs. She has a CPAP at home and denies other DME needs. PCP ([facility]) and pharmacy ([facility]) confirmed, agreeable to [facility]. Denies issues affording medications, utilities, and/or food. No current [facility], states she was going to [facility] for OPPT but had to wait for insurance to approve more visits and plans to return when they do. Daughter will transport at discharge.                Demographic Summary       Row Name 06/05/25 1508       General Information    Admission Type observation    Arrived From emergency department    Required Notices Provided Observation Status Notice    Referral Source admission list    Reason for Consult care coordination/care conference;discharge planning    Preferred Language English       Contact Information    Permission Granted to Share Info With                    Functional Status       Row Name 06/05/25 1505       Functional Status    Usual Activity Tolerance good    Current Activity Tolerance good       Functional Status, IADL    Medications independent    Meal Preparation independent    Housekeeping independent    Laundry independent    Shopping independent       Mental Status Summary    Recent Changes in Mental Status/Cognitive Functioning no changes             Marlen Singh RN     [facility]  Office: [phone]  Cell: [phone]  Fax # [phone]

## 2025-06-05 NOTE — PROGRESS NOTES
Cardiology [facility]      Patient Care Team:  Cindy Huddleston APRN as PCP - General (Nurse Practitioner)  Kitty Carlos PA-C as Physician Assistant (Physician Assistant)      Cardiology assessment and plan        Bilateral pulmonary:  Segmental and subsegmental pulmonary emboli  Normal blood pressure  No tachycardia  No significant RV strain on CT  Echocardiogram with no significant RV strain  Venous Doppler with no DVT  Elevated lactate level  Hypertension  Hyperlipidemia  Diabetes mellitus  COPD  Denies any new complaint  Hemodynamics are stable  Patient looks comfortable  Tmax is 97.9 pulse is 86 respirations are 15 blood pressure is 138/70 sats are 91 to 95% on 2 L  Sodium is 138 potassium is 4.3 creatinine is 0.6  Heparin is therapeutic  Hemoglobin is 13.6  Normal troponin  Normal proBNP  Echocardiogram with normal LV systolic function  There is a small pericardial effusion  Normal right ventricular size and systolic function  No clinical indication for thrombectomy  Continue current medical management continue aggressive risk factor modification  Recommend hypercoagulable workup  Recommend transitioning patient from heparin to oral anticoagulation therapy  Diagnosis and treatment options reviewed and discussed patient  Further recommendation based on patient course                      Chief Complaint: Shortness of breath    Subjective no new complaints    Interval History: No significant change in neurostatus    History taken from: patient    Review of Systems:  Review of Systems   Constitutional: Negative for chills, decreased appetite and malaise/fatigue.   HENT:  Negative for congestion and nosebleeds.    Eyes:  Negative for blurred vision and double vision.   Cardiovascular:  Negative for chest pain, dyspnea on exertion, irregular heartbeat, leg swelling, near-syncope, orthopnea, palpitations and paroxysmal nocturnal dyspnea.   Respiratory:  Positive for shortness of breath. Negative for cough.   "  Hematologic/Lymphatic: Negative for adenopathy. Does not bruise/bleed easily.   Skin:  Negative for color change and rash.   Musculoskeletal:  Negative for back pain and joint pain.   Gastrointestinal:  Negative for bloating, abdominal pain, hematemesis and hematochezia.   Genitourinary:  Negative for flank pain and hematuria.   Neurological:  Negative for dizziness and focal weakness.   Psychiatric/Behavioral:  Negative for altered mental status. The patient does not have insomnia.        Objective    Vital Signs  Visit Vitals  /71   Pulse 87   Temp 97.9 °F (36.6 °C) (Oral)   Resp 15   Ht 163.8 cm (64.49\")   Wt 95.5 kg (210 lb 8.6 oz)   SpO2 91%   BMI 35.59 kg/m²     Oxygen Therapy  SpO2: 91 %  Pulse Oximetry Type: Continuous  Device (Oxygen Therapy): nasal cannula  Flow (L/min) (Oxygen Therapy): 2  Flowsheet Rows      Flowsheet Row First Filed Value   Admission Height 163.8 cm (64.5\") Documented at 06/04/2025 0035   Admission Weight 96.2 kg (212 lb 1.3 oz) Documented at 06/04/2025 0035          Intake & Output (last 3 days)         06/02 0701  06/03 0700 06/03 0701  06/04 0700 06/04 0701  06/05 0700 06/05 0701  06/06 0700    P.O.   840     Total Intake(mL/kg)   840 (8.8)     Urine (mL/kg/hr)   2450 (1.1)     Total Output   2450     Net   -1610                   Lines, Drains & Airways       Active LDAs       Name Placement date Placement time Site Days    Peripheral IV 06/04/25 0125 20 G Anterior;Left Forearm 06/04/25  0125  Forearm  1    External Urinary Catheter 06/04/25  0613  --  1                    Physical Exam:  Constitutional:       Appearance: Well-developed.   Eyes:      Conjunctiva/sclera: Conjunctivae normal.      Pupils: Pupils are equal, round, and reactive to light.   HENT:      Head: Normocephalic and atraumatic.   Neck:      Thyroid: No thyromegaly.   Pulmonary:      Effort: Pulmonary effort is normal.      Breath sounds: Examination of the right-lower field reveals decreased breath " sounds. Examination of the left-lower field reveals decreased breath sounds. Decreased breath sounds present.   Cardiovascular:      Normal rate. Regular rhythm.   Pulses:     Intact distal pulses.   Edema:     Peripheral edema absent.   Abdominal:      General: Bowel sounds are normal.      Palpations: Abdomen is soft.   Musculoskeletal:      Cervical back: Normal range of motion and neck supple. Skin:     General: Skin is warm.   Neurological:      Mental Status: Alert and oriented to person, place, and time.         Results Review:     I reviewed the patient's new clinical results.    Lab Results (last 24 hours)       Procedure Component Value Units Date/Time    POC Glucose 4x Daily Before Meals & at Bedtime [[id number]]  (Abnormal) Collected: 06/05/25 0718    Specimen: Blood Updated: 06/05/25 0719     Glucose 237 mg/dL      Comment: Serial Number: [id number]:  [id number]       Blood Culture - Blood, Arm, Right [[id number]]  (Normal) Collected: 06/04/25 0347    Specimen: Blood from Arm, Right Updated: 06/05/25 0400     Blood Culture No growth at 24 hours    Basic Metabolic Panel [[id number]]  (Abnormal) Collected: 06/05/25 0301    Specimen: Blood Updated: 06/05/25 0330     Glucose 226 mg/dL      BUN 22.3 mg/dL      Creatinine 0.62 mg/dL      Sodium 138 mmol/L      Potassium 4.3 mmol/L      Comment: Specimen hemolyzed.  Result may be falsely elevated.        Chloride 101 mmol/L      CO2 25.7 mmol/L      Calcium 8.8 mg/dL      BUN/Creatinine Ratio 36.0     Anion Gap 11.3 mmol/L      eGFR 100.8 mL/min/1.73     Narrative:      GFR Categories in Chronic Kidney Disease (CKD)              GFR Category          GFR (mL/min/1.73)    Interpretation  G1                    90 or greater        Normal or high (1)  G2                    60-89                Mild decrease (1)  G3a                   45-59                Mild to moderate decrease  G3b                   30-44                Moderate to severe decrease  G4                     15-29                Severe decrease  G5                    14 or less           Kidney failure    (1)In the absence of evidence of kidney disease, neither GFR category G1 or G2 fulfill the criteria for CKD.    eGFR calculation 2021 CKD-EPI creatinine equation, which does not include race as a factor    Heparin Anti-Xa [[id number]]  (Normal) Collected: 06/05/25 0301    Specimen: Blood Updated: 06/05/25 0322     Heparin Anti-Xa (UFH) 0.61 IU/ml     Narrative:      Anti-Xa Reference Ranges:  VTE (PE/DVT)  0.3-0.7  Cardiac or Other (Not VTE) 0.3-0.5      CBC (No Diff) [[id number]]  (Abnormal) Collected: 06/05/25 0301    Specimen: Blood Updated: 06/05/25 0308     WBC 12.47 10*3/mm3      RBC 4.33 10*6/mm3      Hemoglobin 13.6 g/dL      Hematocrit 42.3 %      MCV 97.7 fL      MCH 31.4 pg      MCHC 32.2 g/dL      RDW 13.9 %      RDW-SD 50.5 fl      MPV 11.2 fL      Platelets 213 10*3/mm3     Blood Culture - Blood, Arm, Left [[id number]]  (Normal) Collected: 06/04/25 0246    Specimen: Blood from Arm, Left Updated: 06/05/25 0300     Blood Culture No growth at 24 hours    Heparin Anti-Xa [[id number]]  (Abnormal) Collected: 06/04/25 2108    Specimen: Blood Updated: 06/04/25 2128     Heparin Anti-Xa (UFH) 0.19 IU/ml     Narrative:      Anti-Xa Reference Ranges:  VTE (PE/DVT)  0.3-0.7  Cardiac or Other (Not VTE) 0.3-0.5      POC Glucose Once [[id number]]  (Abnormal) Collected: 06/04/25 2011    Specimen: Blood Updated: 06/04/25 2013     Glucose 272 mg/dL      Comment: Serial Number: [id number]:  [id number]       POC Glucose Once [[id number]]  (Abnormal) Collected: 06/04/25 1624    Specimen: Blood Updated: 06/04/25 1626     Glucose 281 mg/dL      Comment: Serial Number: [id number]:  [id number]       Heparin Anti-Xa [[id number]]  (Abnormal) Collected: 06/04/25 1447    Specimen: Blood from Arm, Left Updated: 06/04/25 1504     Heparin Anti-Xa (UFH) 0.20 IU/ml     Narrative:      Anti-Xa Reference Ranges:  VTE  (PE/DVT)  0.3-0.7  Cardiac or Other (Not VTE) 0.3-0.5      STAT Lactic Acid, Reflex [[id number]]  (Abnormal) Collected: 06/04/25 1206    Specimen: Blood from Arm, Left Updated: 06/04/25 1247     Lactate 2.5 mmol/L     Heparin Anti-Xa [[id number]]  (Abnormal) Collected: 06/04/25 1206    Specimen: Blood from Arm, Left Updated: 06/04/25 1226     Heparin Anti-Xa (UFH) 1.09 IU/ml     Narrative:      Anti-Xa Reference Ranges:  VTE (PE/DVT)  0.3-0.7  Cardiac or Other (Not VTE) 0.3-0.5      POC Glucose Once [[id number]]  (Abnormal) Collected: 06/04/25 1139    Specimen: Blood Updated: 06/04/25 1209     Glucose 246 mg/dL      Comment: Serial Number: [id number]:  [id number]       STAT Lactic Acid, Reflex [[id number]]  (Abnormal) Collected: 06/04/25 0811    Specimen: Blood from Arm, Left Updated: 06/04/25 0849     Lactate 2.6 mmol/L     High Sensitivity Troponin T 1Hr [[id number]]  (Normal) Collected: 06/04/25 0811    Specimen: Blood from Arm, Left Updated: 06/04/25 0841     HS Troponin T 7 ng/L      Troponin T Numeric Delta -1 ng/L     Narrative:      High Sensitive Troponin T Reference Range:  <14.0 ng/L- Negative Female for AMI  <22.0 ng/L- Negative Male for AMI  >=14 - Abnormal Female indicating possible myocardial injury.  >=22 - Abnormal Male indicating possible myocardial injury.   Clinicians would have to utilize clinical acumen, EKG, Troponin, and serial changes to determine if it is an Acute Myocardial Infarction or myocardial injury due to an underlying chronic condition.               Results for orders placed during the hospital encounter of 06/04/25    Adult Transthoracic Echo Complete W/ Cont if Necessary Per Protocol    Interpretation Summary    Left ventricular systolic function is low normal. Calculated left ventricular EF = 50% Left ventricular ejection fraction appears to be 51 - 55%.    The left atrial cavity is dilated.    Left atrial volume is mildly increased.    Saline test results are negative.    The  right atrial cavity is borderline dilated.    There is a small (<1cm) pericardial effusion. There is no evidence of cardiac tamponade.        Medication Review:   I have reviewed the patient's current medication list  Scheduled Meds:amitriptyline, 150 mg, Oral, Nightly  amLODIPine, 5 mg, Oral, Daily  atorvastatin, 40 mg, Oral, Daily  budesonide-formoterol, 2 puff, Inhalation, BID - RT   And  revefenacin, 175 mcg, Nebulization, Daily - RT  divalproex, 2,000 mg, Oral, Nightly  hydroCHLOROthiazide, 25 mg, Oral, Daily  insulin lispro, 2-9 Units, Subcutaneous, 4x Daily AC & at Bedtime  losartan, 100 mg, Oral, Daily  nicotine, 1 patch, Transdermal, Q24H  pantoprazole, 40 mg, Oral, QAM AC  primidone, 100 mg, Oral, Nightly  sodium chloride, 10 mL, Intravenous, Q12H  vilazodone, 20 mg, Oral, Daily      Continuous Infusions:heparin, 15.6 Units/kg/hr, Last Rate: 14.6 Units/kg/hr (06/05/25 0301)      PRN Meds:.  acetaminophen **OR** acetaminophen **OR** acetaminophen    albuterol    senna-docusate sodium **AND** polyethylene glycol **AND** bisacodyl **AND** bisacodyl    dextrose    dextrose    glucagon (human recombinant)    heparin    heparin    melatonin    nitroglycerin    ondansetron    sodium chloride    sodium chloride    traMADol    zolpidem    ECG/EMG Results (last 24 hours)       Procedure Component Value Units Date/Time    Adult Transthoracic Echo Complete W/ Cont if Necessary Per Protocol [[id number]] Resulted: 06/04/25 1059     Updated: 06/04/25 1059     EF(MOD-bp) 50.0 %      LVIDd 5.0 cm      LVIDs 3.4 cm      IVSd 1.00 cm      LVPWd 1.10 cm      FS 32.0 %      IVS/LVPW 0.91 cm      ESV(cubed) 39.3 ml      LV Sys Vol (BSA corrected) 23.2 cm2      EDV(cubed) 125.0 ml      LV Orozco Vol (BSA corrected) 46.5 cm2      LV mass(C)d 194.4 grams      LVOT area 3.1 cm2      LVOT diam 2.00 cm      EDV(MOD-sp2) 78.8 ml      EDV(MOD-sp4) 93.3 ml      ESV(MOD-sp2) 38.8 ml      ESV(MOD-sp4) 46.5 ml      SV(MOD-sp2) 40.0 ml       SV(MOD-sp4) 46.8 ml      SVi(MOD-SP2) 19.9 ml/m2      SVi(MOD-SP4) 23.3 ml/m2      SVi (LVOT) 29.6 ml/m2      EF(MOD-sp2) 50.8 %      EF(MOD-sp4) 50.2 %      MV E max uri 106.0 cm/sec      LA ESV Index (BP) 20.3 ml/m2      Med Peak E' Uri 11.5 cm/sec      Lat Peak E' Uri 8.7 cm/sec      Avg E/e' ratio 10.50     SV(LVOT) 59.4 ml      RVIDd 3.2 cm      TAPSE (>1.6) 1.94 cm      RV S' 9.1 cm/sec      LA dimension (2D)  4.2 cm      LV V1 max 107.0 cm/sec      LV V1 max PG 4.6 mmHg      LV V1 mean PG 2.00 mmHg      LV V1 VTI 18.9 cm      Ao pk uri 113.0 cm/sec      Ao max PG 5.1 mmHg      Ao mean PG 3.0 mmHg      Ao V2 VTI 20.3 cm      JORGE(I,D) 2.9 cm2      Dimensionless Index 0.93 (DI)      MV max PG 7.6 mmHg      MV mean PG 3.0 mmHg      MV V2 VTI 27.8 cm      MVA(VTI) 2.14 cm2      RAP systole 3.0 mmHg      RV V1 max PG 1.11 mmHg      RV V1 max 52.7 cm/sec      RV V1 VTI 11.0 cm      PA V2 max 99.9 cm/sec      PA acc time 0.10 sec      ACS 1.70 cm      Sinus 3.0 cm      BH CV ECHO SHUNT ASSESSMENT PERFORMED (HIDDEN SCRIPTING) 1    Narrative:        Left ventricular systolic function is low normal. Calculated left   ventricular EF = 50% Left ventricular ejection fraction appears to be 51 -   55%.    The left atrial cavity is dilated.    Left atrial volume is mildly increased.    Saline test results are negative.    The right atrial cavity is borderline dilated.    There is a small (<1cm) pericardial effusion. There is no evidence of   cardiac tamponade.      Telemetry Scan [[id number]] Resulted: 06/04/25     Updated: 06/04/25 1112    Telemetry Scan [[id number]] Resulted: 06/04/25     Updated: 06/04/25 6299    Telemetry Scan [[id number]] Resulted: 06/04/25     Updated: 06/05/25 0431    Telemetry Scan [[id number]] Resulted: 06/04/25     Updated: 06/05/25 0811            Imaging Results (Last 24 Hours)       ** No results found for the last 24 hours. **          No results found for this or any previous visit.     Results for  orders placed during the hospital encounter of 06/04/25    Adult Transthoracic Echo Complete W/ Cont if Necessary Per Protocol    Interpretation Summary    Left ventricular systolic function is low normal. Calculated left ventricular EF = 50% Left ventricular ejection fraction appears to be 51 - 55%.    The left atrial cavity is dilated.    Left atrial volume is mildly increased.    Saline test results are negative.    The right atrial cavity is borderline dilated.    There is a small (<1cm) pericardial effusion. There is no evidence of cardiac tamponade.     Lab Results   Component Value Date    GLUCOSE 226 (H) 06/05/2025    BUN 22.3 06/05/2025    CREATININE 0.62 06/05/2025    EGFR 100.8 06/05/2025    BCR 36.0 (H) 06/05/2025    K 4.3 06/05/2025    CO2 25.7 06/05/2025    CALCIUM 8.8 06/05/2025    ALBUMIN 4.1 06/04/2025    BILITOT <0.2 06/04/2025    AST 24 06/04/2025    ALT 18 06/04/2025      Lab Results   Component Value Date    CHOL 157 11/04/2024    TRIG 182 (H) 11/04/2024    HDL 36 (L) 11/04/2024    LDL 90 11/04/2024      Lab Results   Component Value Date    TROPONINT 7 06/04/2025        Assessment & Plan       Multiple pulmonary emboli        Earline Jordan MD  06/05/25  08:40 EDT

## 2025-06-05 NOTE — PROGRESS NOTES
[facility] MEDICINE SERVICE  DAILY PROGRESS NOTE    NAME: Carli Dubon  : 1960  MRN: [id number]      LOS: 1 day     PROVIDER OF SERVICE: Manuel Danielle MD    Chief Complaint: Multiple pulmonary emboli    Subjective:   Interval, consults, labs and imaging reviewed.  Interval History:    Patient seen and evaluated at bedside.   Patient denies change discharge chest pain shortness of air.  Treatment plan discussed with patient. All questions addressed.     Review of Systems:   All 21 ROS were negative except mentioned above.    Objective:     Vital Signs  Temp:  [97.6 °F (36.4 °C)-97.9 °F (36.6 °C)] 97.9 °F (36.6 °C)  Heart Rate:  [79-91] 87  Resp:  [13-20] 15  BP: (126-147)/(61-77) 138/71  Flow (L/min) (Oxygen Therapy):  [2-3] 2   Body mass index is 35.59 kg/m².    Physical Exam   General: No acute distress, appears stated age  Neuro: Awake and alert, oriented x3, no focal deficits appreciated  Head: Atraumatic, normocephalic  HEENT: EOMI, anicteric, normal sclerae and conjunctivae, moist mucus membranes  Neck: supple, no lymphadenopathy  CV: RRR, soft heart sounds, no murmurs appreciated, no peripheral edema  Pulm: Decreased breath sounds, no increased work of breathing, no adventitious sounds  Abd: Soft, nontender, nondistended  Skin: Warm, dry and intact  Psych: Appropriate mood and affect    Scheduled Meds   amitriptyline, 150 mg, Oral, Nightly  amLODIPine, 5 mg, Oral, Daily  atorvastatin, 40 mg, Oral, Daily  budesonide-formoterol, 2 puff, Inhalation, BID - RT   And  revefenacin, 175 mcg, Nebulization, Daily - RT  divalproex, 2,000 mg, Oral, Nightly  hydroCHLOROthiazide, 25 mg, Oral, Daily  insulin lispro, 2-9 Units, Subcutaneous, 4x Daily AC & at Bedtime  losartan, 100 mg, Oral, Daily  nicotine, 1 patch, Transdermal, Q24H  pantoprazole, 40 mg, Oral, QAM AC  primidone, 100 mg, Oral, Nightly  sodium chloride, 10 mL, Intravenous, Q12H  vilazodone, 20 mg, Oral, Daily       PRN Meds     acetaminophen  **OR** acetaminophen **OR** acetaminophen    albuterol    senna-docusate sodium **AND** polyethylene glycol **AND** bisacodyl **AND** bisacodyl    dextrose    dextrose    glucagon (human recombinant)    heparin    heparin    melatonin    nitroglycerin    ondansetron    sodium chloride    sodium chloride    traMADol    zolpidem   Infusions  heparin, 15.6 Units/kg/hr, Last Rate: 14.6 Units/kg/hr (06/05/25 0301)          Diagnostic Data    Results from last 7 days   Lab Units 06/05/25  0301 06/04/25  0542 06/04/25  0126   WBC 10*3/mm3 12.47*  --  13.12*   HEMOGLOBIN g/dL 13.6  --  15.0   HEMATOCRIT % 42.3  --  46.9*   PLATELETS 10*3/mm3 213  --  218   GLUCOSE mg/dL 226*   < > 98   CREATININE mg/dL 0.62   < > 0.71   BUN mg/dL 22.3   < > 16.6   SODIUM mmol/L 138   < > 139   POTASSIUM mmol/L 4.3   < > 3.9   AST (SGOT) U/L  --   --  24   ALT (SGPT) U/L  --   --  18   ALK PHOS U/L  --   --  95   BILIRUBIN mg/dL  --   --  <0.2   ANION GAP mmol/L 11.3   < > 17.8*    < > = values in this interval not displayed.       CT Abdomen Pelvis With Contrast  Result Date: 6/4/2025  Impression: 1.Fatty infiltration of the liver. 2.Multiple uterine fibroids. 3.No acute abdominal or pelvic abnormality. Electronically Signed: Oswaldo Cook MD  6/4/2025 3:03 AM EDT  Workstation ID: [id number]    CT Angiogram Chest Pulmonary Embolism  Result Date: 6/4/2025  Impression: 1.Right lower lobe segmental and subsegmental pulmonary arterial branch emboli. Nonocclusive left lower lobe subsegmental pulmonary arterial branch embolus. 2.Mild interstitial edema. Electronically Signed: Oswaldo Cook MD  6/4/2025 3:01 AM EDT  Workstation ID: [id number]    XR Chest 2 View  Result Date: 6/4/2025  Impression: No active disease. Electronically Signed: Oswaldo Cook MD  6/4/2025 2:12 AM EDT  Workstation ID: [id number]      Interval results reviewed.    Assessment/Plan:   Pulmonary emboli  Leukocytosis  History of tobacco abuse  Type 2 diabetes  Obesity  Fatty  liver  Uterine fibroids  bipolar disorder       DC heparin 2 hrs after eliquis ingestion, and start patient on Eliquis 10 mg twice daily for 1 week and then 5 mg twice daily     cardiology recommendations noted patient does not need thrombectomy 2D echo reviewed patient with small pericardial effusion normal right ventricular size and systolic function.    Will consult him for hypercoagulable workup.    Leukocytosis most likely reactive.  Chest x-ray negative patient afebrile  Treatment plan discussed with RN.     VTE Prophylaxis:  Pharmacologic VTE prophylaxis orders are present.         Code status is   Code Status and Medical Interventions: CPR (Attempt to Resuscitate); Full Support   Ordered at: 06/04/25 0346     Code Status (Patient has no pulse and is not breathing):    CPR (Attempt to Resuscitate)     Medical Interventions (Patient has pulse or is breathing):    Full Support       Plan for disposition:     Barriers to Discharge: On heparin, heme consult  Anticipated Date of Discharge: 6/6  Place of Discharge: home      Time: 40 minutes     Signature: Electronically signed by Manuel Danielle MD, 06/05/25, 10:35 EDT.  [facility]ist Team

## 2025-06-05 NOTE — PLAN OF CARE
Goal Outcome Evaluation:   Pt A/Ox4 with VSS, resting between care. Eliquis started and heparin drip dc'd per order. PRN pain medication given as ordered per pt request. Pt able to voice concerns. Call light within reach.

## 2025-06-05 NOTE — PLAN OF CARE
Goal Outcome Evaluation:  Plan of Care Reviewed With: (P) patient        Progress: (P) improving  Outcome Evaluation: (P) Carli Dubon is a 64 y.o. female with a previous medical history of COPD, Tobacco Abuse, DM, HTN, and Bipolar Disorder who presented to [facility] on 6/4/2025 with complaints of right shoulder and back pain, with a chief diagnosis of multiple PE. Pt lives in a [facility] with family. Pt is a high fall risk(3 recent),and is on 3L O2. At baseline, pt was independent with all household mobility, ADLs and transfers, but uses RW for community ambulation. This date pt requires SBA for transfers and CGA for ambulation. Due to balance and functional endurance deficits pt recommended use of RW for ambulation upon d/c. Pt blood pressure decreased from 154/81 in sitting, 125/67 standing, but pt didn't experience any OH symptoms. Due to decline in PLOF, recommend home health or outpatient PT at D/C. Will follow plan of care.    Anticipated Discharge Disposition (PT): (P) home with home health, home with outpatient therapy services

## 2025-06-05 NOTE — CONSULTS
"        Hematology/Oncology Inpatient Consultation    Patient name: Carli Dubon  : 1960  MRN: [id number]  Referring Provider: Dr. Danielle   Reason for Consultation: \"Hypercoagulable workup\"    Chief complaint: Back pain    History of present illness:    Carli Dubon is a 64 y.o. female past medical history significant for COPD, tobacco use, diabetes, hypertension, bipolar disorder who presented to [facility] on 2025 with complaints of right shoulder pain and back pain.  States she tried to treat her pain at home with Tylenol but it continued to worsen.  She denies shortness of breath, dizziness or palpitations.  She denies any recent trauma, recent travel.    She was found to have right lower lobe segmental and subsegmental pulmonary arterial branch emboli as well as nonocclusive left lower lobe subsegmental pulmonary arterial branch embolus on CT angiogram of the chest.  CT imaging of the abdomen and pelvis showed fatty infiltration of the liver and multiple uterine fibroids, however no acute abdominal pelvic abnormalities noted.  Duplex ultrasound of bilateral lower extremities was normal. Cardiology was consulted for consideration of intervention.  It was determined that there was no clinical indication for thrombectomy.    25  Hematology/Oncology was consulted for hypercoagulable workup.  She reports no previous history of blood clots nor any family history of clotting disorders.    He/She  has a past medical history of Allergic, Allergic rhinitis, Arthritis of back, Asthma, Asthma, extrinsic, Asthma, intrinsic, Bipolar 1 disorder, Chronic bronchitis, Chronic obstructive lung disease (2015), COVID-19 (08/15/2021), CTS (carpal tunnel syndrome), Depression, Diabetes insipidus, Diabetes mellitus, Difficulty walking (2022), Hip arthrosis, Hypertension, Knee swelling, Low back strain, Migraine, Mixed hyperlipidemia (2020), Multiple pulmonary emboli (2025), Neck strain, " Pancreatitis, Peripheral neuropathy, Primary central sleep apnea, PTSD (post-traumatic stress disorder), Pulmonary arterial hypertension, Sleep apnea, obstructive, Tobacco abuse (06/15/2020), and Vitamin D deficiency (06/19/2024).    PCP: Cindy Huddleston APRN    History:  Past Medical History:   Diagnosis Date    Allergic     Allergic rhinitis     Arthritis of back     Asthma     Asthma, extrinsic     Asthma, intrinsic     Bipolar 1 disorder     Chronic bronchitis     Chronic obstructive lung disease 01/28/2015    COVID-19 08/15/2021    CTS (carpal tunnel syndrome)     Depression     Diabetes insipidus     Diabetes mellitus     Difficulty walking 03/2022    Hip arthrosis     Hypertension     Knee swelling     Low back strain     Migraine     Mixed hyperlipidemia 03/03/2020    Multiple pulmonary emboli 6/4/2025    Neck strain     Pancreatitis     Peripheral neuropathy     Primary central sleep apnea     PTSD (post-traumatic stress disorder)     Pulmonary arterial hypertension     Sleep apnea, obstructive     Tobacco abuse 06/15/2020    Vitamin D deficiency 06/19/2024   , History reviewed. No pertinent surgical history.,   Family History   Problem Relation Age of Onset    Hypertension Mother     Diabetes Mother     Asthma Mother     Anemia Mother     Anxiety disorder Mother     Asthma Daughter     COPD Daughter     Anxiety disorder Daughter     Arthritis Daughter    ,   Social History     Tobacco Use    Smoking status: Every Day     Current packs/day: 1.00     Average packs/day: 1.1 packs/day for 40.0 years (45.0 ttl pk-yrs)     Types: Cigarettes     Passive exposure: Current    Smokeless tobacco: Never   Vaping Use    Vaping status: Never Used   Substance Use Topics    Alcohol use: Never    Drug use: Never   ,   Medications Prior to Admission   Medication Sig Dispense Refill Last Dose/Taking    albuterol (ACCUNEB) 1.25 MG/3ML nebulizer solution Take 3 mL by nebulization Every 6 (Six) Hours As Needed for Wheezing  or Shortness of Air. Dispense as 1 box of unit dose 1 each 1 Taking As Needed    albuterol sulfate  (90 Base) MCG/ACT inhaler Inhale 2 puffs Every 4 (Four) Hours As Needed for Wheezing or Shortness of Air. 6.7 g 1 Taking As Needed    amitriptyline (ELAVIL) 150 MG tablet TAKE 1 TABLET BY MOUTH ONCE DAILY AT NIGHT 90 tablet 1 Taking    atorvastatin (LIPITOR) 40 MG tablet Take 1 tablet by mouth once daily 90 tablet 0 Taking    benzonatate (TESSALON) 200 MG capsule Take 1 capsule by mouth 3 (Three) Times a Day As Needed for Cough. 90 capsule 2 Taking As Needed    clonazePAM (KlonoPIN) 0.5 MG tablet Take 1 tablet by mouth 2 (Two) Times a Day As Needed for Anxiety. for anxiety 60 tablet 2 Taking As Needed    divalproex (DEPAKOTE ER) 500 MG 24 hr tablet Take 4 tablets by mouth Every Night.   Taking    fluticasone (FLONASE) 50 MCG/ACT nasal spray Administer 2 sprays into the nostril(s) as directed by provider Daily. 11.1 g 0 Taking    Fluticasone-Umeclidin-Vilant (Trelegy Ellipta) 200-62.5-25 MCG/ACT inhaler Inhale 1 puff Daily.   Taking    glipizide (GLUCOTROL) 10 MG tablet TAKE 1 TABLET BY MOUTH TWICE DAILY BEFORE MEAL(S) 180 tablet 0 Taking    hydroCHLOROthiazide 25 MG tablet Take 1 tablet by mouth Daily. 30 tablet 0 Taking    Jardiance 25 MG tablet tablet Take 1 tablet by mouth once daily 90 tablet 0 Taking    losartan (COZAAR) 100 MG tablet Take 1 tablet by mouth once daily 90 tablet 0 Taking    omeprazole (priLOSEC) 40 MG capsule Take 1 capsule by mouth Daily. 90 capsule 0 Taking    ondansetron ODT (ZOFRAN-ODT) 4 MG disintegrating tablet Take 1 tablet by mouth 4 (Four) Times a Day As Needed for Vomiting or Nausea. 10 tablet 0 Taking As Needed    primidone (MYSOLINE) 50 MG tablet Take 2 tablets by mouth Every Night.   Taking    vilazodone (VIIBRYD) 20 MG tablet tablet Take 1 tablet by mouth Daily. 90 tablet 3 Taking    zolpidem (AMBIEN) 10 MG tablet TAKE 1 TABLET BY MOUTH AT NIGHT AS NEEDED FOR SLEEP 30 tablet 2  "Taking As Needed    amLODIPine (NORVASC) 5 MG tablet Take 1 tablet by mouth once daily 90 tablet 0     metFORMIN (GLUCOPHAGE) 1000 MG tablet TAKE 1 TABLET BY MOUTH TWICE DAILY WITH MEALS 180 tablet 0    , Scheduled Meds:  amitriptyline, 150 mg, Oral, Nightly  amLODIPine, 5 mg, Oral, Daily  apixaban, 10 mg, Oral, BID   Followed by  [START ON 6/12/2025] apixaban, 5 mg, Oral, BID  atorvastatin, 40 mg, Oral, Daily  budesonide-formoterol, 2 puff, Inhalation, BID - RT   And  revefenacin, 175 mcg, Nebulization, Daily - RT  divalproex, 2,000 mg, Oral, Nightly  hydroCHLOROthiazide, 25 mg, Oral, Daily  insulin lispro, 2-9 Units, Subcutaneous, 4x Daily AC & at Bedtime  losartan, 100 mg, Oral, Daily  nicotine, 1 patch, Transdermal, Q24H  pantoprazole, 40 mg, Oral, QAM AC  primidone, 100 mg, Oral, Nightly  sodium chloride, 10 mL, Intravenous, Q12H  vilazodone, 20 mg, Oral, Daily    , Continuous Infusions:  heparin, 15.6 Units/kg/hr, Last Rate: 13.6 Units/kg/hr (06/05/25 1044)    , PRN Meds:    acetaminophen **OR** acetaminophen **OR** acetaminophen    albuterol    senna-docusate sodium **AND** polyethylene glycol **AND** bisacodyl **AND** bisacodyl    dextrose    dextrose    glucagon (human recombinant)    heparin    heparin    melatonin    nitroglycerin    ondansetron    sodium chloride    sodium chloride    traMADol    zolpidem   Allergies:  Iodine and Adhesive tape    Subjective     ROS:  Review of Systems     Objective   Vital Signs:   /71   Pulse 87   Temp 97.9 °F (36.6 °C) (Oral)   Resp 15   Ht 163.8 cm (64.49\")   Wt 95.5 kg (210 lb 8.6 oz)   SpO2 91%   BMI 35.59 kg/m²     Physical Exam: (performed by MD)  Physical Exam    Results Review:  Lab Results (last 48 hours)       Procedure Component Value Units Date/Time    POC Glucose 4x Daily Before Meals & at Bedtime [[id number]]  (Abnormal) Collected: 06/05/25 1120    Specimen: Blood Updated: 06/05/25 1122     Glucose 248 mg/dL      Comment: Serial Number: " [id number]:  [id number]       Heparin Anti-Xa [[id number]]  (Abnormal) Collected: 06/05/25 0939    Specimen: Blood from Arm, Left Updated: 06/05/25 0955     Heparin Anti-Xa (UFH) 0.73 IU/ml     Narrative:      Anti-Xa Reference Ranges:  VTE (PE/DVT)  0.3-0.7  Cardiac or Other (Not VTE) 0.3-0.5      POC Glucose 4x Daily Before Meals & at Bedtime [[id number]]  (Abnormal) Collected: 06/05/25 0718    Specimen: Blood Updated: 06/05/25 0719     Glucose 237 mg/dL      Comment: Serial Number: [id number]:  [id number]       Blood Culture - Blood, Arm, Right [[id number]]  (Normal) Collected: 06/04/25 0347    Specimen: Blood from Arm, Right Updated: 06/05/25 0400     Blood Culture No growth at 24 hours    Basic Metabolic Panel [[id number]]  (Abnormal) Collected: 06/05/25 0301    Specimen: Blood Updated: 06/05/25 0330     Glucose 226 mg/dL      BUN 22.3 mg/dL      Creatinine 0.62 mg/dL      Sodium 138 mmol/L      Potassium 4.3 mmol/L      Comment: Specimen hemolyzed.  Result may be falsely elevated.        Chloride 101 mmol/L      CO2 25.7 mmol/L      Calcium 8.8 mg/dL      BUN/Creatinine Ratio 36.0     Anion Gap 11.3 mmol/L      eGFR 100.8 mL/min/1.73     Narrative:      GFR Categories in Chronic Kidney Disease (CKD)              GFR Category          GFR (mL/min/1.73)    Interpretation  G1                    90 or greater        Normal or high (1)  G2                    60-89                Mild decrease (1)  G3a                   45-59                Mild to moderate decrease  G3b                   30-44                Moderate to severe decrease  G4                    15-29                Severe decrease  G5                    14 or less           Kidney failure    (1)In the absence of evidence of kidney disease, neither GFR category G1 or G2 fulfill the criteria for CKD.    eGFR calculation 2021 CKD-EPI creatinine equation, which does not include race as a factor    Heparin Anti-Xa [[id number]]  (Normal) Collected:  06/05/25 0301    Specimen: Blood Updated: 06/05/25 0322     Heparin Anti-Xa (UFH) 0.61 IU/ml     Narrative:      Anti-Xa Reference Ranges:  VTE (PE/DVT)  0.3-0.7  Cardiac or Other (Not VTE) 0.3-0.5      CBC (No Diff) [[id number]]  (Abnormal) Collected: 06/05/25 0301    Specimen: Blood Updated: 06/05/25 0308     WBC 12.47 10*3/mm3      RBC 4.33 10*6/mm3      Hemoglobin 13.6 g/dL      Hematocrit 42.3 %      MCV 97.7 fL      MCH 31.4 pg      MCHC 32.2 g/dL      RDW 13.9 %      RDW-SD 50.5 fl      MPV 11.2 fL      Platelets 213 10*3/mm3     Blood Culture - Blood, Arm, Left [[id number]]  (Normal) Collected: 06/04/25 0246    Specimen: Blood from Arm, Left Updated: 06/05/25 0300     Blood Culture No growth at 24 hours    Heparin Anti-Xa [[id number]]  (Abnormal) Collected: 06/04/25 2108    Specimen: Blood Updated: 06/04/25 2128     Heparin Anti-Xa (UFH) 0.19 IU/ml     Narrative:      Anti-Xa Reference Ranges:  VTE (PE/DVT)  0.3-0.7  Cardiac or Other (Not VTE) 0.3-0.5      POC Glucose Once [[id number]]  (Abnormal) Collected: 06/04/25 2011    Specimen: Blood Updated: 06/04/25 2013     Glucose 272 mg/dL      Comment: Serial Number: [id number]:  [id number]       POC Glucose Once [[id number]]  (Abnormal) Collected: 06/04/25 1624    Specimen: Blood Updated: 06/04/25 1626     Glucose 281 mg/dL      Comment: Serial Number: [id number]:  [id number]       Heparin Anti-Xa [[id number]]  (Abnormal) Collected: 06/04/25 1447    Specimen: Blood from Arm, Left Updated: 06/04/25 1504     Heparin Anti-Xa (UFH) 0.20 IU/ml     Narrative:      Anti-Xa Reference Ranges:  VTE (PE/DVT)  0.3-0.7  Cardiac or Other (Not VTE) 0.3-0.5      STAT Lactic Acid, Reflex [[id number]]  (Abnormal) Collected: 06/04/25 1206    Specimen: Blood from Arm, Left Updated: 06/04/25 1247     Lactate 2.5 mmol/L     Heparin Anti-Xa [[id number]]  (Abnormal) Collected: 06/04/25 1206    Specimen: Blood from Arm, Left Updated: 06/04/25 1226     Heparin Anti-Xa (UFH) 1.09  IU/ml     Narrative:      Anti-Xa Reference Ranges:  VTE (PE/DVT)  0.3-0.7  Cardiac or Other (Not VTE) 0.3-0.5      POC Glucose Once [[id number]]  (Abnormal) Collected: 06/04/25 1139    Specimen: Blood Updated: 06/04/25 1209     Glucose 246 mg/dL      Comment: Serial Number: [id number]:  [id number]       STAT Lactic Acid, Reflex [[id number]]  (Abnormal) Collected: 06/04/25 0811    Specimen: Blood from Arm, Left Updated: 06/04/25 0849     Lactate 2.6 mmol/L     High Sensitivity Troponin T 1Hr [[id number]]  (Normal) Collected: 06/04/25 0811    Specimen: Blood from Arm, Left Updated: 06/04/25 0841     HS Troponin T 7 ng/L      Troponin T Numeric Delta -1 ng/L     Narrative:      High Sensitive Troponin T Reference Range:  <14.0 ng/L- Negative Female for AMI  <22.0 ng/L- Negative Male for AMI  >=14 - Abnormal Female indicating possible myocardial injury.  >=22 - Abnormal Male indicating possible myocardial injury.   Clinicians would have to utilize clinical acumen, EKG, Troponin, and serial changes to determine if it is an Acute Myocardial Infarction or myocardial injury due to an underlying chronic condition.         BNP [[id number]]  (Normal) Collected: 06/04/25 0542    Specimen: Blood Updated: 06/04/25 0746     proBNP 205.0 pg/mL     Narrative:      This assay is used as an aid in the diagnosis of individuals suspected of having heart failure. It can be used as an aid in the diagnosis of acute decompensated heart failure (ADHF) in patients presenting with signs and symptoms of ADHF to the emergency department (ED). In addition, NT-proBNP of <300 pg/mL indicates ADHF is not likely.    Age Range Result Interpretation  NT-proBNP Concentration (pg/mL:      <50             Positive            >450                   Gray                 300-450                    Negative             <300    50-75           Positive            >900                  Gray                300-900                  Negative             <300      >75             Positive            >1800                  Gray                300-1800                  Negative            <300    High Sensitivity Troponin T [[id number]]  (Normal) Collected: 06/04/25 0542    Specimen: Blood Updated: 06/04/25 0746     HS Troponin T 8 ng/L     Narrative:      High Sensitive Troponin T Reference Range:  <14.0 ng/L- Negative Female for AMI  <22.0 ng/L- Negative Male for AMI  >=14 - Abnormal Female indicating possible myocardial injury.  >=22 - Abnormal Male indicating possible myocardial injury.   Clinicians would have to utilize clinical acumen, EKG, Troponin, and serial changes to determine if it is an Acute Myocardial Infarction or myocardial injury due to an underlying chronic condition.         POC Glucose 4x Daily Before Meals & at Bedtime [[id number]]  (Abnormal) Collected: 06/04/25 0720    Specimen: Blood Updated: 06/04/25 0723     Glucose 227 mg/dL      Comment: Serial Number: [id number]:  [id number]       Basic Metabolic Panel [[id number]]  (Abnormal) Collected: 06/04/25 0542    Specimen: Blood Updated: 06/04/25 0633     Glucose 253 mg/dL      BUN 17.2 mg/dL      Creatinine 0.74 mg/dL      Sodium 137 mmol/L      Potassium 4.8 mmol/L      Comment: Specimen hemolyzed.  Result may be falsely elevated.        Chloride 101 mmol/L      CO2 23.2 mmol/L      Calcium 9.0 mg/dL      BUN/Creatinine Ratio 23.2     Anion Gap 12.8 mmol/L      eGFR 91.6 mL/min/1.73     Narrative:      GFR Categories in Chronic Kidney Disease (CKD)              GFR Category          GFR (mL/min/1.73)    Interpretation  G1                    90 or greater        Normal or high (1)  G2                    60-89                Mild decrease (1)  G3a                   45-59                Mild to moderate decrease  G3b                   30-44                Moderate to severe decrease  G4                    15-29                Severe decrease  G5                    14 or less           Kidney  failure    (1)In the absence of evidence of kidney disease, neither GFR category G1 or G2 fulfill the criteria for CKD.    eGFR calculation 2021 CKD-EPI creatinine equation, which does not include race as a factor    STAT Lactic Acid, Reflex [[id number]]  (Abnormal) Collected: 06/04/25 0542    Specimen: Blood Updated: 06/04/25 0632     Lactate 2.6 mmol/L     Heparin Anti-Xa [[id number]]  (Abnormal) Collected: 06/04/25 0542    Specimen: Blood Updated: 06/04/25 0608     Heparin Anti-Xa (UFH) <0.10 IU/ml     Narrative:      Anti-Xa Reference Ranges:  VTE (PE/DVT)  0.3-0.7  Cardiac or Other (Not VTE) 0.3-0.5      Extra Tubes [[id number]] Collected: 06/04/25 0542    Specimen: Blood, Venous Line Updated: 06/04/25 0600    Narrative:      The following orders were created for panel order Extra Tubes.  Procedure                               Abnormality         Status                     ---------                               -----------         ------                     Lavender Top[[id number]]                                     Final result               Gold Top - SST[[id number]]                                   Final result                 Please view results for these tests on the individual orders.    Lavender Top [[id number]] Collected: 06/04/25 0542    Specimen: Blood Updated: 06/04/25 0600     Extra Tube hold for add-on     Comment: Auto resulted       Gold Top - SST [[id number]] Collected: 06/04/25 0542    Specimen: Blood Updated: 06/04/25 0600     Extra Tube Hold for add-ons.     Comment: Auto resulted.       Protime-INR [[id number]]  (Normal) Collected: 06/04/25 0126    Specimen: Blood Updated: 06/04/25 0358     Protime 12.7 Seconds      INR 0.96    POC Lactate [[id number]]  (Abnormal) Collected: 06/04/25 0246    Specimen: Blood Updated: 06/04/25 0247     Lactate 3.4 mmol/L      Comment: Serial Number: [id number]:  [id number]       CBC & Differential [[id number]]  (Abnormal) Collected: 06/04/25 0126    Specimen:  Blood Updated: 06/04/25 0236    Narrative:      The following orders were created for panel order CBC & Differential.  Procedure                               Abnormality         Status                     ---------                               -----------         ------                     CBC Auto Differential[[id number]]        Abnormal            Final result               Scan Slide[[id number]]                                       Final result                 Please view results for these tests on the individual orders.    CBC Auto Differential [[id number]]  (Abnormal) Collected: 06/04/25 0126    Specimen: Blood Updated: 06/04/25 0236     WBC 13.12 10*3/mm3      RBC 4.74 10*6/mm3      Hemoglobin 15.0 g/dL      Hematocrit 46.9 %      MCV 98.9 fL      MCH 31.6 pg      MCHC 32.0 g/dL      RDW 14.0 %      RDW-SD 51.8 fl      MPV 10.4 fL      Platelets 218 10*3/mm3     Narrative:      The previously reported component NRBC is no longer being reported. Previous result was 0.0 /100 WBC (Reference Range: 0.0-0.2 /100 WBC) on 6/4/2025 at 0202 EDT.    Scan Slide [[id number]] Collected: 06/04/25 0126    Specimen: Blood Updated: 06/04/25 0236     Scan Slide --     Comment: See Manual Differential Results       Manual Differential [[id number]]  (Abnormal) Collected: 06/04/25 0126    Specimen: Blood Updated: 06/04/25 0236     Neutrophil % 66.0 %      Lymphocyte % 25.0 %      Monocyte % 6.0 %      Eosinophil % 1.0 %      Atypical Lymphocyte % 2.0 %      Neutrophils Absolute 8.66 10*3/mm3      Lymphocytes Absolute 3.54 10*3/mm3      Monocytes Absolute 0.79 10*3/mm3      Eosinophils Absolute 0.13 10*3/mm3      RBC Morphology Normal     Toxic Granulation Slight/1+     Platelet Morphology Normal    Comprehensive Metabolic Panel [[id number]]  (Abnormal) Collected: 06/04/25 0126    Specimen: Blood Updated: 06/04/25 0209     Glucose 98 mg/dL      BUN 16.6 mg/dL      Creatinine 0.71 mg/dL      Sodium 139 mmol/L      Potassium 3.9 mmol/L       Comment: Specimen hemolyzed.  Result may be falsely elevated.        Chloride 100 mmol/L      CO2 21.2 mmol/L      Calcium 9.3 mg/dL      Total Protein 7.3 g/dL      Albumin 4.1 g/dL      ALT (SGPT) 18 U/L      AST (SGOT) 24 U/L      Alkaline Phosphatase 95 U/L      Total Bilirubin <0.2 mg/dL      Globulin 3.2 gm/dL      A/G Ratio 1.3 g/dL      BUN/Creatinine Ratio 23.4     Anion Gap 17.8 mmol/L      eGFR 96.3 mL/min/1.73     Narrative:      GFR Categories in Chronic Kidney Disease (CKD)              GFR Category          GFR (mL/min/1.73)    Interpretation  G1                    90 or greater        Normal or high (1)  G2                    60-89                Mild decrease (1)  G3a                   45-59                Mild to moderate decrease  G3b                   30-44                Moderate to severe decrease  G4                    15-29                Severe decrease  G5                    14 or less           Kidney failure    (1)In the absence of evidence of kidney disease, neither GFR category G1 or G2 fulfill the criteria for CKD.    eGFR calculation 2021 CKD-EPI creatinine equation, which does not include race as a factor    Lipase [[id number]]  (Normal) Collected: 06/04/25 0126    Specimen: Blood Updated: 06/04/25 0158     Lipase 46 U/L     D-dimer, Quantitative [[id number]]  (Abnormal) Collected: 06/04/25 0126    Specimen: Blood Updated: 06/04/25 0142     D-Dimer, Quantitative 1.16 MCGFEU/mL     Narrative:      According to the assay 's published package insert, a normal (<0.50 MCGFEU/mL) D-dimer result in conjunction with a non-high clinical probability assessment, excludes deep vein thrombosis (DVT) and pulmonary embolism (PE) with high sensitivity.    D-dimer values increase with age and this can make VTE exclusion of an older population difficult. To address this, the [employer], based on best available evidence and recent guidelines, recommends that clinicians  "use age-adjusted D-dimer thresholds in patients greater than 50 years of age with: a) a low probability of PE who do not meet all Pulmonary Embolism Rule Out Criteria, or b) in those with intermediate probability of PE.   The formula for an age-adjusted D-dimer cut-off is \"age/100\".  For example, a 60 year old patient would have an age-adjusted cut-off of 0.60 MCGFEU/mL and an 80 year old 0.80 MCGFEU/mL.    Urinalysis With Culture If Indicated - Urine, Clean Catch [[id number]]  (Abnormal) Collected: 06/04/25 0110    Specimen: Urine, Clean Catch Updated: 06/04/25 0133     Color, UA Yellow     Appearance, UA Clear     pH, UA <=5.0     Specific Gravity, UA 1.046     Glucose, UA >=1000 mg/dL (3+)     Ketones, UA 15 mg/dL (1+)     Bilirubin, UA Negative     Blood, UA Negative     Protein, UA Negative     Leuk Esterase, UA Negative     Nitrite, UA Negative     Urobilinogen, UA 1.0 E.U./dL    Narrative:      In absence of clinical symptoms, the presence of pyuria, bacteria, and/or nitrites on the urinalysis result does not correlate with infection.  Urine microscopic not indicated.             Pending Results:     Imaging Reviewed:   CT Abdomen Pelvis With Contrast  Result Date: 6/4/2025  Impression: 1.Fatty infiltration of the liver. 2.Multiple uterine fibroids. 3.No acute abdominal or pelvic abnormality. Electronically Signed: Oswaldo Cook MD  6/4/2025 3:03 AM EDT  Workstation ID: [id number]    CT Angiogram Chest Pulmonary Embolism  Result Date: 6/4/2025  Impression: 1.Right lower lobe segmental and subsegmental pulmonary arterial branch emboli. Nonocclusive left lower lobe subsegmental pulmonary arterial branch embolus. 2.Mild interstitial edema. Electronically Signed: Oswaldo Cook MD  6/4/2025 3:01 AM EDT  Workstation ID: [id number]    XR Chest 2 View  Result Date: 6/4/2025  Impression: No active disease. Electronically Signed: Oswaldo Cook MD  6/4/2025 2:12 AM EDT  Workstation ID: [id number]           Assessment & Plan "   ASSESSMENT  Bilateral pulmonary emboli.  Cardiology has evaluated and does not recommend thrombectomy at this time.  She has transitioned from heparin to apixaban.  Will check lupus anticoagulant, beta-2 glycoprotein, and cardiolipin antibodies as that could change anticoagulation recommendations. Will defer hypercoagulable workup  (as outpatient) as this can be affected by acute VTE and anticoagulation. She will need at least 3 months of anticoagulation therapy. Although with the unprovoked nature and extent of the clot she may need to continue for a longer amount of time.  Leukocytosis is likely reactive  COPD and tobacco use.  Recommend cessation.  Recommend continued follow-up with PCP for age-appropriate screenings.    PLAN  As above  Further recommendations per Dr. Gonzalez    Above note was prepared for Dr. Maury Gonzalez -physical exam and review of systems were performed by physician.       Electronically signed by Radha Smith PA-C, 25: I was asked to see Ms. Dubon who came to the hospital complaining of pain in the right side of the chest.  At first this seemed to be the result of muscle strain but the pain persisted and became progressively more intense.  At the time of the admission imaging studies revealed right lower lobe segmental and subsegmental pulmonary embolism as well as left lower lobe subsegmental embolism.  The CT of the abdomen was unremarkable.  Duplex ultrasound of the lower extremities revealed no evidence of thrombosis.  She has no previous history of thrombosis.  Both her parents have  of complications of SARS-COV 2 infection.  She has been a smoker from a young age and consumes at least a pack daily.  She has a history of diabetes and describes also history of arthritis that results in poor mobility.  She has been disabled for some time because of this.  On exam she is a chronically ill-appearing woman who seems much older than the stated age.  She is  alert, conversant and oriented.  There is no jaundice or pallor.  She is edentulous but has no oral lesions.  There is no jugular vein distention and no palpable lymphadenopathy.  The lungs are markedly diminished bilaterally.  The heart is regular and tachycardic.  Abdomen is rounded and soft.  There is no edema.  I have requested lupus anticoagulant as well as anticardiolipin and antibeta 2 glycoprotein antibodies as this would require different anticoagulation.  However, for now, it is reasonable to continue with apixaban and to discharge her with this medication.  I have reviewed and discussed the records with Ms. Colby MARR.  I concur with her note.  I formulated the analysis and the plans.    Maury Gonzalez MD on 6/5/2025 at 1837.

## 2025-06-06 ENCOUNTER — READMISSION MANAGEMENT (OUTPATIENT)
Dept: CALL CENTER | Facility: HOSPITAL | Age: 65
End: 2025-06-06
Payer: MEDICARE

## 2025-06-06 VITALS
SYSTOLIC BLOOD PRESSURE: 123 MMHG | TEMPERATURE: 97.8 F | OXYGEN SATURATION: 98 % | HEART RATE: 89 BPM | BODY MASS INDEX: 35.94 KG/M2 | RESPIRATION RATE: 20 BRPM | DIASTOLIC BLOOD PRESSURE: 65 MMHG | HEIGHT: 64 IN | WEIGHT: 210.54 LBS

## 2025-06-06 LAB
ANION GAP SERPL CALCULATED.3IONS-SCNC: 11.1 MMOL/L (ref 5–15)
B2 GLYCOPROT1 IGA SER-ACNC: <9 GPI IGA UNITS (ref 0–25)
B2 GLYCOPROT1 IGG SER-ACNC: <9 GPI IGG UNITS (ref 0–20)
B2 GLYCOPROT1 IGM SER-ACNC: <9 GPI IGM UNITS (ref 0–32)
BUN SERPL-MCNC: 27.4 MG/DL (ref 8–23)
BUN/CREAT SERPL: 44.9 (ref 7–25)
CALCIUM SPEC-SCNC: 8.6 MG/DL (ref 8.6–10.5)
CHLORIDE SERPL-SCNC: 102 MMOL/L (ref 98–107)
CO2 SERPL-SCNC: 24.9 MMOL/L (ref 22–29)
CREAT SERPL-MCNC: 0.61 MG/DL (ref 0.57–1)
DEPRECATED RDW RBC AUTO: 51.9 FL (ref 37–54)
EGFRCR SERPLBLD CKD-EPI 2021: 101.2 ML/MIN/1.73
ERYTHROCYTE [DISTWIDTH] IN BLOOD BY AUTOMATED COUNT: 14.1 % (ref 12.3–15.4)
GLUCOSE BLDC GLUCOMTR-MCNC: 146 MG/DL (ref 70–105)
GLUCOSE SERPL-MCNC: 156 MG/DL (ref 65–99)
HCT VFR BLD AUTO: 42.9 % (ref 34–46.6)
HGB BLD-MCNC: 13.4 G/DL (ref 12–15.9)
MCH RBC QN AUTO: 31 PG (ref 26.6–33)
MCHC RBC AUTO-ENTMCNC: 31.2 G/DL (ref 31.5–35.7)
MCV RBC AUTO: 99.3 FL (ref 79–97)
PLATELET # BLD AUTO: 226 10*3/MM3 (ref 140–450)
PMV BLD AUTO: 10.2 FL (ref 6–12)
POTASSIUM SERPL-SCNC: 4.1 MMOL/L (ref 3.5–5.2)
RBC # BLD AUTO: 4.32 10*6/MM3 (ref 3.77–5.28)
SODIUM SERPL-SCNC: 138 MMOL/L (ref 136–145)
WBC NRBC COR # BLD AUTO: 13.66 10*3/MM3 (ref 3.4–10.8)

## 2025-06-06 PROCEDURE — 94799 UNLISTED PULMONARY SVC/PX: CPT

## 2025-06-06 PROCEDURE — 25010000002 FUROSEMIDE PER 20 MG: Performed by: INTERNAL MEDICINE

## 2025-06-06 PROCEDURE — 80048 BASIC METABOLIC PNL TOTAL CA: CPT

## 2025-06-06 PROCEDURE — 82948 REAGENT STRIP/BLOOD GLUCOSE: CPT

## 2025-06-06 PROCEDURE — 85027 COMPLETE CBC AUTOMATED: CPT

## 2025-06-06 PROCEDURE — 63710000001 REVEFENACIN 175 MCG/3ML SOLUTION

## 2025-06-06 PROCEDURE — 94660 CPAP INITIATION&MGMT: CPT

## 2025-06-06 PROCEDURE — G0378 HOSPITAL OBSERVATION PER HR: HCPCS

## 2025-06-06 PROCEDURE — 94761 N-INVAS EAR/PLS OXIMETRY MLT: CPT

## 2025-06-06 PROCEDURE — 96375 TX/PRO/DX INJ NEW DRUG ADDON: CPT

## 2025-06-06 PROCEDURE — 94664 DEMO&/EVAL PT USE INHALER: CPT

## 2025-06-06 RX ORDER — FUROSEMIDE 10 MG/ML
40 INJECTION INTRAMUSCULAR; INTRAVENOUS ONCE
Status: COMPLETED | OUTPATIENT
Start: 2025-06-06 | End: 2025-06-06

## 2025-06-06 RX ADMIN — PANTOPRAZOLE SODIUM 40 MG: 40 TABLET, DELAYED RELEASE ORAL at 10:37

## 2025-06-06 RX ADMIN — VILAZODONE HYDROCHLORIDE 20 MG: 10 TABLET, FILM COATED ORAL at 10:37

## 2025-06-06 RX ADMIN — REVEFENACIN 175 MCG: 175 SOLUTION RESPIRATORY (INHALATION) at 06:49

## 2025-06-06 RX ADMIN — FUROSEMIDE 40 MG: 10 INJECTION, SOLUTION INTRAMUSCULAR; INTRAVENOUS at 10:36

## 2025-06-06 RX ADMIN — Medication 10 ML: at 10:38

## 2025-06-06 RX ADMIN — LOSARTAN POTASSIUM 100 MG: 50 TABLET, FILM COATED ORAL at 10:37

## 2025-06-06 RX ADMIN — NICOTINE 1 PATCH: 21 PATCH TRANSDERMAL at 10:36

## 2025-06-06 RX ADMIN — HYDROCHLOROTHIAZIDE 25 MG: 25 TABLET ORAL at 10:37

## 2025-06-06 RX ADMIN — APIXABAN 10 MG: 5 TABLET, FILM COATED ORAL at 10:36

## 2025-06-06 RX ADMIN — BUDESONIDE AND FORMOTEROL FUMARATE DIHYDRATE 2 PUFF: 160; 4.5 AEROSOL RESPIRATORY (INHALATION) at 06:55

## 2025-06-06 RX ADMIN — ATORVASTATIN CALCIUM 40 MG: 40 TABLET, FILM COATED ORAL at 10:36

## 2025-06-06 RX ADMIN — AMLODIPINE BESYLATE 5 MG: 5 TABLET ORAL at 10:37

## 2025-06-06 NOTE — DISCHARGE SUMMARY
"[facility] Medicine Services  Discharge Summary    Date of Service: 2025  Patient Name: Carli Dubon  : 1960  MRN: [id number]    Date of Admission: 2025  Discharge Diagnosis: Multiple pulmonary emboli  Date of Discharge: 2025  Primary Care Physician: Cindy Huddleston APRN    Presenting Problem:   Bilateral pulmonary embolism [I26.99]  Right lower quadrant abdominal pain [R10.31]  Multiple pulmonary emboli [I26.99]  Acute pain of right shoulder [M25.511]    Active and Resolved Hospital Problems:  Active Hospital Problems    Diagnosis POA    **Multiple pulmonary emboli [I26.99] Yes      Resolved Hospital Problems   No resolved problems to display.         Hospital Course     HPI:  \"Carli Dubon is a 64 y.o. female with a previous medical history of COPD, Tobacco Abuse, DM, HTN, and Bipolar Disorder who presented to [facility] on 2025 with complaints of right shoulder and back pain that radiated to her right upper abdomen for the past 4 days. She treated her pain at home with Tylenol but it has continued to worsen.  She denies chest pain, shortness of breath, dizziness, or palpitations.  She denies any recent travel.  She did report some BLE edema a few weeks ago that has resolved. She denies trauma, weight loss.       In the ED, D-dimer 1.16, Lactate 3.4, WBC 13.  CTA showed RLL segmental and subsegmental pulmonary arterial branch emboli with nonocclusive LLL subsegmental embolus, cardiac chambers were WNL.  She was slightly tachycardic on arrival at 109, this had improved to 98 on my exam.  She is afebrile, not hypoxic, SPO2 93% on RA, patient is able to complete sentences, has no complaints of shortness of breath.  BP is stable.  Hospitalist was consulted for further management.\"    Hospital Course:  Patient was admitted to the hospital and started on IV heparin.  Cardiology was consulted for the need of thrombectomy.  Cardiology recommended that patient does " not need thrombectomy and 2D echo was obtained which showed patient with small pericardial effusion normal right ventricular size and systolic function.   Left ventricular systolic function is low normal. Calculated left ventricular EF = 50% Left ventricular ejection fraction appears to be 51 - 55%.    The left atrial cavity is dilated.    Left atrial volume is mildly increased.    Saline test results are negative.    The right atrial cavity is borderline dilated.    There is a small (<1cm) pericardial effusion. There is no evidence of cardiac tamponade.  Patient's heparin was transitioned to Eliquis 10 mg p.o. twice daily for 7 days and then 5 mg twice daily for at least 6 months duration will be decided by hematology after the workup.   hematology was consulted for hypercoagulable workup.     Leukocytosis most likely reactive.  Chest x-ray negative patient afebrile  Patient stable for discharge and follow-up with PCP and hematology as an outpatient  DISCHARGE Follow Up Recommendations for labs and diagnostics:   Follow-up with PCP and hematology    Reasons For Change In Medications and Indications for New Medications:  Apixaban 10 mg p.o. twice daily for 7 days and then 5 mg twice daily     Day of Discharge     Vital Signs:       Physical Exam:  Vitals and nursing note reviewed.   Constitutional:       Appearance: Normal appearance.   HENT:      Head: Normocephalic and atraumatic.   Cardiovascular:      Rate and Rhythm: Normal rate and rhythm.      Heart sounds: Normal heart sounds.   Pulmonary:      Effort: Pulmonary effort is normal.      Breath sounds: Decreased breath sounds.   Abdominal:      Palpations: Abdomen is soft.   Musculoskeletal:      Cervical back: Neck supple.   Neurological:      Mental Status: Mental status is at baseline.     Pertinent  and/or Most Recent Results     LAB RESULTS:      Lab 06/06/25  0302 06/05/25  0939 06/05/25  0301 06/04/25  2108 06/04/25  1447 06/04/25  1206 06/04/25  0811  06/04/25  0542 06/04/25  0542 06/04/25  0246 06/04/25  0126   WBC 13.66*  --  12.47*  --   --   --   --   --   --   --  13.12*   HEMOGLOBIN 13.4  --  13.6  --   --   --   --   --   --   --  15.0   HEMATOCRIT 42.9  --  42.3  --   --   --   --   --   --   --  46.9*   PLATELETS 226  --  213  --   --   --   --   --   --   --  218   NEUTROS ABS  --   --   --   --   --   --   --   --   --   --  8.66*   EOS ABS  --   --   --   --   --   --   --   --   --   --  0.13   MCV 99.3*  --  97.7*  --   --   --   --   --   --   --  98.9*   LACTATE  --   --   --   --   --  2.5* 2.6*  --  2.6* 3.4*  --    PROTIME  --   --   --   --   --   --   --   --   --   --  12.7   HEPARIN ANTI-XA  --  0.73* 0.61 0.19* 0.20* 1.09*  --    < > <0.10*  --   --    D DIMER QUANT  --   --   --   --   --   --   --   --   --   --  1.16*    < > = values in this interval not displayed.         Lab 06/06/25  0302 06/05/25  0301 06/04/25  0542 06/04/25 0126   SODIUM 138 138 137 139   POTASSIUM 4.1 4.3 4.8 3.9   CHLORIDE 102 101 101 100   CO2 24.9 25.7 23.2 21.2*   ANION GAP 11.1 11.3 12.8 17.8*   BUN 27.4* 22.3 17.2 16.6   CREATININE 0.61 0.62 0.74 0.71   EGFR 101.2 100.8 91.6 96.3   GLUCOSE 156* 226* 253* 98   CALCIUM 8.6 8.8 9.0 9.3         Lab 06/05/25  1601 06/04/25  0126   TOTAL PROTEIN  --  7.3   ALBUMIN  --  4.1   GLOBULIN  --  3.2   ALT (SGPT)  --  18   AST (SGOT)  --  24   BILIRUBIN  --  <0.2   BETA 2 GLYCO 1 IGG <9  --    BETA 2 GLYCO 1 IGA <9  --    BETA 2 GLYCO 1 IGM <9  --    ALK PHOS  --  95   LIPASE  --  46         Lab 06/04/25  0811 06/04/25  0542 06/04/25  0126   PROBNP  --  205.0  --    HSTROP T 7 8  --    PROTIME  --   --  12.7   INR  --   --  0.96                 Brief Urine Lab Results  (Last result in the past 365 days)        Color   Clarity   Blood   Leuk Est   Nitrite   Protein   CREAT   Urine HCG        06/04/25 0110 Yellow   Clear   Negative   Negative   Negative   Negative                 Microbiology Results (last 10 days)        Procedure Component Value - Date/Time    Blood Culture - Blood, Arm, Right [[id number]]  (Normal) Collected: 06/04/25 0347    Lab Status: Preliminary result Specimen: Blood from Arm, Right Updated: 06/07/25 0400     Blood Culture No growth at 3 days    Blood Culture - Blood, Arm, Left [[id number]]  (Normal) Collected: 06/04/25 0246    Lab Status: Preliminary result Specimen: Blood from Arm, Left Updated: 06/07/25 0300     Blood Culture No growth at 3 days            CT Abdomen Pelvis With Contrast  Result Date: 6/4/2025  Impression: Impression: 1.Fatty infiltration of the liver. 2.Multiple uterine fibroids. 3.No acute abdominal or pelvic abnormality. Electronically Signed: Oswaldo Cook MD  6/4/2025 3:03 AM EDT  Workstation ID: [id number]    CT Angiogram Chest Pulmonary Embolism  Result Date: 6/4/2025  Impression: Impression: 1.Right lower lobe segmental and subsegmental pulmonary arterial branch emboli. Nonocclusive left lower lobe subsegmental pulmonary arterial branch embolus. 2.Mild interstitial edema. Electronically Signed: Oswaldo Cook MD  6/4/2025 3:01 AM EDT  Workstation ID: [id number]    XR Chest 2 View  Result Date: 6/4/2025  Impression: Impression: No active disease. Electronically Signed: Oswaldo Cook MD  6/4/2025 2:12 AM EDT  Workstation ID: [id number]      Results for orders placed during the hospital encounter of 06/04/25    Duplex Venous Lower Extremity - Bilateral CAR    Interpretation Summary    Normal bilateral lower extremity venous duplex scan.      Results for orders placed during the hospital encounter of 06/04/25    Duplex Venous Lower Extremity - Bilateral CAR    Interpretation Summary    Normal bilateral lower extremity venous duplex scan.      Results for orders placed during the hospital encounter of 06/04/25    Adult Transthoracic Echo Complete W/ Cont if Necessary Per Protocol    Interpretation Summary    Left ventricular systolic function is low normal. Calculated left ventricular EF = 50% Left  "ventricular ejection fraction appears to be 51 - 55%.    The left atrial cavity is dilated.    Left atrial volume is mildly increased.    Saline test results are negative.    The right atrial cavity is borderline dilated.    There is a small (<1cm) pericardial effusion. There is no evidence of cardiac tamponade.      Labs Pending at Discharge:  Pending Results       Procedure [Order ID] Specimen - Date/Time    Anticardiolipin Antibody, IgG / M, Qn [[id number]] Collected: 06/05/25 1601    Specimen: Blood from Arm, Left Updated: 06/05/25 1607            Procedures Performed         Consults:   Consults       Date and Time Order Name Status Description    6/5/2025 10:42 AM Hematology & Oncology Inpatient Consult Completed     6/4/2025  3:45 AM Inpatient Cardiology Consult Completed             Discharge Details        Discharge Medications        New Medications        Instructions Start Date   Eliquis 5 MG tablet tablet  Generic drug: apixaban   Take 2 tablets by mouth 2 (Two) Times a Day for 6 days, THEN 1 tablet 2 (Two) Times a Day for 30 days. Indications: DVT/PE (active thrombosis)   Start Date: June 6, 2025     nicotine polacrilex 2 MG gum  Commonly known as: NICORETTE   2 mg, Mouth/Throat, Every 1 Hour PRN, Maximum 24 Pieces in 24 hours. Gum Should be Chewed Until it Tingles, Then \"Park\" Between Gums & Cheek.  When Tingling is Gone, Chew Again Until Tingling Returns & Then \"Park\" Between Gums & Cheek Repeat Until Tingling is No Longer Experienced & Then Discard Follow Other Instructions on Package             Continue These Medications        Instructions Start Date   albuterol sulfate  (90 Base) MCG/ACT inhaler  Commonly known as: PROVENTIL HFA;VENTOLIN HFA;PROAIR HFA   2 puffs, Inhalation, Every 4 Hours PRN      albuterol 1.25 MG/3ML nebulizer solution  Commonly known as: ACCUNEB   1.25 mg, Nebulization, Every 6 Hours PRN, Dispense as 1 box of unit dose      amitriptyline 150 MG tablet  Commonly known " as: ELAVIL   150 mg, Oral, Nightly      amLODIPine 5 MG tablet  Commonly known as: NORVASC   5 mg, Oral, Daily      atorvastatin 40 MG tablet  Commonly known as: LIPITOR   40 mg, Oral, Daily      benzonatate 200 MG capsule  Commonly known as: TESSALON   200 mg, Oral, 3 Times Daily PRN      clonazePAM 0.5 MG tablet  Commonly known as: KlonoPIN   0.5 mg, Oral, 2 Times Daily PRN, for anxiety      divalproex 500 MG 24 hr tablet  Commonly known as: DEPAKOTE ER   2,000 mg, Nightly      empagliflozin 25 MG tablet tablet  Commonly known as: Jardiance   25 mg, Oral, Daily      fluticasone 50 MCG/ACT nasal spray  Commonly known as: FLONASE   2 sprays, Nasal, Daily      glipizide 10 MG tablet  Commonly known as: GLUCOTROL   10 mg, Oral, 2 Times Daily Before Meals      hydroCHLOROthiazide 25 MG tablet   25 mg, Oral, Daily      losartan 100 MG tablet  Commonly known as: COZAAR   100 mg, Oral, Daily      metFORMIN 1000 MG tablet  Commonly known as: GLUCOPHAGE   1,000 mg, Oral, 2 Times Daily With Meals      omeprazole 40 MG capsule  Commonly known as: priLOSEC   40 mg, Oral, Daily      ondansetron ODT 4 MG disintegrating tablet  Commonly known as: ZOFRAN-ODT   4 mg, Oral, 4 Times Daily PRN      primidone 50 MG tablet  Commonly known as: MYSOLINE   100 mg, Nightly      Trelegy Ellipta 200-62.5-25 MCG/ACT inhaler  Generic drug: Fluticasone-Umeclidin-Vilant   1 puff, Daily      vilazodone 20 MG tablet tablet  Commonly known as: VIIBRYD   20 mg, Oral, Daily             Stop These Medications      zolpidem 10 MG tablet  Commonly known as: AMBIEN              Allergies   Allergen Reactions    Iodine Swelling    Adhesive Tape Rash       Discharge Disposition:   Home or Self Care    Diet:  Diet Instructions       Diet: Cardiac Diets, Diabetic Diets; Low Sodium (2g); Regular (IDDSI 7); Thin (IDDSI 0); Consistent Carbohydrate      Discharge Diet:  Cardiac Diets  Diabetic Diets       Cardiac Diet: Low Sodium (2g)    Texture: Regular (IDDSI  7)    Fluid Consistency: Thin (IDDSI 0)    Diabetic Diet: Consistent Carbohydrate            Discharge Activity:   Activity Instructions       Activity as Tolerated              CODE STATUS:  Code Status and Medical Interventions: CPR (Attempt to Resuscitate); Full Support   Ordered at: 06/04/25 0346     Code Status (Patient has no pulse and is not breathing):    CPR (Attempt to Resuscitate)     Medical Interventions (Patient has pulse or is breathing):    Full Support       Future Appointments   Date Time Provider Department Center   6/10/2025  8:00 PM [facility] SLEEP ROOM 1 [facility] SLEEP [place]   7/1/2025 10:00 AM Cindy Huddleston APRN [facility] [place]   7/3/2025  3:20 PM Ryan Bingham MD [facility] [place] [facility] None   7/7/2025 10:00 AM Mely Holt APRN [facility] [place]   7/18/2025 11:45 AM LAB MD  [facility] NA  [facility] [place]   7/18/2025 12:00 PM Maury Gonzalez MD [facility] [place]   8/7/2025  4:00 PM Varsha Mendoza APRN [facility] [place]   8/13/2025  2:40 PM Rhett Ulrich MD [facility] [place]   9/18/2025  3:30 PM Kitty Carlos PA-C [facility]       Additional Instructions for the Follow-ups that You Need to Schedule       Discharge Follow-up with PCP   As directed       Currently Documented PCP:    Cindy Huddleston APRN    PCP Phone Number:    [phone]     Follow Up Details: 2-3 d        Discharge Follow-up with Specified Provider: cardio; 1 Month   As directed      To: cardio   Follow Up: 1 Month   Follow Up Details: f/u on PE        Discharge Follow-up with Specified Provider: hematology; 1 Month   As directed      To: hematology   Follow Up: 1 Month   Follow Up Details: Follow-up pulmonary embolism and hypercoagulable workup                Time spent on Discharge including face to face service:  >30 minutes    Signature: Electronically signed by Manuel Danielle MD, 06/07/25, 12:01 EDT.  [facility] Hospitalist Team

## 2025-06-06 NOTE — OUTREACH NOTE
Prep Survey      Flowsheet Row Responses   [facility] patient discharged from? Shock   Is LACE score < 7 ? No   Eligibility [facility]   Date of Admission 06/04/25   Date of Discharge 06/06/25   Discharge diagnosis Multiple pulmonary emboli   Does the patient have one of the following disease processes/diagnoses(primary or secondary)? Other   Prep survey completed? Yes            Aylin SALAZAR - Registered Nurse

## 2025-06-06 NOTE — NURSING NOTE
Pt family voiced concerns about pt glucose being elevated. Pt has been non-compliant with checking her glucose at home, leaving family anxious about pt possibly being in a diabetic coma. Spoke with family and pt about the risk and of not checking her glucose at home and education was provided. Will send message to day-shift team about the families concerns.

## 2025-06-07 LAB
APTT SCREEN TO CONFIRM RATIO: 1.04 RATIO (ref 0–1.34)
CARDIOLIPIN IGG SER IA-ACNC: <9 GPL U/ML (ref 0–14)
CARDIOLIPIN IGM SER IA-ACNC: <9 MPL U/ML (ref 0–12)
CONFIRM APTT/NORMAL: 43.7 SEC (ref 0–47.6)
DRVVT SCREEN TO CONFIRM RATIO: 0.9 RATIO (ref 0.8–1.2)
LA 2 SCREEN W REFLEX-IMP: ABNORMAL
MIXING DRVVT: 48 SEC (ref 0–40.4)
SCREEN APTT: 32.4 SEC (ref 0–43.5)
SCREEN DRVVT: 65.3 SEC (ref 0–47)
THROMBIN TIME: 19.8 SEC (ref 0–23)

## 2025-06-09 ENCOUNTER — TRANSITIONAL CARE MANAGEMENT TELEPHONE ENCOUNTER (OUTPATIENT)
Dept: CALL CENTER | Facility: HOSPITAL | Age: 65
End: 2025-06-09
Payer: MEDICARE

## 2025-06-09 ENCOUNTER — TELEPHONE (OUTPATIENT)
Dept: DIABETES SERVICES | Facility: HOSPITAL | Age: 65
End: 2025-06-09
Payer: MEDICARE

## 2025-06-09 LAB
BACTERIA SPEC AEROBE CULT: NORMAL
BACTERIA SPEC AEROBE CULT: NORMAL

## 2025-06-09 NOTE — OUTREACH NOTE
Call Center TCM Note      Flowsheet Row Responses   [facility] patient discharged from? [facility]   Does the patient have one of the following disease processes/diagnoses(primary or secondary)? Other   TCM attempt successful? No   Unsuccessful attempts Attempt 2            LEONEL Araujo Medical Assistant    6/9/2025, 16:02 EDT

## 2025-06-09 NOTE — OUTREACH NOTE
Call Center TCM Note      Flowsheet Row Responses   [facility] patient discharged from? [facility]   Does the patient have one of the following disease processes/diagnoses(primary or secondary)? Other   TCM attempt successful? No   Unsuccessful attempts Attempt 1            LEONEL Araujo Medical Assistant    6/9/2025, 10:19 EDT

## 2025-06-09 NOTE — CASE MANAGEMENT/SOCIAL WORK
Case Management Discharge Note      Final Note: routine home         Selected Continued Care - Discharged on 6/6/2025 Admission date: 6/4/2025 - Discharge disposition: Home or Self Care       Transportation Services  Private: Car    Final Discharge Disposition Code: 01 - home or self-care

## 2025-06-10 ENCOUNTER — HOSPITAL ENCOUNTER (OUTPATIENT)
Dept: SLEEP MEDICINE | Facility: HOSPITAL | Age: 65
Discharge: HOME | End: 2025-06-10
Admitting: INTERNAL MEDICINE
Payer: MEDICARE

## 2025-06-10 ENCOUNTER — TELEPHONE (OUTPATIENT)
Dept: DIABETES SERVICES | Facility: HOSPITAL | Age: 65
End: 2025-06-10
Payer: MEDICARE

## 2025-06-10 ENCOUNTER — TRANSITIONAL CARE MANAGEMENT TELEPHONE ENCOUNTER (OUTPATIENT)
Dept: CALL CENTER | Facility: HOSPITAL | Age: 65
End: 2025-06-10
Payer: MEDICARE

## 2025-06-10 DIAGNOSIS — G47.33 OBSTRUCTIVE SLEEP APNEA: ICD-10-CM

## 2025-06-10 PROCEDURE — 95811 POLYSOM 6/>YRS CPAP 4/> PARM: CPT

## 2025-06-10 NOTE — OUTREACH NOTE
Call Center TCM Note      Flowsheet Row Responses   [facility] facility patient discharged from? [facility]   Does the patient have one of the following disease processes/diagnoses(primary or secondary)? Other   TCM attempt successful? No   Unsuccessful attempts Attempt 3            GABRIELLE Araujo Registered Nurse    6/10/2025, 09:05 EDT

## 2025-06-10 NOTE — TELEPHONE ENCOUNTER
Pt states she did not receive Accuchek Guide meter, test strips and lancets from the pharmacy. Discussed with pt that rxs were not sent to local pharmacy. Pt states she will contact PCP and have them send in the rxs. Pt states she does not have additional questions at this time.

## 2025-06-11 VITALS — WEIGHT: 210.54 LBS | HEIGHT: 64 IN | BODY MASS INDEX: 35.94 KG/M2

## 2025-06-22 DIAGNOSIS — I10 HYPERTENSION, UNSPECIFIED TYPE: ICD-10-CM

## 2025-06-22 RX ORDER — HYDROCHLOROTHIAZIDE 25 MG/1
25 TABLET ORAL DAILY
Qty: 30 TABLET | Refills: 0 | Status: SHIPPED | OUTPATIENT
Start: 2025-06-22

## 2025-06-26 ENCOUNTER — HOSPITAL ENCOUNTER (EMERGENCY)
Facility: HOSPITAL | Age: 65
Discharge: HOME | End: 2025-06-26
Admitting: EMERGENCY MEDICINE
Payer: MEDICARE

## 2025-06-26 ENCOUNTER — APPOINTMENT (OUTPATIENT)
Dept: CT IMAGING | Facility: HOSPITAL | Age: 65
End: 2025-06-26
Payer: MEDICARE

## 2025-06-26 VITALS
DIASTOLIC BLOOD PRESSURE: 81 MMHG | BODY MASS INDEX: 35.26 KG/M2 | TEMPERATURE: 97.7 F | SYSTOLIC BLOOD PRESSURE: 154 MMHG | RESPIRATION RATE: 20 BRPM | OXYGEN SATURATION: 95 % | WEIGHT: 211.64 LBS | HEIGHT: 65 IN | HEART RATE: 88 BPM

## 2025-06-26 DIAGNOSIS — S46.811A STRAIN OF RIGHT TRAPEZIUS MUSCLE, INITIAL ENCOUNTER: Primary | ICD-10-CM

## 2025-06-26 LAB
ANION GAP SERPL CALCULATED.3IONS-SCNC: 13.1 MMOL/L (ref 5–15)
BASOPHILS # BLD AUTO: 0.03 10*3/MM3 (ref 0–0.2)
BASOPHILS NFR BLD AUTO: 0.4 % (ref 0–1.5)
BUN SERPL-MCNC: 20.6 MG/DL (ref 8–23)
BUN/CREAT SERPL: 27.8 (ref 7–25)
CALCIUM SPEC-SCNC: 9.9 MG/DL (ref 8.6–10.5)
CHLORIDE SERPL-SCNC: 99 MMOL/L (ref 98–107)
CO2 SERPL-SCNC: 25.9 MMOL/L (ref 22–29)
CREAT SERPL-MCNC: 0.74 MG/DL (ref 0.57–1)
DEPRECATED RDW RBC AUTO: 49.8 FL (ref 37–54)
EGFRCR SERPLBLD CKD-EPI 2021: 91.6 ML/MIN/1.73
EOSINOPHIL # BLD AUTO: 0.14 10*3/MM3 (ref 0–0.4)
EOSINOPHIL NFR BLD AUTO: 1.8 % (ref 0.3–6.2)
ERYTHROCYTE [DISTWIDTH] IN BLOOD BY AUTOMATED COUNT: 13.5 % (ref 12.3–15.4)
GLUCOSE SERPL-MCNC: 212 MG/DL (ref 65–99)
HCT VFR BLD AUTO: 45.1 % (ref 34–46.6)
HGB BLD-MCNC: 14.2 G/DL (ref 12–15.9)
HOLD SPECIMEN: NORMAL
IMM GRANULOCYTES # BLD AUTO: 0.04 10*3/MM3 (ref 0–0.05)
IMM GRANULOCYTES NFR BLD AUTO: 0.5 % (ref 0–0.5)
LYMPHOCYTES # BLD AUTO: 3.37 10*3/MM3 (ref 0.7–3.1)
LYMPHOCYTES NFR BLD AUTO: 42.9 % (ref 19.6–45.3)
MCH RBC QN AUTO: 31.2 PG (ref 26.6–33)
MCHC RBC AUTO-ENTMCNC: 31.5 G/DL (ref 31.5–35.7)
MCV RBC AUTO: 99.1 FL (ref 79–97)
MONOCYTES # BLD AUTO: 0.57 10*3/MM3 (ref 0.1–0.9)
MONOCYTES NFR BLD AUTO: 7.3 % (ref 5–12)
NEUTROPHILS NFR BLD AUTO: 3.71 10*3/MM3 (ref 1.7–7)
NEUTROPHILS NFR BLD AUTO: 47.1 % (ref 42.7–76)
NRBC BLD AUTO-RTO: 0 /100 WBC (ref 0–0.2)
PLATELET # BLD AUTO: 206 10*3/MM3 (ref 140–450)
PMV BLD AUTO: 10.2 FL (ref 6–12)
POTASSIUM SERPL-SCNC: 4 MMOL/L (ref 3.5–5.2)
RBC # BLD AUTO: 4.55 10*6/MM3 (ref 3.77–5.28)
SODIUM SERPL-SCNC: 138 MMOL/L (ref 136–145)
WBC NRBC COR # BLD AUTO: 7.86 10*3/MM3 (ref 3.4–10.8)
WHOLE BLOOD HOLD COAG: NORMAL

## 2025-06-26 PROCEDURE — 96375 TX/PRO/DX INJ NEW DRUG ADDON: CPT

## 2025-06-26 PROCEDURE — 85025 COMPLETE CBC W/AUTO DIFF WBC: CPT | Performed by: NURSE PRACTITIONER

## 2025-06-26 PROCEDURE — 71275 CT ANGIOGRAPHY CHEST: CPT

## 2025-06-26 PROCEDURE — 99285 EMERGENCY DEPT VISIT HI MDM: CPT

## 2025-06-26 PROCEDURE — 80048 BASIC METABOLIC PNL TOTAL CA: CPT | Performed by: NURSE PRACTITIONER

## 2025-06-26 PROCEDURE — 93005 ELECTROCARDIOGRAM TRACING: CPT

## 2025-06-26 PROCEDURE — 25810000003 SODIUM CHLORIDE 0.9 % SOLUTION: Performed by: NURSE PRACTITIONER

## 2025-06-26 PROCEDURE — 25010000002 DIPHENHYDRAMINE PER 50 MG: Performed by: NURSE PRACTITIONER

## 2025-06-26 PROCEDURE — 96374 THER/PROPH/DIAG INJ IV PUSH: CPT

## 2025-06-26 PROCEDURE — 25010000002 METHYLPREDNISOLONE PER 125 MG: Performed by: NURSE PRACTITIONER

## 2025-06-26 PROCEDURE — 25010000002 FAMOTIDINE 10 MG/ML SOLUTION: Performed by: NURSE PRACTITIONER

## 2025-06-26 PROCEDURE — 25510000001 IOPAMIDOL PER 1 ML: Performed by: NURSE PRACTITIONER

## 2025-06-26 RX ORDER — SODIUM CHLORIDE 0.9 % (FLUSH) 0.9 %
10 SYRINGE (ML) INJECTION AS NEEDED
Status: DISCONTINUED | OUTPATIENT
Start: 2025-06-26 | End: 2025-06-26 | Stop reason: HOSPADM

## 2025-06-26 RX ORDER — METHYLPREDNISOLONE SODIUM SUCCINATE 125 MG/2ML
125 INJECTION, POWDER, LYOPHILIZED, FOR SOLUTION INTRAMUSCULAR; INTRAVENOUS ONCE
Status: COMPLETED | OUTPATIENT
Start: 2025-06-26 | End: 2025-06-26

## 2025-06-26 RX ORDER — CYCLOBENZAPRINE HCL 10 MG
10 TABLET ORAL 3 TIMES DAILY PRN
Qty: 15 TABLET | Refills: 0 | Status: SHIPPED | OUTPATIENT
Start: 2025-06-26

## 2025-06-26 RX ORDER — LIDOCAINE 50 MG/G
1 PATCH TOPICAL EVERY 24 HOURS
Qty: 6 EACH | Refills: 0 | Status: SHIPPED | OUTPATIENT
Start: 2025-06-26

## 2025-06-26 RX ORDER — FAMOTIDINE 10 MG/ML
20 INJECTION, SOLUTION INTRAVENOUS ONCE
Status: COMPLETED | OUTPATIENT
Start: 2025-06-26 | End: 2025-06-26

## 2025-06-26 RX ORDER — DIPHENHYDRAMINE HYDROCHLORIDE 50 MG/ML
25 INJECTION, SOLUTION INTRAMUSCULAR; INTRAVENOUS ONCE
Status: COMPLETED | OUTPATIENT
Start: 2025-06-26 | End: 2025-06-26

## 2025-06-26 RX ORDER — IOPAMIDOL 755 MG/ML
100 INJECTION, SOLUTION INTRAVASCULAR
Status: COMPLETED | OUTPATIENT
Start: 2025-06-26 | End: 2025-06-26

## 2025-06-26 RX ADMIN — METHYLPREDNISOLONE SODIUM SUCCINATE 125 MG: 125 INJECTION, POWDER, FOR SOLUTION INTRAMUSCULAR; INTRAVENOUS at 15:30

## 2025-06-26 RX ADMIN — IOPAMIDOL 100 ML: 755 INJECTION, SOLUTION INTRAVENOUS at 16:41

## 2025-06-26 RX ADMIN — FAMOTIDINE 20 MG: 10 INJECTION INTRAVENOUS at 15:30

## 2025-06-26 RX ADMIN — DIPHENHYDRAMINE HYDROCHLORIDE 25 MG: 50 INJECTION, SOLUTION INTRAMUSCULAR; INTRAVENOUS at 15:30

## 2025-06-26 RX ADMIN — SODIUM CHLORIDE 500 ML: 9 INJECTION, SOLUTION INTRAVENOUS at 15:30

## 2025-06-26 NOTE — DISCHARGE INSTRUCTIONS
Use heat 20 minutes at a time several times a day.  Light massage.  Medications as directed.  Follow with your family doctor.  Return for any new or worsening symptoms

## 2025-06-26 NOTE — ED PROVIDER NOTES
Subjective   History of Present Illness  Chief complaint: Right shoulder pain      Context: 64-year-old female past medical history significant for COPD tobacco abuse diabetes hypertension bipolar pulmonary embolism on Eliquis presents with complaints of right shoulder pain.  She was recently seen and admitted to the hospital and noted to have a segmental and subsegmental PEs with nonocclusive emboli that did not require intervention.  She states she has been compliant with her medications but she does continue to have right shoulder pain that also was present when she was evaluated back in June.  She does not complain of any new chest pain shortness of breath or lower extremity swelling.        PCP:      Review of Systems   Constitutional:  Negative for fever.   Genitourinary:  Urgency: .[id number].       Past Medical History:   Diagnosis Date    Allergic     Allergic rhinitis     Arthritis of back     Asthma     Asthma, extrinsic     Asthma, intrinsic     Bipolar 1 disorder     Chronic bronchitis     Chronic obstructive lung disease 01/28/2015    COVID-19 08/15/2021    CTS (carpal tunnel syndrome)     Depression     Diabetes insipidus     Diabetes mellitus     Difficulty walking 03/2022    Hip arthrosis     Hypertension     Knee swelling     Low back strain     Migraine     Mixed hyperlipidemia 03/03/2020    Multiple pulmonary emboli 6/4/2025    Neck strain     Pancreatitis     Peripheral neuropathy     Primary central sleep apnea     PTSD (post-traumatic stress disorder)     Pulmonary arterial hypertension     Sleep apnea, obstructive     Tobacco abuse 06/15/2020    Vitamin D deficiency 06/19/2024       Allergies   Allergen Reactions    Iodine Swelling    Adhesive Tape Rash       No past surgical history on file.    Family History   Problem Relation Age of Onset    Hypertension Mother     Diabetes Mother     Asthma Mother     Anemia Mother     Anxiety disorder Mother     Asthma Daughter     COPD  Daughter     Anxiety disorder Daughter     Arthritis Daughter        Social History     Socioeconomic History    Marital status: Single   Tobacco Use    Smoking status: Every Day     Current packs/day: 1.00     Average packs/day: 1.1 packs/day for 40.0 years (45.0 ttl pk-yrs)     Types: Cigarettes     Passive exposure: Current    Smokeless tobacco: Never   Vaping Use    Vaping status: Never Used   Substance and Sexual Activity    Alcohol use: Never    Drug use: Never    Sexual activity: Not Currently     Partners: Female           Objective   Physical Exam    Vital signs and traige nurse note reviewed.  Constitutional:  Awake, alert; well developed and well nourished.  No acute distress, the patient is examined in hospital gown.  HEENT:  Normocephalic, atraumatic; with intact EOM; oropharynx is pink and moist without edema or erythema.  Neck: Supple,   Cardiovascular: Regular rate and rhythm, normal S1-S2. .  Pulmonary: Respiratory effort regular, nonlabored; breath sounds CTA without wheezing or rhonchi.  Abdomen: Soft, nontender, nondistended with normoactive bowel sounds; no rebound or guarding.    Musculoskeletal: Independent range of motion of all extremities, no palpable tenderness or edema.   Back:  Spine is midline and without midline tenderness on palpation of cervical, thoracic, and lumbar spine; paraspinal musculature is tender around the right trapezius and subscapular musculature with paraspinal inflammation without trigger point and without  overlying ecchymosis or erythema.  Distal muscle strength is 5/5.  Neuro: Alert oriented x3, speech is clear and appropriate.        Procedures           ED Course  ED Course as of 06/26/25 1749   Thu Jun 26, 2025   1542 Waiting for patient to go to CAT scan [JW]      ED Course User Index  [JW] Farrah Ward APRN      Labs Reviewed   BASIC METABOLIC PANEL - Abnormal; Notable for the following components:       Result Value    Glucose 212 (*)     BUN/Creatinine  Ratio 27.8 (*)     All other components within normal limits    Narrative:     GFR Categories in Chronic Kidney Disease (CKD)              GFR Category          GFR (mL/min/1.73)    Interpretation  G1                    90 or greater        Normal or high (1)  G2                    60-89                Mild decrease (1)  G3a                   45-59                Mild to moderate decrease  G3b                   30-44                Moderate to severe decrease  G4                    15-29                Severe decrease  G5                    14 or less           Kidney failure    (1)In the absence of evidence of kidney disease, neither GFR category G1 or G2 fulfill the criteria for CKD.    eGFR calculation 2021 CKD-EPI creatinine equation, which does not include race as a factor   CBC WITH AUTO DIFFERENTIAL - Abnormal; Notable for the following components:    MCV 99.1 (*)     Lymphocytes, Absolute 3.37 (*)     All other components within normal limits   CBC AND DIFFERENTIAL    Narrative:     The following orders were created for panel order CBC & Differential.  Procedure                               Abnormality         Status                     ---------                               -----------         ------                     CBC Auto Differential[[id number]]        Abnormal            Final result                 Please view results for these tests on the individual orders.   EXTRA TUBES    Narrative:     The following orders were created for panel order Extra Tubes.  Procedure                               Abnormality         Status                     ---------                               -----------         ------                     Gold Top - SST[[id number]]                                   Final result               Light Blue Top[[id number]]                                   Final result                 Please view results for these tests on the individual orders.   GOLD TOP - SST   LIGHT BLUE TOP      Medications   sodium chloride 0.9 % flush 10 mL (has no administration in time range)   sodium chloride 0.9 % bolus 500 mL (500 mL Intravenous New Bag 6/26/25 1530)   famotidine (PEPCID) injection 20 mg (20 mg Intravenous Given 6/26/25 1530)   diphenhydrAMINE (BENADRYL) injection 25 mg (25 mg Intravenous Given 6/26/25 1530)   methylPREDNISolone sodium succinate (SOLU-Medrol) injection 125 mg (125 mg Intravenous Given 6/26/25 1530)   iopamidol (ISOVUE-370) 76 % injection 100 mL (100 mL Intravenous Given 6/26/25 1641)     CT Angiogram Chest Pulmonary Embolism  Result Date: 6/26/2025  1. No pulmonary embolism. Recent described pulmonary emboli appear resolved in the interval. 2. No acute chest finding. 3. Coronary artery calcifications. Correlate with cardiac history. 4. Mild emphysema. Electronically Signed: Anum Guevara MD  6/26/2025 4:54 PM EDT  Workstation ID: [id number]    Prior to Admission medications    Medication Sig Start Date End Date Taking? Authorizing Provider   albuterol (ACCUNEB) 1.25 MG/3ML nebulizer solution Take 3 mL by nebulization Every 6 (Six) Hours As Needed for Wheezing or Shortness of Air. Dispense as 1 box of unit dose 6/28/23   Asad Mendoza MD   albuterol sulfate  (90 Base) MCG/ACT inhaler Inhale 2 puffs Every 4 (Four) Hours As Needed for Wheezing or Shortness of Air. 6/28/23   Asad Mendoza MD   amitriptyline (ELAVIL) 150 MG tablet TAKE 1 TABLET BY MOUTH ONCE DAILY AT NIGHT 5/12/25   Kitty Carlos PA-C   amLODIPine (NORVASC) 5 MG tablet Take 1 tablet by mouth once daily 6/5/25   Yazmin Barajas APRN   apixaban (ELIQUIS) 5 MG tablet tablet Take 2 tablets by mouth 2 (Two) Times a Day for 6 days, THEN 1 tablet 2 (Two) Times a Day for 30 days. Indications: DVT/PE (active thrombosis) 6/6/25 7/12/25  Manuel Danielle MD   atorvastatin (LIPITOR) 40 MG tablet Take 1 tablet by mouth once daily 4/17/25   Cindy Huddleston APRN   benzonatate (TESSALON) 200 MG capsule Take 1  "capsule by mouth 3 (Three) Times a Day As Needed for Cough. 12/5/24   Ryan Bingham MD   clonazePAM (KlonoPIN) 0.5 MG tablet Take 1 tablet by mouth 2 (Two) Times a Day As Needed for Anxiety. for anxiety 5/20/25   Kitty Carlos PA-C   cyclobenzaprine (FLEXERIL) 10 MG tablet Take 1 tablet by mouth 3 (Three) Times a Day As Needed for Muscle Spasms for up to 15 doses. 6/26/25   Farrah Ward APRN   divalproex (DEPAKOTE ER) 500 MG 24 hr tablet Take 4 tablets by mouth Every Night.    Provider, MD Austin   empagliflozin (Jardiance) 25 MG tablet tablet Take 1 tablet by mouth Daily. 6/6/25   Manuel Danielle MD   fluticasone (FLONASE) 50 MCG/ACT nasal spray Administer 2 sprays into the nostril(s) as directed by provider Daily. 12/10/24   Cindy Huddleston APRN   Fluticasone-Umeclidin-Vilant (Trelegy Ellipta) 200-62.5-25 MCG/ACT inhaler Inhale 1 puff Daily. 8/12/24   ProviderAustin MD   glipizide (GLUCOTROL) 10 MG tablet TAKE 1 TABLET BY MOUTH TWICE DAILY BEFORE MEAL(S) 4/17/25   Cindy Huddleston APRN   hydroCHLOROthiazide 25 MG tablet Take 1 tablet by mouth once daily 6/22/25   Nanci Gil MD   lidocaine (Lidoderm) 5 % Place 1 patch on the skin as directed by provider Daily. Remove & Discard patch within 12 hours or as directed by MD 6/26/25   Farrah Ward APRN   losartan (COZAAR) 100 MG tablet Take 1 tablet by mouth once daily 10/2/24   Cindy Huddleston APRN   metFORMIN (GLUCOPHAGE) 1000 MG tablet TAKE 1 TABLET BY MOUTH TWICE DAILY WITH MEALS 6/5/25   Yazmin Barajas APRN   nicotine polacrilex (NICORETTE) 2 MG gum Chew 1 each Every 1 (One) Hour As Needed for Smoking Cessation. Maximum 24 Pieces in 24 hours. Gum Should be Chewed Until it Tingles, Then \"Park\" Between Gums & Cheek.  When Tingling is Gone, Chew Again Until Tingling Returns & Then \"Park\" Between Gums & Cheek Repeat Until Tingling is No Longer Experienced & Then Discard Follow Other Instructions on Package 6/6/25   " Manuel Danielle MD   omeprazole (priLOSEC) 40 MG capsule Take 1 capsule by mouth Daily. 5/5/25   Cindy Huddleston APRN   ondansetron ODT (ZOFRAN-ODT) 4 MG disintegrating tablet Take 1 tablet by mouth 4 (Four) Times a Day As Needed for Vomiting or Nausea. 4/10/25   Mariola Ward APRN   primidone (MYSOLINE) 50 MG tablet Take 2 tablets by mouth Every Night.    Provider, MD Austin   vilazodone (VIIBRYD) 20 MG tablet tablet Take 1 tablet by mouth Daily. 5/20/25   Kitty Carlos, PAGayleC                                          Labs Reviewed   BASIC METABOLIC PANEL - Abnormal; Notable for the following components:       Result Value    Glucose 212 (*)     BUN/Creatinine Ratio 27.8 (*)     All other components within normal limits    Narrative:     GFR Categories in Chronic Kidney Disease (CKD)              GFR Category          GFR (mL/min/1.73)    Interpretation  G1                    90 or greater        Normal or high (1)  G2                    60-89                Mild decrease (1)  G3a                   45-59                Mild to moderate decrease  G3b                   30-44                Moderate to severe decrease  G4                    15-29                Severe decrease  G5                    14 or less           Kidney failure    (1)In the absence of evidence of kidney disease, neither GFR category G1 or G2 fulfill the criteria for CKD.    eGFR calculation 2021 CKD-EPI creatinine equation, which does not include race as a factor   CBC WITH AUTO DIFFERENTIAL - Abnormal; Notable for the following components:    MCV 99.1 (*)     Lymphocytes, Absolute 3.37 (*)     All other components within normal limits   CBC AND DIFFERENTIAL    Narrative:     The following orders were created for panel order CBC & Differential.  Procedure                               Abnormality         Status                     ---------                               -----------         ------                     CBC Auto  "Differential[[id number]]        Abnormal            Final result                 Please view results for these tests on the individual orders.   EXTRA TUBES    Narrative:     The following orders were created for panel order Extra Tubes.  Procedure                               Abnormality         Status                     ---------                               -----------         ------                     Gold Top - SST[[id number]]                                   Final result               Light Blue Top[[id number]]                                   Final result                 Please view results for these tests on the individual orders.   GOLD TOP - SST   LIGHT BLUE TOP                 Medical Decision Making      /81   Pulse 96   Temp 97.7 °F (36.5 °C) (Infrared)   Resp 20   Ht 165.1 cm (65\")   Wt 96 kg (211 lb 10.3 oz)   SpO2 91%   BMI 35.22 kg/m²      Chart review:6/4-6/7/25: evie dc note-segmental PEs without intervention.  Discharged on Eliquis.  Referral to hematology as an outpatient    Radiology interpretation: CT reviewed and interpreted by Reji: Negative for PE  Further interpretation by radiologist as above  Lab interpretation:  Labs all viewed by me and significant for, glucose 212 white count 7.8    EKG viewed and interpreted by Dr. Mendoza, sinus tachycardia rate of 100 MT interval 243  comparison: 8/15/2021            Appropriate PPE worn during exam.  Patient was placed on a cardiac monitor had an IV established labs imaging obtained to evaluate for kidney function anemia worsening clot burden.  Her physical exam is consistent with MSK etiology.  She was noted to have a blood sugar of 212.  She has no anginal symptoms.  I discussed with Dr. Mendoza.  She will be started on Flexeril and Lidoderm patches.  Systemic steroids were not given due to her hyperglycemia and she is unable to be placed on NSAIDs due to her anticoagulation.  Referral to PT was placed.  On reexam she is resting " comfortably hemodynamically stable without any new symptoms.      i discussed findings with patient who voices understanding of discharge instructions, signs and symptoms requiring return to ED; discharged improved and in stable condition with follow up for re-evaluation.  This document is intended for medical expert use only. Reading of this document by patients and/or patient's family without participating medical staff guidance may result in misinterpretation and unintended morbidity.  Any interpretation of such data is the responsibility of the patient and/or family member responsible for the patient in concert with their primary or specialist providers, not to be left for sources of online searches such as [employer] [place], [employer] or similar queries. Relying on these approaches to knowledge may result in misinterpretation, misguided goals of care and even death should patients or family members try recommendations outside of the realm of professional medical care in a supervised inpatient environment.     Discussed with Dr. Mendoza    Problems Addressed:  Strain of right trapezius muscle, initial encounter: complicated acute illness or injury    Amount and/or Complexity of Data Reviewed  Labs: ordered.  Radiology: ordered.    Risk  Prescription drug management.        Final diagnoses:   Strain of right trapezius muscle, initial encounter       ED Disposition  ED Disposition       ED Disposition   Discharge    Condition   Stable    Comment   --               Cindy Huddleston, APRN  [address]  [address]  [place] [address]  [phone]               Medication List        New Prescriptions      cyclobenzaprine 10 MG tablet  Commonly known as: FLEXERIL  Take 1 tablet by mouth 3 (Three) Times a Day As Needed for Muscle Spasms for up to 15 doses.     lidocaine 5 %  Commonly known as: Lidoderm  Place 1 patch on the skin as directed by provider Daily. Remove & Discard patch within 12 hours or as directed by MD                Where to Get Your Medications        These medications were sent to [facility] - [address] [place] [address]      Hours: Monday to Friday 7 AM to 7 PM Phone: [phone]   cyclobenzaprine 10 MG tablet  lidocaine 5 %            Farrah Ward, APRN  06/26/25 7586

## 2025-06-27 LAB
QT INTERVAL: 346 MS
QTC INTERVAL: 448 MS

## 2025-07-01 ENCOUNTER — TELEPHONE (OUTPATIENT)
Dept: FAMILY MEDICINE CLINIC | Facility: CLINIC | Age: 65
End: 2025-07-01

## 2025-07-01 NOTE — TELEPHONE ENCOUNTER
Hub staff attempted to follow warm transfer process and was unsuccessful     Caller: Carli Dubon    Relationship to patient: Self    Best call back number: [phone]    Patient is needing: STATED THAT THEY HAD MISSED THEIR FOLLOW UP APPOINTMENT WITH JACKIE MARTINEZ THIS MORNING AND THEY ARE NEEDING TO RESCHEDULE. NO AVAILABILITY IN 7 DAY TIME FRAME OF DISCHARGE. PLEASE CALL AND ADVISE

## 2025-07-04 NOTE — PROGRESS NOTES
Cardiology Office Follow Up Visit      Primary Care Provider:  Cindy Huddleston APRN    Reason for f/u:     Hospital Follow-Up      Subjective       History of Present Illness        Carli Dubon is a 64 y.o. female seen in clinic today for hospital follow-up.    Patient presents to the ER with complaints of right shoulder pain and found with unprovoked bilateral PE but did not require intervention.  2D echo 6/2025 showed normal RV size and function, EF 51-55% with no significant VHD and a small pericardial effusion.  Bilateral venous dopplar was negative for DVT.  She was started on Eliquis for anticoagulation.  PMH includes HTN, HLD, DM, COPD, and NEVIN (compliant with device).       Patient tells me she is feeling great.  She tells me she stopped smoking with this past admission.  She reports her cough is now resolved and her breathing is great.  She has returned to her usual activities and can perform errands without difficulty.  She reports her BP at home is typically controlled and runs SBP's in the 130s.  She has a follow-up appointment with hemeoncology, Dr. Gonzalez, on 7/18 and is planned for hypercoagulable workup.      ASSESSMENT/PLAN:      Diagnoses and all orders for this visit:    1. Multiple pulmonary emboli (Primary)    2. Essential hypertension            MEDICAL DECISION MAKING:    Continue on anticoagulation with Eliquis.  Awaiting outpatient hypercoagulable workup with [facility] oncology.  Will make no changes to current cardiovascular regimen.        RTC in 3 months.    Past Medical History:   Diagnosis Date    Allergic     Allergic rhinitis     Arthritis of back     Asthma     Asthma, extrinsic     Asthma, intrinsic     Bipolar 1 disorder     Chronic bronchitis     Chronic obstructive lung disease 01/28/2015    COVID-19 08/15/2021    CTS (carpal tunnel syndrome)     Depression     Diabetes insipidus     Diabetes mellitus     Difficulty walking 03/2022    Hip arthrosis     Hypertension     Knee  swelling     Low back strain     Migraine     Mixed hyperlipidemia 03/03/2020    Multiple pulmonary emboli 6/4/2025    Neck strain     Pancreatitis     Peripheral neuropathy     Primary central sleep apnea     PTSD (post-traumatic stress disorder)     Pulmonary arterial hypertension     Sleep apnea, obstructive     Tobacco abuse 06/15/2020    Vitamin D deficiency 06/19/2024       History reviewed. No pertinent surgical history.      Current Outpatient Medications:     albuterol (ACCUNEB) 1.25 MG/3ML nebulizer solution, Take 3 mL by nebulization Every 6 (Six) Hours As Needed for Wheezing or Shortness of Air. Dispense as 1 box of unit dose, Disp: 1 each, Rfl: 1    albuterol sulfate  (90 Base) MCG/ACT inhaler, Inhale 2 puffs Every 4 (Four) Hours As Needed for Wheezing or Shortness of Air., Disp: 6.7 g, Rfl: 1    amitriptyline (ELAVIL) 150 MG tablet, TAKE 1 TABLET BY MOUTH ONCE DAILY AT NIGHT, Disp: 90 tablet, Rfl: 1    amLODIPine (NORVASC) 5 MG tablet, Take 1 tablet by mouth once daily, Disp: 90 tablet, Rfl: 0    apixaban (ELIQUIS) 5 MG tablet tablet, Take 2 tablets by mouth 2 (Two) Times a Day for 6 days, THEN 1 tablet 2 (Two) Times a Day for 30 days. Indications: DVT/PE (active thrombosis), Disp: 60 tablet, Rfl: 5    atorvastatin (LIPITOR) 40 MG tablet, Take 1 tablet by mouth once daily, Disp: 90 tablet, Rfl: 0    benzonatate (TESSALON) 200 MG capsule, Take 1 capsule by mouth 3 (Three) Times a Day As Needed for Cough., Disp: 90 capsule, Rfl: 2    clonazePAM (KlonoPIN) 0.5 MG tablet, Take 1 tablet by mouth 2 (Two) Times a Day As Needed for Anxiety. for anxiety, Disp: 60 tablet, Rfl: 2    cyclobenzaprine (FLEXERIL) 10 MG tablet, Take 1 tablet by mouth 3 (Three) Times a Day As Needed for Muscle Spasms for up to 15 doses., Disp: 15 tablet, Rfl: 0    divalproex (DEPAKOTE ER) 500 MG 24 hr tablet, Take 4 tablets by mouth Every Night., Disp: , Rfl:     empagliflozin (Jardiance) 25 MG tablet tablet, Take 1 tablet by  "mouth Daily., Disp: 90 tablet, Rfl: 0    fluticasone (FLONASE) 50 MCG/ACT nasal spray, Administer 2 sprays into the nostril(s) as directed by provider Daily., Disp: 11.1 g, Rfl: 0    Fluticasone-Umeclidin-Vilant (Trelegy Ellipta) 200-62.5-25 MCG/ACT inhaler, Inhale 1 puff Daily., Disp: , Rfl:     glipizide (GLUCOTROL) 10 MG tablet, TAKE 1 TABLET BY MOUTH TWICE DAILY BEFORE MEAL(S), Disp: 180 tablet, Rfl: 0    hydroCHLOROthiazide 25 MG tablet, Take 1 tablet by mouth once daily, Disp: 30 tablet, Rfl: 0    lidocaine (Lidoderm) 5 %, Place 1 patch on the skin as directed by provider Daily. Remove & Discard patch within 12 hours or as directed by MD, Disp: 6 each, Rfl: 0    losartan (COZAAR) 100 MG tablet, Take 1 tablet by mouth once daily, Disp: 90 tablet, Rfl: 0    metFORMIN (GLUCOPHAGE) 1000 MG tablet, TAKE 1 TABLET BY MOUTH TWICE DAILY WITH MEALS, Disp: 180 tablet, Rfl: 0    nicotine polacrilex (NICORETTE) 2 MG gum, Chew 1 each Every 1 (One) Hour As Needed for Smoking Cessation. Maximum 24 Pieces in 24 hours. Gum Should be Chewed Until it Tingles, Then \"Park\" Between Gums & Cheek.  When Tingling is Gone, Chew Again Until Tingling Returns & Then \"Park\" Between Gums & Cheek Repeat Until Tingling is No Longer Experienced & Then Discard Follow Other Instructions on Package, Disp: 200 each, Rfl: 0    omeprazole (priLOSEC) 40 MG capsule, Take 1 capsule by mouth Daily., Disp: 90 capsule, Rfl: 0    ondansetron ODT (ZOFRAN-ODT) 4 MG disintegrating tablet, Take 1 tablet by mouth 4 (Four) Times a Day As Needed for Vomiting or Nausea., Disp: 10 tablet, Rfl: 0    primidone (MYSOLINE) 50 MG tablet, Take 2 tablets by mouth Every Night., Disp: , Rfl:     vilazodone (VIIBRYD) 20 MG tablet tablet, Take 1 tablet by mouth Daily., Disp: 90 tablet, Rfl: 3    Social History     Socioeconomic History    Marital status: Single   Tobacco Use    Smoking status: Every Day     Current packs/day: 1.00     Average packs/day: 1.1 packs/day for 40.0 " "years (45.0 ttl pk-yrs)     Types: Cigarettes     Passive exposure: Current    Smokeless tobacco: Never   Vaping Use    Vaping status: Never Used   Substance and Sexual Activity    Alcohol use: Never    Drug use: Never    Sexual activity: Not Currently     Partners: Female       Family History   Problem Relation Age of Onset    Hypertension Mother     Diabetes Mother     Asthma Mother     Anemia Mother     Anxiety disorder Mother     Asthma Daughter     COPD Daughter     Anxiety disorder Daughter     Arthritis Daughter        The following portions of the patient's history were reviewed and updated as appropriate: allergies, current medications, past family history, past medical history, past social history, past surgical history and problem list.    ROS  /86   Pulse 99   Ht 165.1 cm (65\")   Wt 95.7 kg (211 lb)   SpO2 92%   BMI 35.11 kg/m² .  Objective     Physical Exam    Physical Exam:  Neuro:  CV:  Resp:  GI:  Ext:  Pysch: AAOx3, no gross deficits  S1S2 RRR, no murmur  Non-labored, CTA  BS+, abd soft  Pedal pulses palp, no edema  Calm and cooperative       Procedures           "

## 2025-07-07 ENCOUNTER — OFFICE VISIT (OUTPATIENT)
Dept: CARDIOLOGY | Facility: CLINIC | Age: 65
End: 2025-07-07
Payer: MEDICARE

## 2025-07-07 VITALS
HEART RATE: 99 BPM | DIASTOLIC BLOOD PRESSURE: 86 MMHG | SYSTOLIC BLOOD PRESSURE: 142 MMHG | BODY MASS INDEX: 35.16 KG/M2 | WEIGHT: 211 LBS | HEIGHT: 65 IN | OXYGEN SATURATION: 92 %

## 2025-07-07 DIAGNOSIS — I10 ESSENTIAL HYPERTENSION: ICD-10-CM

## 2025-07-07 DIAGNOSIS — I26.99 MULTIPLE PULMONARY EMBOLI: Primary | ICD-10-CM

## 2025-07-07 PROCEDURE — 3079F DIAST BP 80-89 MM HG: CPT | Performed by: NURSE PRACTITIONER

## 2025-07-07 PROCEDURE — 3077F SYST BP >= 140 MM HG: CPT | Performed by: NURSE PRACTITIONER

## 2025-07-07 PROCEDURE — 99213 OFFICE O/P EST LOW 20 MIN: CPT | Performed by: NURSE PRACTITIONER

## 2025-07-11 DIAGNOSIS — E11.9 TYPE 2 DIABETES MELLITUS WITHOUT COMPLICATION, WITHOUT LONG-TERM CURRENT USE OF INSULIN: ICD-10-CM

## 2025-07-11 RX ORDER — GLIPIZIDE 10 MG/1
10 TABLET ORAL
Qty: 180 TABLET | Refills: 0 | Status: SHIPPED | OUTPATIENT
Start: 2025-07-11

## 2025-07-11 RX ORDER — ATORVASTATIN CALCIUM 40 MG/1
40 TABLET, FILM COATED ORAL DAILY
Qty: 90 TABLET | Refills: 0 | Status: SHIPPED | OUTPATIENT
Start: 2025-07-11

## 2025-07-15 DIAGNOSIS — G47.33 OBSTRUCTIVE SLEEP APNEA: Primary | ICD-10-CM

## 2025-07-15 NOTE — PROGRESS NOTES
HEMATOLOGY ONCOLOGY OUTPATIENT FOLLOW UP       Patient name: Carli Dubon  : 1960  MRN: [id number]  Primary Care Physician: Cindy Huddleston APRN  Referring Physician: Cindy Huddleston APRN  Reason For Consult: Unprovoked deep vein thrombosis.     History of Present Illness:    2025: I was asked to see Ms. Dubon who came to the hospital complaining of pain in the right side of the chest.  At first this seemed to be the result of muscle strain but the pain persisted and became progressively more intense.  At the time of the admission imaging studies revealed right lower lobe segmental and subsegmental pulmonary embolism as well as left lower lobe subsegmental embolism.  The CT of the abdomen was unremarkable.  Duplex ultrasound of the lower extremities revealed no evidence of thrombosis.  She has no previous history of thrombosis.  Both her parents have  of complications of SARS-COV 2 infection.  She has been a smoker from a young age and consumes at least a pack daily.  She has a history of diabetes and describes also history of arthritis that results in poor mobility.  She has been disabled for some time because of this.  On exam she is a chronically ill-appearing woman who seems much older than the stated age.  She is alert, conversant and oriented.  There is no jaundice or pallor.  She is edentulous but has no oral lesions.  There is no jugular vein distention and no palpable lymphadenopathy.  The lungs are markedly diminished bilaterally.  The heart is regular and tachycardic.  Abdomen is rounded and soft.  There is no edema.  I have requested lupus anticoagulant as well as anticardiolipin and antibeta 2 glycoprotein antibodies as this would require different anticoagulation.  However, for now, it is reasonable to continue with apixaban and to discharge her with this medication.    2025: In the office for follow up. She was discharged on the apixaban and has been taking it  regularly. She has not had any bleeding and has noted the edema of the lower extremities to resolve. She also is not dyspneic any more and she announces proudly that she no longer smokes. She has been eating well. No nausea or vomiting. No weight loss. Denies chest pain or abdominal pain and has not had diarrhea or dysuria. On exam chronically ill appearing but not in distress. No jaundice. No oral lesions and respirations not labored. Lungs diminished bilaterally and heart tachycardic and regular. Abdomen protuberant and soft. No edema. No skin rash. Reviewed the records from the hospital. Reviewed the laboratory exams. To continue same anticoagulation. Sent refills and gave instructions.     Past Medical History:   Diagnosis Date    Allergic     Allergic rhinitis     Arthritis of back     Asthma     Asthma, extrinsic     Asthma, intrinsic     Bipolar 1 disorder     Chronic bronchitis     Chronic obstructive lung disease 01/28/2015    COVID-19 08/15/2021    CTS (carpal tunnel syndrome)     Depression     Diabetes insipidus     Diabetes mellitus     Difficulty walking 03/2022    Hip arthrosis     Hypertension     Knee swelling     Low back strain     Migraine     Mixed hyperlipidemia 03/03/2020    Multiple pulmonary emboli 6/4/2025    Neck strain     Pancreatitis     Peripheral neuropathy     Primary central sleep apnea     PTSD (post-traumatic stress disorder)     Pulmonary arterial hypertension     Sleep apnea, obstructive     Tobacco abuse 06/15/2020    Vitamin D deficiency 06/19/2024     No past surgical history on file.      Current Outpatient Medications:     albuterol (ACCUNEB) 1.25 MG/3ML nebulizer solution, Take 3 mL by nebulization Every 6 (Six) Hours As Needed for Wheezing or Shortness of Air. Dispense as 1 box of unit dose, Disp: 1 each, Rfl: 1    albuterol sulfate  (90 Base) MCG/ACT inhaler, Inhale 2 puffs Every 4 (Four) Hours As Needed for Wheezing or Shortness of Air., Disp: 6.7 g, Rfl: 1     "amitriptyline (ELAVIL) 150 MG tablet, TAKE 1 TABLET BY MOUTH ONCE DAILY AT NIGHT, Disp: 90 tablet, Rfl: 1    amLODIPine (NORVASC) 5 MG tablet, Take 1 tablet by mouth once daily, Disp: 90 tablet, Rfl: 0    atorvastatin (LIPITOR) 40 MG tablet, Take 1 tablet by mouth once daily, Disp: 90 tablet, Rfl: 0    clonazePAM (KlonoPIN) 0.5 MG tablet, Take 1 tablet by mouth 2 (Two) Times a Day As Needed for Anxiety. for anxiety, Disp: 60 tablet, Rfl: 2    cyclobenzaprine (FLEXERIL) 10 MG tablet, Take 1 tablet by mouth 3 (Three) Times a Day As Needed for Muscle Spasms for up to 15 doses., Disp: 15 tablet, Rfl: 0    divalproex (DEPAKOTE ER) 500 MG 24 hr tablet, Take 4 tablets by mouth Every Night., Disp: , Rfl:     empagliflozin (Jardiance) 25 MG tablet tablet, Take 1 tablet by mouth Daily., Disp: 90 tablet, Rfl: 0    fluticasone (FLONASE) 50 MCG/ACT nasal spray, Administer 2 sprays into the nostril(s) as directed by provider Daily., Disp: 11.1 g, Rfl: 0    Fluticasone-Umeclidin-Vilant (Trelegy Ellipta) 200-62.5-25 MCG/ACT inhaler, Inhale 1 puff Daily., Disp: , Rfl:     glipizide (GLUCOTROL) 10 MG tablet, TAKE 1 TABLET BY MOUTH TWICE DAILY BEFORE MEAL(S), Disp: 180 tablet, Rfl: 0    hydroCHLOROthiazide 25 MG tablet, Take 1 tablet by mouth Daily., Disp: 30 tablet, Rfl: 0    lidocaine (Lidoderm) 5 %, Place 1 patch on the skin as directed by provider Daily. Remove & Discard patch within 12 hours or as directed by MD, Disp: 6 each, Rfl: 0    losartan (COZAAR) 100 MG tablet, Take 1 tablet by mouth once daily, Disp: 90 tablet, Rfl: 0    metFORMIN (GLUCOPHAGE) 1000 MG tablet, TAKE 1 TABLET BY MOUTH TWICE DAILY WITH MEALS, Disp: 180 tablet, Rfl: 0    nicotine polacrilex (NICORETTE) 2 MG gum, Chew 1 each Every 1 (One) Hour As Needed for Smoking Cessation. Maximum 24 Pieces in 24 hours. Gum Should be Chewed Until it Tingles, Then \"Park\" Between Gums & Cheek.  When Tingling is Gone, Chew Again Until Tingling Returns & Then \"Park\" Between " Gums & Cheek Repeat Until Tingling is No Longer Experienced & Then Discard Follow Other Instructions on Package, Disp: 200 each, Rfl: 0    omeprazole (priLOSEC) 40 MG capsule, Take 1 capsule by mouth Daily., Disp: 90 capsule, Rfl: 0    ondansetron ODT (ZOFRAN-ODT) 4 MG disintegrating tablet, Take 1 tablet by mouth 4 (Four) Times a Day As Needed for Vomiting or Nausea., Disp: 10 tablet, Rfl: 0    primidone (MYSOLINE) 50 MG tablet, Take 2 tablets by mouth Every Night., Disp: , Rfl:     vilazodone (VIIBRYD) 20 MG tablet tablet, Take 1 tablet by mouth Daily., Disp: 90 tablet, Rfl: 3    apixaban (ELIQUIS) 5 MG tablet tablet, Take 1 tablet by mouth 2 (Two) Times a Day., Disp: 60 tablet, Rfl: 5    benzonatate (TESSALON) 200 MG capsule, Take 1 capsule by mouth 3 (Three) Times a Day As Needed for Cough. (Patient not taking: Reported on 7/18/2025), Disp: 90 capsule, Rfl: 2    Allergies   Allergen Reactions    Iodine Swelling    Adhesive Tape Rash     Family History   Problem Relation Age of Onset    Hypertension Mother     Diabetes Mother     Asthma Mother     Anemia Mother     Anxiety disorder Mother     Asthma Daughter     COPD Daughter     Anxiety disorder Daughter     Arthritis Daughter      Cancer-related family history is not on file.    Social History     Tobacco Use    Smoking status: Every Day     Current packs/day: 1.00     Average packs/day: 1.1 packs/day for 40.0 years (45.0 ttl pk-yrs)     Types: Cigarettes     Passive exposure: Current    Smokeless tobacco: Never   Vaping Use    Vaping status: Never Used   Substance Use Topics    Alcohol use: Never    Drug use: Never     Social History     Social History Narrative    Not on file      ROS:   Review of Systems   Constitutional:  Negative for activity change, appetite change, chills, diaphoresis, fatigue, fever and unexpected weight change.   HENT:  Negative for congestion, dental problem, drooling, ear discharge, ear pain, facial swelling, hearing loss, mouth  "sores, nosebleeds, postnasal drip, rhinorrhea, sinus pressure, sinus pain, sneezing, sore throat, tinnitus, trouble swallowing and voice change.    Eyes:  Negative for photophobia, pain, discharge, redness, itching and visual disturbance.   Respiratory:  Negative for apnea, cough, choking, chest tightness, shortness of breath, wheezing and stridor.    Cardiovascular:  Negative for chest pain, palpitations and leg swelling.   Gastrointestinal:  Negative for abdominal distention, abdominal pain, anal bleeding, blood in stool, constipation, diarrhea, nausea, rectal pain and vomiting.   Endocrine: Negative for cold intolerance, heat intolerance, polydipsia and polyuria.   Genitourinary:  Negative for decreased urine volume, difficulty urinating, dysuria, flank pain, frequency, genital sores, hematuria and urgency.   Musculoskeletal:  Negative for arthralgias, back pain, gait problem, joint swelling, myalgias, neck pain and neck stiffness.   Skin:  Negative for color change, pallor and rash.   Neurological:  Negative for dizziness, tremors, seizures, syncope, facial asymmetry, speech difficulty, weakness, light-headedness, numbness and headaches.   Hematological:  Negative for adenopathy. Does not bruise/bleed easily.   Psychiatric/Behavioral:  Negative for agitation, behavioral problems, confusion, decreased concentration, hallucinations, self-injury, sleep disturbance and suicidal ideas. The patient is not nervous/anxious.      Objective:    Vital signs:  Vitals:    07/18/25 1102   BP: 142/82   Pulse: 110   Resp: 14   Temp: 98.2 °F (36.8 °C)   SpO2: 93%   Weight: 98.9 kg (218 lb)   Height: 165.1 cm (65\")   PainSc: 0-No pain     Body mass index is 36.28 kg/m².    ECOG Status  (1) Restricted in physically strenuous activity, ambulatory and able to do work of light nature    Physical Exam:   Physical Exam  Constitutional:       General: She is not in acute distress.     Appearance: She is ill-appearing. She is not " toxic-appearing or diaphoretic.   HENT:      Head: Normocephalic and atraumatic.      Right Ear: External ear normal.      Left Ear: External ear normal.      Nose: Nose normal.      Mouth/Throat:      Mouth: Mucous membranes are moist.      Pharynx: Oropharynx is clear. No oropharyngeal exudate or posterior oropharyngeal erythema.   Eyes:      General: No scleral icterus.        Right eye: No discharge.         Left eye: No discharge.      Conjunctiva/sclera: Conjunctivae normal.      Pupils: Pupils are equal, round, and reactive to light.   Cardiovascular:      Rate and Rhythm: Normal rate and regular rhythm.      Pulses: Normal pulses.      Heart sounds: No murmur heard.     No friction rub. No gallop.   Pulmonary:      Effort: No respiratory distress.      Breath sounds: No stridor. No wheezing, rhonchi or rales.      Comments: Breath sounds markedly diminished bilaterally.   Abdominal:      General: Bowel sounds are normal. There is no distension.      Palpations: Abdomen is soft. There is no mass.      Tenderness: There is no abdominal tenderness. There is no right CVA tenderness, left CVA tenderness, guarding or rebound.      Hernia: No hernia is present.      Comments: Protuberant, soft and not tender.    Musculoskeletal:         General: No tenderness, deformity or signs of injury.      Cervical back: No rigidity.      Right lower leg: No edema.      Left lower leg: No edema.   Lymphadenopathy:      Cervical: No cervical adenopathy.   Skin:     Coloration: Skin is not jaundiced or pale.      Findings: No bruising, lesion or rash.      Comments: There is brownish discoloration of the lower extremities distally.    Neurological:      General: No focal deficit present.      Mental Status: She is alert and oriented to person, place, and time.      Cranial Nerves: No cranial nerve deficit.   Psychiatric:         Mood and Affect: Mood normal.         Behavior: Behavior normal.         Thought Content: Thought  content normal.         Judgment: Judgment normal.     I Maury Gonzalez MD performed the physical exam on 7/18/2025 as documented above.     Lab Results - Last 18 Months   Lab Units 07/18/25  1037 06/26/25  1527 06/06/25  0302   WBC 10*3/mm3 9.27 7.86 13.66*   HEMOGLOBIN g/dL 13.9 14.2 13.4   HEMATOCRIT % 43.6 45.1 42.9   PLATELETS 10*3/mm3 238 206 226   MCV fL 99.1* 99.1* 99.3*     Lab Results - Last 18 Months   Lab Units 06/26/25  1527 06/06/25  0302 06/05/25  0301 06/04/25  0542 06/04/25  0126 04/10/25  1734 11/04/24  1136   SODIUM mmol/L 138 138 138   < > 139 133* 139   POTASSIUM mmol/L 4.0 4.1 4.3   < > 3.9 3.9 4.1   CHLORIDE mmol/L 99 102 101   < > 100 100 102   CO2 mmol/L 25.9 24.9 25.7   < > 21.2* 22.8 26.0   BUN mg/dL 20.6 27.4* 22.3   < > 16.6 13 15   CREATININE mg/dL 0.74 0.61 0.62   < > 0.71 0.61 0.65   CALCIUM mg/dL 9.9 8.6 8.8   < > 9.3 9.1 9.0   BILIRUBIN mg/dL  --   --   --   --  <0.2 <0.2 0.2   ALK PHOS U/L  --   --   --   --  95 94 84   ALT (SGPT) U/L  --   --   --   --  18 16 18   AST (SGOT) U/L  --   --   --   --  24 18 18   GLUCOSE mg/dL 212* 156* 226*   < > 98 258* 173*    < > = values in this interval not displayed.     Lab Results   Component Value Date    GLUCOSE 212 (H) 06/26/2025    BUN 20.6 06/26/2025    CREATININE 0.74 06/26/2025    EGFRIFNONA 78 01/11/2022    EGFRIFAFRI 94 01/11/2022    BCR 27.8 (H) 06/26/2025    K 4.0 06/26/2025    CO2 25.9 06/26/2025    CALCIUM 9.9 06/26/2025    ALBUMIN 4.1 06/04/2025    AST 24 06/04/2025    ALT 18 06/04/2025     Lab Results - Last 18 Months   Lab Units 06/04/25  0126   INR  0.96     Lab Results   Component Value Date    HUEFBTDY60 716 12/10/2024     LDH   Date Value Ref Range Status   06/16/2020 168 135 - 214 U/L Final     Comment:     Specimen hemolyzed.  Results may be affected.     Lab Results   Component Value Date    CARLITO Negative 02/12/2020     Lab Results   Component Value Date    INR 0.96 06/04/2025     Assessment & Plan      ASSESSMENT  Bilateral pulmonary emboli.  Lupus anticoagulant and anti-phospholipid antibodies negative. On apixaban. To continue same. Sent new prescriptions.   Leukocytosis: Resolved.   Tobacco smoker: Abstinent since the time of the admission. Sent new prescription for the nicotine lossenges  and gave instructions.   Reviewed the records from the hospital. Reviewed all the laboratory exams. Discussed with her.   See me in 3 months.       Maury Gonzalez MD  on 7/18/2025 at 11:51 AM.

## 2025-07-18 ENCOUNTER — APPOINTMENT (OUTPATIENT)
Dept: LAB | Facility: HOSPITAL | Age: 65
End: 2025-07-18
Payer: MEDICARE

## 2025-07-18 ENCOUNTER — OFFICE VISIT (OUTPATIENT)
Dept: ONCOLOGY | Facility: CLINIC | Age: 65
End: 2025-07-18
Payer: MEDICARE

## 2025-07-18 VITALS
TEMPERATURE: 98.2 F | OXYGEN SATURATION: 93 % | HEART RATE: 110 BPM | WEIGHT: 218 LBS | BODY MASS INDEX: 36.32 KG/M2 | SYSTOLIC BLOOD PRESSURE: 142 MMHG | HEIGHT: 65 IN | DIASTOLIC BLOOD PRESSURE: 82 MMHG | RESPIRATION RATE: 14 BRPM

## 2025-07-18 DIAGNOSIS — Z72.0 TOBACCO ABUSE: Chronic | ICD-10-CM

## 2025-07-18 DIAGNOSIS — I26.99 MULTIPLE PULMONARY EMBOLI: Primary | ICD-10-CM

## 2025-07-18 DIAGNOSIS — I10 HYPERTENSION, UNSPECIFIED TYPE: ICD-10-CM

## 2025-07-18 LAB
BASOPHILS # BLD AUTO: 0.02 10*3/MM3 (ref 0–0.2)
BASOPHILS NFR BLD AUTO: 0.2 % (ref 0–1.5)
DEPRECATED RDW RBC AUTO: 51.4 FL (ref 37–54)
EOSINOPHIL # BLD AUTO: 0.19 10*3/MM3 (ref 0–0.4)
EOSINOPHIL NFR BLD AUTO: 2 % (ref 0.3–6.2)
ERYTHROCYTE [DISTWIDTH] IN BLOOD BY AUTOMATED COUNT: 13.8 % (ref 12.3–15.4)
HCT VFR BLD AUTO: 43.6 % (ref 34–46.6)
HGB BLD-MCNC: 13.9 G/DL (ref 12–15.9)
HOLD SPECIMEN: NORMAL
HOLD SPECIMEN: NORMAL
IMM GRANULOCYTES # BLD AUTO: 0.05 10*3/MM3 (ref 0–0.05)
IMM GRANULOCYTES NFR BLD AUTO: 0.5 % (ref 0–0.5)
LYMPHOCYTES # BLD AUTO: 3.67 10*3/MM3 (ref 0.7–3.1)
LYMPHOCYTES NFR BLD AUTO: 39.6 % (ref 19.6–45.3)
MCH RBC QN AUTO: 31.6 PG (ref 26.6–33)
MCHC RBC AUTO-ENTMCNC: 31.9 G/DL (ref 31.5–35.7)
MCV RBC AUTO: 99.1 FL (ref 79–97)
MONOCYTES # BLD AUTO: 0.74 10*3/MM3 (ref 0.1–0.9)
MONOCYTES NFR BLD AUTO: 8 % (ref 5–12)
NEUTROPHILS NFR BLD AUTO: 4.6 10*3/MM3 (ref 1.7–7)
NEUTROPHILS NFR BLD AUTO: 49.7 % (ref 42.7–76)
PLATELET # BLD AUTO: 238 10*3/MM3 (ref 140–450)
PMV BLD AUTO: 10.1 FL (ref 6–12)
RBC # BLD AUTO: 4.4 10*6/MM3 (ref 3.77–5.28)
WBC NRBC COR # BLD AUTO: 9.27 10*3/MM3 (ref 3.4–10.8)

## 2025-07-18 PROCEDURE — 85025 COMPLETE CBC W/AUTO DIFF WBC: CPT | Performed by: INTERNAL MEDICINE

## 2025-07-18 PROCEDURE — 36415 COLL VENOUS BLD VENIPUNCTURE: CPT

## 2025-07-18 RX ORDER — HYDROCHLOROTHIAZIDE 25 MG/1
25 TABLET ORAL DAILY
Qty: 30 TABLET | Refills: 0 | Status: SHIPPED | OUTPATIENT
Start: 2025-07-18

## 2025-07-22 DIAGNOSIS — E11.9 TYPE 2 DIABETES MELLITUS WITHOUT COMPLICATION, WITHOUT LONG-TERM CURRENT USE OF INSULIN: ICD-10-CM

## 2025-07-22 RX ORDER — EMPAGLIFLOZIN 25 MG/1
25 TABLET, FILM COATED ORAL DAILY
Qty: 90 TABLET | Refills: 0 | Status: SHIPPED | OUTPATIENT
Start: 2025-07-22

## 2025-07-24 ENCOUNTER — OFFICE VISIT (OUTPATIENT)
Dept: FAMILY MEDICINE CLINIC | Facility: CLINIC | Age: 65
End: 2025-07-24
Payer: MEDICARE

## 2025-07-24 VITALS
HEART RATE: 101 BPM | OXYGEN SATURATION: 94 % | BODY MASS INDEX: 35.28 KG/M2 | SYSTOLIC BLOOD PRESSURE: 121 MMHG | TEMPERATURE: 98.7 F | WEIGHT: 212 LBS | DIASTOLIC BLOOD PRESSURE: 77 MMHG

## 2025-07-24 DIAGNOSIS — M25.511 ACUTE PAIN OF RIGHT SHOULDER: Primary | ICD-10-CM

## 2025-07-24 DIAGNOSIS — I26.99 OTHER ACUTE PULMONARY EMBOLISM, UNSPECIFIED WHETHER ACUTE COR PULMONALE PRESENT: ICD-10-CM

## 2025-07-24 PROCEDURE — 3052F HG A1C>EQUAL 8.0%<EQUAL 9.0%: CPT | Performed by: NURSE PRACTITIONER

## 2025-07-24 PROCEDURE — 3078F DIAST BP <80 MM HG: CPT | Performed by: NURSE PRACTITIONER

## 2025-07-24 PROCEDURE — 1159F MED LIST DOCD IN RCRD: CPT | Performed by: NURSE PRACTITIONER

## 2025-07-24 PROCEDURE — 1126F AMNT PAIN NOTED NONE PRSNT: CPT | Performed by: NURSE PRACTITIONER

## 2025-07-24 PROCEDURE — 3074F SYST BP LT 130 MM HG: CPT | Performed by: NURSE PRACTITIONER

## 2025-07-24 PROCEDURE — 99214 OFFICE O/P EST MOD 30 MIN: CPT | Performed by: NURSE PRACTITIONER

## 2025-07-24 PROCEDURE — 1111F DSCHRG MED/CURRENT MED MERGE: CPT | Performed by: NURSE PRACTITIONER

## 2025-07-24 PROCEDURE — 1160F RVW MEDS BY RX/DR IN RCRD: CPT | Performed by: NURSE PRACTITIONER

## 2025-07-24 PROCEDURE — G2211 COMPLEX E/M VISIT ADD ON: HCPCS | Performed by: NURSE PRACTITIONER

## 2025-07-24 RX ORDER — ZOLPIDEM TARTRATE 10 MG/1
10 TABLET ORAL NIGHTLY PRN
COMMUNITY
Start: 2025-07-24

## 2025-07-24 NOTE — PROGRESS NOTES
"Transitional Care Follow Up Visit  Subjective     Carli Dubon is a 64 y.o. female who presents for a transitional care management visit.    Within 48 business hours after discharge our office contacted her via telephone to coordinate her care and needs.      I reviewed and discussed the details of that call along with the discharge summary, hospital problems, inpatient lab results, inpatient diagnostic studies, and consultation reports with Carli.     Current outpatient and discharge medications have been reconciled for the patient.  Reviewed by: DILLON Landis          2025     5:53 PM   Date of TCM Phone Call   Hospital [facility]   Date of Admission 2025   Date of Discharge 2025     Risk for Readmission (LACE) No data recorded    History of Present Illness  Patient is here today for [facility] admission follow-up.  Patient was admitted from  through   when he found bilateral pulmonary embolus  Patient returned back to the ER on  with complaints of right shoulder pain  Patient was started on Eliquis after her first visit.  She was advised to follow-up with hematology.  On her repeat CT angiogram it showed the pulmonary embolus had resolved.  She quit smoking over a month ago  She states she still has some right shoulder pain but it is improving  Has seen PT and needs a new referral as it has .   She has had pain about 2 months  Denies any known injury  Denies popping   She mostly has normal ROM    - Hospital Course    HPI:  \"Carli Dubon is a 64 y.o. female with a previous medical history of COPD, Tobacco Abuse, DM, HTN, and Bipolar Disorder who presented to [facility] on 2025 with complaints of right shoulder and back pain that radiated to her right upper abdomen for the past 4 days. She treated her pain at home with Tylenol but it has continued to worsen.  She denies chest pain, shortness of breath, dizziness, or " "palpitations.  She denies any recent travel.  She did report some BLE edema a few weeks ago that has resolved. She denies trauma, weight loss.       In the ED, D-dimer 1.16, Lactate 3.4, WBC 13.  CTA showed RLL segmental and subsegmental pulmonary arterial branch emboli with nonocclusive LLL subsegmental embolus, cardiac chambers were WNL.  She was slightly tachycardic on arrival at 109, this had improved to 98 on my exam.  She is afebrile, not hypoxic, SPO2 93% on RA, patient is able to complete sentences, has no complaints of shortness of breath.  BP is stable.  Hospitalist was consulted for further management.\"     Hospital Course:  Patient was admitted to the hospital and started on IV heparin.  Cardiology was consulted for the need of thrombectomy.  Cardiology recommended that patient does not need thrombectomy and 2D echo was obtained which showed patient with small pericardial effusion normal right ventricular size and systolic function.   Left ventricular systolic function is low normal. Calculated left ventricular EF = 50% Left ventricular ejection fraction appears to be 51 - 55%.  ·  The left atrial cavity is dilated.  ·  Left atrial volume is mildly increased.  ·  Saline test results are negative.  ·  The right atrial cavity is borderline dilated.  ·  There is a small (<1cm) pericardial effusion. There is no evidence of cardiac tamponade.  Patient's heparin was transitioned to Eliquis 10 mg p.o. twice daily for 7 days and then 5 mg twice daily for at least 6 months duration will be decided by hematology after the workup. hematology was consulted for hypercoagulable workup.   Leukocytosis most likely reactive.  Chest x-ray negative patient afebrile  Patient stable for discharge and follow-up with PCP and hematology as an outpatient  DISCHARGE Follow Up Recommendations for labs and diagnostics:   Follow-up with PCP and hematology     Reasons For Change In Medications and Indications for New " Medications:  Apixaban 10 mg p.o. twice daily for 7 days and then 5 mg twice daily     6/26 Hospital ER visit    Appropriate PPE worn during exam.  Patient was placed on a cardiac monitor had an IV established labs imaging obtained to evaluate for kidney function anemia worsening clot burden.  Her physical exam is consistent with MSK etiology.  She was noted to have a blood sugar of 212.  She has no anginal symptoms.  I discussed with Dr. Mendoza.  She will be started on Flexeril and Lidoderm patches.  Systemic steroids were not given due to her hyperglycemia and she is unable to be placed on NSAIDs due to her anticoagulation.  Referral to PT was placed.  On reexam she is resting comfortably hemodynamically stable without any new symptoms.       Course During Hospital Stay:  see HPI     The following portions of the patient's history were reviewed and updated as appropriate: allergies, current medications, past family history, past medical history, past social history, past surgical history, and problem list.    Review of Systems   Constitutional:  Negative for chills, fatigue and fever.   Respiratory:  Negative for chest tightness and shortness of breath.    Cardiovascular:  Negative for chest pain and palpitations.   Musculoskeletal:  Negative for arthralgias and myalgias.   Neurological:  Negative for dizziness and headaches.       Objective   /77 (BP Location: Left arm, Patient Position: Sitting, Cuff Size: Large Adult)   Pulse 101   Temp 98.7 °F (37.1 °C) (Tympanic)   Wt 96.2 kg (212 lb)   SpO2 94%   BMI 35.28 kg/m²   Physical Exam  Constitutional:       General: She is not in acute distress.     Appearance: Normal appearance. She is not ill-appearing.   HENT:      Head: Normocephalic and atraumatic.   Eyes:      Extraocular Movements: Extraocular movements intact.   Cardiovascular:      Rate and Rhythm: Normal rate and regular rhythm.      Heart sounds: No murmur heard.  Pulmonary:      Effort:  Pulmonary effort is normal. No respiratory distress.   Musculoskeletal:         General: Normal range of motion.   Skin:     General: Skin is warm and dry.   Neurological:      General: No focal deficit present.      Mental Status: She is alert and oriented to person, place, and time.   Psychiatric:         Mood and Affect: Mood normal.         Behavior: Behavior normal.         Thought Content: Thought content normal.         Judgment: Judgment normal.         Assessment & Plan   Problems Addressed this Visit    None  Visit Diagnoses         Acute pain of right shoulder    -  Primary    improving  tylenol as needed  referral to PT    Relevant Orders    Ambulatory Referral to Physical Therapy for Evaluation & Treatment (Completed)      Other acute pulmonary embolism, unspecified whether acute cor pulmonale present        recent CT angiogram showed resolution  sees hemtology  cont eliquis          Diagnoses         Codes Comments      Acute pain of right shoulder    -  Primary ICD-10-CM: M25.511  ICD-9-CM: 719.41 improving  tylenol as needed  referral to PT      Other acute pulmonary embolism, unspecified whether acute cor pulmonale present     ICD-10-CM: I26.99  ICD-9-CM: 415.19 recent CT angiogram showed resolution  sees hemtology  cont eliquis

## 2025-07-28 DIAGNOSIS — I10 HYPERTENSION, UNSPECIFIED TYPE: ICD-10-CM

## 2025-07-28 RX ORDER — LOSARTAN POTASSIUM 100 MG/1
100 TABLET ORAL DAILY
Qty: 90 TABLET | Refills: 0 | Status: SHIPPED | OUTPATIENT
Start: 2025-07-28

## 2025-08-01 DIAGNOSIS — R11.2 NAUSEA AND VOMITING, UNSPECIFIED VOMITING TYPE: ICD-10-CM

## 2025-08-01 RX ORDER — OMEPRAZOLE 40 MG/1
40 CAPSULE, DELAYED RELEASE ORAL DAILY
Qty: 90 CAPSULE | Refills: 0 | Status: SHIPPED | OUTPATIENT
Start: 2025-08-01

## 2025-08-13 ENCOUNTER — OFFICE VISIT (OUTPATIENT)
Dept: ENDOCRINOLOGY | Facility: CLINIC | Age: 65
End: 2025-08-13
Payer: MEDICARE

## 2025-08-13 ENCOUNTER — PATIENT ROUNDING (BHMG ONLY) (OUTPATIENT)
Dept: ENDOCRINOLOGY | Facility: CLINIC | Age: 65
End: 2025-08-13
Payer: MEDICARE

## 2025-08-13 ENCOUNTER — SPECIALTY PHARMACY (OUTPATIENT)
Dept: ENDOCRINOLOGY | Facility: CLINIC | Age: 65
End: 2025-08-13
Payer: MEDICARE

## 2025-08-13 VITALS
HEART RATE: 101 BPM | BODY MASS INDEX: 35.99 KG/M2 | HEIGHT: 65 IN | SYSTOLIC BLOOD PRESSURE: 152 MMHG | OXYGEN SATURATION: 95 % | WEIGHT: 216 LBS | DIASTOLIC BLOOD PRESSURE: 90 MMHG

## 2025-08-13 DIAGNOSIS — Z79.4 TYPE 2 DIABETES MELLITUS WITH HYPERGLYCEMIA, WITH LONG-TERM CURRENT USE OF INSULIN: Primary | ICD-10-CM

## 2025-08-13 DIAGNOSIS — E11.65 TYPE 2 DIABETES MELLITUS WITH HYPERGLYCEMIA, WITH LONG-TERM CURRENT USE OF INSULIN: Primary | ICD-10-CM

## 2025-08-13 DIAGNOSIS — E66.9 OBESITY (BMI 30-39.9): ICD-10-CM

## 2025-08-13 DIAGNOSIS — E11.42 DIABETIC PERIPHERAL NEUROPATHY: ICD-10-CM

## 2025-08-13 PROCEDURE — 1159F MED LIST DOCD IN RCRD: CPT | Performed by: INTERNAL MEDICINE

## 2025-08-13 PROCEDURE — 99214 OFFICE O/P EST MOD 30 MIN: CPT | Performed by: INTERNAL MEDICINE

## 2025-08-13 PROCEDURE — 1160F RVW MEDS BY RX/DR IN RCRD: CPT | Performed by: INTERNAL MEDICINE

## 2025-08-13 PROCEDURE — 3080F DIAST BP >= 90 MM HG: CPT | Performed by: INTERNAL MEDICINE

## 2025-08-13 PROCEDURE — G2211 COMPLEX E/M VISIT ADD ON: HCPCS | Performed by: INTERNAL MEDICINE

## 2025-08-13 PROCEDURE — 3052F HG A1C>EQUAL 8.0%<EQUAL 9.0%: CPT | Performed by: INTERNAL MEDICINE

## 2025-08-13 PROCEDURE — 3077F SYST BP >= 140 MM HG: CPT | Performed by: INTERNAL MEDICINE

## 2025-08-13 RX ORDER — KETOROLAC TROMETHAMINE 30 MG/ML
1 INJECTION, SOLUTION INTRAMUSCULAR; INTRAVENOUS
Qty: 1 EACH | Refills: 0 | Status: SHIPPED | OUTPATIENT
Start: 2025-08-13

## 2025-08-13 RX ORDER — GLIPIZIDE 10 MG/1
10 TABLET ORAL
Qty: 180 TABLET | Refills: 3 | Status: SHIPPED | OUTPATIENT
Start: 2025-08-13

## 2025-08-13 RX ORDER — PEN NEEDLE, DIABETIC 30 GX3/16"
1 NEEDLE, DISPOSABLE MISCELLANEOUS DAILY
Qty: 100 EACH | Refills: 3 | Status: SHIPPED | OUTPATIENT
Start: 2025-08-13

## 2025-08-13 RX ORDER — HYDROCHLOROTHIAZIDE 12.5 MG/1
CAPSULE ORAL
Qty: 6 EACH | Refills: 3 | Status: SHIPPED | OUTPATIENT
Start: 2025-08-13

## 2025-08-15 ENCOUNTER — APPOINTMENT (OUTPATIENT)
Dept: GENERAL RADIOLOGY | Facility: HOSPITAL | Age: 65
End: 2025-08-15
Payer: MEDICARE

## 2025-08-15 ENCOUNTER — APPOINTMENT (OUTPATIENT)
Dept: CT IMAGING | Facility: HOSPITAL | Age: 65
End: 2025-08-15
Payer: MEDICARE

## 2025-08-15 ENCOUNTER — HOSPITAL ENCOUNTER (EMERGENCY)
Facility: HOSPITAL | Age: 65
Discharge: HOME | End: 2025-08-16
Payer: MEDICARE

## 2025-08-15 DIAGNOSIS — J98.4 PNEUMONITIS: Primary | ICD-10-CM

## 2025-08-15 DIAGNOSIS — M54.9 ACUTE RIGHT-SIDED BACK PAIN, UNSPECIFIED BACK LOCATION: ICD-10-CM

## 2025-08-15 LAB
ALBUMIN SERPL-MCNC: 4 G/DL (ref 3.5–5.2)
ALBUMIN/GLOB SERPL: 1.5 G/DL
ALP SERPL-CCNC: 79 U/L (ref 39–117)
ALT SERPL W P-5'-P-CCNC: 14 U/L (ref 1–33)
ANION GAP SERPL CALCULATED.3IONS-SCNC: 8.7 MMOL/L (ref 5–15)
AST SERPL-CCNC: 18 U/L (ref 1–32)
BASOPHILS # BLD AUTO: 0.03 10*3/MM3 (ref 0–0.2)
BASOPHILS NFR BLD AUTO: 0.3 % (ref 0–1.5)
BILIRUB SERPL-MCNC: 0.2 MG/DL (ref 0–1.2)
BILIRUB UR QL STRIP: NEGATIVE
BUN SERPL-MCNC: 14.4 MG/DL (ref 8–23)
BUN/CREAT SERPL: 23.6 (ref 7–25)
CALCIUM SPEC-SCNC: 9.1 MG/DL (ref 8.6–10.5)
CHLORIDE SERPL-SCNC: 104 MMOL/L (ref 98–107)
CLARITY UR: CLEAR
CO2 SERPL-SCNC: 27.3 MMOL/L (ref 22–29)
COLOR UR: YELLOW
CREAT SERPL-MCNC: 0.61 MG/DL (ref 0.57–1)
D DIMER PPP FEU-MCNC: 0.89 MCGFEU/ML (ref 0–0.62)
DEPRECATED RDW RBC AUTO: 52.5 FL (ref 37–54)
EGFRCR SERPLBLD CKD-EPI 2021: 101.2 ML/MIN/1.73
EOSINOPHIL # BLD AUTO: 0.09 10*3/MM3 (ref 0–0.4)
EOSINOPHIL NFR BLD AUTO: 1 % (ref 0.3–6.2)
ERYTHROCYTE [DISTWIDTH] IN BLOOD BY AUTOMATED COUNT: 14.3 % (ref 12.3–15.4)
GEN 5 1HR TROPONIN T REFLEX: 8 NG/L
GLOBULIN UR ELPH-MCNC: 2.6 GM/DL
GLUCOSE BLDC GLUCOMTR-MCNC: 157 MG/DL (ref 70–105)
GLUCOSE BLDC GLUCOMTR-MCNC: 63 MG/DL (ref 70–105)
GLUCOSE SERPL-MCNC: 133 MG/DL (ref 65–99)
GLUCOSE UR STRIP-MCNC: ABNORMAL MG/DL
HCT VFR BLD AUTO: 42.9 % (ref 34–46.6)
HGB BLD-MCNC: 13.6 G/DL (ref 12–15.9)
HGB UR QL STRIP.AUTO: NEGATIVE
HOLD SPECIMEN: NORMAL
HOLD SPECIMEN: NORMAL
IMM GRANULOCYTES # BLD AUTO: 0.07 10*3/MM3 (ref 0–0.05)
IMM GRANULOCYTES NFR BLD AUTO: 0.8 % (ref 0–0.5)
KETONES UR QL STRIP: ABNORMAL
LEUKOCYTE ESTERASE UR QL STRIP.AUTO: NEGATIVE
LIPASE SERPL-CCNC: 26 U/L (ref 13–60)
LYMPHOCYTES # BLD AUTO: 2.75 10*3/MM3 (ref 0.7–3.1)
LYMPHOCYTES NFR BLD AUTO: 30.2 % (ref 19.6–45.3)
MCH RBC QN AUTO: 31.2 PG (ref 26.6–33)
MCHC RBC AUTO-ENTMCNC: 31.7 G/DL (ref 31.5–35.7)
MCV RBC AUTO: 98.4 FL (ref 79–97)
MONOCYTES # BLD AUTO: 0.64 10*3/MM3 (ref 0.1–0.9)
MONOCYTES NFR BLD AUTO: 7 % (ref 5–12)
NEUTROPHILS NFR BLD AUTO: 5.53 10*3/MM3 (ref 1.7–7)
NEUTROPHILS NFR BLD AUTO: 60.7 % (ref 42.7–76)
NITRITE UR QL STRIP: NEGATIVE
NRBC BLD AUTO-RTO: 0 /100 WBC (ref 0–0.2)
NT-PROBNP SERPL-MCNC: 354 PG/ML (ref 0–900)
PH UR STRIP.AUTO: 7.5 [PH] (ref 5–8)
PLATELET # BLD AUTO: 232 10*3/MM3 (ref 140–450)
PMV BLD AUTO: 9.5 FL (ref 6–12)
POTASSIUM SERPL-SCNC: 3.9 MMOL/L (ref 3.5–5.2)
PROT SERPL-MCNC: 6.6 G/DL (ref 6–8.5)
PROT UR QL STRIP: NEGATIVE
RBC # BLD AUTO: 4.36 10*6/MM3 (ref 3.77–5.28)
SODIUM SERPL-SCNC: 140 MMOL/L (ref 136–145)
SP GR UR STRIP: 1.04 (ref 1–1.03)
TROPONIN T NUMERIC DELTA: -3 NG/L
TROPONIN T SERPL HS-MCNC: 11 NG/L
UROBILINOGEN UR QL STRIP: ABNORMAL
WBC NRBC COR # BLD AUTO: 9.11 10*3/MM3 (ref 3.4–10.8)
WHOLE BLOOD HOLD COAG: NORMAL
WHOLE BLOOD HOLD SPECIMEN: NORMAL

## 2025-08-15 PROCEDURE — 0202U NFCT DS 22 TRGT SARS-COV-2: CPT

## 2025-08-15 PROCEDURE — 80053 COMPREHEN METABOLIC PANEL: CPT

## 2025-08-15 PROCEDURE — 82948 REAGENT STRIP/BLOOD GLUCOSE: CPT

## 2025-08-15 PROCEDURE — 36415 COLL VENOUS BLD VENIPUNCTURE: CPT

## 2025-08-15 PROCEDURE — 85379 FIBRIN DEGRADATION QUANT: CPT

## 2025-08-15 PROCEDURE — 84484 ASSAY OF TROPONIN QUANT: CPT

## 2025-08-15 PROCEDURE — 85025 COMPLETE CBC W/AUTO DIFF WBC: CPT

## 2025-08-15 PROCEDURE — 71275 CT ANGIOGRAPHY CHEST: CPT

## 2025-08-15 PROCEDURE — 96375 TX/PRO/DX INJ NEW DRUG ADDON: CPT

## 2025-08-15 PROCEDURE — 99285 EMERGENCY DEPT VISIT HI MDM: CPT

## 2025-08-15 PROCEDURE — 25510000001 IOPAMIDOL PER 1 ML

## 2025-08-15 PROCEDURE — 83880 ASSAY OF NATRIURETIC PEPTIDE: CPT

## 2025-08-15 PROCEDURE — 93005 ELECTROCARDIOGRAM TRACING: CPT

## 2025-08-15 PROCEDURE — 96374 THER/PROPH/DIAG INJ IV PUSH: CPT

## 2025-08-15 PROCEDURE — 25010000002 DIPHENHYDRAMINE PER 50 MG

## 2025-08-15 PROCEDURE — 81003 URINALYSIS AUTO W/O SCOPE: CPT

## 2025-08-15 PROCEDURE — 25010000002 METHYLPREDNISOLONE PER 40 MG

## 2025-08-15 PROCEDURE — 71045 X-RAY EXAM CHEST 1 VIEW: CPT

## 2025-08-15 PROCEDURE — 83690 ASSAY OF LIPASE: CPT

## 2025-08-15 RX ORDER — DEXTROSE MONOHYDRATE 25 G/50ML
INJECTION, SOLUTION INTRAVENOUS
Status: COMPLETED
Start: 2025-08-15 | End: 2025-08-15

## 2025-08-15 RX ORDER — DEXTROSE MONOHYDRATE 25 G/50ML
25 INJECTION, SOLUTION INTRAVENOUS ONCE
Status: COMPLETED | OUTPATIENT
Start: 2025-08-15 | End: 2025-08-15

## 2025-08-15 RX ORDER — DIPHENHYDRAMINE HYDROCHLORIDE 50 MG/ML
50 INJECTION, SOLUTION INTRAMUSCULAR; INTRAVENOUS
Status: COMPLETED | OUTPATIENT
Start: 2025-08-15 | End: 2025-08-15

## 2025-08-15 RX ORDER — SODIUM CHLORIDE 0.9 % (FLUSH) 0.9 %
10 SYRINGE (ML) INJECTION AS NEEDED
Status: DISCONTINUED | OUTPATIENT
Start: 2025-08-15 | End: 2025-08-16 | Stop reason: HOSPADM

## 2025-08-15 RX ORDER — IOPAMIDOL 755 MG/ML
100 INJECTION, SOLUTION INTRAVASCULAR
Status: COMPLETED | OUTPATIENT
Start: 2025-08-15 | End: 2025-08-15

## 2025-08-15 RX ORDER — METHYLPREDNISOLONE SODIUM SUCCINATE 40 MG/ML
40 INJECTION, POWDER, LYOPHILIZED, FOR SOLUTION INTRAMUSCULAR; INTRAVENOUS EVERY 4 HOURS
Status: DISCONTINUED | OUTPATIENT
Start: 2025-08-15 | End: 2025-08-16 | Stop reason: HOSPADM

## 2025-08-15 RX ADMIN — IOPAMIDOL 100 ML: 755 INJECTION, SOLUTION INTRAVENOUS at 22:38

## 2025-08-15 RX ADMIN — DEXTROSE MONOHYDRATE 25 G: 25 INJECTION, SOLUTION INTRAVENOUS at 22:19

## 2025-08-15 RX ADMIN — DIPHENHYDRAMINE HYDROCHLORIDE 50 MG: 50 INJECTION INTRAMUSCULAR; INTRAVENOUS at 21:24

## 2025-08-15 RX ADMIN — METHYLPREDNISOLONE SODIUM SUCCINATE 40 MG: 40 INJECTION, POWDER, FOR SOLUTION INTRAMUSCULAR; INTRAVENOUS at 21:24

## 2025-08-16 VITALS
RESPIRATION RATE: 18 BRPM | SYSTOLIC BLOOD PRESSURE: 159 MMHG | BODY MASS INDEX: 35.48 KG/M2 | OXYGEN SATURATION: 94 % | WEIGHT: 220.8 LBS | HEART RATE: 101 BPM | HEIGHT: 66 IN | DIASTOLIC BLOOD PRESSURE: 75 MMHG | TEMPERATURE: 98.2 F

## 2025-08-16 LAB
B PARAPERT DNA SPEC QL NAA+PROBE: NOT DETECTED
B PERT DNA SPEC QL NAA+PROBE: NOT DETECTED
C PNEUM DNA NPH QL NAA+NON-PROBE: NOT DETECTED
FLUAV SUBTYP SPEC NAA+PROBE: NOT DETECTED
FLUBV RNA NPH QL NAA+NON-PROBE: NOT DETECTED
HADV DNA SPEC NAA+PROBE: NOT DETECTED
HCOV 229E RNA SPEC QL NAA+PROBE: NOT DETECTED
HCOV HKU1 RNA SPEC QL NAA+PROBE: NOT DETECTED
HCOV NL63 RNA SPEC QL NAA+PROBE: NOT DETECTED
HCOV OC43 RNA SPEC QL NAA+PROBE: NOT DETECTED
HMPV RNA NPH QL NAA+NON-PROBE: NOT DETECTED
HPIV1 RNA ISLT QL NAA+PROBE: NOT DETECTED
HPIV2 RNA SPEC QL NAA+PROBE: NOT DETECTED
HPIV3 RNA NPH QL NAA+PROBE: NOT DETECTED
HPIV4 P GENE NPH QL NAA+PROBE: NOT DETECTED
M PNEUMO IGG SER IA-ACNC: NOT DETECTED
QT INTERVAL: 335 MS
QTC INTERVAL: 421 MS
RHINOVIRUS RNA SPEC NAA+PROBE: NOT DETECTED
RSV RNA NPH QL NAA+NON-PROBE: NOT DETECTED
SARS-COV-2 RNA RESP QL NAA+PROBE: NOT DETECTED

## 2025-08-16 RX ORDER — AZITHROMYCIN 250 MG/1
TABLET, FILM COATED ORAL
Qty: 6 TABLET | Refills: 0 | Status: SHIPPED | OUTPATIENT
Start: 2025-08-16

## 2025-08-16 RX ORDER — METHYLPREDNISOLONE 4 MG/1
TABLET ORAL
Qty: 21 TABLET | Refills: 0 | Status: SHIPPED | OUTPATIENT
Start: 2025-08-16

## 2025-08-16 RX ORDER — METHYLPREDNISOLONE 4 MG/1
TABLET ORAL
Qty: 21 TABLET | Refills: 0 | Status: SHIPPED | OUTPATIENT
Start: 2025-08-16 | End: 2025-08-16

## 2025-08-16 RX ORDER — AZITHROMYCIN 250 MG/1
TABLET, FILM COATED ORAL
Qty: 6 TABLET | Refills: 0 | Status: SHIPPED | OUTPATIENT
Start: 2025-08-16 | End: 2025-08-16

## 2025-08-20 DIAGNOSIS — F51.04 PSYCHOPHYSIOLOGICAL INSOMNIA: ICD-10-CM

## 2025-08-20 RX ORDER — AMITRIPTYLINE HYDROCHLORIDE 150 MG/1
150 TABLET ORAL NIGHTLY
Qty: 90 TABLET | Refills: 1 | Status: SHIPPED | OUTPATIENT
Start: 2025-08-20